# Patient Record
Sex: MALE | Race: WHITE | Employment: UNEMPLOYED | URBAN - NONMETROPOLITAN AREA
[De-identification: names, ages, dates, MRNs, and addresses within clinical notes are randomized per-mention and may not be internally consistent; named-entity substitution may affect disease eponyms.]

---

## 2020-03-05 ENCOUNTER — APPOINTMENT (OUTPATIENT)
Dept: CT IMAGING | Age: 45
DRG: 092 | End: 2020-03-05
Payer: COMMERCIAL

## 2020-03-05 ENCOUNTER — APPOINTMENT (OUTPATIENT)
Dept: MRI IMAGING | Age: 45
DRG: 092 | End: 2020-03-05
Payer: COMMERCIAL

## 2020-03-05 ENCOUNTER — HOSPITAL ENCOUNTER (INPATIENT)
Age: 45
LOS: 11 days | Discharge: NURSING, REHAB OR LONG-TERM CARE | DRG: 092 | End: 2020-03-17
Attending: FAMILY MEDICINE | Admitting: FAMILY MEDICINE
Payer: COMMERCIAL

## 2020-03-05 LAB
ANION GAP SERPL CALCULATED.3IONS-SCNC: 14 MEQ/L (ref 8–16)
BACTERIA: ABNORMAL /HPF
BASOPHILS # BLD: 0.3 %
BASOPHILS ABSOLUTE: 0 THOU/MM3 (ref 0–0.1)
BILIRUBIN URINE: NEGATIVE
BLOOD, URINE: ABNORMAL
BUN BLDV-MCNC: 13 MG/DL (ref 7–22)
C-REACTIVE PROTEIN: 0.19 MG/DL (ref 0–1)
CALCIUM IONIZED: 1.15 MMOL/L (ref 1.12–1.32)
CALCIUM SERPL-MCNC: 9.4 MG/DL (ref 8.5–10.5)
CALCIUM SERPL-MCNC: 9.5 MG/DL (ref 8.5–10.5)
CASTS 2: ABNORMAL /LPF
CASTS UA: ABNORMAL /LPF
CHARACTER, URINE: CLEAR
CHLORIDE BLD-SCNC: 102 MEQ/L (ref 98–111)
CO2: 24 MEQ/L (ref 23–33)
COLOR: YELLOW
CREAT SERPL-MCNC: 0.8 MG/DL (ref 0.4–1.2)
CRYSTALS, UA: ABNORMAL
EOSINOPHIL # BLD: 1 %
EOSINOPHILS ABSOLUTE: 0.1 THOU/MM3 (ref 0–0.4)
EPITHELIAL CELLS, UA: ABNORMAL /HPF
ERYTHROCYTE [DISTWIDTH] IN BLOOD BY AUTOMATED COUNT: 12.3 % (ref 11.5–14.5)
ERYTHROCYTE [DISTWIDTH] IN BLOOD BY AUTOMATED COUNT: 38.3 FL (ref 35–45)
GFR SERPL CREATININE-BSD FRML MDRD: > 90 ML/MIN/1.73M2
GLUCOSE BLD-MCNC: 139 MG/DL (ref 70–108)
GLUCOSE URINE: NEGATIVE MG/DL
HCT VFR BLD CALC: 43.9 % (ref 42–52)
HEMOGLOBIN: 14.4 GM/DL (ref 14–18)
IMMATURE GRANS (ABS): 0.02 THOU/MM3 (ref 0–0.07)
IMMATURE GRANULOCYTES: 0.3 %
KETONES, URINE: NEGATIVE
LEUKOCYTE ESTERASE, URINE: NEGATIVE
LYMPHOCYTES # BLD: 20.6 %
LYMPHOCYTES ABSOLUTE: 1.3 THOU/MM3 (ref 1–4.8)
MAGNESIUM: 2.2 MG/DL (ref 1.6–2.4)
MCH RBC QN AUTO: 28.1 PG (ref 26–33)
MCHC RBC AUTO-ENTMCNC: 32.8 GM/DL (ref 32.2–35.5)
MCV RBC AUTO: 85.7 FL (ref 80–94)
MISCELLANEOUS 2: ABNORMAL
MONOCYTES # BLD: 5.9 %
MONOCYTES ABSOLUTE: 0.4 THOU/MM3 (ref 0.4–1.3)
NITRITE, URINE: NEGATIVE
NUCLEATED RED BLOOD CELLS: 0 /100 WBC
OSMOLALITY CALCULATION: 281.8 MOSMOL/KG (ref 275–300)
PH UA: 7 (ref 5–9)
PLATELET # BLD: 305 THOU/MM3 (ref 130–400)
PMV BLD AUTO: 8.2 FL (ref 9.4–12.4)
POTASSIUM REFLEX MAGNESIUM: 4.1 MEQ/L (ref 3.5–5.2)
PROTEIN UA: NEGATIVE
RBC # BLD: 5.12 MILL/MM3 (ref 4.7–6.1)
RBC URINE: ABNORMAL /HPF
RENAL EPITHELIAL, UA: ABNORMAL
SEDIMENTATION RATE, ERYTHROCYTE: 17 MM/HR (ref 0–10)
SEG NEUTROPHILS: 71.9 %
SEGMENTED NEUTROPHILS ABSOLUTE COUNT: 4.5 THOU/MM3 (ref 1.8–7.7)
SODIUM BLD-SCNC: 140 MEQ/L (ref 135–145)
SPECIFIC GRAVITY, URINE: 1.01 (ref 1–1.03)
UROBILINOGEN, URINE: 1 EU/DL (ref 0–1)
WBC # BLD: 6.3 THOU/MM3 (ref 4.8–10.8)
WBC UA: ABNORMAL /HPF
YEAST: ABNORMAL

## 2020-03-05 PROCEDURE — 72158 MRI LUMBAR SPINE W/O & W/DYE: CPT

## 2020-03-05 PROCEDURE — 81001 URINALYSIS AUTO W/SCOPE: CPT

## 2020-03-05 PROCEDURE — 82330 ASSAY OF CALCIUM: CPT

## 2020-03-05 PROCEDURE — A9579 GAD-BASE MR CONTRAST NOS,1ML: HCPCS | Performed by: FAMILY MEDICINE

## 2020-03-05 PROCEDURE — 96374 THER/PROPH/DIAG INJ IV PUSH: CPT

## 2020-03-05 PROCEDURE — 70450 CT HEAD/BRAIN W/O DYE: CPT

## 2020-03-05 PROCEDURE — 72141 MRI NECK SPINE W/O DYE: CPT

## 2020-03-05 PROCEDURE — 80048 BASIC METABOLIC PNL TOTAL CA: CPT

## 2020-03-05 PROCEDURE — A9579 GAD-BASE MR CONTRAST NOS,1ML: HCPCS | Performed by: NURSE PRACTITIONER

## 2020-03-05 PROCEDURE — 85025 COMPLETE CBC W/AUTO DIFF WBC: CPT

## 2020-03-05 PROCEDURE — 86140 C-REACTIVE PROTEIN: CPT

## 2020-03-05 PROCEDURE — 70551 MRI BRAIN STEM W/O DYE: CPT

## 2020-03-05 PROCEDURE — 99285 EMERGENCY DEPT VISIT HI MDM: CPT

## 2020-03-05 PROCEDURE — 96376 TX/PRO/DX INJ SAME DRUG ADON: CPT

## 2020-03-05 PROCEDURE — 72157 MRI CHEST SPINE W/O & W/DYE: CPT

## 2020-03-05 PROCEDURE — 85651 RBC SED RATE NONAUTOMATED: CPT

## 2020-03-05 PROCEDURE — 36415 COLL VENOUS BLD VENIPUNCTURE: CPT

## 2020-03-05 PROCEDURE — 82310 ASSAY OF CALCIUM: CPT

## 2020-03-05 PROCEDURE — 83735 ASSAY OF MAGNESIUM: CPT

## 2020-03-05 PROCEDURE — 6360000004 HC RX CONTRAST MEDICATION: Performed by: NURSE PRACTITIONER

## 2020-03-05 PROCEDURE — 6360000004 HC RX CONTRAST MEDICATION: Performed by: FAMILY MEDICINE

## 2020-03-05 RX ORDER — METOCLOPRAMIDE 10 MG/1
10 TABLET ORAL 4 TIMES DAILY
Qty: 20 TABLET | Refills: 0 | Status: SHIPPED | OUTPATIENT
Start: 2020-03-05 | End: 2020-03-05

## 2020-03-05 RX ORDER — TRAMADOL HYDROCHLORIDE 50 MG/1
50 TABLET ORAL EVERY 6 HOURS PRN
Qty: 20 TABLET | Refills: 0 | Status: SHIPPED | OUTPATIENT
Start: 2020-03-05 | End: 2020-03-05

## 2020-03-05 RX ADMIN — GADOTERIDOL 15 ML: 279.3 INJECTION, SOLUTION INTRAVENOUS at 21:23

## 2020-03-05 RX ADMIN — GADOTERIDOL 15 ML: 279.3 INJECTION, SOLUTION INTRAVENOUS at 13:14

## 2020-03-05 ASSESSMENT — ENCOUNTER SYMPTOMS
CONSTIPATION: 0
BACK PAIN: 0
ABDOMINAL PAIN: 0
COUGH: 0
DIARRHEA: 0
NAUSEA: 0
SHORTNESS OF BREATH: 0
VOMITING: 0
CHEST TIGHTNESS: 0

## 2020-03-05 NOTE — ED NOTES
Ambulated patient in the hallway with contact assist to help steady and a 2 wheel walker. Gait is slow and \"jerky\". Continues to complain of numbness in legs. Omar Benítez NP in [place] to witness patient ambulating. Returned to cot and laying down. Mom at bedside. Call light in reach.       Lion Kwok RN  03/05/20 9677

## 2020-03-05 NOTE — ED PROVIDER NOTES
be filling with contrast. This could also represent subarachnoid adhesions. 3. Ventral epidural contrast is able to displace the thoracic spinal cord away from the posterior margins of the vertebral bodies. 4. There is subtle mass effect upon the dorsal aspect of the thoracic spinal cord at the T8-9 and T9 levels. **This report has been created using voice recognition software. It may contain minor errors which are inherent in voice recognition technology. **      Final report electronically signed by Dr. Francheska Coley on 3/6/2020 1:25 PM      FL LUMBAR PUNCTURE DIAG   Final Result      Fluoroscopy guided lumbar puncture for CSF collection and contrast injection for CT of the thoracic spine. Final report electronically signed by Dr. Linda Wiley on 3/6/2020 12:34 PM      FL GUIDED FOR SPINE INJECT   Final Result      Fluoroscopy guided lumbar puncture for CSF collection and contrast injection for CT of the thoracic spine. Final report electronically signed by Dr. Linda Wiley on 3/6/2020 12:34 PM      FL MYELOGRAM THORACIC VIA LUMB INJ S&I   Final Result      Fluoroscopy guided lumbar puncture for CSF collection and contrast injection for CT of the thoracic spine. Final report electronically signed by Dr. Linda Wiley on 3/6/2020 12:34 PM      XR CHEST PORTABLE   Final Result      No acute cardiopulmonary disease. **This report has been created using voice recognition software. It may contain minor errors which are inherent in voice recognition technology. **      Final report electronically signed by Dr. Miryam Trinh on 3/6/2020 3:24 AM      MRI BRAIN WO CONTRAST   Final Result    Normal MRI of the brain. **This report has been created using voice recognition software. It may contain minor errors which are inherent in voice recognition technology. **      Final report electronically signed by Dr. Francheska Coley on 3/6/2020 9:31 AM       [address] CONTRAST   Final Result          1. Moderate severity spinal canal stenosis at the C3-4, C4-5 and C5-6 levels due to posterior disc osteophyte complexes. 2. Severe bilateral foraminal stenosis at the C5-6 level. 3. Normal signal in the cervical spinal cord. **This report has been created using voice recognition software. It may contain minor errors which are inherent in voice recognition technology. **      Final report electronically signed by Dr. Francheska Coley on 3/6/2020 9:35 AM      MRI THORACIC SPINE W WO CONTRAST   Final Result       Mass effect upon the thoracic spinal cord at the T7-8 through T9 levels. The thoracic spinal cord is flattened against the ventral spinal canal at the T8-9 level. There is abnormal cord signal consistent with cord edema versus myelomalacia. Just below    this, there is a cystic abnormality within the cord. This can represent a syrinx. There is a nodule of enhancement. The differential diagnosis includes a cystic mass in the thoracic spinal cord with enhancing nodule however, this does not explain the    mass effect upon the dorsal aspect of the cord. Alternatively, this could be and arachnoid cyst dorsal to the thoracic cord compressing the cord. This could also represent subarachnoid adhesions distorting the cord. **This report has been created using voice recognition software. It may contain minor errors which are inherent in voice recognition technology. **      Final report electronically signed by Dr. Francheska Coley on 3/6/2020 9:51 AM      MRI [address]   Final Result       1. Abnormal marrow signal in the superior endplate of L5 for. This is on the right. The pattern and distribution is most consistent with an acute fracture. This is most likely secondary to underlying osteopenia process if the patient does not have a    history of trauma. Clinical correlation is recommended.    2. No evidence of significant spinal canal stenosis following components:    Glucose 174 (*)     BUN 26 (*)     All other components within normal limits   CBC - Abnormal; Notable for the following components:    WBC 12.8 (*)     MPV 8.4 (*)     All other components within normal limits   BASIC METABOLIC PANEL - Abnormal; Notable for the following components:    Glucose 149 (*)     BUN 24 (*)     All other components within normal limits   CBC - Abnormal; Notable for the following components:    WBC 14.1 (*)     MPV 8.9 (*)     All other components within normal limits   BASIC METABOLIC PANEL - Abnormal; Notable for the following components:    Glucose 155 (*)     BUN 26 (*)     All other components within normal limits   CBC - Abnormal; Notable for the following components:    WBC 17.0 (*)     Platelets 359 (*)     MPV 8.9 (*)     All other components within normal limits   VITAMIN D 25 HYDROXY - Abnormal; Notable for the following components:    Vit D, 25-Hydroxy 24 (*)     All other components within normal limits   CBC WITH AUTO DIFFERENTIAL - Abnormal; Notable for the following components:    WBC 19.1 (*)     Platelets 702 (*)     MPV 8.9 (*)     Segs Absolute 16.0 (*)     Monocytes Absolute 1.4 (*)     Immature Grans (Abs) 0.60 (*)     All other components within normal limits   BASIC METABOLIC PANEL - Abnormal; Notable for the following components:    Sodium 132 (*)     Chloride 94 (*)     Glucose 128 (*)     BUN 27 (*)     All other components within normal limits   POCT GLUCOSE - Abnormal; Notable for the following components:    POC Glucose 133 (*)     All other components within normal limits   POCT GLUCOSE - Abnormal; Notable for the following components:    POC Glucose 170 (*)     All other components within normal limits   POCT GLUCOSE - Abnormal; Notable for the following components:    POC Glucose 159 (*)     All other components within normal limits   POCT GLUCOSE - Abnormal; Notable for the following components:    POC Glucose 240 (*)     All other components within normal limits   POCT GLUCOSE - Abnormal; Notable for the following components:    POC Glucose 138 (*)     All other components within normal limits   POCT GLUCOSE - Abnormal; Notable for the following components:    POC Glucose 126 (*)     All other components within normal limits   POCT GLUCOSE - Abnormal; Notable for the following components:    POC Glucose 134 (*)     All other components within normal limits   POCT GLUCOSE - Abnormal; Notable for the following components:    POC Glucose 200 (*)     All other components within normal limits   POCT GLUCOSE - Abnormal; Notable for the following components:    POC Glucose 143 (*)     All other components within normal limits   POCT GLUCOSE - Abnormal; Notable for the following components:    POC Glucose 118 (*)     All other components within normal limits   POCT GLUCOSE - Abnormal; Notable for the following components:    POC Glucose 131 (*)     All other components within normal limits   POCT GLUCOSE - Abnormal; Notable for the following components:    POC Glucose 169 (*)     All other components within normal limits   POCT GLUCOSE - Abnormal; Notable for the following components:    POC Glucose 132 (*)     All other components within normal limits   POCT GLUCOSE - Abnormal; Notable for the following components:    POC Glucose 122 (*)     All other components within normal limits   POCT GLUCOSE - Abnormal; Notable for the following components:    POC Glucose 159 (*)     All other components within normal limits   POCT GLUCOSE - Abnormal; Notable for the following components:    POC Glucose 214 (*)     All other components within normal limits   POCT GLUCOSE - Abnormal; Notable for the following components:    POC Glucose 135 (*)     All other components within normal limits   POCT GLUCOSE - Abnormal; Notable for the following components:    POC Glucose 128 (*)     All other components within normal limits   POCT GLUCOSE - Abnormal; Notable for cerebrospinal fluid       Site:           Current Antibiotics: not stated   MENINGITIS ENCEPHALITIS PANEL CSF, MOLECULAR   C-REACTIVE PROTEIN   ANION GAP   GLOMERULAR FILTRATION RATE, ESTIMATED   OSMOLALITY   CALCIUM   CALCIUM, IONIZED   MAGNESIUM   PROTIME-INR   HEMOGLOBIN A1C   VITAMIN B12 & FOLATE   TSH WITH REFLEX   ANION GAP   GLOMERULAR FILTRATION RATE, ESTIMATED   OSMOLALITY   PROTEIN, CSF   ANION GAP   GLOMERULAR FILTRATION RATE, ESTIMATED   HEMOGLOBIN A1C   ANION GAP   GLOMERULAR FILTRATION RATE, ESTIMATED   CYTOLOGY, NON-GYN    Narrative:     [facility]      [place], [place]                  [phone]  [address] [facility], [address]                                                      PROC: 2020  [facility]                                    RECV: 2020  [address]                                    RPTD: 03/10/2020  [facility], [address]                      MRN:  [id number]    LOC: [id number]                      ACCT: [de-identified]  SEX: M                      : 1975  AGE: 53 Y                         PATHOLOGY REPORT                      ATTN: Elizabeth Lucas                      REQ: MILADYS BEST       Copies To:   Micaela Macedo DR; Elizabeth Lucas      Clinical Information:  DISEASE OF THE SPINAL CORD, UNSPECIFIED          FINAL RESULTS:     Mononuclear cells present, with poor preservation. Note: The cytology is limited by poor preservation and staining       Of the specimen. These are mononuclear cells, favored to be       Inflammatory. Please correlate with cell count differential.        SOURCE:  A) BODY FLUID, SPINAL FLUID    Source Description:                 Materials Prepared & Examined:  Volume. ........... Rocio Silk 13 ml           Monolayers. ............... Rocio Silk 1  Consistency. .. Rocio Silk Rocio Silk Rocio Silk Thin  Other. ................  3ml spinal  fluid added to fixative          Pathologist: TITO   CPT: 83141 x 1 <Sign Out Dr. Mona Coleman M.D., F.C.A.P. Accessioning and Transcription services performed at:  [facility]/ [facility]   Printed on:  3/10/2020  [place] [address], [address] Tasha Colon  Original print date: 03/10/2020  Technical service performed at [facility]-[address], [address] [facility], ESTIMATED   ANION [address] [facility], ESTIMATED   SCAN OF BLOOD SMEAR   CYTOLOGY, NON-GYN       EMERGENCY DEPARTMENT COURSE:   Vitals:    Vitals:    03/17/20 0322 03/17/20 0815 03/17/20 1200 03/17/20 1256   BP: 109/64 127/68 130/81    Pulse: 55 74 59    Resp: 16 16 16    Temp: 97.7 °F (36.5 °C) 97.9 °F (36.6 °C) 96.9 °F (36.1 °C)    TempSrc: Oral Oral Oral    SpO2: 94% 96% 96% 96%   Weight:       Height:           11:08 AM: The patient was seen and evaluated. MDM:  Patient seen and evaluated in the emergency department after being transferred to the ED from [facility] for further evaluation of worsening neurologic status. Appropriate labs were ordered, MRI lumbar spine was completed with no acute findings. After initial work-up patient was ambulated in the hallway by nursing staff and had significant difficulty and gross ataxia with ambulation. At this point I did not feel that patient was stable to discharge home, contacted hospitalist and neurology for consult and possible admission. At this time patient did not meet criteria for admission, I staffed the patient with Dr. Johan Maravilla who also saw the patient, please see his note for details. On reevaluation, patient was found to have hyperactive deep tendon reflexes in lower extremities as well as bilateral clonus.   MRI of head cervical and thoracic spine were also ordered, delayed due to patient having already been injected with contrast.  At the end of my shift, patient was pending MRI results and care was signed out to Dr. Dina Gupta. CRITICAL CARE:   None    CONSULTS:  11:35 - Contacted Garrison Garcia at [facility] to determine appointment this am with pt; reported that he wanted pt to be seen by neurology at [facility] for expressive aphasia and worsening numbness in the lower legs since seeing him in December. 14:10 - Contacted Dr Curtis Hansen (neurology) for potential consult; following case presentation Dr Curtis Hansen feel spt should be stable enough to be seen as an outpatient  14:45-consulted Minerva Duggan with hospitalist service for possible admission for further evaluation as patient is unable to ambulate  15:15-Dr. Eros Pickett attending to evaluate patient for further work-up    PROCEDURES:  None    FINAL IMPRESSION      1. Ataxia    2. Spinal cord lesion ([facility] [address]          DISPOSITION/PLAN   Admit    PATIENT REFERRED TO:  No follow-up provider specified. DISCHARGE MEDICATIONS:  Discharge Medication List as of 3/17/2020  4:22 PM          (Please note that portions of this note were completed with a voice recognition program.  Efforts were made to edit the dictations but occasionally words are mis-transcribed.)    The patient was given an opportunity to see the Emergency Attending. The patient voiced understanding that I was a Mid-LevelProvider and was in agreement with being seen independently by myself.           Foreign Blas, ISHAAN - CNP  03/25/20 9231

## 2020-03-05 NOTE — ED NOTES
States he was sent over from [address] [place] [address] for to see a neurologist and get blood work done. PT has had nerve problems with his knee for the past 6 months. Pt states he is not having any pain at the moment. Pt was noticeably unstable when getting into the bed.        Susan Christian  03/05/20 1016

## 2020-03-05 NOTE — ED NOTES
Spoke with MRI tech on phone. Patient has to wait 6 hours before can have another MRI completed due to receiving contrast.  Dr Holly Purvis updated.        Maria Teresa Srinivasan RN  03/05/20 8603

## 2020-03-05 NOTE — ED NOTES
Resting on cot at this time watching TV. Dr Holly Purvis in room to assess. Patient states his legs are \"sore\". Elevated on bed at this time. Call light in reach. Denies needs at this time.       Onur Lopez RN  03/05/20 2070

## 2020-03-05 NOTE — ED PROVIDER NOTES
when I reviewed the films. Radiology reading is pending    At this point will pass the case to  at change of shift. Dr. Ashlyn Silva is given the clinical history and he will make disposition once thoracic spine MRI results are known. 1. Ataxia          DISPOSITION/PLAN  PATIENT REFERRED TO:  No follow-up provider specified.   DISCHARGE MEDICATIONS:  Current Discharge Medication List            MD Gloria Rodriguez MD  03/05/20 3497

## 2020-03-06 ENCOUNTER — APPOINTMENT (OUTPATIENT)
Dept: GENERAL RADIOLOGY | Age: 45
DRG: 092 | End: 2020-03-06
Payer: COMMERCIAL

## 2020-03-06 ENCOUNTER — APPOINTMENT (OUTPATIENT)
Dept: CT IMAGING | Age: 45
DRG: 092 | End: 2020-03-06
Payer: COMMERCIAL

## 2020-03-06 PROBLEM — R29.898 LEG WEAKNESS, BILATERAL: Status: ACTIVE | Noted: 2020-03-06

## 2020-03-06 LAB
ANION GAP SERPL CALCULATED.3IONS-SCNC: 16 MEQ/L (ref 8–16)
AVERAGE GLUCOSE: 123 MG/DL (ref 70–126)
BUN BLDV-MCNC: 14 MG/DL (ref 7–22)
CALCIUM SERPL-MCNC: 9.8 MG/DL (ref 8.5–10.5)
CHARACTER, CSF: ABNORMAL
CHLORIDE BLD-SCNC: 103 MEQ/L (ref 98–111)
CO2: 23 MEQ/L (ref 23–33)
COLOR CSF: COLORLESS
CREAT SERPL-MCNC: 0.8 MG/DL (ref 0.4–1.2)
CRYPTOCOCCUS NEOFORMANS/GATTI CSF FILM ARR.: NOT DETECTED
CYTOMEGALOVIRUS (CMV) CSF FILM ARRAY: NOT DETECTED
EKG ATRIAL RATE: 63 BPM
EKG P AXIS: 18 DEGREES
EKG P-R INTERVAL: 112 MS
EKG Q-T INTERVAL: 392 MS
EKG QRS DURATION: 92 MS
EKG QTC CALCULATION (BAZETT): 401 MS
EKG R AXIS: 60 DEGREES
EKG T AXIS: 19 DEGREES
EKG VENTRICULAR RATE: 63 BPM
ENTEROVIRUS DETECTION PCR: NOT DETECTED
ERYTHROCYTE [DISTWIDTH] IN BLOOD BY AUTOMATED COUNT: 12.4 % (ref 11.5–14.5)
ERYTHROCYTE [DISTWIDTH] IN BLOOD BY AUTOMATED COUNT: 39.3 FL (ref 35–45)
ESCHERICHIA COLI K1 CSF FILM ARRAY: NOT DETECTED
FOLATE: 12 NG/ML (ref 4.8–24.2)
GFR SERPL CREATININE-BSD FRML MDRD: > 90 ML/MIN/1.73M2
GLUCOSE BLD-MCNC: 133 MG/DL (ref 70–108)
GLUCOSE BLD-MCNC: 138 MG/DL (ref 70–108)
GLUCOSE BLD-MCNC: 159 MG/DL (ref 70–108)
GLUCOSE BLD-MCNC: 170 MG/DL (ref 70–108)
GLUCOSE BLD-MCNC: 240 MG/DL (ref 70–108)
GLUCOSE, CSF: 82 MG/DL (ref 40–80)
HAEMOPHILUS INFLUENZA CSF FILM ARRAY: NOT DETECTED
HBA1C MFR BLD: 6.1 % (ref 4.4–6.4)
HCT VFR BLD CALC: 47.1 % (ref 42–52)
HEMOGLOBIN: 15.4 GM/DL (ref 14–18)
HHV-6 (HERPESVIRUS 6) CSF FILM ARRAY: NOT DETECTED
HSV-1 CSF FILM ARRAY: NOT DETECTED
HSV-2 CSF FILM ARRAY: NOT DETECTED
INR BLD: 1.04 (ref 0.85–1.13)
LISTERIA MONOCYTOGENES CSF FILM ARRAY: NOT DETECTED
LYMPHS CSF: 23 % (ref 0–90)
MCH RBC QN AUTO: 28.5 PG (ref 26–33)
MCHC RBC AUTO-ENTMCNC: 32.7 GM/DL (ref 32.2–35.5)
MCV RBC AUTO: 87.2 FL (ref 80–94)
MONOCYTE, CSF: 6 % (ref 0–45)
NEISSERIA MENIGITIDIS CSF FILM ARRAY: NOT DETECTED
OSMOLALITY CALCULATION: 285.8 MOSMOL/KG (ref 275–300)
PARECHOVIRUS CSF FILM ARRAY: NOT DETECTED
PATHOLOGIST REVIEW: ABNORMAL
PLATELET # BLD: 286 THOU/MM3 (ref 130–400)
PMV BLD AUTO: 8.6 FL (ref 9.4–12.4)
POTASSIUM REFLEX MAGNESIUM: 4.7 MEQ/L (ref 3.5–5.2)
PROTEIN CSF: 49 MG/DL (ref 12–60)
RBC # BLD: 5.4 MILL/MM3 (ref 4.7–6.1)
RBC CSF: 726 /CUMM
SEGS, CSF: 71 % (ref 0–6)
SODIUM BLD-SCNC: 142 MEQ/L (ref 135–145)
STREPTOCOCCUS AGALACTIAE CSF FILM ARRAY: NOT DETECTED
STREPTOCOCCUS PNEUMONIAE CSF FILM ARRAY: NOT DETECTED
TOTAL NUCLEATED CELLS CSF: 19 /CUMM (ref 0–5)
TSH SERPL DL<=0.05 MIU/L-ACNC: 1.44 UIU/ML (ref 0.4–4.2)
TUBE VOLUME RECEIVED CSF: 4 ML
VARICELLA-ZOSTER, PCR: NOT DETECTED
VITAMIN B-12: 543 PG/ML (ref 211–911)
WBC # BLD: 10.5 THOU/MM3 (ref 4.8–10.8)

## 2020-03-06 PROCEDURE — 99223 1ST HOSP IP/OBS HIGH 75: CPT | Performed by: FAMILY MEDICINE

## 2020-03-06 PROCEDURE — 6370000000 HC RX 637 (ALT 250 FOR IP): Performed by: FAMILY MEDICINE

## 2020-03-06 PROCEDURE — 6360000004 HC RX CONTRAST MEDICATION: Performed by: PSYCHIATRY & NEUROLOGY

## 2020-03-06 PROCEDURE — 71045 X-RAY EXAM CHEST 1 VIEW: CPT

## 2020-03-06 PROCEDURE — 87075 CULTR BACTERIA EXCEPT BLOOD: CPT

## 2020-03-06 PROCEDURE — 36415 COLL VENOUS BLD VENIPUNCTURE: CPT

## 2020-03-06 PROCEDURE — 87205 SMEAR GRAM STAIN: CPT

## 2020-03-06 PROCEDURE — 93010 ELECTROCARDIOGRAM REPORT: CPT | Performed by: NUCLEAR MEDICINE

## 2020-03-06 PROCEDURE — G0378 HOSPITAL OBSERVATION PER HR: HCPCS

## 2020-03-06 PROCEDURE — 89051 BODY FLUID CELL COUNT: CPT

## 2020-03-06 PROCEDURE — 62303 MYELOGRAPHY LUMBAR INJECTION: CPT

## 2020-03-06 PROCEDURE — 99223 1ST HOSP IP/OBS HIGH 75: CPT | Performed by: NEUROLOGICAL SURGERY

## 2020-03-06 PROCEDURE — 82746 ASSAY OF FOLIC ACID SERUM: CPT

## 2020-03-06 PROCEDURE — 6360000002 HC RX W HCPCS: Performed by: FAMILY MEDICINE

## 2020-03-06 PROCEDURE — 80048 BASIC METABOLIC PNL TOTAL CA: CPT

## 2020-03-06 PROCEDURE — B01B1ZZ FLUOROSCOPY OF SPINAL CORD USING LOW OSMOLAR CONTRAST: ICD-10-PCS | Performed by: RADIOLOGY

## 2020-03-06 PROCEDURE — 93005 ELECTROCARDIOGRAM TRACING: CPT | Performed by: FAMILY MEDICINE

## 2020-03-06 PROCEDURE — 82607 VITAMIN B-12: CPT

## 2020-03-06 PROCEDURE — 2060000000 HC ICU INTERMEDIATE R&B

## 2020-03-06 PROCEDURE — 84157 ASSAY OF PROTEIN OTHER: CPT

## 2020-03-06 PROCEDURE — 82948 REAGENT STRIP/BLOOD GLUCOSE: CPT

## 2020-03-06 PROCEDURE — 62328 DX LMBR SPI PNXR W/FLUOR/CT: CPT

## 2020-03-06 PROCEDURE — 2580000003 HC RX 258: Performed by: FAMILY MEDICINE

## 2020-03-06 PROCEDURE — 83036 HEMOGLOBIN GLYCOSYLATED A1C: CPT

## 2020-03-06 PROCEDURE — 77003 FLUOROGUIDE FOR SPINE INJECT: CPT

## 2020-03-06 PROCEDURE — 85027 COMPLETE CBC AUTOMATED: CPT

## 2020-03-06 PROCEDURE — 94761 N-INVAS EAR/PLS OXIMETRY MLT: CPT

## 2020-03-06 PROCEDURE — 85610 PROTHROMBIN TIME: CPT

## 2020-03-06 PROCEDURE — 72129 CT CHEST SPINE W/DYE: CPT

## 2020-03-06 PROCEDURE — 009U3ZX DRAINAGE OF SPINAL CANAL, PERCUTANEOUS APPROACH, DIAGNOSTIC: ICD-10-PCS | Performed by: RADIOLOGY

## 2020-03-06 PROCEDURE — 84443 ASSAY THYROID STIM HORMONE: CPT

## 2020-03-06 PROCEDURE — 87798 DETECT AGENT NOS DNA AMP: CPT

## 2020-03-06 PROCEDURE — 2709999900 HC NON-CHARGEABLE SUPPLY

## 2020-03-06 PROCEDURE — 99252 IP/OBS CONSLTJ NEW/EST SF 35: CPT | Performed by: PSYCHIATRY & NEUROLOGY

## 2020-03-06 PROCEDURE — 82945 GLUCOSE OTHER FLUID: CPT

## 2020-03-06 PROCEDURE — 88112 CYTOPATH CELL ENHANCE TECH: CPT

## 2020-03-06 RX ORDER — PROMETHAZINE HYDROCHLORIDE 25 MG/1
12.5 TABLET ORAL EVERY 6 HOURS PRN
Status: DISCONTINUED | OUTPATIENT
Start: 2020-03-06 | End: 2020-03-17 | Stop reason: HOSPADM

## 2020-03-06 RX ORDER — ONDANSETRON 2 MG/ML
4 INJECTION INTRAMUSCULAR; INTRAVENOUS EVERY 6 HOURS PRN
Status: DISCONTINUED | OUTPATIENT
Start: 2020-03-06 | End: 2020-03-17 | Stop reason: HOSPADM

## 2020-03-06 RX ORDER — DEXAMETHASONE SODIUM PHOSPHATE 4 MG/ML
4 INJECTION, SOLUTION INTRA-ARTICULAR; INTRALESIONAL; INTRAMUSCULAR; INTRAVENOUS; SOFT TISSUE EVERY 6 HOURS
Status: DISCONTINUED | OUTPATIENT
Start: 2020-03-06 | End: 2020-03-17 | Stop reason: HOSPADM

## 2020-03-06 RX ORDER — DEXAMETHASONE SODIUM PHOSPHATE 4 MG/ML
10 INJECTION, SOLUTION INTRA-ARTICULAR; INTRALESIONAL; INTRAMUSCULAR; INTRAVENOUS; SOFT TISSUE ONCE
Status: COMPLETED | OUTPATIENT
Start: 2020-03-06 | End: 2020-03-06

## 2020-03-06 RX ORDER — SODIUM CHLORIDE 0.9 % (FLUSH) 0.9 %
10 SYRINGE (ML) INJECTION PRN
Status: DISCONTINUED | OUTPATIENT
Start: 2020-03-06 | End: 2020-03-17 | Stop reason: HOSPADM

## 2020-03-06 RX ORDER — ALBUTEROL SULFATE 90 UG/1
2 AEROSOL, METERED RESPIRATORY (INHALATION) EVERY 6 HOURS PRN
Status: DISCONTINUED | OUTPATIENT
Start: 2020-03-06 | End: 2020-03-17 | Stop reason: HOSPADM

## 2020-03-06 RX ORDER — SODIUM CHLORIDE 0.9 % (FLUSH) 0.9 %
10 SYRINGE (ML) INJECTION EVERY 12 HOURS SCHEDULED
Status: DISCONTINUED | OUTPATIENT
Start: 2020-03-06 | End: 2020-03-17 | Stop reason: HOSPADM

## 2020-03-06 RX ORDER — ACETAMINOPHEN 650 MG/1
650 SUPPOSITORY RECTAL EVERY 6 HOURS PRN
Status: DISCONTINUED | OUTPATIENT
Start: 2020-03-06 | End: 2020-03-17 | Stop reason: HOSPADM

## 2020-03-06 RX ORDER — POLYETHYLENE GLYCOL 3350 17 G/17G
17 POWDER, FOR SOLUTION ORAL DAILY PRN
Status: DISCONTINUED | OUTPATIENT
Start: 2020-03-06 | End: 2020-03-17 | Stop reason: HOSPADM

## 2020-03-06 RX ORDER — ACETAMINOPHEN 325 MG/1
650 TABLET ORAL EVERY 6 HOURS PRN
Status: DISCONTINUED | OUTPATIENT
Start: 2020-03-06 | End: 2020-03-17 | Stop reason: HOSPADM

## 2020-03-06 RX ADMIN — IOHEXOL 10 ML: 240 INJECTION, SOLUTION INTRATHECAL; INTRAVASCULAR; INTRAVENOUS; ORAL at 12:28

## 2020-03-06 RX ADMIN — DEXAMETHASONE SODIUM PHOSPHATE 10 MG: 4 INJECTION, SOLUTION INTRA-ARTICULAR; INTRALESIONAL; INTRAMUSCULAR; INTRAVENOUS; SOFT TISSUE at 02:44

## 2020-03-06 RX ADMIN — ACETAMINOPHEN 650 MG: 325 TABLET ORAL at 06:30

## 2020-03-06 RX ADMIN — Medication 10 ML: at 21:24

## 2020-03-06 RX ADMIN — DEXAMETHASONE SODIUM PHOSPHATE 4 MG: 4 INJECTION, SOLUTION INTRAMUSCULAR; INTRAVENOUS at 21:23

## 2020-03-06 RX ADMIN — Medication 10 ML: at 15:48

## 2020-03-06 RX ADMIN — DEXAMETHASONE SODIUM PHOSPHATE 4 MG: 4 INJECTION, SOLUTION INTRAMUSCULAR; INTRAVENOUS at 15:48

## 2020-03-06 ASSESSMENT — PAIN SCALES - GENERAL
PAINLEVEL_OUTOF10: 0
PAINLEVEL_OUTOF10: 0
PAINLEVEL_OUTOF10: 10
PAINLEVEL_OUTOF10: 0

## 2020-03-06 NOTE — ED NOTES
Dr Theresia Aschoff in room assessing pt. Pt will be transferred to floor after.      Dia Quiroz RN  03/06/20 7412

## 2020-03-06 NOTE — CONSULTS
head of the bed elevated 30 degrees, comfortably without significant complaints other than lower extremity numbness. Zickel exam the patient is hyperreflexive in the lower extremities bilaterally with ankle clonus bilateral.  He demonstrates 5/5 strength for lower extremities with excellent effort some mild weakness for dorsiflexion and extensor hallux longus estimated at 4/5 with left iliopsoas also estimated at 4/5. He was intact to pinprick sensation for the right lower extremity but demonstrates noticeable decrease from the hip to the ankle with some normalizing of pinprick noted for the foot. Thoracic 9 distribution noted. Ulcerative for clonus and hyperreflexia. There is no tenderness to palpation of the cervical, thoracic or lumbar spine. An MRI of the thoracic spine imaged on 3/5/2020 reveals a cystic lesion within the spinal cord at T8-9 level with a moderate broad-based disc protrusion and syrinx extending both inferiorly and superiorly to the cystic focus. The cystic lesion measures 0.4 x 0.97 cm. ROS:    Review of Systems  Constitutional: Positive for activity change. Negative for chills and fever. Respiratory: Negative for cough, chest tightness and shortness of breath. Cardiovascular: Negative for chest pain and leg swelling. Gastrointestinal: Negative for abdominal pain, constipation, diarrhea, nausea and vomiting. Genitourinary: Positive for decreased urine volume, difficulty urinating (endorses incontinence at nighttime; \"waking up wet\"), dysuria and urgency. Negative for flank pain, frequency, hematuria, penile pain and penile swelling. Musculoskeletal: Positive for gait problem. Negative for arthralgias, back pain, joint swelling, neck pain and neck stiffness. Neurological: Positive for dizziness, weakness and numbness. Negative for light-headedness and headaches.      PROBLEM LIST:  Patient Active Problem List   Diagnosis    Leg weakness, bilateral       MEDICATIONS: Prior to Admission medications    Medication Sig Start Date End Date Taking? Authorizing Provider   Acetaminophen 325 MG CAPS Take 2 tablets by mouth   Yes Historical Provider, MD   albuterol (PROVENTIL) (2.5 MG/3ML) 0.083% nebulizer solution Take 3 mLs by nebulization every 6 hours as needed for Wheezing or Shortness of Breath. 7/22/12   Nikki Vásquez MD   albuterol (PROVENTIL HFA) 108 (90 BASE) MCG/ACT inhaler Inhale 2 puffs into the lungs every 6 hours as needed for Wheezing. 7/20/12   ISHAAN Motta CNP   potassium chloride SA (K-DUR;KLOR-CON) 10 MEQ tablet Take 1 tablet by mouth daily.  7/20/12   ISHAAN Motta CNP       Current Facility-Administered Medications   Medication Dose Route Frequency Provider Last Rate Last Dose    albuterol sulfate  (90 Base) MCG/ACT inhaler 2 puff  2 puff Inhalation Q6H PRN Lorenza Madrigal MD        sodium chloride flush 0.9 % injection 10 mL  10 mL Intravenous 2 times per day Romi Gardner MD        sodium chloride flush 0.9 % injection 10 mL  10 mL Intravenous PRN Lorenza Madrigal MD        acetaminophen (TYLENOL) tablet 650 mg  650 mg Oral Q6H PRN Lorenza Madrigal MD   650 mg at 03/06/20 0630    Or    acetaminophen (TYLENOL) suppository 650 mg  650 mg Rectal Q6H PRN Lorenza Madrigal MD        polyethylene glycol (GLYCOLAX) packet 17 g  17 g Oral Daily PRN Lorenza Madrigal MD        promethazine (PHENERGAN) tablet 12.5 mg  12.5 mg Oral Q6H PRN Lorenza Madrigal MD        Or    ondansetron (ZOFRAN) injection 4 mg  4 mg Intravenous Q6H PRN Lorenza Madrigal MD        Dexamethasone Sodium Phosphate injection 4 mg  4 mg Intravenous Q6H Lorenza Madrigal MD         Current Outpatient Medications   Medication Sig Dispense Refill    Acetaminophen 325 MG CAPS Take 2 tablets by mouth      albuterol (PROVENTIL) (2.5 MG/3ML) 0.083% nebulizer solution Take 3 mLs by nebulization every 6 hours as needed for Wheezing person, place, and time. Cranial Nerves: No facial asymmetry. Sensory: Sensory deficit present. Motor: Weakness present. No pronator drift. Gait: Gait abnormal.      Deep Tendon Reflexes: Reflexes abnormal.      Comments: Over 5 strength for lower extremities bilaterally with exception of left iliopsoas at -4/5 and some dorsiflexion weakness left greater than right at 4/5. Significant decrease for pinprick sensation for left lower extremity from the hip to the ankle with some improvement involving the left foot. Positive ankle clonus left lower extremity. Psychiatric:         Mood and Affect: Mood normal.         Behavior: Behavior normal.         Thought Content:  Thought content normal.         Judgment: Judgment normal.             Carlos Clemons PA-C  Electronically signed 3/6/2020 at 0830

## 2020-03-06 NOTE — ED NOTES
Pt VS reassessed and Respiraitons regular. Pt states that his pain is very bad and it hurts a lot.  Will continue to monitor      Scott Medrano RN  03/06/20 7033

## 2020-03-06 NOTE — ED NOTES
Pt VS reassessed and respirations regular. Dr. Ilda Reyes at bedside to talk with pt.  Will continue to monitor      Shlomo Myles RN  03/06/20 7208

## 2020-03-06 NOTE — ED NOTES
Pt resting in bed at this time. VS reassessed and respirations regular.  Will continue to monitor      Smita Rosas RN  03/06/20 023

## 2020-03-06 NOTE — H&P
Allergies:   Patient has no known allergies. Social History:   Socioeconomic History    Marital status: Single   Tobacco Use    Smoking status: Never Smoker   Substance and Sexual Activity    Alcohol use: No    Drug use: No     Family History:    Reviewed / no significant PFHx. REVIEW OF SYSTEMS:  A 14-point ROS was obtained and negative, with the exception of pertinent positives as stated in the HPI. PHYSICAL EXAM:  Vitals:    03/05/20 2243 03/05/20 2330 03/06/20 0039 03/06/20 0131   BP:  (!) 142/94 126/88 119/80   Pulse: 79 95 90 80   Resp: 16 17 18 19   Temp:       SpO2: 98% 95% 97% 95%   Weight:       Height:       RA    General appearance: Alert / well-appearing  male. Pleasant. Cooperative. NAD. HEENT: Normocephalic / atraumatic. PERRL. EOMI. Conjunctivae appear normal.  Neck: Supple. No JVD. Respiratory: Normal respiratory effort on RA. CTAB. No wheezes / rales / rhonchi. Cardiovascular: RRR. Normal S1/S2. No murmurs / rubs / gallops. Abdomen: Soft / non-tender / non-distended. BS present. Musculoskeletal: No cyanosis or LE edema.   - Back exam: No visible gross deformities or asymmetries. No midline tenderness to palpation or bony step-offs. No paraspinal tenderness to palpation. Skin: Warm / dry. No pallor or diaphoresis. Neurologic: A/O x 3. Speech normal. Answers questions appropriately. CN intact. - Bilateral upper extremities: Strength/sensation normal. 2+ reflexes. - Bilateral lower extremities: Sensation is absent to soft touch and pinprick. 4/5 strength. + Hyperreflexia. Clonus present. Wiggles toes without issue.  - Demonstrates T9 sensory deficit on exam  Psychiatric: Thought content / judgment / insight appear appropriate. Capillary refill: Brisk bilaterally. Peripheral pulses: +2 bilaterally.     Labs:   Results for orders placed or performed during the hospital encounter of 03/05/20   CBC Auto Differential   Result Value Ref Range    WBC 6.3 4.8 - 10.8 STRAW-YELL    Character, Urine CLEAR CLEAR-SL C    RBC, UA 5-10 0-2/hpf /hpf    WBC, UA NONE SEEN 0-4/hpf /hpf    Epithelial Cells, UA NONE SEEN 3-5/hpf /hpf    Bacteria, UA NONE SEEN FEW/NONE S /hpf    Casts UA NONE SEEN NONE SEEN /lpf    Crystals, UA NONE SEEN NONE SEEN    Renal Epithelial, UA NONE SEEN NONE SEEN    Yeast, UA NONE SEEN NONE SEEN    CASTS 2 NONE SEEN NONE SEEN /lpf    MISCELLANEOUS 2 NONE SEEN    Calcium   Result Value Ref Range    Calcium 9.5 8.5 - 10.5 mg/dL   Calcium, Ionized   Result Value Ref Range    Calcium, Ion 1.15 1.12 - 1.32 mmol/L   Magnesium   Result Value Ref Range    Magnesium 2.2 1.6 - 2.4 mg/dL     EKG: none performed in the ED    Radiology:    Ct Head Wo Contrast  Result Date: 3/5/2020  PROCEDURE: CT HEAD WO CONTRAST CLINICAL INFORMATION: numbness below waist, worsening expressive aphasia, Trauma. COMPARISON: No prior study. TECHNIQUE: Noncontrast 5 mm axial images were obtained through the brain. All CT scans at this facility use dose modulation, iterative reconstruction, and/or weight-based dosing when appropriate to reduce radiation dose to as low as reasonably achievable. FINDINGS: There is no hemorrhage. There are no intra-or extra-axial collections. There is no hydrocephalus, midline shift or mass effect. The gray-white matter differentiation is preserved. The paranasal sinuses and mastoid air cells are normally aerated. There is no suspicious calvarial abnormality. No evidence of an acute process. **This report has been created using voice recognition software. It may contain minor errors which are inherent in voice recognition technology. ** Final report electronically signed by Dr. Ag Noyola on 3/5/2020 12:13 PM      Mri Cervical Spine Wo Contrast  Result Date: 3/5/2020  PROCEDURE: MRI CERVICAL SPINE WO CONTRAST  TECHNIQUE:  Multiplane multiecho imaging obtained of the cervical spine without intravenous contrast.  HISTORY: ataxia; bilateral ankle clonus is electronically signed by Roseanne Wiseman MD., March 6 2020 12:10:38 AM ET =============== PRELIMINARY RESULT ADDENDUM ENDS ================== PROCEDURE: MRI THORACIC SPINE W WO CONTRAST  TECHNIQUE:    HISTORY: ataxia; bilateral ankle clonus  COMPARISONS:  FINDINGS:  There is a cystic lesion within the spinal cord at T8-T9 level results in some cord anomaly at this level and is minimally expansile. The cord approximately T8-T9 level where there is a moderate broad-based disc protrusion. There is syrinx extending both  inferiorly and superiorly to the cystic focus follows signal intensity on all pulse sequences. On postcontrast exam there is a punctate focus of enhancement in the posterior margin of the cystic lesion no additional areas of abnormal enhancement are observed  Additionally noted is broad-based posterior disc protrusion at T7-8 and minimal bulging disc T6 T7   Cystic lesion within the cord with a tiny enhancing focus present inferior to the T8-T9 level approximately 0.4 x 0.97 cm and transverse and CC dimension with syrinx extending to the upper and lower thoracic cord  Suspect neoplastic focus. Postposttraumatic or postinfectious etiologies may demonstrate a similar appearance  Small disc protrusions T7-T8 and T8-T9  This document is electronically signed by Roseanne Wiseman MD., March 5 2020 11:59:36 PM ET    Mri Lumbar Spine W Wo Contrast  Result Date: 3/5/2020  PROCEDURE: MRI LUMBAR SPINE W WO CONTRAST CLINICAL INFORMATION: bilateral leg numbness, loss of bladder control. Urinary incontinence. Uncontrollable leg spasms. COMPARISON: No prior study. TECHNIQUE: Sagittal and axial T1 and T2-weighted images were obtained to the lumbar spine. Postcontrast axial and sagittal T1-weighted images were also obtained. FINDINGS: The lumbar vertebral bodies are normally aligned. There is abnormal bone marrow signal in the superior right aspect of the L4 vertebral body. There is edema on the STIR sequence.

## 2020-03-06 NOTE — PROCEDURES
Myelogram (with Radiologist)     Technique:     The prodecure and risks were explained to the patient. Informed consent was obtained. The patient was placed in a prone position on the fluoroscopy table. A lumbar intervertebral disc space was localized under fluoroscopy. Under sterile conditions, a lumbar puncture was performed using a 20-gauge spinal needle after the administration of local anesthetic with 2% lidocaine. The subarachnoid space was accessed without difficulty and showed immediate clear cerebrospinal fluid. 13 ml of fluid was collected and sent to cytology. Approximately 10 mL of omnipaque-200 was then injected under fluoroscopy. The thecal sac was well opacified. The needle was removed and a bandage was placed over the puncture site. There were no immediate complications and blood loss was minimal. The patient was transferred to CT in satisfactory condition. Impression: Unremarkable fluid collection and contrast injection with CT scan to follow.

## 2020-03-06 NOTE — ED NOTES
MRI called for pt. MRI states that they will be down to get pt after they are finished with the pt that they have.      Lluvia Mcclure RN  03/05/20 1950

## 2020-03-06 NOTE — ED NOTES
ED to inpatient nurses report    Chief Complaint   Patient presents with    Other     sent in by doctor      Present to ED from home  LOC: alert and orientated to name, place, date  Vital signs   Vitals:    03/06/20 0236 03/06/20 0346 03/06/20 0500 03/06/20 0624   BP: 118/71 110/67 116/76 120/82   Pulse: 72 73 86 84   Resp: 16 17 16 16   Temp:       SpO2: 95% 95% 97% 96%   Weight:       Height:          Oxygen Baseline 96%    Current needs required room air  Bipap/Cpap No  LDAs:   Peripheral IV 03/06/20 Left Forearm (Active)   Site Assessment Clean;Dry; Intact 3/6/2020  6:24 AM   Line Status Normal saline locked 3/6/2020  6:24 AM   Dressing Status Clean;Dry; Intact 3/6/2020  6:24 AM     Mobility: Requires assistance * 1  Pending ED orders: none  Present condition: stable    Electronically signed by Tay Mclean RN on 3/6/2020 at 7:25 AM       Tay Mclean RN  03/06/20 0010

## 2020-03-06 NOTE — ED NOTES
ED nurse-to-nurse bedside report    Chief Complaint   Patient presents with    Other     sent in by doctor      LOC: alert and orientated to name, place, date  Vital signs   Vitals:    03/06/20 0236 03/06/20 0346 03/06/20 0500 03/06/20 0624   BP: 118/71 110/67 116/76 120/82   Pulse: 72 73 86 84   Resp: 16 17 16 16   Temp:       SpO2: 95% 95% 97% 96%   Weight:       Height:          Pain:    Pain Interventions: tylenol  Pain Goal: 3  Oxygen: No    Current needs required room air   Telemetry: No  LDAs:   Peripheral IV 03/06/20 Left Forearm (Active)   Site Assessment Clean;Dry; Intact 3/6/2020  6:24 AM   Line Status Normal saline locked 3/6/2020  6:24 AM   Dressing Status Clean;Dry; Intact 3/6/2020  6:24 AM     Continuous Infusions:   Mobility: Requires assistance * 1  Gerard Fall Risk Score: No flowsheet data found.   Fall Interventions: side rails up, bed locked and lowest position  Report given to: Toshia Rosas RN  03/06/20 [id number]

## 2020-03-06 NOTE — ED NOTES
Pt resting in bed watching tv. VS reassessed and respirations regular. Pt updated on POC.  PT verbalized understanding     Karon Mancia RN  03/06/20 [id number]

## 2020-03-06 NOTE — CONSULTS
moderate severity spinal canal stenosis. There is moderate severity left foraminal stenosis. At C4-C5, there is a posterior disc osteophyte complex. There are bilateral uncovertebral joint degenerative changes. There is moderate severity spinal canal stenosis. There is severe left and moderate severity right foraminal stenosis. At C5-C6, there is a moderate-sized posterior disc osteophyte complex. There are uncovertebral joint degenerative changes. There is moderate severity spinal canal stenosis. There is severe bilateral foraminal stenosis. At C6-C7, there are very mild facet degenerative changes. There is no spinal canal or foraminal stenosis. At C7-T1, there are no degenerative changes. There are no suspicious findings in the cervical soft tissues. Impression    1. Moderate severity spinal canal stenosis at the C3-4, C4-5 and C5-6 levels due to posterior disc osteophyte complexes. 2. Severe bilateral foraminal stenosis at the C5-6 level. 3. Normal signal in the cervical spinal cord. **This report has been created using voice recognition software. It may contain minor errors which are inherent in voice recognition technology. **    Final report electronically signed by Dr. Steven Hu on 3/6/2020 9:35 AM     No results found for this or any previous visit. No results found for this or any previous visit. No results found for this or any previous visit. Results for orders placed during the hospital encounter of 03/05/20   MRI BRAIN WO CONTRAST    Narrative PROCEDURE: MRI BRAIN WO CONTRAST    CLINICAL INFORMATION ataxia; bilateral ankle clonus. Bilateral leg numbness. Urinary incontinence. COMPARISON: Head CT 3/5/2020. TECHNIQUE: Multiplanar and multiple spin echo MRI images were obtained of the brain without contrast.    FINDINGS:        The diffusion-weighted images are normal.  The brain volume is normal. There is normal signal intensity in the brain.     There are no intra-or extra-axial collections. There is no hydrocephalus, midline shift or mass effect. There is no susceptibility artifact in the brain. The major intracranial vascular flow voids are present. The midline craniocervical junction structures are normal.  The pituitary gland and brainstem are normal.            Impression  Normal MRI of the brain. **This report has been created using voice recognition software. It may contain minor errors which are inherent in voice recognition technology. **    Final report electronically signed by Dr. Griselda Barajas on 3/6/2020 9:31 AM     No results found for this or any previous visit. No results found for this or any previous visit. Results for orders placed during the hospital encounter of 03/05/20   CT Head WO Contrast    Narrative PROCEDURE: CT HEAD WO CONTRAST    CLINICAL INFORMATION: numbness below waist, worsening expressive aphasia, Trauma. COMPARISON: No prior study. TECHNIQUE: Noncontrast 5 mm axial images were obtained through the brain. All CT scans at this facility use dose modulation, iterative reconstruction, and/or weight-based dosing when appropriate to reduce radiation dose to as low as reasonably achievable. FINDINGS:        There is no hemorrhage. There are no intra-or extra-axial collections. There is no hydrocephalus, midline shift or mass effect. The gray-white matter differentiation is preserved. The paranasal sinuses and mastoid air cells are normally aerated. There is no suspicious calvarial abnormality. Impression  No evidence of an acute process. **This report has been created using voice recognition software. It may contain minor errors which are inherent in voice recognition technology. **    Final report electronically signed by Dr. Theodore Richardson on 3/5/2020 12:13 PM         We reviewed the patient records and available information in the EHR       Impression:    Active Problems:    Leg weakness, bilateral  Resolved Problems:    * No resolved hospital problems. *      48 year old man with acute presentation of lower extremities weakness and loss of sensation. Recommendations:    - MRI thoracic showed a T8/9 lesion, cyst  -CT thoracic myelogram showed subarachnoid adhesions  - will give him decadron 4mg q6hr to help his spinal edema  - still cannot completely rule out small tumor at this time  - CSF was sent for cytology  - CSF glucose and protein are normal  - f/u cytology and infection panel.  - lovenox for DVT prophylaxis  - aggressive PT/OT                                              1. Case was discussed with primary service. 2. All questions were answered. It was my pleasure to evaluate Concetta Santiago today. Please call with questions.       Electronically signed by Nu Escobar MD on 3/6/2020 at 10:23 Amy Hardwick MD  Attending Neurologist/Neurointensivist

## 2020-03-06 NOTE — ED PROVIDER NOTES
Value Ref Range    Calcium 9.5 8.5 - 10.5 mg/dL   Calcium, Ionized   Result Value Ref Range    Calcium, Ion 1.15 1.12 - 1.32 mmol/L   Magnesium   Result Value Ref Range    Magnesium 2.2 1.6 - 2.4 mg/dL   EKG 12 Lead   Result Value Ref Range    Ventricular Rate 63 BPM    Atrial Rate 63 BPM    P-R Interval 112 ms    QRS Duration 92 ms    Q-T Interval 392 ms    QTc Calculation (Bazett) 401 ms    P Axis 18 degrees    R Axis 60 degrees    T Axis 19 degrees     IMAGING STUDIES  XR CHEST PORTABLE   Final Result      No acute cardiopulmonary disease. **This report has been created using voice recognition software. It may contain minor errors which are inherent in voice recognition technology. **      Final report electronically signed by Dr. Sedrick King on 3/6/2020 3:24 AM      MRI BRAIN WO CONTRAST         MRI CERVICAL SPINE WO CONTRAST         MRI THORACIC SPINE W WO CONTRAST         MRI LUMBAR SPINE W Collin Tejada   Final Result       1. Abnormal marrow signal in the superior endplate of L5 for. This is on the right. The pattern and distribution is most consistent with an acute fracture. This is most likely secondary to underlying osteopenia process if the patient does not have a    history of trauma. Clinical correlation is recommended. 2. No evidence of significant spinal canal stenosis at any level. 3. Variable foraminal stenosis at multiple levels as described above. **This report has been created using voice recognition software. It may contain minor errors which are inherent in voice recognition technology. **      Final report electronically signed by Dr. Shreya Pascual on 3/5/2020 1:50 PM      CT Head WO Contrast   Final Result    No evidence of an acute process. **This report has been created using voice recognition software. It may contain minor errors which are inherent in voice recognition technology. **      Final report electronically signed by Dr. Grzegorz Reeder on 3/5/2020 12:13 PM        Discussed with neurosurgery who recommends hospitalist admission. Admitted by Dr. Chino Platt. IMPRESSION  1. Ataxia    2.  Spinal cord lesion [facility])        DISPOSITION AND PLAN  Admit     Deepika Olson MD  03/06/20 3296

## 2020-03-07 LAB
ANION GAP SERPL CALCULATED.3IONS-SCNC: 14 MEQ/L (ref 8–16)
AVERAGE GLUCOSE: 120 MG/DL (ref 70–126)
BUN BLDV-MCNC: 26 MG/DL (ref 7–22)
CALCIUM SERPL-MCNC: 9.4 MG/DL (ref 8.5–10.5)
CHLORIDE BLD-SCNC: 103 MEQ/L (ref 98–111)
CO2: 23 MEQ/L (ref 23–33)
CREAT SERPL-MCNC: 0.9 MG/DL (ref 0.4–1.2)
ERYTHROCYTE [DISTWIDTH] IN BLOOD BY AUTOMATED COUNT: 12.3 % (ref 11.5–14.5)
ERYTHROCYTE [DISTWIDTH] IN BLOOD BY AUTOMATED COUNT: 38.9 FL (ref 35–45)
GFR SERPL CREATININE-BSD FRML MDRD: > 90 ML/MIN/1.73M2
GLUCOSE BLD-MCNC: 126 MG/DL (ref 70–108)
GLUCOSE BLD-MCNC: 134 MG/DL (ref 70–108)
GLUCOSE BLD-MCNC: 138 MG/DL (ref 70–108)
GLUCOSE BLD-MCNC: 174 MG/DL (ref 70–108)
GLUCOSE BLD-MCNC: 200 MG/DL (ref 70–108)
HBA1C MFR BLD: 6 % (ref 4.4–6.4)
HCT VFR BLD CALC: 46 % (ref 42–52)
HEMOGLOBIN: 14.9 GM/DL (ref 14–18)
MCH RBC QN AUTO: 28.1 PG (ref 26–33)
MCHC RBC AUTO-ENTMCNC: 32.4 GM/DL (ref 32.2–35.5)
MCV RBC AUTO: 86.8 FL (ref 80–94)
PLATELET # BLD: 370 THOU/MM3 (ref 130–400)
PMV BLD AUTO: 8.4 FL (ref 9.4–12.4)
POTASSIUM SERPL-SCNC: 4.3 MEQ/L (ref 3.5–5.2)
RBC # BLD: 5.3 MILL/MM3 (ref 4.7–6.1)
SODIUM BLD-SCNC: 140 MEQ/L (ref 135–145)
WBC # BLD: 12.8 THOU/MM3 (ref 4.8–10.8)

## 2020-03-07 PROCEDURE — 80048 BASIC METABOLIC PNL TOTAL CA: CPT

## 2020-03-07 PROCEDURE — 85027 COMPLETE CBC AUTOMATED: CPT

## 2020-03-07 PROCEDURE — 97165 OT EVAL LOW COMPLEX 30 MIN: CPT

## 2020-03-07 PROCEDURE — 94760 N-INVAS EAR/PLS OXIMETRY 1: CPT

## 2020-03-07 PROCEDURE — 36415 COLL VENOUS BLD VENIPUNCTURE: CPT

## 2020-03-07 PROCEDURE — G0378 HOSPITAL OBSERVATION PER HR: HCPCS

## 2020-03-07 PROCEDURE — 97535 SELF CARE MNGMENT TRAINING: CPT

## 2020-03-07 PROCEDURE — 99232 SBSQ HOSP IP/OBS MODERATE 35: CPT | Performed by: PSYCHIATRY & NEUROLOGY

## 2020-03-07 PROCEDURE — 99232 SBSQ HOSP IP/OBS MODERATE 35: CPT | Performed by: INTERNAL MEDICINE

## 2020-03-07 PROCEDURE — 97110 THERAPEUTIC EXERCISES: CPT

## 2020-03-07 PROCEDURE — 2580000003 HC RX 258: Performed by: FAMILY MEDICINE

## 2020-03-07 PROCEDURE — 6370000000 HC RX 637 (ALT 250 FOR IP): Performed by: PSYCHIATRY & NEUROLOGY

## 2020-03-07 PROCEDURE — 6360000002 HC RX W HCPCS: Performed by: FAMILY MEDICINE

## 2020-03-07 PROCEDURE — 82948 REAGENT STRIP/BLOOD GLUCOSE: CPT

## 2020-03-07 PROCEDURE — 2709999900 HC NON-CHARGEABLE SUPPLY

## 2020-03-07 PROCEDURE — 2060000000 HC ICU INTERMEDIATE R&B

## 2020-03-07 PROCEDURE — 6370000000 HC RX 637 (ALT 250 FOR IP): Performed by: FAMILY MEDICINE

## 2020-03-07 PROCEDURE — 83036 HEMOGLOBIN GLYCOSYLATED A1C: CPT

## 2020-03-07 PROCEDURE — 97163 PT EVAL HIGH COMPLEX 45 MIN: CPT

## 2020-03-07 PROCEDURE — 96376 TX/PRO/DX INJ SAME DRUG ADON: CPT

## 2020-03-07 RX ORDER — DEXTROSE MONOHYDRATE 25 G/50ML
12.5 INJECTION, SOLUTION INTRAVENOUS PRN
Status: DISCONTINUED | OUTPATIENT
Start: 2020-03-07 | End: 2020-03-17 | Stop reason: HOSPADM

## 2020-03-07 RX ORDER — NICOTINE POLACRILEX 4 MG
15 LOZENGE BUCCAL PRN
Status: DISCONTINUED | OUTPATIENT
Start: 2020-03-07 | End: 2020-03-17 | Stop reason: HOSPADM

## 2020-03-07 RX ORDER — PANTOPRAZOLE SODIUM 40 MG/1
40 TABLET, DELAYED RELEASE ORAL
Status: DISCONTINUED | OUTPATIENT
Start: 2020-03-07 | End: 2020-03-17 | Stop reason: HOSPADM

## 2020-03-07 RX ORDER — DEXTROSE MONOHYDRATE 50 MG/ML
100 INJECTION, SOLUTION INTRAVENOUS PRN
Status: DISCONTINUED | OUTPATIENT
Start: 2020-03-07 | End: 2020-03-17 | Stop reason: HOSPADM

## 2020-03-07 RX ADMIN — POLYETHYLENE GLYCOL 3350 17 G: 17 POWDER, FOR SOLUTION ORAL at 08:02

## 2020-03-07 RX ADMIN — Medication 10 ML: at 08:03

## 2020-03-07 RX ADMIN — PANTOPRAZOLE SODIUM 40 MG: 40 TABLET, DELAYED RELEASE ORAL at 18:29

## 2020-03-07 RX ADMIN — DEXAMETHASONE SODIUM PHOSPHATE 4 MG: 4 INJECTION, SOLUTION INTRAMUSCULAR; INTRAVENOUS at 08:03

## 2020-03-07 RX ADMIN — Medication 10 ML: at 21:09

## 2020-03-07 RX ADMIN — INSULIN LISPRO 2 UNITS: 100 INJECTION, SOLUTION INTRAVENOUS; SUBCUTANEOUS at 21:09

## 2020-03-07 RX ADMIN — DEXAMETHASONE SODIUM PHOSPHATE 4 MG: 4 INJECTION, SOLUTION INTRAMUSCULAR; INTRAVENOUS at 02:39

## 2020-03-07 RX ADMIN — DEXAMETHASONE SODIUM PHOSPHATE 4 MG: 4 INJECTION, SOLUTION INTRAMUSCULAR; INTRAVENOUS at 21:08

## 2020-03-07 RX ADMIN — DEXAMETHASONE SODIUM PHOSPHATE 4 MG: 4 INJECTION, SOLUTION INTRAMUSCULAR; INTRAVENOUS at 16:15

## 2020-03-07 ASSESSMENT — PAIN SCALES - GENERAL
PAINLEVEL_OUTOF10: 0
PAINLEVEL_OUTOF10: 0
PAINLEVEL_OUTOF10: 4
PAINLEVEL_OUTOF10: 10
PAINLEVEL_OUTOF10: 10
PAINLEVEL_OUTOF10: 0

## 2020-03-07 ASSESSMENT — PAIN DESCRIPTION - ORIENTATION
ORIENTATION: RIGHT;LEFT

## 2020-03-07 ASSESSMENT — PAIN DESCRIPTION - PAIN TYPE
TYPE: ACUTE PAIN

## 2020-03-07 ASSESSMENT — PAIN DESCRIPTION - LOCATION
LOCATION: LEG

## 2020-03-07 ASSESSMENT — PAIN - FUNCTIONAL ASSESSMENT: PAIN_FUNCTIONAL_ASSESSMENT: PREVENTS OR INTERFERES SOME ACTIVE ACTIVITIES AND ADLS

## 2020-03-07 ASSESSMENT — PAIN DESCRIPTION - ONSET: ONSET: AWAKENED FROM SLEEP

## 2020-03-07 ASSESSMENT — PAIN DESCRIPTION - PROGRESSION: CLINICAL_PROGRESSION: NOT CHANGED

## 2020-03-07 ASSESSMENT — PAIN DESCRIPTION - FREQUENCY: FREQUENCY: CONTINUOUS

## 2020-03-07 ASSESSMENT — PAIN DESCRIPTION - DESCRIPTORS: DESCRIPTORS: SHARP;HEAVINESS
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
## 2020-03-07 NOTE — PROGRESS NOTES
NEUROLOGY INPATIENT PROGRESS NOTE    Patient- Curtis Garcia    MRN -  [id number]   Acct # - [de-identified]      - 1975    53 y.o. 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 Patient is alert at this time. He feels a little stronger today    Objective:   /73   Pulse 61   Temp 97.5 °F (36.4 °C) (Oral)   Resp 15   Ht 5' 10\" (1.778 m)   Wt 195 lb (88.5 kg)   SpO2 94%   BMI 27.98 kg/m²       Intake/Output Summary (Last 24 hours) at 3/7/2020 1717  Last data filed at 3/7/2020 1456  Gross per 24 hour   Intake 1320 ml   Output 1100 ml   Net 220 ml       Physical Exam:  General:  Awake, walking on walker with PT, not  in distress. Language is intact. HEENT: pink conjunctiva, unicteric sclera, moist oral mucosa. There is no neck lymphadenopathy. Neck: There is no carotid bruits. The Neck is supple. Neuro: CN 2-12 grossly intact with no focal deficits. Power 5/5 Throughout the upper arms symmetric, right lower leg 4-/5, left leg 4/5, Reflexes are +3 symmetric. Long tracts are intact. Cerebellar exam is Intact. Sensory exam is intact to light touch. Gait is slow with walker and need help. No abnormal movement. Osteo: There is no LROM of any of the 4 extremity jointsno joint tenderness. Leg edema none  Skin: no lesions, no rash, warm and moist to touch. Abdomen is soft intact bowel sounds. ROS:    Cardiac: no chest pain. No palpitations.   Renal : no flank pain, no hematuria  Skin: no rash    Medications:     insulin lispro  0-12 Units Subcutaneous TID     insulin lispro  0-6 Units Subcutaneous Nightly    sodium chloride flush  10 mL Intravenous 2 times per day    dexamethasone  4 mg Intravenous Q6H       Data:   CBC:   Recent Labs     20  1126 20  0621 20  0400   WBC 6.3 10.5 12.8*   HGB 14.4 15.4 14.9    286 370     BMP:    Recent Labs     20  1126

## 2020-03-07 NOTE — PLAN OF CARE
Problem: Falls - Risk of:  Goal: Will remain free from falls  Description: Will remain free from falls  Outcome: Ongoing  Note: Patient remained free from falls this shift. Bed is in low position with alarm on and siderails up x2. Education given on use of call light and patient voiced understanding. Call light and beside table within reach. Arm band and falling star in place. Hourly rounds completed. Will continue to monitor. Goal: Absence of physical injury  Description: Absence of physical injury  Outcome: Ongoing     Problem: Risk for Impaired Skin Integrity  Goal: Tissue integrity - skin and mucous membranes  Description: Structural intactness and normal physiological function of skin and  mucous membranes. Outcome: Ongoing  Note: Patient able to hydrate orally and provide own oral care. Problem: Safety:  Goal: Free from accidental physical injury  Description: Free from accidental physical injury  Outcome: Ongoing  Note: Call light in reach, fall band on, bed alarm on. Non-skid socks on       Problem: Daily Care:  Goal: Daily care needs are met  Description: Daily care needs are met  Outcome: Ongoing  Note: Denies needs at this time. Problem: Discharge Planning:  Goal: Patients continuum of care needs are met  Description: Patients continuum of care needs are met  Outcome: Ongoing  Note: The current medical regimen is effective;  continue present plan and medications. Problem: Pain:  Goal: Pain level will decrease  Description: Pain level will decrease  Outcome: Ongoing  Note: Patient denied pain with each assessment this shift. Will continue to monitor and provide pain relief measures as needed. Goal: Control of acute pain  Description: Control of acute pain  Outcome: Ongoing  Goal: Control of chronic pain  Description: Control of chronic pain  Outcome: Ongoing    Care plan reviewed with patient. Patient verbalize understanding of the plan of care and contribute to goal setting.

## 2020-03-07 NOTE — PROGRESS NOTES
floor apartment   Bathroom Shower/Tub: Tub/Shower unit  Bathroom Toilet: Standard  Bathroom Accessibility: Accessible    Receives Help From: Family  ADL Assistance: Independent  Homemaking Assistance: Needs assistance  Homemaking Responsibilities: Yes  Ambulation Assistance: Independent  Transfer Assistance: Independent    Active : Yes  Occupation: Full time employment  Type of occupation:  at a [address] [employer]: Watching sports, playing games on his phone  Additional Comments: Pt had to start having help with driving secondary to weakness and numbness in his legs. He still has been independent with self care. Cognition/Orientation:  Overall Orientation Status: Within Normal Limits  Overall Cognitive Status: [facility]  Cognition Comment: Cues for problem solving new learning    ADL's:  LE Dressing: Maximum assistance(straightening his slipper socks; help also provided with starting his depends and pulling them up- pt did help some with getting them up over his hips)  Vision - Basic Assessment  Prior Vision: No visual deficits    Functional Mobility:  Bed mobility  Supine to Sit: Contact guard assistance    Functional Mobility  Functional - Mobility Device: Rolling Walker  Activity: Other(Walked 10 ft x 2 including turning around in hallway)  Assist Level: Minimal assistance  Functional Mobility Comments: Cues provided for keeping his back straight and to be as tall as possible. Pt favors his RLE.      Balance:  Balance  Sitting Balance: Stand by assistance  Standing Balance: Contact guard assistance(preparing to walk and finishing pulling up his Depends)  Standing Balance  Time: 30 seconds and 40 seconds  Activity: preparing to walk and pulling up the Depends    Transfers:  Sit to stand: Minimal assistance(Cues to push off of the bed using his arms)  Stand to sit: Minimal assistance(cues to reach back for the armrests of the chair and sit slowly)       Upper Extremity Assessment:   MARLEN PROM: WNL  LUE AROM : WNL  Left Hand PROM: WNL  Left Hand AROM: WNL  RUE PROM: WNL  RUE AROM : WNL  Right Hand PROM: WNL  Right Hand AROM: WNL    LUE Strength  L Hand General: 4+/5  LUE Strength Comment: 4-/5 at shoulder muscles and 4+/5 at biceps/triceps  RUE Strength  R Hand General: 4+/5  RUE Strength Comment: 4-/5 at shoulder muscles and 4+/5 at biceps/triceps    Sensation  Overall Sensation Status: Impaired(Little or no sensation in his LLE; intact to tactile stim along RLE)       Activity Tolerance: Patient limited by pain  Pt was sitting in the chair and had his legs elevated. He reports that he feels  better just from getting up out of bed. Assessment:  Assessment: Patient would benefit from continued skilled OT services to address above deficits secondary to admission with BLE weakness. He was having increased difficulty with walking. He had fallen prior to admission. He had recent injection in his L knee for OA pain. Pt demonstrated LE dressing with MAX A and walked out to hallway and back with a rolling walker and MIN A. Pt was independent and did not use any AD prior to admission. Performance deficits / Impairments: Decreased functional mobility , Decreased ADL status, Decreased endurance, Decreased high-level IADLs, Decreased sensation  Prognosis: Good  REQUIRES OT FOLLOW UP: Yes  Decision Making: Low Complexity    Treatment Initiated: Treatment and education initiated within context of evaluation. Evaluation time included review of current medical information, gathering information related to past medical, social and functional history, completion of standardized testing, formal and informal observation of tasks, assessment of data and development of plan of care and goals. Treatment time included skilled education and facilitation of tasks to increase safety and independence with ADL's for improved functional independence and quality of life.    Pt demonstrated stretching exercises while using a gait belt: pt was able to do calf stretches and ankle plantar flexion and dorsiflexion x 8 reps. Tolerated well    Exercises completed to increase pt's strength or decrease muscle tightness for ease of doing self care and functional mobility. Encouraged pt to be up in the chair for meals. Pt agreed to not stay in the bed for long periods of time. Discharge Recommendations:  Continue to assess pending progress, Patient would benefit from continued therapy after discharge    Patient Education:  OT Education: OT Role, Plan of Care, Transfer Training  Patient Education: Stretching exercises while using his gait belt as a strap    Equipment Recommendations:  Equipment Needed: Yes  Other: Shower chair; grabbars in shower; LHAE    Plan:  Times per week: 6x  Current Treatment Recommendations: Functional Mobility Training, Endurance Training, Balance Training, Safety Education & Training, Equipment Evaluation, Education, & procurement, Self-Care / ADL  Plan Comment: Pt would benefit from continued skilled OT services when medically stable and discharged from Acute. Moraima Ballard would benefit. Specific instructions for Next Treatment: Functional mobility; ADLs and adaptations; standing safety    Goals:  Patient goals : \"Get around and be able to keep doing things independently. \" pt states. Short term goals  Time Frame for Short term goals: By discharge  Short term goal 1: Pt will demonstrate functional mobility walking to/from the chair or the bathroom with a rolling walker and SBA to prepare for doing self care at the sink. Short term goal 2: Pt will complete simple ADLs while standing and using 1 hand release with SBA to increase his safety with dressing, bathing. Short term goal 3: Pt will complete lower body ADLs while using any LHAE needed with SBA to increase his independence with self care.    Short term goal 4: Pt will complete transfers to/from various sufaces with SBA with cues for hand placement to
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
## 2020-03-08 LAB
GLUCOSE BLD-MCNC: 118 MG/DL (ref 70–108)
GLUCOSE BLD-MCNC: 131 MG/DL (ref 70–108)
GLUCOSE BLD-MCNC: 143 MG/DL (ref 70–108)
GLUCOSE BLD-MCNC: 169 MG/DL (ref 70–108)

## 2020-03-08 PROCEDURE — 82948 REAGENT STRIP/BLOOD GLUCOSE: CPT

## 2020-03-08 PROCEDURE — 6360000002 HC RX W HCPCS: Performed by: FAMILY MEDICINE

## 2020-03-08 PROCEDURE — 6370000000 HC RX 637 (ALT 250 FOR IP): Performed by: INTERNAL MEDICINE

## 2020-03-08 PROCEDURE — G0378 HOSPITAL OBSERVATION PER HR: HCPCS

## 2020-03-08 PROCEDURE — 6370000000 HC RX 637 (ALT 250 FOR IP): Performed by: PSYCHIATRY & NEUROLOGY

## 2020-03-08 PROCEDURE — 99232 SBSQ HOSP IP/OBS MODERATE 35: CPT | Performed by: NEUROLOGICAL SURGERY

## 2020-03-08 PROCEDURE — 2580000003 HC RX 258: Performed by: FAMILY MEDICINE

## 2020-03-08 PROCEDURE — 99232 SBSQ HOSP IP/OBS MODERATE 35: CPT | Performed by: INTERNAL MEDICINE

## 2020-03-08 PROCEDURE — 2060000000 HC ICU INTERMEDIATE R&B

## 2020-03-08 PROCEDURE — 99232 SBSQ HOSP IP/OBS MODERATE 35: CPT | Performed by: PSYCHIATRY & NEUROLOGY

## 2020-03-08 PROCEDURE — 96376 TX/PRO/DX INJ SAME DRUG ADON: CPT

## 2020-03-08 RX ADMIN — DEXAMETHASONE SODIUM PHOSPHATE 4 MG: 4 INJECTION, SOLUTION INTRAMUSCULAR; INTRAVENOUS at 16:00

## 2020-03-08 RX ADMIN — PANTOPRAZOLE SODIUM 40 MG: 40 TABLET, DELAYED RELEASE ORAL at 06:36

## 2020-03-08 RX ADMIN — INSULIN LISPRO 1 UNITS: 100 INJECTION, SOLUTION INTRAVENOUS; SUBCUTANEOUS at 20:59

## 2020-03-08 RX ADMIN — DEXAMETHASONE SODIUM PHOSPHATE 4 MG: 4 INJECTION, SOLUTION INTRAMUSCULAR; INTRAVENOUS at 03:46

## 2020-03-08 RX ADMIN — DEXAMETHASONE SODIUM PHOSPHATE 4 MG: 4 INJECTION, SOLUTION INTRAMUSCULAR; INTRAVENOUS at 20:59

## 2020-03-08 RX ADMIN — INSULIN LISPRO 2 UNITS: 100 INJECTION, SOLUTION INTRAVENOUS; SUBCUTANEOUS at 08:33

## 2020-03-08 RX ADMIN — Medication 10 ML: at 08:33

## 2020-03-08 RX ADMIN — PANTOPRAZOLE SODIUM 40 MG: 40 TABLET, DELAYED RELEASE ORAL at 18:00

## 2020-03-08 RX ADMIN — Medication 10 ML: at 20:59

## 2020-03-08 RX ADMIN — DEXAMETHASONE SODIUM PHOSPHATE 4 MG: 4 INJECTION, SOLUTION INTRAMUSCULAR; INTRAVENOUS at 08:32

## 2020-03-08 ASSESSMENT — PAIN SCALES - GENERAL
PAINLEVEL_OUTOF10: 0
PAINLEVEL_OUTOF10: 0

## 2020-03-08 NOTE — PROGRESS NOTES
Progress note      Patient: Courtney Stewart  YOB: 1975    MRN: [id number]     Acct: [de-identified]    PCP: No primary care provider on file. 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 Lesion with tiny enhancing focus present inferior to the T8-T9 level; approximately 0.4 x 0.97 cm transverse and CC dimension with syrinx extending to the upper and lower thoracic cord. Neoplastic focus suspected / post-traumatic or postinfectious etiologies not excluded. Patient endorses progressive symptoms + intermittent bladder and bowel incontinence. Denies recent trauma. Patient is stable without significant change is weakness, per patient. Appears to be consistent with cyst based on myelogram, Trialing steroids. If no improvement NS to consider surgical approach. 3/8/20: sensation improving. Continue conservative approach. Abnormal marrow signal at L5  On MRI. Per read, most consistent with an acute fracture (?secondary to underlying osteopenia). Vitamin D level pending. Neurosurgery on consult. Multi-level DDD (C/T/L spine)  Neurosurgery on consult. Symptom control as needed. PT/OT eval prior to discharge. Hyperglycemia  Mild. No known history of DM. May represent a stress-response. Patient will be receiving IV steroids. A1C 6.1. OK to monitor BG with daily BMP for now. SSI, CC diet    Microscopic hematuria  Noted on UA. Patient is a never-smoker. Recommend additional outpatient follow up as indicated. Asthma  Without exacerbation. Albuterol prn. Chief Complaint: leg weakness/numbness    History of Present Illness:  52 y/o M PMHx asthma -- presents to [address] [place] [address] with a chief complaint of bilateral leg weakness and numbness. History provided by the patient.     Patient endorses several-week history of progressive bilateral leg weakness with Substance and Sexual Activity    Alcohol use: No    Drug use: No     Family History:    Reviewed / no significant PFHx. REVIEW OF SYSTEMS:  A 14-point ROS was obtained and negative, with the exception of pertinent positives as stated in the HPI. PHYSICAL EXAM:  Vitals:    03/08/20 0344 03/08/20 0819 03/08/20 1150 03/08/20 1535   BP: 126/81 116/78 132/80 119/72   Pulse: 61 (!) 6 70 65   Resp: 14 17 18 16   Temp: 97.8 °F (36.6 °C) 97.9 °F (36.6 °C) 97.5 °F (36.4 °C) 97.5 °F (36.4 °C)   TempSrc: Oral Oral Oral Oral   SpO2: 96% 97% 95% 95%   Weight: 203 lb 14.4 oz (92.5 kg)      Height:       RA    General appearance: Alert / well-appearing  male. Pleasant. Cooperative. NAD. HEENT: Normocephalic / atraumatic. PERRL. EOMI. Conjunctivae appear normal.  Neck: Supple. No JVD. Respiratory: Normal respiratory effort on RA. CTAB. No wheezes / rales / rhonchi. Cardiovascular: RRR. Normal S1/S2. No murmurs / rubs / gallops. Abdomen: Soft / non-tender / non-distended. BS present. Musculoskeletal: No cyanosis or LE edema.   - Back exam: No visible gross deformities or asymmetries. No midline tenderness to palpation or bony step-offs. No paraspinal tenderness to palpation. Skin: Warm / dry. No pallor or diaphoresis. Neurologic: A/O x 3. Speech normal. Answers questions appropriately. CN intact. - Bilateral upper extremities: Strength/sensation normal. 2+ reflexes. - Bilateral lower extremities: improved sensation in LLE, present in right. BLE weakness, worse on left. Psychiatric: Thought content / judgment / insight appear appropriate. Capillary refill: Brisk bilaterally. Peripheral pulses: +2 bilaterally.     Labs:   Results for orders placed or performed during the hospital encounter of 03/05/20   Culture, CSF   Result Value Ref Range    CSF Culture No growth-preliminary No growth      Anaerobic Culture No growth-preliminary       Gram Stain Result       performed on cytospun specimen Rare segmented neutrophils observed. No bacteria seen. Meningitis Encephalitis Panel CSF, Molecular   Result Value Ref Range    ESCHERICHIA COLI K1 CSF FILM ARRAY Not Detected     HAEMOPHILUS INFLUENZA CSF FILM ARRAY Not Detected     LISTERIA MONOCYTOGENES CSF FILM ARRAY Not Detected     NEISSERIA MENIGITIDIS CSF FILM ARRAY Not Detected     STREPTOCOCCUS AGALACTIAE CSF FILM ARRAY Not Detected     STREPTOCOCCUS PNEUMONIAE CSF FILM ARRAY Not Detected     CYTOMEGALOVIRUS (CMV) CSF FILM ARRAY Not Detected     Enterovirus Detect PCR Not Detected     HSV-1 CSF FILM ARRAY Not Detected     HSV-2 CSF FILM ARRAY Not Detected     HHV-6 (HERPESVIRUS 6) CSF FILM ARRAY Not Detected     PARECHOVIRUS CSF FILM ARRAY Not Detected     Varicella-Zoster, PCR Not Detected     CRYPTOCOCCUS NEOFORMANS/MARILU CSF FILM ARR.  Not Detected    CBC Auto Differential   Result Value Ref Range    WBC 6.3 4.8 - 10.8 thou/mm3    RBC 5.12 4.70 - 6.10 mill/mm3    Hemoglobin 14.4 14.0 - 18.0 gm/dl    Hematocrit 43.9 42.0 - 52.0 %    MCV 85.7 80.0 - 94.0 fL    MCH 28.1 26.0 - 33.0 pg    MCHC 32.8 32.2 - 35.5 gm/dl    RDW-CV 12.3 11.5 - 14.5 %    RDW-SD 38.3 35.0 - 45.0 fL    Platelets 587 463 - 716 thou/mm3    MPV 8.2 (L) 9.4 - 12.4 fL    Seg Neutrophils 71.9 %    Lymphocytes 20.6 %    Monocytes 5.9 %    Eosinophils 1.0 %    Basophils 0.3 %    Immature Granulocytes 0.3 %    Segs Absolute 4.5 1.8 - 7.7 thou/mm3    Lymphocytes Absolute 1.3 1.0 - 4.8 thou/mm3    Monocytes Absolute 0.4 0.4 - 1.3 thou/mm3    Eosinophils Absolute 0.1 0.0 - 0.4 thou/mm3    Basophils Absolute 0.0 0.0 - 0.1 thou/mm3    Immature Grans (Abs) 0.02 0.00 - 0.07 thou/mm3    nRBC 0 /100 wbc   Basic Metabolic Panel w/ Reflex to MG   Result Value Ref Range    Sodium 140 135 - 145 meq/L    Potassium reflex Magnesium 4.1 3.5 - 5.2 meq/L    Chloride 102 98 - 111 meq/L    CO2 24 23 - 33 meq/L    Glucose 139 (H) 70 - 108 mg/dL    BUN 13 7 - 22 mg/dL    CREATININE 0.8 0.4 - 1.2 mg/dL    Calcium 9.4 8.5 - 10.5 mg/dL   Sedimentation Rate   Result Value Ref Range    Sed Rate 17 (H) 0 - 10 mm/hr   C-Reactive Protein   Result Value Ref Range    CRP 0.19 0.00 - 1.00 mg/dl   Anion Gap   Result Value Ref Range    Anion Gap 14.0 8.0 - 16.0 meq/L   Glomerular Filtration Rate, Estimated   Result Value Ref Range    Est, Glom Filt Rate >90 ml/min/1.73m2   Osmolality   Result Value Ref Range    Osmolality Calc 281.8 275.0 - 300 mOsmol/kg   Urine with Reflexed Micro   Result Value Ref Range    Glucose, Ur NEGATIVE NEGATIVE mg/dl    Bilirubin Urine NEGATIVE NEGATIVE    Ketones, Urine NEGATIVE NEGATIVE    Specific Gravity, Urine 1.015 1.002 - 1.03    Blood, Urine TRACE (A) NEGATIVE    pH, UA 7.0 5.0 - 9.0    Protein, UA NEGATIVE NEGATIVE    Urobilinogen, Urine 1.0 0.0 - 1.0 eu/dl    Nitrite, Urine NEGATIVE NEGATIVE    Leukocyte Esterase, Urine NEGATIVE NEGATIVE    Color, UA YELLOW STRAW-YELL    Character, Urine CLEAR CLEAR-SL C    RBC, UA 5-10 0-2/hpf /hpf    WBC, UA NONE SEEN 0-4/hpf /hpf    Epithelial Cells, UA NONE SEEN 3-5/hpf /hpf    Bacteria, UA NONE SEEN FEW/NONE S /hpf    Casts UA NONE SEEN NONE SEEN /lpf    Crystals, UA NONE SEEN NONE SEEN    Renal Epithelial, UA NONE SEEN NONE SEEN    Yeast, UA NONE SEEN NONE SEEN    CASTS 2 NONE SEEN NONE SEEN /lpf    MISCELLANEOUS 2 NONE SEEN    Calcium   Result Value Ref Range    Calcium 9.5 8.5 - 10.5 mg/dL   Calcium, Ionized   Result Value Ref Range    Calcium, Ion 1.15 1.12 - 1.32 mmol/L   Magnesium   Result Value Ref Range    Magnesium 2.2 1.6 - 2.4 mg/dL   CBC   Result Value Ref Range    WBC 10.5 4.8 - 10.8 thou/mm3    RBC 5.40 4.70 - 6.10 mill/mm3    Hemoglobin 15.4 14.0 - 18.0 gm/dl    Hematocrit 47.1 42.0 - 52.0 %    MCV 87.2 80.0 - 94.0 fL    MCH 28.5 26.0 - 33.0 pg    MCHC 32.7 32.2 - 35.5 gm/dl    RDW-CV 12.4 11.5 - 14.5 %    RDW-SD 39.3 35.0 - 45.0 fL    Platelets 215 722 - 289 thou/mm3    MPV 8.6 (L) 9.4 - 12.4 fL   Protime-INR   Result Value Ref Range    INR 1.04 upper and lower thoracic cord  Suspect neoplastic focus. Postposttraumatic or postinfectious etiologies may demonstrate a similar appearance  Small disc protrusions T7-T8 and T8-T9  This document is electronically signed by Selene Qiu MD., March 5 2020 11:59:36 PM ET    Mri Lumbar Spine W Wo Contrast  Result Date: 3/5/2020  PROCEDURE: MRI LUMBAR SPINE W WO CONTRAST CLINICAL INFORMATION: bilateral leg numbness, loss of bladder control. Urinary incontinence. Uncontrollable leg spasms. COMPARISON: No prior study. TECHNIQUE: Sagittal and axial T1 and T2-weighted images were obtained to the lumbar spine. Postcontrast axial and sagittal T1-weighted images were also obtained. FINDINGS: The lumbar vertebral bodies are normally aligned. There is abnormal bone marrow signal in the superior right aspect of the L4 vertebral body. There is edema on the STIR sequence. Subtle fracture lines are seen. This appears to be an acute compression fracture of the right lateral aspect of the superior endplate. There is no associated soft tissue mass. There is early height loss. The height loss is only 10%. There is some degenerative marrow edema in the endplates at the N5-Y3 level. This is on the right. No suspicious osseous lesions are present. There are no focal bone lesions. No pars defects are noted. There is disc desiccation throughout. The visualized aspects of the distal spinal cord are normal. The nerve roots of the cauda equina and the tip of the conus are normal. There are no gross abnormalities in the distal thoracic spine. On the axial images, at T12-L1, there are no degenerative changes. There is no spinal canal or foraminal stenosis. At L1-L2, there are minimal facet degenerative changes. There is no focal disc abnormality. There is no spinal canal or foraminal stenosis. At L2-3, there is a very mild diffuse disc bulge. There are minimal facet degenerative changes. There is no spinal canal stenosis.  There is

## 2020-03-08 NOTE — PROGRESS NOTES
Patient was seen and examined in the floor. There is no acute event over the night. Patient fells that his legs is stronger today. Also, he feels that his gait is better. T-spine CT myelogram showed:    1. No evidence of a myelographic block. Intrathecal contrast fills the thecal sac. 2. The possible intrathecal abnormality seen at the T8 and T9 levels displacing the thoracic spinal cord on MRI is not seen on CT myelogram. This abnormality could be filling with contrast. This could also represent subarachnoid adhesions. 3. Ventral epidural contrast is able to displace the thoracic spinal cord away from the posterior margins of the vertebral bodies. 4. There is subtle mass effect upon the dorsal aspect of the thoracic spinal cord at the T8-9 and T9 levels.           A/P:    -This is 53year-old male who will develop a clinical picture consistent with a progressive thoracic spinal cord myelopathy.  -  Patients T-spine MRI  and CT that showed findings so far suggestive of arachnoid cyst/ band at the levels of T7-T9 levels that displaces his spine cord at that level. Patient's showed some improvement with the steroids.  - At this time we will continue monitoring patient respond to steroids and physical therapy. If there is no significant improvement, I may consider him for a surgical intervention in the form of thoracic laminectomy and surgical resection of his arachnoid cyst/ band at the level of T7-T9.   -This was discussed with patient, family, patient primary and with Dr. Marine Sanchez (neurology team). *Time spent with the patient was 25 minutes. More than 50% was spent counseling/coordinating the patient's care.

## 2020-03-09 LAB
ANION GAP SERPL CALCULATED.3IONS-SCNC: 15 MEQ/L (ref 8–16)
BUN BLDV-MCNC: 24 MG/DL (ref 7–22)
CALCIUM SERPL-MCNC: 9.5 MG/DL (ref 8.5–10.5)
CHLORIDE BLD-SCNC: 101 MEQ/L (ref 98–111)
CO2: 24 MEQ/L (ref 23–33)
CREAT SERPL-MCNC: 0.8 MG/DL (ref 0.4–1.2)
ERYTHROCYTE [DISTWIDTH] IN BLOOD BY AUTOMATED COUNT: 12.2 % (ref 11.5–14.5)
ERYTHROCYTE [DISTWIDTH] IN BLOOD BY AUTOMATED COUNT: 38.2 FL (ref 35–45)
GFR SERPL CREATININE-BSD FRML MDRD: > 90 ML/MIN/1.73M2
GLUCOSE BLD-MCNC: 122 MG/DL (ref 70–108)
GLUCOSE BLD-MCNC: 132 MG/DL (ref 70–108)
GLUCOSE BLD-MCNC: 149 MG/DL (ref 70–108)
GLUCOSE BLD-MCNC: 159 MG/DL (ref 70–108)
GLUCOSE BLD-MCNC: 214 MG/DL (ref 70–108)
HCT VFR BLD CALC: 44.2 % (ref 42–52)
HEMOGLOBIN: 14.5 GM/DL (ref 14–18)
MCH RBC QN AUTO: 28.3 PG (ref 26–33)
MCHC RBC AUTO-ENTMCNC: 32.8 GM/DL (ref 32.2–35.5)
MCV RBC AUTO: 86.3 FL (ref 80–94)
PLATELET # BLD: 397 THOU/MM3 (ref 130–400)
PMV BLD AUTO: 8.9 FL (ref 9.4–12.4)
POTASSIUM SERPL-SCNC: 4.2 MEQ/L (ref 3.5–5.2)
RBC # BLD: 5.12 MILL/MM3 (ref 4.7–6.1)
SODIUM BLD-SCNC: 140 MEQ/L (ref 135–145)
WBC # BLD: 14.1 THOU/MM3 (ref 4.8–10.8)

## 2020-03-09 PROCEDURE — 97535 SELF CARE MNGMENT TRAINING: CPT

## 2020-03-09 PROCEDURE — 97110 THERAPEUTIC EXERCISES: CPT

## 2020-03-09 PROCEDURE — 2580000003 HC RX 258: Performed by: FAMILY MEDICINE

## 2020-03-09 PROCEDURE — 97530 THERAPEUTIC ACTIVITIES: CPT

## 2020-03-09 PROCEDURE — 85027 COMPLETE CBC AUTOMATED: CPT

## 2020-03-09 PROCEDURE — 97116 GAIT TRAINING THERAPY: CPT

## 2020-03-09 PROCEDURE — 96376 TX/PRO/DX INJ SAME DRUG ADON: CPT

## 2020-03-09 PROCEDURE — 80048 BASIC METABOLIC PNL TOTAL CA: CPT

## 2020-03-09 PROCEDURE — 99232 SBSQ HOSP IP/OBS MODERATE 35: CPT | Performed by: INTERNAL MEDICINE

## 2020-03-09 PROCEDURE — 94760 N-INVAS EAR/PLS OXIMETRY 1: CPT

## 2020-03-09 PROCEDURE — 6360000002 HC RX W HCPCS: Performed by: FAMILY MEDICINE

## 2020-03-09 PROCEDURE — 99231 SBSQ HOSP IP/OBS SF/LOW 25: CPT | Performed by: PHYSICIAN ASSISTANT

## 2020-03-09 PROCEDURE — 36415 COLL VENOUS BLD VENIPUNCTURE: CPT

## 2020-03-09 PROCEDURE — G0378 HOSPITAL OBSERVATION PER HR: HCPCS

## 2020-03-09 PROCEDURE — 82948 REAGENT STRIP/BLOOD GLUCOSE: CPT

## 2020-03-09 PROCEDURE — 6370000000 HC RX 637 (ALT 250 FOR IP): Performed by: PSYCHIATRY & NEUROLOGY

## 2020-03-09 PROCEDURE — 2060000000 HC ICU INTERMEDIATE R&B

## 2020-03-09 RX ADMIN — PANTOPRAZOLE SODIUM 40 MG: 40 TABLET, DELAYED RELEASE ORAL at 17:10

## 2020-03-09 RX ADMIN — INSULIN LISPRO 2 UNITS: 100 INJECTION, SOLUTION INTRAVENOUS; SUBCUTANEOUS at 17:11

## 2020-03-09 RX ADMIN — Medication 10 ML: at 08:44

## 2020-03-09 RX ADMIN — INSULIN LISPRO 2 UNITS: 100 INJECTION, SOLUTION INTRAVENOUS; SUBCUTANEOUS at 22:12

## 2020-03-09 RX ADMIN — Medication 10 ML: at 22:12

## 2020-03-09 RX ADMIN — DEXAMETHASONE SODIUM PHOSPHATE 4 MG: 4 INJECTION, SOLUTION INTRAMUSCULAR; INTRAVENOUS at 08:44

## 2020-03-09 RX ADMIN — DEXAMETHASONE SODIUM PHOSPHATE 4 MG: 4 INJECTION, SOLUTION INTRAMUSCULAR; INTRAVENOUS at 03:40

## 2020-03-09 RX ADMIN — DEXAMETHASONE SODIUM PHOSPHATE 4 MG: 4 INJECTION, SOLUTION INTRAMUSCULAR; INTRAVENOUS at 15:12

## 2020-03-09 RX ADMIN — PANTOPRAZOLE SODIUM 40 MG: 40 TABLET, DELAYED RELEASE ORAL at 08:44

## 2020-03-09 RX ADMIN — DEXAMETHASONE SODIUM PHOSPHATE 4 MG: 4 INJECTION, SOLUTION INTRAMUSCULAR; INTRAVENOUS at 22:12

## 2020-03-09 ASSESSMENT — ENCOUNTER SYMPTOMS
BACK PAIN: 0
ABDOMINAL PAIN: 0
ABDOMINAL DISTENTION: 0
SHORTNESS OF BREATH: 0
CHEST TIGHTNESS: 0
APNEA: 0

## 2020-03-09 ASSESSMENT — PAIN SCALES - GENERAL
PAINLEVEL_OUTOF10: 0

## 2020-03-09 NOTE — PLAN OF CARE
Patients continuum of care needs are met  3/9/2020 1026 by Virginia Mae, MANOLO  Outcome: Ongoing  Note: No orders for d/c at this time. Care plan reviewed with patient. Patient verbalizes understanding of the plan of care and contributed to goal setting.
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
## 2020-03-09 NOTE — CARE COORDINATION
3/9/20, 10:48 AM EDT  DISCHARGE PLANNING EVALUATION:    Cheyenne Hernandez       Admitted from: ED 3/5/2020/ [address] day: 3   Location: -17/[facility] Reason for admit: Leg weakness, bilateral [R29.898] Status: IP  Admit order signed?: yes  PMH:  has a past medical history of Asthma. Procedure: none  Pertinent abnormal Imaging:CT Thoracic Spine W Contrast    Impression:          1. No evidence of a myelographic block. Intrathecal contrast fills the thecal sac. 2. The possible intrathecal abnormality seen at the T8 and T9 levels displacing the thoracic spinal cord on MRI is not seen on CT myelogram. This abnormality could be filling with contrast. This could also represent subarachnoid adhesions. 3. Ventral epidural contrast is able to displace the thoracic spinal cord away from the posterior margins of the vertebral bodies. 4. There is subtle mass effect upon the dorsal aspect of the thoracic spinal cord at the T8-9 and T9 levels. Mri Thoracic Spine W WO Contrast    Impression:          Mass effect upon the thoracic spinal cord at the T7-8 through T9 levels. The thoracic spinal cord is flattened against the ventral spinal canal at the T8-9 level. There is abnormal cord signal consistent with cord edema versus myelomalacia. Just below   this, there is a cystic abnormality within the cord. This can represent a syrinx. There is a nodule of enhancement. The differential diagnosis includes a cystic mass in the thoracic spinal cord with enhancing nodule however, this does not explain the   mass effect upon the dorsal aspect of the cord. Alternatively, this could be and arachnoid cyst dorsal to the thoracic cord compressing the cord. This could also represent subarachnoid adhesions distorting the cord.              Medications:  Scheduled Meds:   insulin lispro  0-12 Units Subcutaneous TID WC    insulin lispro  0-6 Units Subcutaneous Nightly    pantoprazole  40 mg Oral BID AC    sodium chloride flush  10 mL Intravenous 2 times per day    dexamethasone  4 mg Intravenous Q6H     Continuous Infusions:   dextrose        Pertinent Info/Orders/Treatment Plan:  WBC 14.1, IV Decadron, diabetes management, telemetry, neuro checks, Neurology consult, Neurosurgery consult, PT/OT. Diet: DIET GENERAL; Carb Control: 3 carb choices (45 gms)/meal   Smoking status:  reports that he has never smoked. He does not have any smokeless tobacco history on file. PCP: No primary care provider on file. Readmission 30 days or less: no  Readmission Risk Score: 10%    Discharge Planning Evaluation  Current Residence:  Private Residence  Living Arrangements:  Family Members   Support Systems:  None  Current Services PTA:     Potential Assistance Needed:  N/A  Potential Assistance Purchasing Medications:  No  Does patient want to participate in local refill/ meds to beds program?  No  Type of Home Care Services:  None  Patient expects to be discharged to:  home with mom   Expected Discharge date: Follow Up Appointment: Best Day/ Time: Tuesday AM    Patient Goals/Plan/Treatment Preferences: Met with Brigido Avila. He currently lives at home with his mother. Plan is to return home at discharge. He will most likely need a front wheeled walker. He declines HH. Will follow. Transportation/Food Security/Housekeeping Addressed:  No issues identified.     Evaluation: no

## 2020-03-09 NOTE — PROGRESS NOTES
Neurosurgery Progress Note    Patient:  Julio Cesar Bolaños      Unit/Bed:4A-17/017-A    YOB: 1975    MRN: [id number]     Acct: [de-identified]     Admit date: 3/5/2020    Chief Complaint   Patient presents with    Other     sent in by doctor       Patient Seen, Chart, Physician notes, Labs, Radiology studies reviewed. Subjective: Patient is status post admission with lower extremity weakness and numbness secondary to intrathecal abnormalities seen at T8-T9 levels. He is comfortable this morning without significant complaints relating improvement in sensation for the left lower extremity along with improvement in strength left greater than right lower extremities. Past, Family, Social History unchanged from admission. Diet:  DIET GENERAL; Carb Control: 3 carb choices (45 gms)/meal    Medications:  Scheduled Meds:   insulin lispro  0-12 Units Subcutaneous TID WC    insulin lispro  0-6 Units Subcutaneous Nightly    pantoprazole  40 mg Oral BID AC    sodium chloride flush  10 mL Intravenous 2 times per day    dexamethasone  4 mg Intravenous Q6H     Continuous Infusions:   dextrose       PRN Meds:glucose, dextrose, glucagon (rDNA), dextrose, albuterol sulfate HFA, sodium chloride flush, acetaminophen **OR** acetaminophen, polyethylene glycol, promethazine **OR** ondansetron    Objective: He is sitting up in a chair comfortably today having ambulated with assistance utilizing a rolling four-point walker. He demonstrates purposeful movement in all 4 extremities with improved strength for left lower extremity and improved sensation for left lower extremity. Vitals: /83   Pulse 67   Temp 97.9 °F (36.6 °C) (Oral)   Resp 16   Ht 5' 10\" (1.778 m)   Wt 204 lb 12.8 oz (92.9 kg)   SpO2 96%   BMI 29.39 kg/m²   Physical Exam:  Alert and attentive. Language appropriate, with no aphasia. Pupils equal.  Facial strength symmetric.     Physical Exam   Acute changes recorded to the patient's chart overnight. Patient does relate improvement in strength and sensation for lower extremity groups and is ambulating with assistance. ROS:  Review of Systems   Constitutional: Positive for activity change. Respiratory: Negative for apnea, chest tightness and shortness of breath. Cardiovascular: Negative for chest pain and leg swelling. Gastrointestinal: Negative for abdominal distention and abdominal pain. Musculoskeletal: Negative for back pain, neck pain and neck stiffness. Neurological: Positive for weakness and numbness. Negative for dizziness and headaches. Psychiatric/Behavioral: Negative for agitation, behavioral problems, confusion and decreased concentration. 24 hour intake/output:    Intake/Output Summary (Last 24 hours) at 3/9/2020 0921  Last data filed at 3/9/2020 0338  Gross per 24 hour   Intake 770 ml   Output 2050 ml   Net -1280 ml     Last 3 weights: Wt Readings from Last 3 Encounters:   03/09/20 204 lb 12.8 oz (92.9 kg)         CBC:   Recent Labs     03/07/20  0400 03/09/20  0753   WBC 12.8* 14.1*   HGB 14.9 14.5    397     BMP:    Recent Labs     03/07/20  0400 03/09/20  0753    140   K 4.3 4.2    101   CO2 23 24   BUN 26* 24*   CREATININE 0.9 0.8   GLUCOSE 174* 149*     Calcium:  Recent Labs     03/09/20  0753   CALCIUM 9.5     Magnesium:No results for input(s): MG in the last 72 hours. Glucose:  Recent Labs     03/08/20  1731 03/08/20  1954 03/09/20  0900   POCGLU 131* 169* 132*     HgbA1C:   Recent Labs     03/07/20  0400   LABA1C 6.0     Lipids: No results for input(s): CHOL, TRIG, HDL, LDLCALC in the last 72 hours. Invalid input(s): LDL    Radiology reports as per the Radiologist  Radiology: Ct Head Wo Contrast    Result Date: 3/5/2020  PROCEDURE: CT HEAD WO CONTRAST CLINICAL INFORMATION: numbness below waist, worsening expressive aphasia, Trauma. COMPARISON: No prior study.  TECHNIQUE: Noncontrast 5 mm axial images were obtained through the obtained through the discs. Postcontrast axial and sagittal T1-weighted images were obtained. FINDINGS:  At the T9 level, the thoracic spinal cord is abnormal. The central canal is dilated. Just above this, the thoracic spinal cord is flattened ventrally in the thecal sac. There does appear to be abnormal cord signal. This is at the T8-9 disc level. There is mass effect upon the posterior aspect of the cord from an abnormality within the spinal canal. The thoracic spinal cord is flattened ventrally at this level. There is compression of the thoracic spinal cord ventrally. Just above this, there appears to be an intrathecal but extramedullary abnormality. On the axial images, this measures 16 x 9 cm. This is difficult to assess on the other sequences. This is isointense to CSF on all sequences and does not enhance. This appears to extend over a length of 3.2 cm. This may represent an arachnoid cyst within the thecal sac. This could also represent subarachnoid adhesions. On the postcontrast images, there is a punctate focus of enhancement inferiorly at the level of the dilated central canal and abnormal thoracic cord. This is at the T9 level. This can represent a small nodule associated with the tumor versus some vascular enhancement. This is seen on series 10 image 9, the sagittal postcontrast T1 weighted images. This is not well demonstrated on the axial postcontrast images. On the axial postcontrast images, there is no abnormal enhancement. The thoracic vertebral bodies are normally aligned. There are no acute compression fractures. There is an old mild chronic appearing deformity in the superior endplate of T6. There is no suspicious marrow signal abnormality. There is no bone marrow edema. On the axial images, there is some minimal prominence of the central canal in the upper thoracic cord.  Beginning at the T7-8 level, the thoracic spinal cord is displaced ventrally in the spinal canal. There is mass effect upon the posterior aspect of the thoracic spinal cord. This is at the disc level. Just below the disc level there is prominence of central canal. Then, at the disc level at the T8-9 level, the thoracic spinal cord is flattened. There is abnormal cord signal. Below the T9 level, the thoracic spinal cord is of normal caliber. Again there is minimal scattered prominence of the central canal. There are no gross abnormalities on the localizer images. Mass effect upon the thoracic spinal cord at the T7-8 through T9 levels. The thoracic spinal cord is flattened against the ventral spinal canal at the T8-9 level. There is abnormal cord signal consistent with cord edema versus myelomalacia. Just below this, there is a cystic abnormality within the cord. This can represent a syrinx. There is a nodule of enhancement. The differential diagnosis includes a cystic mass in the thoracic spinal cord with enhancing nodule however, this does not explain the mass effect upon the dorsal aspect of the cord. Alternatively, this could be and arachnoid cyst dorsal to the thoracic cord compressing the cord. This could also represent subarachnoid adhesions distorting the cord. **This report has been created using voice recognition software. It may contain minor errors which are inherent in voice recognition technology. ** Final report electronically signed by Dr. Keri Abdullahi on 3/6/2020 9:51 AM    Mri Lumbar Spine W Wo Contrast    Result Date: 3/5/2020  PROCEDURE: MRI LUMBAR SPINE W WO CONTRAST CLINICAL INFORMATION: bilateral leg numbness, loss of bladder control. Urinary incontinence. Uncontrollable leg spasms. COMPARISON: No prior study. TECHNIQUE: Sagittal and axial T1 and T2-weighted images were obtained to the lumbar spine. Postcontrast axial and sagittal T1-weighted images were also obtained. FINDINGS: The lumbar vertebral bodies are normally aligned.   There is abnormal bone marrow signal in the superior right aspect of the L4 vertebral body. There is edema on the STIR sequence. Subtle fracture lines are seen. This appears to be an acute compression fracture of the right lateral aspect of the superior endplate. There is no associated soft tissue mass. There is early height loss. The height loss is only 10%. There is some degenerative marrow edema in the endplates at the B0-A0 level. This is on the right. No suspicious osseous lesions are present. There are no focal bone lesions. No pars defects are noted. There is disc desiccation throughout. The visualized aspects of the distal spinal cord are normal. The nerve roots of the cauda equina and the tip of the conus are normal. There are no gross abnormalities in the distal thoracic spine. On the axial images, at T12-L1, there are no degenerative changes. There is no spinal canal or foraminal stenosis. At L1-L2, there are minimal facet degenerative changes. There is no focal disc abnormality. There is no spinal canal or foraminal stenosis. At L2-3, there is a very mild diffuse disc bulge. There are minimal facet degenerative changes. There is no spinal canal stenosis. There is mild left foraminal stenosis. At L3-4, there are mild facet degenerative changes. There is no focal disc abnormality. There is no spinal canal stenosis. There is mild left foraminal stenosis. At L4-5, there are facet degenerative changes. There is a mild diffuse disc bulge. There is mild spinal canal stenosis. There is mild left foraminal stenosis. At L5-S1, there are mild facet degenerative changes. There is no spinal canal stenosis. There is moderate severity right foraminal stenosis. There is no abnormal enhancement. There are no suspicious findings in the visualized aspects of the retroperitoneum and paraspinal soft tissues. 1. Abnormal marrow signal in the superior endplate of L5 for. This is on the right. The pattern and distribution is most consistent with an acute fracture.  This is most likely sterile manner. Following administration of 2% lidocaine, a lumbar puncture was performed using a 20-gauge spinal needle. The subarachnoid space was accessed without difficulty and showed immediate clear cerebrospinal fluid. 13 mL of fluid was collected and sent to cytology. Approximately 10 mL Omnipaque 200 was injected under fluoroscopy. The needle was withdrawn and a bandage was placed over the puncture site. The patient tolerated the procedure well without immediate complications. 2 spot images were obtained. Total fluoroscopy time 2.4 minutes. COMPARISON: None FINDINGS: There is adequate opacification of the lumbar thecal sac with subsequent migration of contrast into the thoracic spine. Fluoroscopy guided lumbar puncture for CSF collection and contrast injection for CT of the thoracic spine. Final report electronically signed by Dr. Lita Hodges on 3/6/2020 12:34 PM    A/P: S/P remains status post admission with complaints of lower extremity weakness and numbness secondary to Lasix lesion evidenced on imaging. He is comfortable today and without specific complaints relating improvement in lower extremity strength and sensation following steroid therapy. Neurosurgery recommends continuation of the steroids along with PT and OT as tolerated.   Neurosurgery to follow    Electronically signed by Donal Rinne, PA-C on 3/9/2020 at 9:21 AM

## 2020-03-09 NOTE — FLOWSHEET NOTE
Pt is a 44y. o. male sitting up in bed about to eat dinner, at 4A17. Mentioned he's thankful the Katja Conner pointed him in the direction of the hospital following a medical procedure and flare-up of current issue. 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 He enthusiastically shared that his feeling in lower extremities is improving and is actually walking three times a day. Pt and  discussed 's journey to our visit, professionally. Pt expressed desire to continue conversation with me, in particular when we have more time so he could eat his dinner. Goal: Being able to walk more distance for longer periods and be pain-free    Plan: Continue following treatment plan as described/prescribed and work hard at getting around     03/09/20 [address] [facility] [facility] provided to: Patient   Referral/Consult From: [facility] Unknown   Continue Visiting Yes  (3/9)   Complexity of Encounter Low   Length of Encounter 15 minutes   Spiritual/Orthodox   Type Spiritual support   Assessment Calm; Hopeful;Peaceful   Intervention Prayer; Active listening;Discussed illness/injury and it's impact; Discussed belief system/Adventism practices/ronit   Outcome Comfort;Expressed gratitude;Receptive;Engaged in conversation
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
## 2020-03-09 NOTE — PROGRESS NOTES
10.5 12.8*   HGB 15.4 14.9    370     BMP:    Recent Labs     03/06/20  0621 03/07/20  0400    140   K 4.7 4.3    103   CO2 23 23   BUN 14 26*   CREATININE 0.8 0.9   GLUCOSE 138* 174*         Results for orders placed during the hospital encounter of 03/05/20   MRI CERVICAL SPINE WO CONTRAST    Narrative PROCEDURE: MRI CERVICAL SPINE WO CONTRAST    CLINICAL INFORMATION: ataxia; bilateral ankle clonus . Bilateral leg numbness. Urinary incontinence. COMPARISON: No prior study. TECHNIQUE: Sagittal T1, T2 and STIR sequences were obtained through the cervical spine. Axial fast and echo and gradient echo T2-weighted images were obtained. FINDINGS:        There is a slight retrolisthesis of C4 on C5 and C5 on C6. There is normal marrow signal throughout. No suspicious osseous lesions are present. There is no bone marrow edema. The cervical spinal cord is of normal caliber and signal intensity. There is normal signal in the dorsal columns. The visualized aspects of the posterior fossa are normal.    On the axial images, at C2-C3, there are very mild facet degenerative changes. There is no spinal canal or foraminal stenosis. At C3-C4, there is a posterior discussed effect complex. There are uncovertebral joint degenerative changes. There is moderate severity spinal canal stenosis. There is moderate severity left foraminal stenosis. At C4-C5, there is a posterior disc osteophyte complex. There are bilateral uncovertebral joint degenerative changes. There is moderate severity spinal canal stenosis. There is severe left and moderate severity right foraminal stenosis. At C5-C6, there is a moderate-sized posterior disc osteophyte complex. There are uncovertebral joint degenerative changes. There is moderate severity spinal canal stenosis. There is severe bilateral foraminal stenosis. At C6-C7, there are very mild facet degenerative changes.  There is no spinal canal or foraminal results found for this or any previous visit. Results for orders placed during the hospital encounter of 03/05/20   CT Head WO Contrast    Narrative PROCEDURE: CT HEAD WO CONTRAST    CLINICAL INFORMATION: numbness below waist, worsening expressive aphasia, Trauma. COMPARISON: No prior study. TECHNIQUE: Noncontrast 5 mm axial images were obtained through the brain. All CT scans at this facility use dose modulation, iterative reconstruction, and/or weight-based dosing when appropriate to reduce radiation dose to as low as reasonably achievable. FINDINGS:        There is no hemorrhage. There are no intra-or extra-axial collections. There is no hydrocephalus, midline shift or mass effect. The gray-white matter differentiation is preserved. The paranasal sinuses and mastoid air cells are normally aerated. There is no suspicious calvarial abnormality. Impression  No evidence of an acute process. **This report has been created using voice recognition software. It may contain minor errors which are inherent in voice recognition technology. **    Final report electronically signed by Dr. Mary Mckinney on 3/5/2020 12:13 PM         Assessment:  Active Problems:    Leg weakness, bilateral  Resolved Problems:    * No resolved hospital problems. *    51 year old man with acute lower legs weakeness and loss of sensation, now with T8/9 subarachnoid adhesion, improved after decadron    Plan:    1. con't decadron 4mg q6hr for now, may continue it for another 10 days and wean it off, discussed with Dr. Shayy Morataya (neurosurgeon) and favor this non-invasive plan, if patient get worse, surgery will be considered. Patient agrees with this plan as well. 2. con't PT/OT  3. lovenox for DVT prophylaxis  4.  Pantoprazole 40mg BID due to decadron use for GI prophylaxis    Mercie Kehr, MD, 3/8/2020 8:19 PM     Farideh Waldrop MD  Attending Neurologist/Neurointensivist

## 2020-03-10 LAB
GLUCOSE BLD-MCNC: 128 MG/DL (ref 70–108)
GLUCOSE BLD-MCNC: 135 MG/DL (ref 70–108)
GLUCOSE BLD-MCNC: 139 MG/DL (ref 70–108)
GLUCOSE BLD-MCNC: 141 MG/DL (ref 70–108)

## 2020-03-10 PROCEDURE — 96376 TX/PRO/DX INJ SAME DRUG ADON: CPT

## 2020-03-10 PROCEDURE — 6360000002 HC RX W HCPCS: Performed by: FAMILY MEDICINE

## 2020-03-10 PROCEDURE — 2580000003 HC RX 258: Performed by: FAMILY MEDICINE

## 2020-03-10 PROCEDURE — 6370000000 HC RX 637 (ALT 250 FOR IP): Performed by: PSYCHIATRY & NEUROLOGY

## 2020-03-10 PROCEDURE — 97535 SELF CARE MNGMENT TRAINING: CPT

## 2020-03-10 PROCEDURE — 97116 GAIT TRAINING THERAPY: CPT

## 2020-03-10 PROCEDURE — 82948 REAGENT STRIP/BLOOD GLUCOSE: CPT

## 2020-03-10 PROCEDURE — 97110 THERAPEUTIC EXERCISES: CPT

## 2020-03-10 PROCEDURE — 99232 SBSQ HOSP IP/OBS MODERATE 35: CPT | Performed by: NEUROLOGICAL SURGERY

## 2020-03-10 PROCEDURE — 2060000000 HC ICU INTERMEDIATE R&B

## 2020-03-10 PROCEDURE — 99232 SBSQ HOSP IP/OBS MODERATE 35: CPT | Performed by: INTERNAL MEDICINE

## 2020-03-10 PROCEDURE — G0378 HOSPITAL OBSERVATION PER HR: HCPCS

## 2020-03-10 PROCEDURE — 97530 THERAPEUTIC ACTIVITIES: CPT

## 2020-03-10 RX ADMIN — INSULIN LISPRO 1 UNITS: 100 INJECTION, SOLUTION INTRAVENOUS; SUBCUTANEOUS at 21:26

## 2020-03-10 RX ADMIN — DEXAMETHASONE SODIUM PHOSPHATE 4 MG: 4 INJECTION, SOLUTION INTRAMUSCULAR; INTRAVENOUS at 14:40

## 2020-03-10 RX ADMIN — Medication 10 ML: at 21:26

## 2020-03-10 RX ADMIN — Medication 10 ML: at 08:47

## 2020-03-10 RX ADMIN — DEXAMETHASONE SODIUM PHOSPHATE 4 MG: 4 INJECTION, SOLUTION INTRAMUSCULAR; INTRAVENOUS at 08:47

## 2020-03-10 RX ADMIN — PANTOPRAZOLE SODIUM 40 MG: 40 TABLET, DELAYED RELEASE ORAL at 08:47

## 2020-03-10 RX ADMIN — DEXAMETHASONE SODIUM PHOSPHATE 4 MG: 4 INJECTION, SOLUTION INTRAMUSCULAR; INTRAVENOUS at 04:00

## 2020-03-10 RX ADMIN — DEXAMETHASONE SODIUM PHOSPHATE 4 MG: 4 INJECTION, SOLUTION INTRAMUSCULAR; INTRAVENOUS at 21:26

## 2020-03-10 RX ADMIN — PANTOPRAZOLE SODIUM 40 MG: 40 TABLET, DELAYED RELEASE ORAL at 17:04

## 2020-03-10 RX ADMIN — INSULIN LISPRO 2 UNITS: 100 INJECTION, SOLUTION INTRAVENOUS; SUBCUTANEOUS at 18:07

## 2020-03-10 ASSESSMENT — PAIN SCALES - GENERAL
PAINLEVEL_OUTOF10: 0

## 2020-03-10 NOTE — PROGRESS NOTES
Pharmacy Medication History Note      List of current medications patient is taking is complete. Source of information: Patient and family    Changes made to medication list:  Medications removed (include reason, ex. therapy complete or physician discontinued): Albuterol inhaler (List cleanup. Patient states to have not used inhaler in about six years and does not have one at home.)    Medications added/doses adjusted:  None    Other notes (ex. Recent course of antibiotics, Coumadin dosing):  Denies use of other OTC or herbal medications.       Allergies reviewed      Electronically signed by Cristiane Dia on 3/10/2020 at 11:43 AM
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
## 2020-03-10 NOTE — PROGRESS NOTES
Systems  Constitutional: Positive for activity change. Respiratory: Negative for apnea, chest tightness and shortness of breath. Cardiovascular: Negative for chest pain and leg swelling. Gastrointestinal: Negative for abdominal distention and abdominal pain. Musculoskeletal: Negative for back pain, neck pain and neck stiffness. Neurological: Positive for weakness and numbness. Negative for dizziness and headaches. Psychiatric/Behavioral: Negative for agitation, behavioral problems, confusion and decreased concentration. 24 hour intake/output:    Intake/Output Summary (Last 24 hours) at 3/10/2020 1644  Last data filed at 3/10/2020 0345  Gross per 24 hour   Intake 910 ml   Output 1300 ml   Net -390 ml     Last 3 weights: Wt Readings from Last 3 Encounters:   03/10/20 205 lb (93 kg)         CBC:   Recent Labs     03/09/20  0753   WBC 14.1*   HGB 14.5        BMP:    Recent Labs     03/09/20  0753      K 4.2      CO2 24   BUN 24*   CREATININE 0.8   GLUCOSE 149*     Calcium:  Recent Labs     03/09/20  0753   CALCIUM 9.5     Magnesium:No results for input(s): MG in the last 72 hours. Glucose:  Recent Labs     03/09/20  2041 03/10/20  0833 03/10/20  1222   POCGLU 214* 135* 128*     HgbA1C: No results for input(s): LABA1C in the last 72 hours. Lipids: No results for input(s): CHOL, TRIG, HDL, LDLCALC in the last 72 hours. Invalid input(s): LDL    Radiology reports as per the Radiologist  Radiology: Ct Head Wo Contrast    Result Date: 3/5/2020  PROCEDURE: CT HEAD WO CONTRAST CLINICAL INFORMATION: numbness below waist, worsening expressive aphasia, Trauma. COMPARISON: No prior study. TECHNIQUE: Noncontrast 5 mm axial images were obtained through the brain. All CT scans at this facility use dose modulation, iterative reconstruction, and/or weight-based dosing when appropriate to reduce radiation dose to as low as reasonably achievable. FINDINGS: There is no hemorrhage.  There are no abnormal cord signal. Below the T9 level, the thoracic spinal cord is of normal caliber. Again there is minimal scattered prominence of the central canal. There are no gross abnormalities on the localizer images. Mass effect upon the thoracic spinal cord at the T7-8 through T9 levels. The thoracic spinal cord is flattened against the ventral spinal canal at the T8-9 level. There is abnormal cord signal consistent with cord edema versus myelomalacia. Just below this, there is a cystic abnormality within the cord. This can represent a syrinx. There is a nodule of enhancement. The differential diagnosis includes a cystic mass in the thoracic spinal cord with enhancing nodule however, this does not explain the mass effect upon the dorsal aspect of the cord. Alternatively, this could be and arachnoid cyst dorsal to the thoracic cord compressing the cord. This could also represent subarachnoid adhesions distorting the cord. **This report has been created using voice recognition software. It may contain minor errors which are inherent in voice recognition technology. ** Final report electronically signed by Dr. Idania Ferreira on 3/6/2020 9:51 AM    Mri Lumbar Spine W Wo Contrast    Result Date: 3/5/2020  PROCEDURE: MRI LUMBAR SPINE W WO CONTRAST CLINICAL INFORMATION: bilateral leg numbness, loss of bladder control. Urinary incontinence. Uncontrollable leg spasms. COMPARISON: No prior study. TECHNIQUE: Sagittal and axial T1 and T2-weighted images were obtained to the lumbar spine. Postcontrast axial and sagittal T1-weighted images were also obtained. FINDINGS: The lumbar vertebral bodies are normally aligned. There is abnormal bone marrow signal in the superior right aspect of the L4 vertebral body. There is edema on the STIR sequence. Subtle fracture lines are seen. This appears to be an acute compression fracture of the right lateral aspect of the superior endplate. There is no associated soft tissue mass.  There is as described above. **This report has been created using voice recognition software. It may contain minor errors which are inherent in voice recognition technology. ** Final report electronically signed by Dr. Humphrey Colon on 3/5/2020 1:50 PM    Xr Chest Portable    Result Date: 3/6/2020  PROCEDURE: XR CHEST PORTABLE CLINICAL INFORMATION: pre-op. COMPARISON: Chest x-ray dated 7/28/2012 TECHNIQUE: AP Portable chest xray FINDINGS: Lines/tubes/devices: none Lungs/pleura:  No pneumonia, pulmonary edema, or obvious mass. No pleural effusion. No pneumothorax. Heart: Heart size is normal. Mediastinum/tiago: No obvious mass or adenopathy. Skeleton: There is multilevel vertebral endplate spondylosis. No acute cardiopulmonary disease. **This report has been created using voice recognition software. It may contain minor errors which are inherent in voice recognition technology. ** Final report electronically signed by Dr. Stephan Leahy on 3/6/2020 3:24 AM    Mri Brain Wo Contrast    Result Date: 3/6/2020  PROCEDURE: MRI BRAIN WO CONTRAST CLINICAL INFORMATION ataxia; bilateral ankle clonus. Bilateral leg numbness. Urinary incontinence. COMPARISON: Head CT 3/5/2020. TECHNIQUE: Multiplanar and multiple spin echo MRI images were obtained of the brain without contrast. FINDINGS: The diffusion-weighted images are normal.  The brain volume is normal. There is normal signal intensity in the brain. There are no intra-or extra-axial collections. There is no hydrocephalus, midline shift or mass effect. There is no susceptibility artifact in the brain. The major intracranial vascular flow voids are present. The midline craniocervical junction structures are normal.  The pituitary gland and brainstem are normal.      Normal MRI of the brain. **This report has been created using voice recognition software. It may contain minor errors which are inherent in voice recognition technology. ** Final report electronically signed by Dr. Odalys Burrell Olimpia Taylor on 3/6/2020 9:31 AM    Fl Guided For Spine Inject    Result Date: 3/6/2020  PROCEDURE: FL LUMBAR PUNCTURE DIAG, FL GUIDED FOR SPINE INJECT, FL MYELOGRAM THORACIC VIA LUMB INJ S&I CLINICAL INFORMATION: Abnormal MRI examination; fluoroscopy guided lumbar puncture requested for CSF collection and contrast injection for CT of the thoracic spine TECHNIQUE: The procedure, risks and potential complications were explained to the patient. Informed consent was obtained. The patient was placed in a prone position on the fluoroscopy table and a lumbar interspace was localized. The overlying skin was prepped and draped in the usual sterile manner. Following administration of 2% lidocaine, a lumbar puncture was performed using a 20-gauge spinal needle. The subarachnoid space was accessed without difficulty and showed immediate clear cerebrospinal fluid. 13 mL of fluid was collected and sent to cytology. Approximately 10 mL Omnipaque 200 was injected under fluoroscopy. The needle was withdrawn and a bandage was placed over the puncture site. The patient tolerated the procedure well without immediate complications. 2 spot images were obtained. Total fluoroscopy time 2.4 minutes. COMPARISON: None FINDINGS: There is adequate opacification of the lumbar thecal sac with subsequent migration of contrast into the thoracic spine. Fluoroscopy guided lumbar puncture for CSF collection and contrast injection for CT of the thoracic spine. Final report electronically signed by Dr. Stefanie Coe on 3/6/2020 12:34 PM    Fl Lumbar Puncture Diag    Result Date: 3/6/2020  PROCEDURE: FL LUMBAR PUNCTURE DIAG, FL GUIDED FOR SPINE INJECT, FL MYELOGRAM THORACIC VIA LUMB INJ S&I CLINICAL INFORMATION: Abnormal MRI examination; fluoroscopy guided lumbar puncture requested for CSF collection and contrast injection for CT of the thoracic spine TECHNIQUE: The procedure, risks and potential complications were explained to the patient.  Informed

## 2020-03-10 NOTE — PLAN OF CARE
Problem: Falls - Risk of:  Goal: Will remain free from falls  Description: Will remain free from falls  3/10/2020 0951 by Noe Corbin RN  Outcome: Met This Shift  Note: Free from falls this shift, at this time. Call light and bedside table within reach. Bed alarm on. Bed wheels locked and bed in lowest position. Pt oriented to own abilities and is encouraged to use call light for needs. Up X 1 assist and walker. Problem: Falls - Risk of:  Goal: Absence of physical injury  Description: Absence of physical injury  Outcome: Met This Shift  Note: Free from physical injury this shift, at this time. Problem: Risk for Impaired Skin Integrity  Goal: Tissue integrity - skin and mucous membranes  Description: Structural intactness and normal physiological function of skin and  mucous membranes. 3/10/2020 0951 by Noe Corbin RN  Outcome: Met This Shift  Note: No skin issues noted with each assessment. Patient able to turn and reposition self. Problem: Safety:  Goal: Free from accidental physical injury  Description: Free from accidental physical injury  Outcome: Met This Shift  Note: Free from accidental physical injury this shift, at this time. Problem: Pain:  Goal: Pain level will decrease  Description: Pain level will decrease  3/10/2020 0951 by Noe Corbin RN  Outcome: Met This Shift  Note: Denies pain this shift, at this time. Pain goal is 3. Problem: Pain:  Goal: Control of acute pain  Description: Control of acute pain  Outcome: Met This Shift  Note: Denies pain. Problem: Pain:  Goal: Control of chronic pain  Description: Control of chronic pain  Outcome: Met This Shift  Note: Denies pain. Problem: Daily Care:  Goal: Daily care needs are met  Description: Daily care needs are met  3/10/2020 0951 by Noe Corbin RN  Outcome: Ongoing  Note: Daily care needs met with assistance of staff.       Problem: Discharge Planning:  Goal: Patients continuum of care needs are met  Description: Patients continuum of care needs are met  3/10/2020 0951 by Low Pascual RN  Outcome: Ongoing  Note: Consult made to rehab this shift. No orders for d/c at this time. Care plan reviewed with patient. Patient verbalizes understanding of the plan of care and contributed to goal setting.

## 2020-03-10 NOTE — CARE COORDINATION
3/10/20, 2:53 PM EDT    DISCHARGE ONGOING EVALUATION:     Marcelodao Kasey day: 4  Location: -17/017-A Reason for admit: Leg weakness, bilateral [R29.898]   Treatment Plan of Care: PT/OT, Physiatry consult, IV Decadron, diabetes management. Barriers to Discharge: medical stability. PCP: No primary care provider on file. Readmission Risk Score: 10%  Patient Goals/Plan/Treatment Preferences: From home with family. Await Physiatry recommendations for possible IP Rehab.

## 2020-03-11 LAB
ANAEROBIC CULTURE: NORMAL
ANION GAP SERPL CALCULATED.3IONS-SCNC: 13 MEQ/L (ref 8–16)
BUN BLDV-MCNC: 26 MG/DL (ref 7–22)
CALCIUM SERPL-MCNC: 9.2 MG/DL (ref 8.5–10.5)
CHLORIDE BLD-SCNC: 98 MEQ/L (ref 98–111)
CO2: 25 MEQ/L (ref 23–33)
CREAT SERPL-MCNC: 0.7 MG/DL (ref 0.4–1.2)
CSF CULTURE: NORMAL
ERYTHROCYTE [DISTWIDTH] IN BLOOD BY AUTOMATED COUNT: 12.3 % (ref 11.5–14.5)
ERYTHROCYTE [DISTWIDTH] IN BLOOD BY AUTOMATED COUNT: 38 FL (ref 35–45)
GFR SERPL CREATININE-BSD FRML MDRD: > 90 ML/MIN/1.73M2
GLUCOSE BLD-MCNC: 129 MG/DL (ref 70–108)
GLUCOSE BLD-MCNC: 137 MG/DL (ref 70–108)
GLUCOSE BLD-MCNC: 145 MG/DL (ref 70–108)
GLUCOSE BLD-MCNC: 155 MG/DL (ref 70–108)
GLUCOSE BLD-MCNC: 201 MG/DL (ref 70–108)
GRAM STAIN RESULT: NORMAL
HCT VFR BLD CALC: 44.7 % (ref 42–52)
HEMOGLOBIN: 14.9 GM/DL (ref 14–18)
MCH RBC QN AUTO: 28.7 PG (ref 26–33)
MCHC RBC AUTO-ENTMCNC: 33.3 GM/DL (ref 32.2–35.5)
MCV RBC AUTO: 86 FL (ref 80–94)
PLATELET # BLD: 401 THOU/MM3 (ref 130–400)
PMV BLD AUTO: 8.9 FL (ref 9.4–12.4)
POTASSIUM SERPL-SCNC: 4.3 MEQ/L (ref 3.5–5.2)
RBC # BLD: 5.2 MILL/MM3 (ref 4.7–6.1)
SODIUM BLD-SCNC: 136 MEQ/L (ref 135–145)
WBC # BLD: 17 THOU/MM3 (ref 4.8–10.8)

## 2020-03-11 PROCEDURE — 96376 TX/PRO/DX INJ SAME DRUG ADON: CPT

## 2020-03-11 PROCEDURE — 2060000000 HC ICU INTERMEDIATE R&B

## 2020-03-11 PROCEDURE — 2580000003 HC RX 258: Performed by: FAMILY MEDICINE

## 2020-03-11 PROCEDURE — G0378 HOSPITAL OBSERVATION PER HR: HCPCS

## 2020-03-11 PROCEDURE — 80048 BASIC METABOLIC PNL TOTAL CA: CPT

## 2020-03-11 PROCEDURE — 97530 THERAPEUTIC ACTIVITIES: CPT

## 2020-03-11 PROCEDURE — 97110 THERAPEUTIC EXERCISES: CPT

## 2020-03-11 PROCEDURE — 85027 COMPLETE CBC AUTOMATED: CPT

## 2020-03-11 PROCEDURE — 36415 COLL VENOUS BLD VENIPUNCTURE: CPT

## 2020-03-11 PROCEDURE — 97116 GAIT TRAINING THERAPY: CPT

## 2020-03-11 PROCEDURE — 6360000002 HC RX W HCPCS: Performed by: FAMILY MEDICINE

## 2020-03-11 PROCEDURE — 99231 SBSQ HOSP IP/OBS SF/LOW 25: CPT | Performed by: PHYSICIAN ASSISTANT

## 2020-03-11 PROCEDURE — 82948 REAGENT STRIP/BLOOD GLUCOSE: CPT

## 2020-03-11 PROCEDURE — 6370000000 HC RX 637 (ALT 250 FOR IP): Performed by: PSYCHIATRY & NEUROLOGY

## 2020-03-11 PROCEDURE — 97535 SELF CARE MNGMENT TRAINING: CPT

## 2020-03-11 PROCEDURE — 99232 SBSQ HOSP IP/OBS MODERATE 35: CPT | Performed by: INTERNAL MEDICINE

## 2020-03-11 RX ADMIN — DEXAMETHASONE SODIUM PHOSPHATE 4 MG: 4 INJECTION, SOLUTION INTRAMUSCULAR; INTRAVENOUS at 08:41

## 2020-03-11 RX ADMIN — PANTOPRAZOLE SODIUM 40 MG: 40 TABLET, DELAYED RELEASE ORAL at 18:47

## 2020-03-11 RX ADMIN — DEXAMETHASONE SODIUM PHOSPHATE 4 MG: 4 INJECTION, SOLUTION INTRAMUSCULAR; INTRAVENOUS at 15:25

## 2020-03-11 RX ADMIN — INSULIN LISPRO 2 UNITS: 100 INJECTION, SOLUTION INTRAVENOUS; SUBCUTANEOUS at 18:44

## 2020-03-11 RX ADMIN — PANTOPRAZOLE SODIUM 40 MG: 40 TABLET, DELAYED RELEASE ORAL at 08:41

## 2020-03-11 RX ADMIN — Medication 10 ML: at 08:41

## 2020-03-11 RX ADMIN — INSULIN LISPRO 2 UNITS: 100 INJECTION, SOLUTION INTRAVENOUS; SUBCUTANEOUS at 20:35

## 2020-03-11 RX ADMIN — DEXAMETHASONE SODIUM PHOSPHATE 4 MG: 4 INJECTION, SOLUTION INTRAMUSCULAR; INTRAVENOUS at 20:34

## 2020-03-11 RX ADMIN — DEXAMETHASONE SODIUM PHOSPHATE 4 MG: 4 INJECTION, SOLUTION INTRAMUSCULAR; INTRAVENOUS at 04:14

## 2020-03-11 RX ADMIN — Medication 10 ML: at 20:37

## 2020-03-11 ASSESSMENT — PAIN SCALES - GENERAL
PAINLEVEL_OUTOF10: 0
PAINLEVEL_OUTOF10: 0

## 2020-03-11 NOTE — PROGRESS NOTES
lenghty seated rest break after trial of mobility, mod fatigue noted. ADDITIONAL ACTIVITIES:  Reiterated education on benefits of LHAE to increase independence with LB care. Therapist provided set-up for reacher and sock aid- min cues for problem solving. Pt expresses interest on demos good understanding for proper use- educated on various resources for purchase after discharge as he states he is concerned with financial aspect to purchase full hip-kit. ASSESSMENT:     Activity Tolerance:  Patient tolerance of  treatment: fair. Pt limited by fatigue. Discharge Recommendations: Continue to assess pending progress, Patient would benefit from continued therapy after discharge  Equipment Recommendations: Equipment Needed: Yes  Other: Shower chair; grabbars in shower; LHAE  Plan: Times per week: 6x  Specific instructions for Next Treatment: Functional mobility; ADLs and adaptations; standing safety  Current Treatment Recommendations: Functional Mobility Training, Endurance Training, Balance Training, Safety Education & Training, Equipment Evaluation, Education, & procurement, Self-Care / ADL  Plan Comment: Pt would benefit from continued skilled OT services when medically stable and discharged from Acute. Nickie Overton would benefit. Patient Education  Patient Education: ADL's, Equipment Education, Assistive Device Safety and Safety with transfers and mobility. Goals  Short term goals  Time Frame for Short term goals: By discharge  Short term goal 1: Pt will demonstrate functional mobility walking to/from the chair or the bathroom with a rolling walker and SBA to prepare for doing self care at the sink. Short term goal 2: Pt will complete simple ADLs while standing and using 1 hand release with SBA to increase his safety with dressing, bathing. Short term goal 3: Pt will complete lower body ADLs while using any LHAE needed with SBA to increase his independence with self care.    Short term goal 4: Pt will

## 2020-03-11 NOTE — PLAN OF CARE
Problem: Discharge Planning:  Goal: Patients continuum of care needs are met  Description: Patients continuum of care needs are met  Outcome: Ongoing  Note: Discharge planning in process and discussed with patient/family. Care manager aware of discharge needs. Patient is from home. Plan is to be discharged to inpatient rehab. Patient discharge planning remains in progress. Problem: Pain:  Goal: Pain level will decrease  Description: Pain level will decrease  Outcome: Ongoing  Note: Patient able to use 0-10 pain scale. Denies pain at this time. Agreeable to take PRN pain medications. Patient has not complained of any pain this shift. Patient stated goal is \"0/10\". Problem: Pain:  Goal: Control of acute pain  Description: Control of acute pain  Outcome: Ongoing  Note: Patient able to use 0-10 pain scale. Denies pain at this time. Agreeable to take PRN pain medications. Patient has not complained of any pain this shift. Patient stated goal is \"0/10\". Problem: Pain:  Goal: Control of chronic pain  Description: Control of chronic pain  Outcome: Ongoing  Note: Patient able to use 0-10 pain scale. Denies pain at this time. Agreeable to take PRN pain medications. Patient has not complained of any pain this shift. Patient stated goal is \"0/10\". Problem: Musculor/Skeletal Functional Status  Goal: Able to perform physical activity  Outcome: Ongoing  Note: Patient is working with PT/OT this shift. Patient ambulating with one assist and a walker. Patient to be discharged to inpatient rehab. Care plan reviewed with patient and RN. Patient and RN verbalize understanding of the plan of care and contribute to goal setting.

## 2020-03-11 NOTE — PROGRESS NOTES
Hospitalist Progress Note    Patient:  Amari Li      Unit/Bed:4A-17/017-A    YOB: 1975    MRN: [id number]       Acct: [de-identified]     PCP: No primary care provider on file. Date of Admission: 3/5/2020      Assessment/Plan:  Active Hospital Problems    Diagnosis Date Noted    Leg weakness, bilateral [R29.898] 03/06/2020       Progressive bilateral LE weakness, numbness: cystic spinal lesion T8-9 as noted on MRI. Approximately 0.4 x 0.97 cm transverse and CC dimension with syrinx extending to the upper and lower thoracic cord. -Patient is stable without significant change is weakness, per patient.   -Trial of steroids x10 days then wean. 3/8/20: sensation improving. Continue conservative approach. 3/9/20: Continuous improvement, consider IPR.   3/10/20: continues to improve. Pursuing IPR. Per NS, plan MRI t spine w/wo contrast with ciss/vista sequence. 3/11: still pending full IPR eval.  Stable today, no worsening symptoms.      Abnormal marrow signal at L5 On MRI. Per read, most consistent with an acute fracture (?secondary to underlying osteopenia). -Vitamin D level ordered for AM.   -Neurosurgery on consult. Conservative management.      Multi-level DDD (C/T/L spine)  Neurosurgery on consult. Symptom control as needed. PT/OT eval prior to discharge.     Leukocytosis: reactive 2/2 steroids. Hyperglycemia: A1C 6.1. OK to monitor BG with accuchecks. SSI, CC diet - suspect 2/2 steroids.      Microscopic hematuria  Noted on UA. Patient is a never-smoker. Recommend additional outpatient follow up by PCP with repeat UA.      Asthma Without exacerbation. Albuterol prn.       Expected discharge date:  1-2 days     Disposition: pending IPR recommendations         [] Home       [] TCU       [] Rehab       [] Psych       [] SNF       [] [place]       [] Other-    --------------------------------------------    Chief Complaint: Leg weakness    Hospital Course: Patient admitted for leg weakness, found to have spinal cord cystic lesion. Is on trial of steroids and Neurosurgery following with conservative approach. Pending disposition, eval from Physiatry. Subjective (past 24 hours): Patient seen at bedside. Feeling like his strength is getting slightly better. Still with overall decreased sensation but intact to pressure. Working with therapy. Denies any bowel or bladder incontinence or retention. Otherwise doing well. Medications:  Reviewed    Infusion Medications    dextrose       Scheduled Medications    insulin lispro  0-12 Units Subcutaneous TID WC    insulin lispro  0-6 Units Subcutaneous Nightly    pantoprazole  40 mg Oral BID AC    sodium chloride flush  10 mL Intravenous 2 times per day    dexamethasone  4 mg Intravenous Q6H     PRN Meds: glucose, dextrose, glucagon (rDNA), dextrose, albuterol sulfate HFA, sodium chloride flush, acetaminophen **OR** acetaminophen, polyethylene glycol, promethazine **OR** ondansetron      Intake/Output Summary (Last 24 hours) at 3/11/2020 0799  Last data filed at 3/11/2020 0427  Gross per 24 hour   Intake --   Output 400 ml   Net -400 ml     Weight change:       Exam:  /79   Pulse 54   Temp 98.1 °F (36.7 °C) (Oral)   Resp 16   Ht 5' 10\" (1.778 m)   Wt 205 lb (93 kg)   SpO2 96%   BMI 29.41 kg/m²     General appearance: No apparent distress, well developed, appears stated age. Eyes:  Pupils equal, round, and reactive to light. Conjunctivae/corneas clear. HENT: Head normal in appearance. External nares normal.  Oral mucosa moist without lesions. Hearing grossly intact. Poor dentition. Neck: Supple, with full range of motion. No jugular venous distention. Trachea midline. Respiratory:  Normal respiratory effort. Clear to auscultation, bilaterally without rales or wheezes or Rhonchi. Cardiovascular: Normal rate, regular rhythm with normal S1/S2 without murmurs. No lower extremity edema. created using voice recognition software. It may contain minor errors which are inherent in voice recognition technology. **      Final report electronically signed by Dr. Bill Isaac on 3/6/2020 9:35 AM      MRI THORACIC SPINE W WO CONTRAST   Final Result       Mass effect upon the thoracic spinal cord at the T7-8 through T9 levels. The thoracic spinal cord is flattened against the ventral spinal canal at the T8-9 level. There is abnormal cord signal consistent with cord edema versus myelomalacia. Just below    this, there is a cystic abnormality within the cord. This can represent a syrinx. There is a nodule of enhancement. The differential diagnosis includes a cystic mass in the thoracic spinal cord with enhancing nodule however, this does not explain the    mass effect upon the dorsal aspect of the cord. Alternatively, this could be and arachnoid cyst dorsal to the thoracic cord compressing the cord. This could also represent subarachnoid adhesions distorting the cord. **This report has been created using voice recognition software. It may contain minor errors which are inherent in voice recognition technology. **      Final report electronically signed by Dr. Bill Isaac on 3/6/2020 9:51 AM      MRI [address]   Final Result       1. Abnormal marrow signal in the superior endplate of L5 for. This is on the right. The pattern and distribution is most consistent with an acute fracture. This is most likely secondary to underlying osteopenia process if the patient does not have a    history of trauma. Clinical correlation is recommended. 2. No evidence of significant spinal canal stenosis at any level. 3. Variable foraminal stenosis at multiple levels as described above. **This report has been created using voice recognition software. It may contain minor errors which are inherent in voice recognition technology. **      Final report electronically signed by
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
## 2020-03-11 NOTE — CARE COORDINATION
3/11/20, 2:44 PM EDT    DISCHARGE ONGOING EVALUATION:     Marvel Marshall day: 5  Location: [facility]17/017-A Reason for admit: Leg weakness, bilateral [R29.898]   Treatment Plan of Care: PT/OT, IV Decadron, diabetes management, Physiatry consult. Barriers to Discharge: Physiatry consult. Awaiting Physiatry recommendations for home vs IP Rehab. PCP: No primary care provider on file. Readmission Risk Score: 10%  Patient Goals/Plan/Treatment Preferences: from home. Await Physiatry recommendations for Home vs IPR.

## 2020-03-11 NOTE — PROGRESS NOTES
Neurosurgery Progress Note    Patient:  Susan Boyce      Unit/Bed:4A-17/017-A    YOB: 1975    MRN: [id number]     Acct: [de-identified]     Admit date: 3/5/2020    Chief Complaint   Patient presents with    Other     sent in by doctor       Patient Seen, Chart, Physician notes, Labs, Radiology studies reviewed. Subjective: Mr. Florentin Miramontes remains status post admission with lower extremity weakness and numbness secondary to intrathecal abnormalities seen at T8-T9 levels.  He remains comfortable this morning without significant complaints relating some continuing improvement in sensation for the left lower extremity along with improvement in strength left greater than right lower extremities. Past, Family, Social History unchanged from admission. Diet:  DIET GENERAL; Carb Control: 3 carb choices (45 gms)/meal    Medications:  Scheduled Meds:   insulin lispro  0-12 Units Subcutaneous TID WC    insulin lispro  0-6 Units Subcutaneous Nightly    pantoprazole  40 mg Oral BID AC    sodium chloride flush  10 mL Intravenous 2 times per day    dexamethasone  4 mg Intravenous Q6H     Continuous Infusions:   dextrose       PRN Meds:glucose, dextrose, glucagon (rDNA), dextrose, albuterol sulfate HFA, sodium chloride flush, acetaminophen **OR** acetaminophen, polyethylene glycol, promethazine **OR** ondansetron    Objective: He is sitting up in bed comfortably on exam this afternoon. There are no acute changes to the patient's chart overnight. He is comfortable awaiting evaluation for possible placement for inpatient rehabilitation. Vitals: /79   Pulse 55   Temp 97.5 °F (36.4 °C) (Oral)   Resp 18   Ht 5' 10\" (1.778 m)   Wt 205 lb (93 kg)   SpO2 93%   BMI 29.41 kg/m²   Physical Exam:  Alert and attentive. Language appropriate, with no aphasia. Pupils equal.  Facial strength symmetric. Physical Exam   No acute changes recorded to the patient chart overnight.     ROS:  Review of Systems  Constitutional: Positive for activity change. Respiratory: Negative for apnea, chest tightness and shortness of breath.    Cardiovascular: Negative for chest pain and leg swelling. Gastrointestinal: Negative for abdominal distention and abdominal pain. Musculoskeletal: Negative for back pain, neck pain and neck stiffness. Neurological: Positive for weakness and numbness. Negative for dizziness and headaches. Psychiatric/Behavioral: Negative for agitation, behavioral problems, confusion and decreased concentration.     24 hour intake/output:    Intake/Output Summary (Last 24 hours) at 3/11/2020 1003  Last data filed at 3/11/2020 0425  Gross per 24 hour   Intake --   Output 400 ml   Net -400 ml     Last 3 weights: Wt Readings from Last 3 Encounters:   03/10/20 205 lb (93 kg)         CBC:   Recent Labs     03/09/20  0753 03/11/20  0722   WBC 14.1* 17.0*   HGB 14.5 14.9    401*     BMP:    Recent Labs     03/09/20  0753 03/11/20  0722    136   K 4.2 4.3    98   CO2 24 25   BUN 24* 26*   CREATININE 0.8 0.7   GLUCOSE 149* 155*     Calcium:  Recent Labs     03/11/20  0722   CALCIUM 9.2     Magnesium:No results for input(s): MG in the last 72 hours. Glucose:  Recent Labs     03/10/20  1755 03/10/20  2020 03/11/20  0826   POCGLU 141* 139* 129*     HgbA1C: No results for input(s): LABA1C in the last 72 hours. Lipids: No results for input(s): CHOL, TRIG, HDL, LDLCALC in the last 72 hours. Invalid input(s): LDL    Radiology reports as per the Radiologist  Radiology: Ct Head Wo Contrast    Result Date: 3/5/2020  PROCEDURE: CT HEAD WO CONTRAST CLINICAL INFORMATION: numbness below waist, worsening expressive aphasia, Trauma. COMPARISON: No prior study. TECHNIQUE: Noncontrast 5 mm axial images were obtained through the brain.  All CT scans at this facility use dose modulation, iterative reconstruction, and/or weight-based dosing when appropriate to reduce radiation dose to as low as the thoracic spinal cord does not fill with contrast. The thoracic vertebral bodies are normally aligned. There is normal mineralization. No suspicious osseous lesions are present. There is an old compression deformity of the superior endplate of T7. There is excellent opacification of the thecal sac intrathecal contrast. The thoracic spinal cord is of normal caliber. No mass lesions are noted in the thecal sac. 1. No evidence of a myelographic block. Intrathecal contrast fills the thecal sac. 2. The possible intrathecal abnormality seen at the T8 and T9 levels displacing the thoracic spinal cord on MRI is not seen on CT myelogram. This abnormality could be filling with contrast. This could also represent subarachnoid adhesions. 3. Ventral epidural contrast is able to displace the thoracic spinal cord away from the posterior margins of the vertebral bodies. 4. There is subtle mass effect upon the dorsal aspect of the thoracic spinal cord at the T8-9 and T9 levels. **This report has been created using voice recognition software. It may contain minor errors which are inherent in voice recognition technology. ** Final report electronically signed by Dr. Felipe Moreau on 3/6/2020 1:25 PM    Mri Cervical Spine Wo Contrast    Result Date: 3/6/2020  PROCEDURE: MRI CERVICAL SPINE WO CONTRAST CLINICAL INFORMATION: ataxia; bilateral ankle clonus . Bilateral leg numbness. Urinary incontinence. COMPARISON: No prior study. TECHNIQUE: Sagittal T1, T2 and STIR sequences were obtained through the cervical spine. Axial fast and echo and gradient echo T2-weighted images were obtained. FINDINGS: There is a slight retrolisthesis of C4 on C5 and C5 on C6. There is normal marrow signal throughout. No suspicious osseous lesions are present. There is no bone marrow edema. The cervical spinal cord is of normal caliber and signal intensity. There is normal signal in the dorsal columns.   The visualized aspects of the posterior fossa are normal. On the axial images, at C2-C3, there are very mild facet degenerative changes. There is no spinal canal or foraminal stenosis. At C3-C4, there is a posterior discussed effect complex. There are uncovertebral joint degenerative changes. There is moderate severity spinal canal stenosis. There is moderate severity left foraminal stenosis. At C4-C5, there is a posterior disc osteophyte complex. There are bilateral uncovertebral joint degenerative changes. There is moderate severity spinal canal stenosis. There is severe left and moderate severity right foraminal stenosis. At C5-C6, there is a moderate-sized posterior disc osteophyte complex. There are uncovertebral joint degenerative changes. There is moderate severity spinal canal stenosis. There is severe bilateral foraminal stenosis. At C6-C7, there are very mild facet degenerative changes. There is no spinal canal or foraminal stenosis. At C7-T1, there are no degenerative changes. There are no suspicious findings in the cervical soft tissues. 1. Moderate severity spinal canal stenosis at the C3-4, C4-5 and C5-6 levels due to posterior disc osteophyte complexes. 2. Severe bilateral foraminal stenosis at the C5-6 level. 3. Normal signal in the cervical spinal cord. **This report has been created using voice recognition software. It may contain minor errors which are inherent in voice recognition technology. ** Final report electronically signed by Dr. Rula Kelsey on 3/6/2020 9:35 AM    Mri Thoracic Spine W Wo Contrast    Result Date: 3/6/2020  PROCEDURE: MRI THORACIC SPINE W WO CONTRAST CLINICAL INFORMATION: ataxia; bilateral ankle clonus. COMPARISON: No prior study. TECHNIQUE: Sagittal T1, T2 and STIR sequences were obtained through the thoracic spine. Axial T2-weighted images were obtained through the discs. Postcontrast axial and sagittal T1-weighted images were obtained.  FINDINGS:  At the T9 level, the thoracic spinal cord is abnormal. The central canal is dilated. Just above this, the thoracic spinal cord is flattened ventrally in the thecal sac. There does appear to be abnormal cord signal. This is at the T8-9 disc level. There is mass effect upon the posterior aspect of the cord from an abnormality within the spinal canal. The thoracic spinal cord is flattened ventrally at this level. There is compression of the thoracic spinal cord ventrally. Just above this, there appears to be an intrathecal but extramedullary abnormality. On the axial images, this measures 16 x 9 cm. This is difficult to assess on the other sequences. This is isointense to CSF on all sequences and does not enhance. This appears to extend over a length of 3.2 cm. This may represent an arachnoid cyst within the thecal sac. This could also represent subarachnoid adhesions. On the postcontrast images, there is a punctate focus of enhancement inferiorly at the level of the dilated central canal and abnormal thoracic cord. This is at the T9 level. This can represent a small nodule associated with the tumor versus some vascular enhancement. This is seen on series 10 image 9, the sagittal postcontrast T1 weighted images. This is not well demonstrated on the axial postcontrast images. On the axial postcontrast images, there is no abnormal enhancement. The thoracic vertebral bodies are normally aligned. There are no acute compression fractures. There is an old mild chronic appearing deformity in the superior endplate of T6. There is no suspicious marrow signal abnormality. There is no bone marrow edema. On the axial images, there is some minimal prominence of the central canal in the upper thoracic cord. Beginning at the T7-8 level, the thoracic spinal cord is displaced ventrally in the spinal canal. There is mass effect upon the posterior aspect of the thoracic spinal cord. This is at the disc level.  Just below the disc level there is prominence of central canal. Then, at the disc level at the T8-9 level, the thoracic spinal cord is flattened. There is abnormal cord signal. Below the T9 level, the thoracic spinal cord is of normal caliber. Again there is minimal scattered prominence of the central canal. There are no gross abnormalities on the localizer images. Mass effect upon the thoracic spinal cord at the T7-8 through T9 levels. The thoracic spinal cord is flattened against the ventral spinal canal at the T8-9 level. There is abnormal cord signal consistent with cord edema versus myelomalacia. Just below this, there is a cystic abnormality within the cord. This can represent a syrinx. There is a nodule of enhancement. The differential diagnosis includes a cystic mass in the thoracic spinal cord with enhancing nodule however, this does not explain the mass effect upon the dorsal aspect of the cord. Alternatively, this could be and arachnoid cyst dorsal to the thoracic cord compressing the cord. This could also represent subarachnoid adhesions distorting the cord. **This report has been created using voice recognition software. It may contain minor errors which are inherent in voice recognition technology. ** Final report electronically signed by Dr. Derrick Jamil on 3/6/2020 9:51 AM    Mri Lumbar Spine W Wo Contrast    Result Date: 3/5/2020  PROCEDURE: MRI LUMBAR SPINE W WO CONTRAST CLINICAL INFORMATION: bilateral leg numbness, loss of bladder control. Urinary incontinence. Uncontrollable leg spasms. COMPARISON: No prior study. TECHNIQUE: Sagittal and axial T1 and T2-weighted images were obtained to the lumbar spine. Postcontrast axial and sagittal T1-weighted images were also obtained. FINDINGS: The lumbar vertebral bodies are normally aligned. There is abnormal bone marrow signal in the superior right aspect of the L4 vertebral body. There is edema on the STIR sequence. Subtle fracture lines are seen.  This appears to be an acute compression fracture of the right lateral aspect of the superior endplate. There is no associated soft tissue mass. There is early height loss. The height loss is only 10%. There is some degenerative marrow edema in the endplates at the R8-D3 level. This is on the right. No suspicious osseous lesions are present. There are no focal bone lesions. No pars defects are noted. There is disc desiccation throughout. The visualized aspects of the distal spinal cord are normal. The nerve roots of the cauda equina and the tip of the conus are normal. There are no gross abnormalities in the distal thoracic spine. On the axial images, at T12-L1, there are no degenerative changes. There is no spinal canal or foraminal stenosis. At L1-L2, there are minimal facet degenerative changes. There is no focal disc abnormality. There is no spinal canal or foraminal stenosis. At L2-3, there is a very mild diffuse disc bulge. There are minimal facet degenerative changes. There is no spinal canal stenosis. There is mild left foraminal stenosis. At L3-4, there are mild facet degenerative changes. There is no focal disc abnormality. There is no spinal canal stenosis. There is mild left foraminal stenosis. At L4-5, there are facet degenerative changes. There is a mild diffuse disc bulge. There is mild spinal canal stenosis. There is mild left foraminal stenosis. At L5-S1, there are mild facet degenerative changes. There is no spinal canal stenosis. There is moderate severity right foraminal stenosis. There is no abnormal enhancement. There are no suspicious findings in the visualized aspects of the retroperitoneum and paraspinal soft tissues. 1. Abnormal marrow signal in the superior endplate of L5 for. This is on the right. The pattern and distribution is most consistent with an acute fracture. This is most likely secondary to underlying osteopenia process if the patient does not have a history of trauma. Clinical correlation is recommended.  2. No evidence of significant spinal canal stenosis at any level. 3. Variable foraminal stenosis at multiple levels as described above. **This report has been created using voice recognition software. It may contain minor errors which are inherent in voice recognition technology. ** Final report electronically signed by Dr. Francheska Coley on 3/5/2020 1:50 PM    Xr Chest Portable    Result Date: 3/6/2020  PROCEDURE: XR CHEST PORTABLE CLINICAL INFORMATION: pre-op. COMPARISON: Chest x-ray dated 7/28/2012 TECHNIQUE: AP Portable chest xray FINDINGS: Lines/tubes/devices: none Lungs/pleura:  No pneumonia, pulmonary edema, or obvious mass. No pleural effusion. No pneumothorax. Heart: Heart size is normal. Mediastinum/tiago: No obvious mass or adenopathy. Skeleton: There is multilevel vertebral endplate spondylosis. No acute cardiopulmonary disease. **This report has been created using voice recognition software. It may contain minor errors which are inherent in voice recognition technology. ** Final report electronically signed by Dr. Miryam Trinh on 3/6/2020 3:24 AM    Mri Brain Wo Contrast    Result Date: 3/6/2020  PROCEDURE: MRI BRAIN WO CONTRAST CLINICAL INFORMATION ataxia; bilateral ankle clonus. Bilateral leg numbness. Urinary incontinence. COMPARISON: Head CT 3/5/2020. TECHNIQUE: Multiplanar and multiple spin echo MRI images were obtained of the brain without contrast. FINDINGS: The diffusion-weighted images are normal.  The brain volume is normal. There is normal signal intensity in the brain. There are no intra-or extra-axial collections. There is no hydrocephalus, midline shift or mass effect. There is no susceptibility artifact in the brain. The major intracranial vascular flow voids are present. The midline craniocervical junction structures are normal.  The pituitary gland and brainstem are normal.      Normal MRI of the brain. **This report has been created using voice recognition software.  It may contain minor errors which are inherent in voice recognition technology. ** Final report electronically signed by Dr. Shreya Pascual on 3/6/2020 9:31 AM    Fl Guided For Spine Inject    Result Date: 3/6/2020  PROCEDURE: FL LUMBAR PUNCTURE DIAG, FL GUIDED FOR SPINE INJECT, FL MYELOGRAM THORACIC VIA LUMB INJ S&I CLINICAL INFORMATION: Abnormal MRI examination; fluoroscopy guided lumbar puncture requested for CSF collection and contrast injection for CT of the thoracic spine TECHNIQUE: The procedure, risks and potential complications were explained to the patient. Informed consent was obtained. The patient was placed in a prone position on the fluoroscopy table and a lumbar interspace was localized. The overlying skin was prepped and draped in the usual sterile manner. Following administration of 2% lidocaine, a lumbar puncture was performed using a 20-gauge spinal needle. The subarachnoid space was accessed without difficulty and showed immediate clear cerebrospinal fluid. 13 mL of fluid was collected and sent to cytology. Approximately 10 mL Omnipaque 200 was injected under fluoroscopy. The needle was withdrawn and a bandage was placed over the puncture site. The patient tolerated the procedure well without immediate complications. 2 spot images were obtained. Total fluoroscopy time 2.4 minutes. COMPARISON: None FINDINGS: There is adequate opacification of the lumbar thecal sac with subsequent migration of contrast into the thoracic spine. Fluoroscopy guided lumbar puncture for CSF collection and contrast injection for CT of the thoracic spine.  Final report electronically signed by Dr. Melo Beach on 3/6/2020 12:34 PM    Fl Lumbar Puncture Diag    Result Date: 3/6/2020  PROCEDURE: FL LUMBAR PUNCTURE DIAG, FL GUIDED FOR SPINE INJECT, FL MYELOGRAM THORACIC VIA LUMB INJ S&I CLINICAL INFORMATION: Abnormal MRI examination; fluoroscopy guided lumbar puncture requested for CSF collection and contrast injection for CT of the thoracic spine TECHNIQUE: The procedure, risks and potential complications were explained to the patient. Informed consent was obtained. The patient was placed in a prone position on the fluoroscopy table and a lumbar interspace was localized. The overlying skin was prepped and draped in the usual sterile manner. Following administration of 2% lidocaine, a lumbar puncture was performed using a 20-gauge spinal needle. The subarachnoid space was accessed without difficulty and showed immediate clear cerebrospinal fluid. 13 mL of fluid was collected and sent to cytology. Approximately 10 mL Omnipaque 200 was injected under fluoroscopy. The needle was withdrawn and a bandage was placed over the puncture site. The patient tolerated the procedure well without immediate complications. 2 spot images were obtained. Total fluoroscopy time 2.4 minutes. COMPARISON: None FINDINGS: There is adequate opacification of the lumbar thecal sac with subsequent migration of contrast into the thoracic spine. Fluoroscopy guided lumbar puncture for CSF collection and contrast injection for CT of the thoracic spine. Final report electronically signed by Dr. Rosita Madrigal on 3/6/2020 12:34 PM    Fl Myelogram Thoracic Via Arletta Galea Inj S&i    Result Date: 3/6/2020  PROCEDURE: FL LUMBAR PUNCTURE DIAG, FL GUIDED FOR SPINE INJECT, FL MYELOGRAM THORACIC VIA LUMB INJ S&I CLINICAL INFORMATION: Abnormal MRI examination; fluoroscopy guided lumbar puncture requested for CSF collection and contrast injection for CT of the thoracic spine TECHNIQUE: The procedure, risks and potential complications were explained to the patient. Informed consent was obtained. The patient was placed in a prone position on the fluoroscopy table and a lumbar interspace was localized. The overlying skin was prepped and draped in the usual sterile manner. Following administration of 2% lidocaine, a lumbar puncture was performed using a 20-gauge spinal needle.  The subarachnoid space was accessed without difficulty and showed immediate clear cerebrospinal fluid. 13 mL of fluid was collected and sent to cytology. Approximately 10 mL Omnipaque 200 was injected under fluoroscopy. The needle was withdrawn and a bandage was placed over the puncture site. The patient tolerated the procedure well without immediate complications. 2 spot images were obtained. Total fluoroscopy time 2.4 minutes. COMPARISON: None FINDINGS: There is adequate opacification of the lumbar thecal sac with subsequent migration of contrast into the thoracic spine. Fluoroscopy guided lumbar puncture for CSF collection and contrast injection for CT of the thoracic spine. Final report electronically signed by Dr. Jamal Hernandez on 3/6/2020 12:34 PM    A/P: S/P He remains status post admission with complaints of lower extremity weakness and numbness secondary to Lasix lesion evidenced on imaging.  He is comfortable today and without specific complaints relating some continuing improvement in lower extremity strength and sensation following steroid therapy.  Neurosurgery recommends continuation of the steroids along with PT and OT as tolerated.    Patient care and planning was discussed by Dr. Dannielle Lyles with [facility] yesterday and we agree with evaluation for placement in inpatient rehabilitation. Neurosurgery to follow    Electronically signed by Arnulfo Arce PA-C on 3/11/2020 at 10:03 AM       Attending Note:     Patient seen and examined in floor in conjunction with neurosurgery PA Arnulfo Arce PA-C). Discussed with patient, his family, his  Nurse and patient primary  as well. All data and imaging reviewed by myself. I agree with examination assessment and plan as documented above.    - There is no acute event over the night.  -Patient feels stronger in his legs in general but he still is having a balance and gait issues.  -Patient is supposed to be evaluated by our inpatient rehab today.  -Today I reviewed the case again with patient and his primary(hospitalist service). My recommendation is follow, at this time we will continue the conservative approach at this time (steriodsand physical therapy. We will evaluate the patient again in 1 to 2 months (as long as there is no deterioration in patient neurological status) clinically and with you T-spine MRI(I prefer to be performed with CISS or Vista sequence). If patient symptoms of thoracic spine myelopathy persist, I would recommend for him a surgical intervention in the form of thoracic laminectomy and surgical resection of his arachnoid cyst/ band at the level of T7-T9.   -All questions and concerns were addressed and answered. All are in agreement with above recommendation treatment plan.       Maritza Christian MD

## 2020-03-12 LAB
GLUCOSE BLD-MCNC: 134 MG/DL (ref 70–108)
GLUCOSE BLD-MCNC: 138 MG/DL (ref 70–108)
GLUCOSE BLD-MCNC: 148 MG/DL (ref 70–108)
GLUCOSE BLD-MCNC: 167 MG/DL (ref 70–108)
VITAMIN D 25-HYDROXY: 24 NG/ML (ref 30–100)

## 2020-03-12 PROCEDURE — 6370000000 HC RX 637 (ALT 250 FOR IP): Performed by: PSYCHIATRY & NEUROLOGY

## 2020-03-12 PROCEDURE — 97110 THERAPEUTIC EXERCISES: CPT

## 2020-03-12 PROCEDURE — 99232 SBSQ HOSP IP/OBS MODERATE 35: CPT | Performed by: INTERNAL MEDICINE

## 2020-03-12 PROCEDURE — 99223 1ST HOSP IP/OBS HIGH 75: CPT | Performed by: PHYSICAL MEDICINE & REHABILITATION

## 2020-03-12 PROCEDURE — 2580000003 HC RX 258: Performed by: FAMILY MEDICINE

## 2020-03-12 PROCEDURE — G0378 HOSPITAL OBSERVATION PER HR: HCPCS

## 2020-03-12 PROCEDURE — 6370000000 HC RX 637 (ALT 250 FOR IP): Performed by: INTERNAL MEDICINE

## 2020-03-12 PROCEDURE — 97535 SELF CARE MNGMENT TRAINING: CPT

## 2020-03-12 PROCEDURE — 96376 TX/PRO/DX INJ SAME DRUG ADON: CPT

## 2020-03-12 PROCEDURE — 6360000002 HC RX W HCPCS: Performed by: FAMILY MEDICINE

## 2020-03-12 PROCEDURE — 36415 COLL VENOUS BLD VENIPUNCTURE: CPT

## 2020-03-12 PROCEDURE — 97530 THERAPEUTIC ACTIVITIES: CPT

## 2020-03-12 PROCEDURE — 2060000000 HC ICU INTERMEDIATE R&B

## 2020-03-12 PROCEDURE — 97116 GAIT TRAINING THERAPY: CPT

## 2020-03-12 PROCEDURE — 82306 VITAMIN D 25 HYDROXY: CPT

## 2020-03-12 PROCEDURE — 82948 REAGENT STRIP/BLOOD GLUCOSE: CPT

## 2020-03-12 RX ORDER — CALCIUM CARBONATE/VITAMIN D3 250-3.125
1 TABLET ORAL DAILY
Status: DISCONTINUED | OUTPATIENT
Start: 2020-03-12 | End: 2020-03-17 | Stop reason: HOSPADM

## 2020-03-12 RX ADMIN — INSULIN LISPRO 1 UNITS: 100 INJECTION, SOLUTION INTRAVENOUS; SUBCUTANEOUS at 22:07

## 2020-03-12 RX ADMIN — Medication 10 ML: at 09:10

## 2020-03-12 RX ADMIN — PANTOPRAZOLE SODIUM 40 MG: 40 TABLET, DELAYED RELEASE ORAL at 18:24

## 2020-03-12 RX ADMIN — INSULIN LISPRO 2 UNITS: 100 INJECTION, SOLUTION INTRAVENOUS; SUBCUTANEOUS at 18:21

## 2020-03-12 RX ADMIN — DEXAMETHASONE SODIUM PHOSPHATE 4 MG: 4 INJECTION, SOLUTION INTRAMUSCULAR; INTRAVENOUS at 03:37

## 2020-03-12 RX ADMIN — PANTOPRAZOLE SODIUM 40 MG: 40 TABLET, DELAYED RELEASE ORAL at 09:09

## 2020-03-12 RX ADMIN — Medication 250 MG: at 13:19

## 2020-03-12 RX ADMIN — DEXAMETHASONE SODIUM PHOSPHATE 4 MG: 4 INJECTION, SOLUTION INTRAMUSCULAR; INTRAVENOUS at 09:09

## 2020-03-12 RX ADMIN — DEXAMETHASONE SODIUM PHOSPHATE 4 MG: 4 INJECTION, SOLUTION INTRAMUSCULAR; INTRAVENOUS at 15:51

## 2020-03-12 RX ADMIN — Medication 10 ML: at 22:08

## 2020-03-12 RX ADMIN — DEXAMETHASONE SODIUM PHOSPHATE 4 MG: 4 INJECTION, SOLUTION INTRAMUSCULAR; INTRAVENOUS at 22:08

## 2020-03-12 ASSESSMENT — PAIN SCALES - GENERAL
PAINLEVEL_OUTOF10: 0
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
## 2020-03-12 NOTE — CONSULTS
guarded position wide base of support    Stairs:  started at [facility] however as he fatigued needed min assist   Number of Steps: 9  Height: 6\" step with pt using UEs heavily on the step he struggled w/ controling foot placement      Balance:  dynamic standing balance no UE at support with min to mod trunk sway and min to CGA for balance pt reaching mostly in base of support he was able to stand 2 trials for ~ 3 min at a time   Standing w/ trevor UEs at support tapping feet to target with CGA and with slight decreased accuracy. O.T:  ADL:   Lower Extremity Dressing: Stand By Assistance with v/c. To doff/don socks from B feet- pt initially attempted without LHAE, however, demos increased difficulty at RLE crossing legs to don sock- therapist initiated education for [facility] (See Additional Activity).     BALANCE:  Sitting Balance:  Stand By Assistance. Standing Balance: Contact Guard Assistance. x1 minute in prep for mobility.     BED MOBILITY:  Supine to Sit: Stand By Assistance. Scooting: Stand By Assistance EOB.     TRANSFERS:  Sit to Stand:  Minimal Assistance. Stand to Sit: Contact Guard Assistance.      FUNCTIONAL MOBILITY:  Assistive Device: Rolling Walker  Assist Level:  Minimal Assistance. Distance: Completed functional mobility to/from 4A therapy gym at slow pace, no LOB noted. Pt requires mod cues for walker safety to keep close to JENNA with therapist providing Risa Flor at times for positioning- lenghty seated rest break after trial of mobility, mod fatigue noted.      ADDITIONAL ACTIVITIES:  Reiterated education on benefits of LHAE to increase independence with LB care. Therapist provided set-up for reacher and sock aid- min cues for problem solving. Pt expresses interest on demos good understanding for proper use- educated on various resources for purchase after discharge as he states he is concerned with financial aspect to purchase full hip-kit.     Past Medical History:        Diagnosis Date    Asthma Substance and Sexual Activity    Alcohol use: No    Drug use: No    Sexual activity: Not on file   Lifestyle    Physical activity     Days per week: Not on file     Minutes per session: Not on file    Stress: Not on file   Relationships    Social connections     Talks on phone: Not on file     Gets together: Not on file     Attends Synagogue service: Not on file     Active member of club or organization: Not on file     Attends meetings of clubs or organizations: Not on file     Relationship status: Not on file    Intimate partner violence     Fear of current or ex partner: Not on file     Emotionally abused: Not on file     Physically abused: Not on file     Forced sexual activity: Not on file   Other Topics Concern    Not on file   Social History Narrative    Not on file     Occupation: He works as a  at the [employer] in [employer]. Lives with: his mom and sister. Home setup: 1 level home with 1 flight of approx. 12 stairs to enter. Previous level of independence: He had been independent. Family History:   His mother is alive and healthy. She does have bilateral knee osteoarthritis. His father passed away at the age of 48 due to cardiac complications. Review of Systems:  CONSTITUTIONAL:  negative  EYES:  negative  HEENT:  negative  RESPIRATORY:  negative  CARDIOVASCULAR:  negative  GASTROINTESTINAL:  positive for a recent history of incontinence.   GENITOURINARY:  positive for urinary incontinence  SKIN:  negative  HEMATOLOGIC/LYMPHATIC:  negative  MUSCULOSKELETAL:  positive for  myalgias, arthralgias, pain, muscle weakness and bone pain  NEUROLOGICAL:  positive for speech problems, coordination problems, gait problems, weakness, numbness and pain  BEHAVIOR/PSYCH:  negative  10 point system review otherwise negative    Physical Exam:  /80   Pulse 67   Temp 97.2 °F (36.2 °C) (Oral)   Resp 16   Ht 5' 10\" (1.778 m)   Wt 198 lb 6.4 oz (90 kg)   SpO2 93% 70 - 108 mg/dl       Impression:  Gait dysfunction. Deficits with his A.D.L.'s.  History of adult-onset diabetes mellitus. History of asthma. History of expressive aphasia and dysarthria. Patient Active Problem List   Diagnosis    Leg weakness, bilateral      Recommendations:  · Patient is a good candidate for inpatient rehab. A pre-cert has been initiated. · He is to continue to work with his current inpatient rehab. therapies. · Will continue to monitor his medical and functional recovery. Greater than 50% of the 70 minutes was spent with the patient on counseling and with respect to coordinating his future inpatient rehab care. All of his questions were answered. It was my pleasure to evaluate Natalia Folks today. Please call with questions.     Nadege Bell MD

## 2020-03-12 NOTE — PROGRESS NOTES
Hospitalist Progress Note    Patient:  Cheyenne Hernandez      Unit/Bed:4A-17/017-A    YOB: 1975    MRN: [id number]       Acct: [de-identified]     PCP: No primary care provider on file. Date of Admission: 3/5/2020      Assessment/Plan:  Active Hospital Problems    Diagnosis Date Noted    Ataxia [R27.0]     Leg weakness, bilateral [R29.898] 03/06/2020       Progressive bilateral LE weakness, numbness: cystic spinal lesion T8-9 as noted on MRI. Approximately 0.4 x 0.97 cm transverse and CC dimension with syrinx extending to the upper and lower thoracic cord. .  ? Arachnoid cyst.   -Patient is stable without significant change is weakness, per patient.   -Trial of steroids x10 days then wean. 3/8/20: sensation improving. Continue conservative approach. 3/9/20: Continuous improvement, consider IPR.   3/10/20: continues to improve. Pursuing IPR. Per NS, plan MRI t spine w/wo contrast with ciss/vista sequence. 3/11: still pending full IPR eval.  Stable today, no worsening symptoms. 3/12: PMR consult completed, awaiting precert now.      Abnormal marrow signal at L5 On MRI. Per read, most consistent with an acute fracture (?secondary to underlying osteopenia). -Vitamin D level ordered for AM. - slightly low start on [place].   -Neurosurgery on consult. Conservative management.      Multi-level DDD (C/T/L spine)  Neurosurgery on consult. Symptom control as needed. PT/OT eval prior to discharge.     Leukocytosis: reactive 2/2 steroids. Hyperglycemia: A1C 6.1. OK to monitor BG with accuchecks. SSI, CC diet - suspect 2/2 steroids. Well controlled.       Microscopic hematuria  Noted on UA. Recommend additional outpatient follow up by PCP with repeat UA.      Poor dentition: recommend Dental evaluation as OP. Asthma Without exacerbation. Albuterol prn.       Expected discharge date:  1-2 days     Disposition: pending IPR recommendations         [] Home       [] TCU       [] Rehab       [] Psych       [] SNF       [] [facility]       [] Other-    --------------------------------------------    Chief Complaint: Leg weakness    Hospital Course: Patient admitted for leg weakness, found to have spinal cord cystic lesion. Is on trial of steroids and Neurosurgery following with conservative approach. Pending disposition, eval from Physiatry. Subjective (past 24 hours): Patient seen at bedside. Feeling like his strength is getting slightly better yet today. Awaiting precert, was evaluated by PMR today. No complaints. Medications:  Reviewed    Infusion Medications    dextrose       Scheduled Medications    oyster shell calcium/vitamin D  1 tablet Oral Daily    insulin lispro  0-12 Units Subcutaneous TID WC    insulin lispro  0-6 Units Subcutaneous Nightly    pantoprazole  40 mg Oral BID AC    sodium chloride flush  10 mL Intravenous 2 times per day    dexamethasone  4 mg Intravenous Q6H     PRN Meds: glucose, dextrose, glucagon (rDNA), dextrose, albuterol sulfate HFA, sodium chloride flush, acetaminophen **OR** acetaminophen, polyethylene glycol, promethazine **OR** ondansetron      Intake/Output Summary (Last 24 hours) at 3/12/2020 1743  Last data filed at 3/12/2020 1317  Gross per 24 hour   Intake 1050 ml   Output 2075 ml   Net -1025 ml     Weight change:       Exam:  /68   Pulse 63   Temp 98 °F (36.7 °C) (Oral)   Resp 14   Ht 5' 10\" (1.778 m)   Wt 198 lb 6.4 oz (90 kg)   SpO2 94%   BMI 28.47 kg/m²     General appearance: No apparent distress, well developed, appears stated age. Eyes:  Pupils equal, round, and reactive to light. Conjunctivae/corneas clear. HENT: Head normal in appearance. External nares normal.  Oral mucosa moist without lesions. Hearing grossly intact. Poor dentition. Neck: Supple, with full range of motion. No jugular venous distention. Trachea midline. Respiratory:  Normal respiratory effort.  Clear to auscultation, bilaterally without spinal cord away from the posterior margins of the vertebral bodies. 4. There is subtle mass effect upon the dorsal aspect of the thoracic spinal cord at the T8-9 and T9 levels. **This report has been created using voice recognition software. It may contain minor errors which are inherent in voice recognition technology. **      Final report electronically signed by Dr. Lavell Calle on 3/6/2020 1:25 PM      FL LUMBAR PUNCTURE DIAG   Final Result      Fluoroscopy guided lumbar puncture for CSF collection and contrast injection for CT of the thoracic spine. Final report electronically signed by Dr. Patricio Michele on 3/6/2020 12:34 PM      FL GUIDED FOR SPINE INJECT   Final Result      Fluoroscopy guided lumbar puncture for CSF collection and contrast injection for CT of the thoracic spine. Final report electronically signed by Dr. Patricio Michele on 3/6/2020 12:34 PM      FL MYELOGRAM THORACIC VIA LUMB INJ S&I   Final Result      Fluoroscopy guided lumbar puncture for CSF collection and contrast injection for CT of the thoracic spine. Final report electronically signed by Dr. Patricio Michele on 3/6/2020 12:34 PM      XR CHEST PORTABLE   Final Result      No acute cardiopulmonary disease. **This report has been created using voice recognition software. It may contain minor errors which are inherent in voice recognition technology. **      Final report electronically signed by Dr. Noemi Richards on 3/6/2020 3:24 AM      MRI BRAIN WO CONTRAST   Final Result    Normal MRI of the brain. **This report has been created using voice recognition software. It may contain minor errors which are inherent in voice recognition technology. **      Final report electronically signed by Dr. Lavell Calle on 3/6/2020 9:31 AM      MRI CERVICAL SPINE WO CONTRAST   Final Result          1.  Moderate severity spinal canal stenosis at the C3-4, C4-5 and C5-6 levels due to posterior disc osteophyte complexes. 2. Severe bilateral foraminal stenosis at the C5-6 level. 3. Normal signal in the cervical spinal cord. **This report has been created using voice recognition software. It may contain minor errors which are inherent in voice recognition technology. **      Final report electronically signed by Dr. Bella Vizcarra on 3/6/2020 9:35 AM      MRI THORACIC SPINE W WO CONTRAST   Final Result       Mass effect upon the thoracic spinal cord at the T7-8 through T9 levels. The thoracic spinal cord is flattened against the ventral spinal canal at the T8-9 level. There is abnormal cord signal consistent with cord edema versus myelomalacia. Just below    this, there is a cystic abnormality within the cord. This can represent a syrinx. There is a nodule of enhancement. The differential diagnosis includes a cystic mass in the thoracic spinal cord with enhancing nodule however, this does not explain the    mass effect upon the dorsal aspect of the cord. Alternatively, this could be and arachnoid cyst dorsal to the thoracic cord compressing the cord. This could also represent subarachnoid adhesions distorting the cord. **This report has been created using voice recognition software. It may contain minor errors which are inherent in voice recognition technology. **      Final report electronically signed by Dr. Bella Vizcarra on 3/6/2020 9:51 AM      MRI [address]   Final Result       1. Abnormal marrow signal in the superior endplate of L5 for. This is on the right. The pattern and distribution is most consistent with an acute fracture. This is most likely secondary to underlying osteopenia process if the patient does not have a    history of trauma. Clinical correlation is recommended. 2. No evidence of significant spinal canal stenosis at any level. 3. Variable foraminal stenosis at multiple levels as described above.                **This report has been created

## 2020-03-13 LAB
GLUCOSE BLD-MCNC: 127 MG/DL (ref 70–108)
GLUCOSE BLD-MCNC: 149 MG/DL (ref 70–108)
GLUCOSE BLD-MCNC: 151 MG/DL (ref 70–108)
GLUCOSE BLD-MCNC: 180 MG/DL (ref 70–108)

## 2020-03-13 PROCEDURE — 97110 THERAPEUTIC EXERCISES: CPT

## 2020-03-13 PROCEDURE — 2580000003 HC RX 258: Performed by: FAMILY MEDICINE

## 2020-03-13 PROCEDURE — 6370000000 HC RX 637 (ALT 250 FOR IP): Performed by: FAMILY MEDICINE

## 2020-03-13 PROCEDURE — 6360000002 HC RX W HCPCS: Performed by: FAMILY MEDICINE

## 2020-03-13 PROCEDURE — 99232 SBSQ HOSP IP/OBS MODERATE 35: CPT | Performed by: INTERNAL MEDICINE

## 2020-03-13 PROCEDURE — 96376 TX/PRO/DX INJ SAME DRUG ADON: CPT

## 2020-03-13 PROCEDURE — 82948 REAGENT STRIP/BLOOD GLUCOSE: CPT

## 2020-03-13 PROCEDURE — 6370000000 HC RX 637 (ALT 250 FOR IP): Performed by: INTERNAL MEDICINE

## 2020-03-13 PROCEDURE — 97116 GAIT TRAINING THERAPY: CPT

## 2020-03-13 PROCEDURE — 1200000000 HC SEMI PRIVATE

## 2020-03-13 PROCEDURE — G0378 HOSPITAL OBSERVATION PER HR: HCPCS

## 2020-03-13 PROCEDURE — 97530 THERAPEUTIC ACTIVITIES: CPT

## 2020-03-13 PROCEDURE — 6370000000 HC RX 637 (ALT 250 FOR IP): Performed by: PSYCHIATRY & NEUROLOGY

## 2020-03-13 RX ADMIN — Medication 10 ML: at 08:59

## 2020-03-13 RX ADMIN — DEXAMETHASONE SODIUM PHOSPHATE 4 MG: 4 INJECTION, SOLUTION INTRAMUSCULAR; INTRAVENOUS at 15:07

## 2020-03-13 RX ADMIN — INSULIN LISPRO 2 UNITS: 100 INJECTION, SOLUTION INTRAVENOUS; SUBCUTANEOUS at 17:55

## 2020-03-13 RX ADMIN — Medication 250 MG: at 08:58

## 2020-03-13 RX ADMIN — PANTOPRAZOLE SODIUM 40 MG: 40 TABLET, DELAYED RELEASE ORAL at 08:59

## 2020-03-13 RX ADMIN — DEXAMETHASONE SODIUM PHOSPHATE 4 MG: 4 INJECTION, SOLUTION INTRAMUSCULAR; INTRAVENOUS at 09:00

## 2020-03-13 RX ADMIN — ACETAMINOPHEN 650 MG: 325 TABLET ORAL at 08:59

## 2020-03-13 RX ADMIN — DEXAMETHASONE SODIUM PHOSPHATE 4 MG: 4 INJECTION, SOLUTION INTRAMUSCULAR; INTRAVENOUS at 04:28

## 2020-03-13 RX ADMIN — Medication 10 ML: at 20:27

## 2020-03-13 RX ADMIN — PANTOPRAZOLE SODIUM 40 MG: 40 TABLET, DELAYED RELEASE ORAL at 17:55

## 2020-03-13 RX ADMIN — DEXAMETHASONE SODIUM PHOSPHATE 4 MG: 4 INJECTION, SOLUTION INTRAMUSCULAR; INTRAVENOUS at 20:27

## 2020-03-13 RX ADMIN — INSULIN LISPRO 1 UNITS: 100 INJECTION, SOLUTION INTRAVENOUS; SUBCUTANEOUS at 20:26

## 2020-03-13 RX ADMIN — INSULIN LISPRO 2 UNITS: 100 INJECTION, SOLUTION INTRAVENOUS; SUBCUTANEOUS at 09:01

## 2020-03-13 RX ADMIN — ACETAMINOPHEN 650 MG: 325 TABLET ORAL at 20:27

## 2020-03-13 ASSESSMENT — PAIN DESCRIPTION - DESCRIPTORS: DESCRIPTORS: PRESSURE

## 2020-03-13 ASSESSMENT — PAIN SCALES - GENERAL
PAINLEVEL_OUTOF10: 9
PAINLEVEL_OUTOF10: 8
PAINLEVEL_OUTOF10: 0
PAINLEVEL_OUTOF10: 8

## 2020-03-13 ASSESSMENT — PAIN DESCRIPTION - ORIENTATION: ORIENTATION: MID

## 2020-03-13 ASSESSMENT — PAIN DESCRIPTION - PAIN TYPE: TYPE: ACUTE PAIN

## 2020-03-13 ASSESSMENT — PAIN DESCRIPTION - PROGRESSION
CLINICAL_PROGRESSION: NOT CHANGED

## 2020-03-13 ASSESSMENT — PAIN - FUNCTIONAL ASSESSMENT: PAIN_FUNCTIONAL_ASSESSMENT: PREVENTS OR INTERFERES SOME ACTIVE ACTIVITIES AND ADLS

## 2020-03-13 ASSESSMENT — PAIN DESCRIPTION - ONSET: ONSET: ON-GOING

## 2020-03-13 ASSESSMENT — PAIN DESCRIPTION - FREQUENCY: FREQUENCY: CONTINUOUS

## 2020-03-13 ASSESSMENT — PAIN DESCRIPTION - LOCATION: LOCATION: BACK

## 2020-03-13 NOTE — PROGRESS NOTES
This RN called Cody Bowers RN Rehab coordinator and asked if the patient's precert has been approved. Whitney states she has not received any information.

## 2020-03-13 NOTE — PLAN OF CARE
Discharge Planning:  Goal: Patients continuum of care needs are met  Description: Patients continuum of care needs are met  Outcome: Ongoing  Note: Discharge planning in process and discussed with patient/family. Care manager aware of discharge needs. Patient is from home. Plan is to be discharged to inpatient rehab. Patient discharge planning remains in progress. Discharge home vs TCU     Problem: Pain:  Goal: Pain level will decrease  Description: Pain level will decrease  Outcome: Ongoing  Note: Patient able to use 0-10 pain scale. Denies pain at this time. Agreeable to take PRN pain medications. Patient has not complained of any pain this shift. Patient stated goal is \"0/10\". Problem: Pain:  Goal: Control of acute pain  Description: Control of acute pain  Outcome: Ongoing  Note: Patient able to use 0-10 pain scale. Denies pain at this time. Agreeable to take PRN pain medications. Patient has not complained of any pain this shift. Patient stated goal is \"0/10\". Problem: Pain:  Goal: Control of chronic pain  Description: Control of chronic pain  Outcome: Ongoing  Note: Patient able to use 0-10 pain scale. Denies pain at this time. Agreeable to take PRN pain medications. Patient has not complained of any pain this shift. Patient stated goal is \"0/10\". Problem: Musculor/Skeletal Functional Status  Goal: Able to perform physical activity  Outcome: Ongoing  Note: Patient is working with PT/OT this shift. Patient ambulating with one assist and a walker. Patient to be discharged to inpatient rehab. Care plan reviewed with patient and RN. Patient and RN verbalize understanding of the plan of care and contribute to goal setting.

## 2020-03-13 NOTE — CARE COORDINATION
3/13/20, 10:29 AM EDT        Planning IP rehab later today or once precert approved. As of 4338 pm no precert back. Patient goals/plan/ treatment preferences discussed by  and . Patient goals/plan/ treatment preferences reviewed with patient/ family. Patient/ family verbalize understanding of discharge plan and are in agreement with goal/plan/treatment preferences. Understanding was demonstrated using the teach back method. AVS provided by RN at time of discharge, which includes all necessary medical information pertaining to the patients current course of illness, treatment, post-discharge goals of care, and treatment preferences.
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
## 2020-03-13 NOTE — PROGRESS NOTES
with blood sugar control due to steriod use for cystic spinal lesion, thought to be causing bilateral leg weakness. Planning inpt rehab. Chemstick 149, on humalog coverage, dexamethasone. Last BM 3/9/20  · Wound Type: None  · Current Nutrition Therapies:  · Oral Diet Orders: Carb Control 3 Carbs/Meal   · Oral Diet intake: %  · Anthropometric Measures:  · Ht: 5' 10\" (177.8 cm)   · Current Body Wt: 196 lb 1.6 oz (89 kg)(3/13/20 no edema)  · Admission Body Wt: 203 lb 14.4 oz (92.5 kg)(3/8/20 no edema)  · Usual Body Wt: (~ 195# per pt. No previous EMR weights.)  · % Weight Change:  ,  2.5% loss from 3/8/20-3/13/20 if bedscales accurate  · Ideal Body Wt: 166 lb (75.3 kg),   · BMI Classification: BMI 25.0 - 29.9 Overweight    Nutrition Interventions:   Modify current diet(Increased to 4 carb per meal to assist with preventing further weight loss)  Continued Inpatient Monitoring, Education Initiated, Coordination of Care    Nutrition Evaluation:   · Evaluation: Goals set   · Goals: Patient will not have any further significant weight loss during LOS.     · Monitoring: Meal Intake, Weight, Pertinent Labs      Electronically signed by Serge Tejada RD, LD on 3/13/20 at 10:15 AM EDT    Contact Number: [phone]

## 2020-03-13 NOTE — PROGRESS NOTES
without lesions. Hearing grossly intact. Poor dentition. Neck: Supple, with full range of motion. No jugular venous distention. Trachea midline. Respiratory:  Normal respiratory effort. Clear to auscultation, bilaterally without rales or wheezes or Rhonchi. Cardiovascular: Normal rate, regular rhythm with normal S1/S2 without murmurs. No lower extremity edema. Abdomen: Soft, non-tender, non-distended with normal bowel sounds. Musculoskeletal: Normal range of motion in extremities. There is no joint swelling or tenderness. Skin: Skin color, texture, turgor normal.  No rashes or lesions. Neurologic:  Hyperreflexia noted in bilateral patellar tendons. Weakness noted which is mostly in left leg flexion/extension of knee and flexion of hip (stable). Subjective decreased sensation on left leg all over. CN 2-12 grossly intact. Psychiatric: Alert and oriented, thought content appropriate, normal insight. Capillary Refill: Brisk,< 3 seconds. Peripheral Pulses: +2 palpable, equal bilaterally. Labs:   Recent Labs     03/11/20  0722   WBC 17.0*   HGB 14.9   HCT 44.7   *     Recent Labs     03/11/20  0722      K 4.3   CL 98   CO2 25   BUN 26*   CREATININE 0.7   CALCIUM 9.2     No results for input(s): AST, ALT, BILIDIR, BILITOT, ALKPHOS in the last 72 hours. No results for input(s): INR in the last 72 hours. No results for input(s): Levonia Citrin in the last 72 hours. Microbiology:      Urinalysis:      Lab Results   Component Value Date    NITRU NEGATIVE 03/05/2020    WBCUA NONE SEEN 03/05/2020    BACTERIA NONE SEEN 03/05/2020    RBCUA 5-10 03/05/2020    BLOODU TRACE 03/05/2020    GLUCOSEU NEGATIVE 03/05/2020       Radiology:  RADIOLOGY REPORT   Final Result      CT THORACIC SPINE W CONTRAST   Final Result       1. No evidence of a myelographic block. Intrathecal contrast fills the thecal sac.    2. The possible intrathecal abnormality seen at the T8 and T9 levels displacing the Clinical correlation is recommended. 2. No evidence of significant spinal canal stenosis at any level. 3. Variable foraminal stenosis at multiple levels as described above. **This report has been created using voice recognition software. It may contain minor errors which are inherent in voice recognition technology. **      Final report electronically signed by Dr. Francheska Coley on 3/5/2020 1:50 PM      CT Head WO Contrast   Final Result    No evidence of an acute process. **This report has been created using voice recognition software. It may contain minor errors which are inherent in voice recognition technology. **      Final report electronically signed by Dr. Maxi Landon on 3/5/2020 12:13 PM          DVT prophylaxis: [] Lovenox                                  [x] SCDs                                 [] SQ Heparin                                 [] Encourage ambulation           [] Already on Anticoagulation     Code Status: Full Code    PT/OT Eval Status: yes    Diet:   DIET GENERAL; Carb Control: 4 carb choices (60 gms)/meal    Fluids: na    Tele:   [] yes             [x] no      Electronically signed by Charlotte Urbina DO on 3/13/2020 at 4:48 PM

## 2020-03-13 NOTE — PROGRESS NOTES
Planning inpatient rehab today if patient is medically cleared and precert is approved.  Patient will go to 1X[facility]

## 2020-03-14 LAB
GLUCOSE BLD-MCNC: 135 MG/DL (ref 70–108)
GLUCOSE BLD-MCNC: 147 MG/DL (ref 70–108)
GLUCOSE BLD-MCNC: 147 MG/DL (ref 70–108)
GLUCOSE BLD-MCNC: 163 MG/DL (ref 70–108)
GLUCOSE BLD-MCNC: 164 MG/DL (ref 70–108)

## 2020-03-14 PROCEDURE — 2580000003 HC RX 258: Performed by: FAMILY MEDICINE

## 2020-03-14 PROCEDURE — 96376 TX/PRO/DX INJ SAME DRUG ADON: CPT

## 2020-03-14 PROCEDURE — 99232 SBSQ HOSP IP/OBS MODERATE 35: CPT | Performed by: INTERNAL MEDICINE

## 2020-03-14 PROCEDURE — G0378 HOSPITAL OBSERVATION PER HR: HCPCS

## 2020-03-14 PROCEDURE — 6370000000 HC RX 637 (ALT 250 FOR IP): Performed by: INTERNAL MEDICINE

## 2020-03-14 PROCEDURE — 6360000002 HC RX W HCPCS: Performed by: FAMILY MEDICINE

## 2020-03-14 PROCEDURE — 1200000000 HC SEMI PRIVATE

## 2020-03-14 PROCEDURE — 82948 REAGENT STRIP/BLOOD GLUCOSE: CPT

## 2020-03-14 PROCEDURE — 6370000000 HC RX 637 (ALT 250 FOR IP): Performed by: PSYCHIATRY & NEUROLOGY

## 2020-03-14 PROCEDURE — 6370000000 HC RX 637 (ALT 250 FOR IP): Performed by: FAMILY MEDICINE

## 2020-03-14 PROCEDURE — 94760 N-INVAS EAR/PLS OXIMETRY 1: CPT

## 2020-03-14 RX ADMIN — ACETAMINOPHEN 650 MG: 325 TABLET ORAL at 03:40

## 2020-03-14 RX ADMIN — PANTOPRAZOLE SODIUM 40 MG: 40 TABLET, DELAYED RELEASE ORAL at 08:51

## 2020-03-14 RX ADMIN — INSULIN LISPRO 2 UNITS: 100 INJECTION, SOLUTION INTRAVENOUS; SUBCUTANEOUS at 12:11

## 2020-03-14 RX ADMIN — DEXAMETHASONE SODIUM PHOSPHATE 4 MG: 4 INJECTION, SOLUTION INTRAMUSCULAR; INTRAVENOUS at 22:08

## 2020-03-14 RX ADMIN — Medication 250 MG: at 12:11

## 2020-03-14 RX ADMIN — DEXAMETHASONE SODIUM PHOSPHATE 4 MG: 4 INJECTION, SOLUTION INTRAMUSCULAR; INTRAVENOUS at 03:40

## 2020-03-14 RX ADMIN — Medication 10 ML: at 22:09

## 2020-03-14 RX ADMIN — DEXAMETHASONE SODIUM PHOSPHATE 4 MG: 4 INJECTION, SOLUTION INTRAMUSCULAR; INTRAVENOUS at 12:10

## 2020-03-14 RX ADMIN — INSULIN LISPRO 1 UNITS: 100 INJECTION, SOLUTION INTRAVENOUS; SUBCUTANEOUS at 22:08

## 2020-03-14 ASSESSMENT — PAIN DESCRIPTION - PROGRESSION
CLINICAL_PROGRESSION: NOT CHANGED
CLINICAL_PROGRESSION: GRADUALLY WORSENING
CLINICAL_PROGRESSION: NOT CHANGED

## 2020-03-14 ASSESSMENT — PAIN DESCRIPTION - ORIENTATION: ORIENTATION: LOWER

## 2020-03-14 ASSESSMENT — PAIN DESCRIPTION - ONSET: ONSET: ON-GOING

## 2020-03-14 ASSESSMENT — PAIN SCALES - GENERAL
PAINLEVEL_OUTOF10: 0
PAINLEVEL_OUTOF10: 8
PAINLEVEL_OUTOF10: 8
PAINLEVEL_OUTOF10: 0
PAINLEVEL_OUTOF10: 2

## 2020-03-14 ASSESSMENT — PAIN DESCRIPTION - LOCATION: LOCATION: BACK

## 2020-03-14 ASSESSMENT — PAIN DESCRIPTION - PAIN TYPE: TYPE: ACUTE PAIN

## 2020-03-14 ASSESSMENT — PAIN DESCRIPTION - DESCRIPTORS: DESCRIPTORS: ACHING

## 2020-03-14 ASSESSMENT — PAIN DESCRIPTION - FREQUENCY: FREQUENCY: CONTINUOUS

## 2020-03-14 ASSESSMENT — PAIN - FUNCTIONAL ASSESSMENT: PAIN_FUNCTIONAL_ASSESSMENT: PREVENTS OR INTERFERES SOME ACTIVE ACTIVITIES AND ADLS

## 2020-03-14 NOTE — PLAN OF CARE
Problem: Falls - Risk of:  Goal: Will remain free from falls  Description: Will remain free from falls  3/14/2020 1126 by Naseem Horne RN  Outcome: Met This Shift  Note: No falls noted this shift. Patient ambulates with x1 staff assistance with moderate difficulty and slightly stilted gait. Bed kept in low position. Safe environment maintained. Bedside table & call light in reach. Uses call light appropriately when needing assistance. 3/14/2020 0101 by Terri Guerrero RN  Outcome: Ongoing  Note: No falls noted this shift. Patient ambulates with x1 staff assistance with unsteady gait. Bed kept in low position. Safe environment maintained. Bedside table & call light in reach. Uses call light appropriately when needing assistance. Problem: Risk for Impaired Skin Integrity  Goal: Tissue integrity - skin and mucous membranes  Description: Structural intactness and normal physiological function of skin and  mucous membranes. 3/14/2020 1126 by Naseem Horne RN  Outcome: Ongoing  Note: Mucous membranes pink and moist.   No new skin issues noted. Turning q2h to prevent any new skin issues      Problem: Discharge Planning:  Goal: Patients continuum of care needs are met  Description: Patients continuum of care needs are met  3/14/2020 1126 by Naseem Horne RN  Outcome: Ongoing  Note: Planning inpatient rehab at discharge    Goal: Daily care needs are met  Description: Daily care needs are met  3/14/2020 1126 by Naseem Horne RN  Outcome: Met This Shift  Note: Able to participate in adls with minimal or standby assist from pt.     Tolerates activity fairly well

## 2020-03-14 NOTE — PROGRESS NOTES
Hospitalist Progress Note    Patient:  Kesha West      Unit/Bed:7K-20/020-A    YOB: 1975    MRN: [id number]       Acct: [de-identified]     PCP: No primary care provider on file. Date of Admission: 3/5/2020    Assessment/Plan:    Progressive bilateral LE weakness, numbness: cystic spinal lesion T8-9 as noted on MRI. Approximately 0.4 x 0.97 cm transverse and CC dimension with syrinx extending to the upper and lower thoracic cord. .  ? Arachnoid cyst.   -stable without significant change in weakness, per patient.   -Trial of steroids x10 days then wean. 3/8/20: sensation improving. Continue conservative approach. 3/9/20: Continuous improvement, consider IPR.   3/10/20: continues to improve. Pursuing IPR.   3/11: still pending full IPR eval.  Stable today, no worsening symptoms. 3/12: PMR consult completed, awaiting precert now. 9/56: Precert pending. Stable. 3/14: As above  Per NS, plan MRI t spine w/wo contrast with ciss/vista sequence. F/up 1-2 months  Neurosurgery to determine steroid taper duration  Tylenol prn, PPI while on steroids. Continue PT. OT     Abnormal marrow signal at L5 On MRI. Per read, most consistent with an acute fracture (?secondary to underlying osteopenia). -Vitamin D level ordered for AM. - slightly low start on [place].   -Neurosurgery on consult. Conservative management.      Multi-level DDD (C/T/L spine)  Neurosurgery on consult. Symptom control as needed. PT/OT eval.      Leukocytosis: reactive 2/2 steroids.      Hyperglycemia: A1C 6.1. OK to monitor BG with accuchecks. SSI, CC diet - suspect 2/2 steroids. Well controlled.       Microscopic hematuria  Noted on UA. Recommend additional outpatient follow up by PCP with repeat UA.      Poor dentition: recommend Dental evaluation as OP.      Asthma Without exacerbation. Albuterol prn.         Expected discharge date:  3/16    Disposition:    [] Home       [] TCU       [x] Rehab       [] Psych       [] SNF sounds. Musculoskeletal: passive and active ROM x 4 extremities. Skin: Skin color, texture, turgor normal.    Neurologic:  LLE weakness compared to right 4-/5, subjective decreased sensation over all of LLE except for spared location corresponding to L4-5 dermatome. Cranial nerves: II-XII intact, grossly non-focal.  Psychiatric: Alert and oriented, thought content appropriate  Capillary Refill: Brisk,< 3 seconds   Peripheral Pulses: +2 palpable, equal bilaterally       Labs:   No results for input(s): WBC, HGB, HCT, PLT in the last 72 hours. No results for input(s): NA, K, CL, CO2, BUN, CREATININE, CALCIUM, PHOS in the last 72 hours. Invalid input(s): MAGNES  No results for input(s): AST, ALT, BILIDIR, BILITOT, ALKPHOS in the last 72 hours. No results for input(s): INR in the last 72 hours. No results for input(s): Don Rower in the last 72 hours. No results for input(s): PROCAL in the last 72 hours. Microbiology:      Urinalysis:      Lab Results   Component Value Date    NITRU NEGATIVE 03/05/2020    WBCUA NONE SEEN 03/05/2020    BACTERIA NONE SEEN 03/05/2020    RBCUA 5-10 03/05/2020    BLOODU TRACE 03/05/2020    GLUCOSEU NEGATIVE 03/05/2020       Radiology:  RADIOLOGY REPORT   Final Result      CT THORACIC SPINE W CONTRAST   Final Result       1. No evidence of a myelographic block. Intrathecal contrast fills the thecal sac. 2. The possible intrathecal abnormality seen at the T8 and T9 levels displacing the thoracic spinal cord on MRI is not seen on CT myelogram. This abnormality could be filling with contrast. This could also represent subarachnoid adhesions. 3. Ventral epidural contrast is able to displace the thoracic spinal cord away from the posterior margins of the vertebral bodies. 4. There is subtle mass effect upon the dorsal aspect of the thoracic spinal cord at the T8-9 and T9 levels. **This report has been created using voice recognition software.  It may contain minor errors which are inherent in voice recognition technology. **      Final report electronically signed by Dr. Shreya Pascual on 3/6/2020 1:25 PM      FL LUMBAR PUNCTURE DIAG   Final Result      Fluoroscopy guided lumbar puncture for CSF collection and contrast injection for CT of the thoracic spine. Final report electronically signed by Dr. Melo Beach on 3/6/2020 12:34 PM      FL GUIDED FOR SPINE INJECT   Final Result      Fluoroscopy guided lumbar puncture for CSF collection and contrast injection for CT of the thoracic spine. Final report electronically signed by Dr. Melo Beach on 3/6/2020 12:34 PM      FL MYELOGRAM THORACIC VIA LUMB INJ S&I   Final Result      Fluoroscopy guided lumbar puncture for CSF collection and contrast injection for CT of the thoracic spine. Final report electronically signed by Dr. Melo Beach on 3/6/2020 12:34 PM      XR CHEST PORTABLE   Final Result      No acute cardiopulmonary disease. **This report has been created using voice recognition software. It may contain minor errors which are inherent in voice recognition technology. **      Final report electronically signed by Dr. Sedrick King on 3/6/2020 3:24 AM      MRI BRAIN WO CONTRAST   Final Result    Normal MRI of the brain. **This report has been created using voice recognition software. It may contain minor errors which are inherent in voice recognition technology. **      Final report electronically signed by Dr. Shreya Pascual on 3/6/2020 9:31 AM      MRI CERVICAL SPINE WO CONTRAST   Final Result          1. Moderate severity spinal canal stenosis at the C3-4, C4-5 and C5-6 levels due to posterior disc osteophyte complexes. 2. Severe bilateral foraminal stenosis at the C5-6 level. 3. Normal signal in the cervical spinal cord. **This report has been created using voice recognition software.  It may contain minor errors which are inherent in voice recognition **This report has been created using voice recognition software. It may contain minor errors which are inherent in voice recognition technology. **      Final report electronically signed by Dr. Theodore Richardson on 3/5/2020 12:13 PM          DVT prophylaxis: [] Lovenox                                 [x] SCDs                                 [] SQ Heparin                                 [] Encourage ambulation           [] Already on Anticoagulation     Code Status: Full Code    Tele:   [] yes             [x] no    Active Hospital Problems    Diagnosis Date Noted    Ataxia [R27.0]     Leg weakness, bilateral [R29.898] 03/06/2020       Electronically signed by Estefani Diana DO on 3/14/2020 at 5:10 PM

## 2020-03-14 NOTE — PLAN OF CARE
care needs are met  Description: Patients continuum of care needs are met  3/14/2020 0101 by Bubba Brooks RN  Outcome: Ongoing  Note: Pt plans inpatient rehab at discharge. Care manager and social working helping with discharge needs. Problem: Pain:  Goal: Pain level will decrease  Description: Pain level will decrease  3/14/2020 0101 by Bubba Brooks RN  Outcome: Ongoing  Note: Pt reports pain at 7 on scale. Pt states oral medication helping to achieve pain goal of a 0 on scale. Problem: Pain:  Goal: Control of acute pain  Description: Control of acute pain  Outcome: Ongoing  Note: Pt reports pain at 7 on scale. Pt states oral medication helping to achieve pain goal of a 0 on scale. Problem: Pain:  Goal: Control of chronic pain  Description: Control of chronic pain  Outcome: Ongoing  Note: Pt reports pain at 7 on scale. Pt states oral medication helping to achieve pain goal of a 0 on scale. Problem: Musculor/Skeletal Functional Status  Goal: Able to perform physical activity  3/14/2020 0101 by Bubba Brooks RN  Outcome: Ongoing  Note: Pt is up with x1 staff assistance with walker and gait belt. Pt tolerates well, but with an unsteady gait. Will continue to monitor. Problem: Nutrition  Goal: Optimal nutrition therapy  Outcome: Ongoing  Note: Pt is a general diet and tolerating well. Care plan reviewed with patient. Patient verbalize understanding of the plan of care and contribute to goal setting.

## 2020-03-15 LAB
GLUCOSE BLD-MCNC: 115 MG/DL (ref 70–108)
GLUCOSE BLD-MCNC: 139 MG/DL (ref 70–108)
GLUCOSE BLD-MCNC: 140 MG/DL (ref 70–108)
GLUCOSE BLD-MCNC: 155 MG/DL (ref 70–108)

## 2020-03-15 PROCEDURE — 6370000000 HC RX 637 (ALT 250 FOR IP): Performed by: PSYCHIATRY & NEUROLOGY

## 2020-03-15 PROCEDURE — 97530 THERAPEUTIC ACTIVITIES: CPT

## 2020-03-15 PROCEDURE — 96376 TX/PRO/DX INJ SAME DRUG ADON: CPT

## 2020-03-15 PROCEDURE — 99231 SBSQ HOSP IP/OBS SF/LOW 25: CPT | Performed by: INTERNAL MEDICINE

## 2020-03-15 PROCEDURE — 97110 THERAPEUTIC EXERCISES: CPT

## 2020-03-15 PROCEDURE — 1200000000 HC SEMI PRIVATE

## 2020-03-15 PROCEDURE — 2580000003 HC RX 258: Performed by: FAMILY MEDICINE

## 2020-03-15 PROCEDURE — 6370000000 HC RX 637 (ALT 250 FOR IP): Performed by: INTERNAL MEDICINE

## 2020-03-15 PROCEDURE — 6360000002 HC RX W HCPCS: Performed by: FAMILY MEDICINE

## 2020-03-15 PROCEDURE — 94760 N-INVAS EAR/PLS OXIMETRY 1: CPT

## 2020-03-15 PROCEDURE — 82948 REAGENT STRIP/BLOOD GLUCOSE: CPT

## 2020-03-15 PROCEDURE — G0378 HOSPITAL OBSERVATION PER HR: HCPCS

## 2020-03-15 RX ADMIN — Medication 10 ML: at 08:59

## 2020-03-15 RX ADMIN — DEXAMETHASONE SODIUM PHOSPHATE 4 MG: 4 INJECTION, SOLUTION INTRAMUSCULAR; INTRAVENOUS at 03:05

## 2020-03-15 RX ADMIN — PANTOPRAZOLE SODIUM 40 MG: 40 TABLET, DELAYED RELEASE ORAL at 17:17

## 2020-03-15 RX ADMIN — PANTOPRAZOLE SODIUM 40 MG: 40 TABLET, DELAYED RELEASE ORAL at 08:59

## 2020-03-15 RX ADMIN — INSULIN LISPRO 1 UNITS: 100 INJECTION, SOLUTION INTRAVENOUS; SUBCUTANEOUS at 21:14

## 2020-03-15 RX ADMIN — Medication 10 ML: at 20:56

## 2020-03-15 RX ADMIN — DEXAMETHASONE SODIUM PHOSPHATE 4 MG: 4 INJECTION, SOLUTION INTRAMUSCULAR; INTRAVENOUS at 17:17

## 2020-03-15 RX ADMIN — Medication 250 MG: at 08:59

## 2020-03-15 RX ADMIN — DEXAMETHASONE SODIUM PHOSPHATE 4 MG: 4 INJECTION, SOLUTION INTRAMUSCULAR; INTRAVENOUS at 08:59

## 2020-03-15 RX ADMIN — DEXAMETHASONE SODIUM PHOSPHATE 4 MG: 4 INJECTION, SOLUTION INTRAMUSCULAR; INTRAVENOUS at 20:56

## 2020-03-15 ASSESSMENT — PAIN SCALES - GENERAL
PAINLEVEL_OUTOF10: 0

## 2020-03-15 ASSESSMENT — PAIN DESCRIPTION - PROGRESSION
CLINICAL_PROGRESSION: NOT CHANGED

## 2020-03-15 NOTE — PROGRESS NOTES
minor errors which are inherent in voice recognition technology. **      Final report electronically signed by Dr. Johnathon Contreras on 3/6/2020 1:25 PM      FL LUMBAR PUNCTURE DIAG   Final Result      Fluoroscopy guided lumbar puncture for CSF collection and contrast injection for CT of the thoracic spine. Final report electronically signed by Dr. Parth Ramirez on 3/6/2020 12:34 PM      FL GUIDED FOR SPINE INJECT   Final Result      Fluoroscopy guided lumbar puncture for CSF collection and contrast injection for CT of the thoracic spine. Final report electronically signed by Dr. Parth Ramirez on 3/6/2020 12:34 PM      FL MYELOGRAM THORACIC VIA LUMB INJ S&I   Final Result      Fluoroscopy guided lumbar puncture for CSF collection and contrast injection for CT of the thoracic spine. Final report electronically signed by Dr. Parth Ramirez on 3/6/2020 12:34 PM      XR CHEST PORTABLE   Final Result      No acute cardiopulmonary disease. **This report has been created using voice recognition software. It may contain minor errors which are inherent in voice recognition technology. **      Final report electronically signed by Dr. Maxwell Jimenez on 3/6/2020 3:24 AM      MRI BRAIN WO CONTRAST   Final Result    Normal MRI of the brain. **This report has been created using voice recognition software. It may contain minor errors which are inherent in voice recognition technology. **      Final report electronically signed by Dr. Johnathon Contreras on 3/6/2020 9:31 AM      MRI CERVICAL SPINE WO CONTRAST   Final Result          1. Moderate severity spinal canal stenosis at the C3-4, C4-5 and C5-6 levels due to posterior disc osteophyte complexes. 2. Severe bilateral foraminal stenosis at the C5-6 level. 3. Normal signal in the cervical spinal cord. **This report has been created using voice recognition software.  It may contain minor errors which are inherent in voice recognition technology. **      Final report electronically signed by Dr. Felipe Moreau on 3/6/2020 9:35 AM      MRI THORACIC SPINE W WO CONTRAST   Final Result       Mass effect upon the thoracic spinal cord at the T7-8 through T9 levels. The thoracic spinal cord is flattened against the ventral spinal canal at the T8-9 level. There is abnormal cord signal consistent with cord edema versus myelomalacia. Just below    this, there is a cystic abnormality within the cord. This can represent a syrinx. There is a nodule of enhancement. The differential diagnosis includes a cystic mass in the thoracic spinal cord with enhancing nodule however, this does not explain the    mass effect upon the dorsal aspect of the cord. Alternatively, this could be and arachnoid cyst dorsal to the thoracic cord compressing the cord. This could also represent subarachnoid adhesions distorting the cord. **This report has been created using voice recognition software. It may contain minor errors which are inherent in voice recognition technology. **      Final report electronically signed by Dr. Felipe Moreau on 3/6/2020 9:51 AM      MRI [address]   Final Result       1. Abnormal marrow signal in the superior endplate of L5 for. This is on the right. The pattern and distribution is most consistent with an acute fracture. This is most likely secondary to underlying osteopenia process if the patient does not have a    history of trauma. Clinical correlation is recommended. 2. No evidence of significant spinal canal stenosis at any level. 3. Variable foraminal stenosis at multiple levels as described above. **This report has been created using voice recognition software. It may contain minor errors which are inherent in voice recognition technology. **      Final report electronically signed by Dr. Felipe Moreau on 3/5/2020 1:50 PM      CT Head WO Contrast   Final Result    No evidence of an acute process. **This report has been created using voice recognition software. It may contain minor errors which are inherent in voice recognition technology. **      Final report electronically signed by Dr. Emanuel Álvarez on 3/5/2020 12:13 PM          DVT prophylaxis: [] Lovenox                                 [x] SCDs                                 [] SQ Heparin                                 [] Encourage ambulation           [] Already on Anticoagulation     Code Status: Full Code    Tele:   [] yes             [x] no    Active Hospital Problems    Diagnosis Date Noted    Ataxia [R27.0]     Leg weakness, bilateral [R29.898] 03/06/2020       Electronically signed by Bello Velásquez DO on 3/15/2020 at 3:32 PM

## 2020-03-15 NOTE — PLAN OF CARE
Problem: Falls - Risk of:  Goal: Will remain free from falls  Description: Will remain free from falls  3/15/2020 1133 by Vilma Sen RN  Outcome: Ongoing  Note: Patient is a fall risk, working with PT/OT, ambulates with gait belt, walker, nonskid socks and assistance of staff. Problem: Falls - Risk of:  Goal: Absence of physical injury  Description: Absence of physical injury  3/15/2020 1133 by Vilma Sen RN  Outcome: Ongoing     Problem: Risk for Impaired Skin Integrity  Goal: Tissue integrity - skin and mucous membranes  Description: Structural intactness and normal physiological function of skin and  mucous membranes. 3/15/2020 1133 by Vilma Sen RN  Outcome: Ongoing  Note: Continent of bladder and bowel, shifts weight often in bed. Problem: Safety:  Goal: Free from accidental physical injury  Description: Free from accidental physical injury  3/15/2020 1133 by Vilma Sen RN  Outcome: Ongoing  Note: Patient is a fall risk, working with PT/OT, ambulates with gait belt, walker, nonskid socks and assistance of staff. Problem: Daily Care:  Goal: Daily care needs are met  Description: Daily care needs are met  3/15/2020 1133 by Vilma Sen RN  Outcome: Ongoing  Note: Patient able to wash himself with minimal assistance. Problem: Discharge Planning:  Goal: Patients continuum of care needs are met  Description: Patients continuum of care needs are met  3/15/2020 1133 by Vilma Sen RN  Outcome: Ongoing  Note: Waiting for insurance precert for placement at rehab/TCU      Problem: Pain:  Goal: Pain level will decrease  Description: Pain level will decrease  3/15/2020 1133 by Vilma Sen RN  Outcome: Ongoing  Note: Denies any pain this shift, prn tylenol available.       Problem: Pain:  Goal: Control of acute pain  Description: Control of acute pain  3/15/2020 1133 by Vilma Sen RN  Outcome: Ongoing     Problem: Pain:  Goal: Control of chronic pain  Description: Control of chronic pain  3/15/2020 1133 by Fara Thurman RN  Outcome: Ongoing     Problem: Musculor/Skeletal Functional Status  Goal: Able to perform physical activity  3/15/2020 1133 by Fara Thurman RN  Outcome: Ongoing  Note: Patient is a fall risk, working with PT/OT, ambulates with gait belt, walker, nonskid socks and assistance of staff. Problem: Nutrition  Goal: Optimal nutrition therapy  3/15/2020 1133 by Fara Thurman RN  Outcome: Ongoing  Note: Tolerating diet well. Care plan reviewed with patient. Patient verbalizes understanding of the care plan and contributed to goal setting.

## 2020-03-15 NOTE — PLAN OF CARE
Problem: Falls - Risk of:  Goal: Will remain free from falls  Description: Will remain free from falls    Outcome: Ongoing  Note: No falls noted this shift. Patient ambulates with x1 staff assistance with unsteady gait. Bed kept in low position. Safe environment maintained. Bedside table & call light in reach. Uses call light appropriately when needing assistance. Problem: Falls - Risk of:  Goal: Absence of physical injury  Description: Absence of physical injury    Outcome: Ongoing  Note: No falls noted this shift. Patient ambulates with x1 staff assistance with unsteady gait. Bed kept in low position. Safe environment maintained. Bedside table & call light in reach. Uses call light appropriately when needing assistance. Problem: Risk for Impaired Skin Integrity  Goal: Tissue integrity - skin and mucous membranes  Description: Structural intactness and normal physiological function of skin and  mucous membranes. Outcome: Ongoing  Note: Pt able to make position changes per staff assistance every 2 hours and as needed. Encouraged to make frequent changes in position to prevent skin breakdown. Problem: Safety:  Goal: Free from accidental physical injury  Description: Free from accidental physical injury    Outcome: Ongoing  Note: No falls noted this shift. Patient ambulates with x1 staff assistance with unsteady gait. Bed kept in low position. Safe environment maintained. Bedside table & call light in reach. Uses call light appropriately when needing assistance. Problem: Daily Care:  Goal: Daily care needs are met  Description: Daily care needs are met    Outcome: Ongoing  Note: Pt encouraged to provide self care as able. Continue to monitor needs. Problem: Discharge Planning:  Goal: Patients continuum of care needs are met  Description: Patients continuum of care needs are met    Outcome: Ongoing  Note: Pt plans inpatient rehab at discharge.  Care manager and social working helping

## 2020-03-16 LAB
ANION GAP SERPL CALCULATED.3IONS-SCNC: 13 MEQ/L (ref 8–16)
BASOPHILS # BLD: 0.2 %
BASOPHILS ABSOLUTE: 0 THOU/MM3 (ref 0–0.1)
BUN BLDV-MCNC: 27 MG/DL (ref 7–22)
CALCIUM SERPL-MCNC: 9.3 MG/DL (ref 8.5–10.5)
CHLORIDE BLD-SCNC: 94 MEQ/L (ref 98–111)
CO2: 25 MEQ/L (ref 23–33)
CREAT SERPL-MCNC: 0.8 MG/DL (ref 0.4–1.2)
EOSINOPHIL # BLD: 0 %
EOSINOPHILS ABSOLUTE: 0 THOU/MM3 (ref 0–0.4)
ERYTHROCYTE [DISTWIDTH] IN BLOOD BY AUTOMATED COUNT: 12.7 % (ref 11.5–14.5)
ERYTHROCYTE [DISTWIDTH] IN BLOOD BY AUTOMATED COUNT: 39.3 FL (ref 35–45)
GFR SERPL CREATININE-BSD FRML MDRD: > 90 ML/MIN/1.73M2
GLUCOSE BLD-MCNC: 128 MG/DL (ref 70–108)
GLUCOSE BLD-MCNC: 137 MG/DL (ref 70–108)
GLUCOSE BLD-MCNC: 149 MG/DL (ref 70–108)
GLUCOSE BLD-MCNC: 154 MG/DL (ref 70–108)
GLUCOSE BLD-MCNC: 163 MG/DL (ref 70–108)
HCT VFR BLD CALC: 46.4 % (ref 42–52)
HEMOGLOBIN: 15.4 GM/DL (ref 14–18)
IMMATURE GRANS (ABS): 0.6 THOU/MM3 (ref 0–0.07)
IMMATURE GRANULOCYTES: 3.1 %
LYMPHOCYTES # BLD: 5.3 %
LYMPHOCYTES ABSOLUTE: 1 THOU/MM3 (ref 1–4.8)
MCH RBC QN AUTO: 28.7 PG (ref 26–33)
MCHC RBC AUTO-ENTMCNC: 33.2 GM/DL (ref 32.2–35.5)
MCV RBC AUTO: 86.4 FL (ref 80–94)
MONOCYTES # BLD: 7.4 %
MONOCYTES ABSOLUTE: 1.4 THOU/MM3 (ref 0.4–1.3)
NUCLEATED RED BLOOD CELLS: 0 /100 WBC
PLATELET # BLD: 420 THOU/MM3 (ref 130–400)
PMV BLD AUTO: 8.9 FL (ref 9.4–12.4)
POTASSIUM SERPL-SCNC: 4.3 MEQ/L (ref 3.5–5.2)
RBC # BLD: 5.37 MILL/MM3 (ref 4.7–6.1)
SCAN OF BLOOD SMEAR: NORMAL
SEG NEUTROPHILS: 84 %
SEGMENTED NEUTROPHILS ABSOLUTE COUNT: 16 THOU/MM3 (ref 1.8–7.7)
SODIUM BLD-SCNC: 132 MEQ/L (ref 135–145)
WBC # BLD: 19.1 THOU/MM3 (ref 4.8–10.8)

## 2020-03-16 PROCEDURE — 96376 TX/PRO/DX INJ SAME DRUG ADON: CPT

## 2020-03-16 PROCEDURE — 97112 NEUROMUSCULAR REEDUCATION: CPT

## 2020-03-16 PROCEDURE — 97535 SELF CARE MNGMENT TRAINING: CPT

## 2020-03-16 PROCEDURE — 6370000000 HC RX 637 (ALT 250 FOR IP): Performed by: PSYCHIATRY & NEUROLOGY

## 2020-03-16 PROCEDURE — 97116 GAIT TRAINING THERAPY: CPT

## 2020-03-16 PROCEDURE — 80048 BASIC METABOLIC PNL TOTAL CA: CPT

## 2020-03-16 PROCEDURE — 97530 THERAPEUTIC ACTIVITIES: CPT

## 2020-03-16 PROCEDURE — 99231 SBSQ HOSP IP/OBS SF/LOW 25: CPT | Performed by: INTERNAL MEDICINE

## 2020-03-16 PROCEDURE — 6360000002 HC RX W HCPCS: Performed by: FAMILY MEDICINE

## 2020-03-16 PROCEDURE — 85025 COMPLETE CBC W/AUTO DIFF WBC: CPT

## 2020-03-16 PROCEDURE — 36415 COLL VENOUS BLD VENIPUNCTURE: CPT

## 2020-03-16 PROCEDURE — 1200000000 HC SEMI PRIVATE

## 2020-03-16 PROCEDURE — G0378 HOSPITAL OBSERVATION PER HR: HCPCS

## 2020-03-16 PROCEDURE — 6370000000 HC RX 637 (ALT 250 FOR IP): Performed by: INTERNAL MEDICINE

## 2020-03-16 PROCEDURE — 2580000003 HC RX 258: Performed by: FAMILY MEDICINE

## 2020-03-16 PROCEDURE — 82948 REAGENT STRIP/BLOOD GLUCOSE: CPT

## 2020-03-16 PROCEDURE — 97110 THERAPEUTIC EXERCISES: CPT

## 2020-03-16 RX ADMIN — Medication 10 ML: at 20:21

## 2020-03-16 RX ADMIN — INSULIN LISPRO 2 UNITS: 100 INJECTION, SOLUTION INTRAVENOUS; SUBCUTANEOUS at 16:59

## 2020-03-16 RX ADMIN — DEXAMETHASONE SODIUM PHOSPHATE 4 MG: 4 INJECTION, SOLUTION INTRAMUSCULAR; INTRAVENOUS at 15:30

## 2020-03-16 RX ADMIN — PANTOPRAZOLE SODIUM 40 MG: 40 TABLET, DELAYED RELEASE ORAL at 17:01

## 2020-03-16 RX ADMIN — DEXAMETHASONE SODIUM PHOSPHATE 4 MG: 4 INJECTION, SOLUTION INTRAMUSCULAR; INTRAVENOUS at 09:54

## 2020-03-16 RX ADMIN — PANTOPRAZOLE SODIUM 40 MG: 40 TABLET, DELAYED RELEASE ORAL at 08:08

## 2020-03-16 RX ADMIN — Medication 10 ML: at 09:54

## 2020-03-16 RX ADMIN — DEXAMETHASONE SODIUM PHOSPHATE 4 MG: 4 INJECTION, SOLUTION INTRAMUSCULAR; INTRAVENOUS at 03:28

## 2020-03-16 RX ADMIN — INSULIN LISPRO 1 UNITS: 100 INJECTION, SOLUTION INTRAVENOUS; SUBCUTANEOUS at 20:24

## 2020-03-16 RX ADMIN — Medication 10 ML: at 15:30

## 2020-03-16 RX ADMIN — DEXAMETHASONE SODIUM PHOSPHATE 4 MG: 4 INJECTION, SOLUTION INTRAMUSCULAR; INTRAVENOUS at 20:20

## 2020-03-16 RX ADMIN — Medication 250 MG: at 08:08

## 2020-03-16 ASSESSMENT — PAIN SCALES - GENERAL
PAINLEVEL_OUTOF10: 0
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
## 2020-03-16 NOTE — PROGRESS NOTES
Hospitalist Progress Note    Patient:  Kerri Alfaro      Unit/Bed:7K-20/020-A    YOB: 1975    MRN: [id number]       Acct: [de-identified]     PCP: No primary care provider on file. Date of Admission: 3/5/2020    Assessment/Plan:    Progressive bilateral LE weakness, numbness: cystic spinal lesion T8-9 as noted on MRI. Approximately 0.4 x 0.97 cm transverse and CC dimension with syrinx extending to the upper and lower thoracic cord. .  ? Arachnoid cyst.   -stable without significant change in weakness, per patient.   -Trial of steroids x10 days then wean. 3/8/20: sensation improving. Continue conservative approach. 3/9/20: Continuous improvement, consider IPR.   3/10/20: continues to improve. Pursuing IPR.   3/11: still pending full IPR eval.  Stable today, no worsening symptoms. 3/12: PMR consult completed, awaiting precert now. 2/41: Precert pending. Stable. 3/14: As above  3/15: As above  3/16: AS ABOVE  Per NS, plan MRI t spine w/wo contrast with ciss/vista sequence. F/up 1-2 months  Neurosurgery to determine steroid taper duration  Tylenol prn, PPI while on steroids. Continue PT. OT     Abnormal marrow signal at L5 On MRI. Per read, most consistent with an acute fracture (?secondary to underlying osteopenia). -Vitamin D level ordered for AM. - slightly low start on [place].   -Neurosurgery on consult. Conservative management.      Multi-level DDD (C/T/L spine)  Neurosurgery on consult. Symptom control as needed. PT/OT eval.      Leukocytosis: reactive 2/2 steroids.      Hyperglycemia: A1C 6.1. OK to monitor BG with accuchecks. SSI, CC diet - suspect 2/2 steroids. Well controlled.       Microscopic hematuria  Noted on UA. Recommend additional outpatient follow up by PCP with repeat UA.      Poor dentition: recommend Dental evaluation as OP.      Asthma Without exacerbation. Albuterol prn.         Expected discharge date:  3/16    Disposition:    [] Home       [] TCU       [x] non-distended with normal bowel sounds. Musculoskeletal: passive and active ROM x 4 extremities. Skin: Skin color, texture, turgor normal.    Neurologic:  LLE weakness compared to right 4-/5, subjective decreased sensation over all of LLE except for spared location corresponding to L4-5 dermatome. Cranial nerves: II-XII intact, grossly non-focal.  Psychiatric: Alert and oriented, thought content appropriate  Capillary Refill: Brisk,< 3 seconds   Peripheral Pulses: +2 palpable, equal bilaterally       Labs:   Recent Labs     03/16/20  1152   WBC 19.1*   HGB 15.4   HCT 46.4   *     Recent Labs     03/16/20  1152   *   K 4.3   CL 94*   CO2 25   BUN 27*   CREATININE 0.8   CALCIUM 9.3     No results for input(s): AST, ALT, BILIDIR, BILITOT, ALKPHOS in the last 72 hours. No results for input(s): INR in the last 72 hours. No results for input(s): Lucrecia Medici in the last 72 hours. No results for input(s): PROCAL in the last 72 hours. Microbiology:      Urinalysis:      Lab Results   Component Value Date    NITRU NEGATIVE 03/05/2020    WBCUA NONE SEEN 03/05/2020    BACTERIA NONE SEEN 03/05/2020    RBCUA 5-10 03/05/2020    BLOODU TRACE 03/05/2020    GLUCOSEU NEGATIVE 03/05/2020       Radiology:  RADIOLOGY REPORT   Final Result      CT THORACIC SPINE W CONTRAST   Final Result       1. No evidence of a myelographic block. Intrathecal contrast fills the thecal sac. 2. The possible intrathecal abnormality seen at the T8 and T9 levels displacing the thoracic spinal cord on MRI is not seen on CT myelogram. This abnormality could be filling with contrast. This could also represent subarachnoid adhesions. 3. Ventral epidural contrast is able to displace the thoracic spinal cord away from the posterior margins of the vertebral bodies. 4. There is subtle mass effect upon the dorsal aspect of the thoracic spinal cord at the T8-9 and T9 levels.                **This report has been created using voice are inherent in voice recognition technology. **      Final report electronically signed by Dr. Luly Wilkinson on 3/6/2020 9:35 AM      MRI THORACIC SPINE W WO CONTRAST   Final Result       Mass effect upon the thoracic spinal cord at the T7-8 through T9 levels. The thoracic spinal cord is flattened against the ventral spinal canal at the T8-9 level. There is abnormal cord signal consistent with cord edema versus myelomalacia. Just below    this, there is a cystic abnormality within the cord. This can represent a syrinx. There is a nodule of enhancement. The differential diagnosis includes a cystic mass in the thoracic spinal cord with enhancing nodule however, this does not explain the    mass effect upon the dorsal aspect of the cord. Alternatively, this could be and arachnoid cyst dorsal to the thoracic cord compressing the cord. This could also represent subarachnoid adhesions distorting the cord. **This report has been created using voice recognition software. It may contain minor errors which are inherent in voice recognition technology. **      Final report electronically signed by Dr. Luly Wilkinson on 3/6/2020 9:51 AM      MRI [address]   Final Result       1. Abnormal marrow signal in the superior endplate of L5 for. This is on the right. The pattern and distribution is most consistent with an acute fracture. This is most likely secondary to underlying osteopenia process if the patient does not have a    history of trauma. Clinical correlation is recommended. 2. No evidence of significant spinal canal stenosis at any level. 3. Variable foraminal stenosis at multiple levels as described above. **This report has been created using voice recognition software. It may contain minor errors which are inherent in voice recognition technology. **      Final report electronically signed by Dr. Luly Wilkinson on 3/5/2020 1:50 PM      CT Head WO Contrast   Final Result    No
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
## 2020-03-16 NOTE — CARE COORDINATION
3/16/20, 10:47 AM EDT    Patient goals/plan/ treatment preferences discussed by  and . Patient goals/plan/ treatment preferences reviewed with patient/ family. Patient/ family verbalize understanding of discharge plan and are in agreement with goal/plan/treatment preferences. Understanding was demonstrated using the teach back method. AVS provided by RN at time of discharge, which includes all necessary medical information pertaining to the patients current course of illness, treatment, post-discharge goals of care, and treatment preferences. Services After Discharge  Services At/After Discharge: Nursing Services, OT, PT, Skilled Therapy         Planning discharge/ readmit to [address] [place] [address] when precert approved.   No PCP - he said that he wants appt with Blanche Lara CNP in [facility] at discharge

## 2020-03-16 NOTE — PLAN OF CARE
Problem: Falls - Risk of:  Goal: Will remain free from falls  Description: Will remain free from falls  Outcome: Met This Shift  Note: Alert, oriented. Call light within reach, appropriate use. Rails up x 2. Bed and chair alarms set as appropriate. Belongings within reach. Up with assist, gait belt, walker; gait slow and steady. Problem: Risk for Impaired Skin Integrity  Goal: Tissue integrity - skin and mucous membranes  Description: Structural intactness and normal physiological function of skin and  mucous membranes. Outcome: Ongoing  Note: Elbows reddened, elbow protectors applied. Problem: Discharge Planning:  Goal: Patients continuum of care needs are met  Description: Patients continuum of care needs are met  Outcome: Ongoing  Note: Plan rehab on discharge. Awaiting precert. Problem: Pain:  Goal: Pain level will decrease  Description: Pain level will decrease  Outcome: Met This Shift  Note: Denies pain. Problem: Musculor/Skeletal Functional Status  Goal: Able to perform physical activity  Outcome: Ongoing  Note: Weakness in legs when up. Working with PT and OT. Problem: Nutrition  Goal: Optimal nutrition therapy  Outcome: Met This Shift   Care plan reviewed with patient. Patient verbalizes understanding of the plan of care and contribute to goal setting.

## 2020-03-17 ENCOUNTER — HOSPITAL ENCOUNTER (INPATIENT)
Age: 45
LOS: 10 days | Discharge: HOME | DRG: 057 | End: 2020-03-27
Attending: PHYSICAL MEDICINE & REHABILITATION | Admitting: PHYSICAL MEDICINE & REHABILITATION
Payer: COMMERCIAL

## 2020-03-17 VITALS
SYSTOLIC BLOOD PRESSURE: 130 MMHG | TEMPERATURE: 96.9 F | BODY MASS INDEX: 28.07 KG/M2 | HEIGHT: 70 IN | RESPIRATION RATE: 16 BRPM | DIASTOLIC BLOOD PRESSURE: 81 MMHG | WEIGHT: 196.1 LBS | OXYGEN SATURATION: 96 % | HEART RATE: 59 BPM

## 2020-03-17 PROBLEM — G95.0 SYRINX OF SPINAL CORD (HCC): Status: ACTIVE | Noted: 2020-03-17

## 2020-03-17 LAB
GLUCOSE BLD-MCNC: 122 MG/DL (ref 70–108)
GLUCOSE BLD-MCNC: 152 MG/DL (ref 70–108)
GLUCOSE BLD-MCNC: 185 MG/DL (ref 70–108)
GLUCOSE BLD-MCNC: 234 MG/DL (ref 70–108)

## 2020-03-17 PROCEDURE — 6370000000 HC RX 637 (ALT 250 FOR IP): Performed by: HOSPITALIST

## 2020-03-17 PROCEDURE — 6370000000 HC RX 637 (ALT 250 FOR IP): Performed by: PSYCHIATRY & NEUROLOGY

## 2020-03-17 PROCEDURE — 6370000000 HC RX 637 (ALT 250 FOR IP): Performed by: FAMILY MEDICINE

## 2020-03-17 PROCEDURE — 94760 N-INVAS EAR/PLS OXIMETRY 1: CPT

## 2020-03-17 PROCEDURE — 82948 REAGENT STRIP/BLOOD GLUCOSE: CPT

## 2020-03-17 PROCEDURE — 97535 SELF CARE MNGMENT TRAINING: CPT

## 2020-03-17 PROCEDURE — G0378 HOSPITAL OBSERVATION PER HR: HCPCS

## 2020-03-17 PROCEDURE — 6370000000 HC RX 637 (ALT 250 FOR IP): Performed by: INTERNAL MEDICINE

## 2020-03-17 PROCEDURE — 99232 SBSQ HOSP IP/OBS MODERATE 35: CPT | Performed by: PHYSICAL MEDICINE & REHABILITATION

## 2020-03-17 PROCEDURE — 97530 THERAPEUTIC ACTIVITIES: CPT

## 2020-03-17 PROCEDURE — 99239 HOSP IP/OBS DSCHRG MGMT >30: CPT | Performed by: PHYSICIAN ASSISTANT

## 2020-03-17 PROCEDURE — 1180000000 HC REHAB R&B

## 2020-03-17 PROCEDURE — 6360000002 HC RX W HCPCS: Performed by: HOSPITALIST

## 2020-03-17 PROCEDURE — 2580000003 HC RX 258: Performed by: FAMILY MEDICINE

## 2020-03-17 PROCEDURE — 6360000002 HC RX W HCPCS: Performed by: FAMILY MEDICINE

## 2020-03-17 PROCEDURE — 97110 THERAPEUTIC EXERCISES: CPT

## 2020-03-17 PROCEDURE — 96376 TX/PRO/DX INJ SAME DRUG ADON: CPT

## 2020-03-17 PROCEDURE — 97116 GAIT TRAINING THERAPY: CPT

## 2020-03-17 RX ORDER — MULTIVITAMIN WITH FOLIC ACID 400 MCG
1 TABLET ORAL DAILY
Status: DISCONTINUED | OUTPATIENT
Start: 2020-03-18 | End: 2020-03-27 | Stop reason: HOSPADM

## 2020-03-17 RX ORDER — CALCIUM CARBONATE/VITAMIN D3 250-3.125
1 TABLET ORAL DAILY
Status: DISCONTINUED | OUTPATIENT
Start: 2020-03-18 | End: 2020-03-27 | Stop reason: HOSPADM

## 2020-03-17 RX ORDER — ONDANSETRON 4 MG/1
4 TABLET, FILM COATED ORAL EVERY 8 HOURS PRN
Status: DISCONTINUED | OUTPATIENT
Start: 2020-03-17 | End: 2020-03-27 | Stop reason: HOSPADM

## 2020-03-17 RX ORDER — ACETAMINOPHEN 325 MG/1
650 TABLET ORAL EVERY 4 HOURS PRN
Status: DISCONTINUED | OUTPATIENT
Start: 2020-03-17 | End: 2020-03-27 | Stop reason: HOSPADM

## 2020-03-17 RX ORDER — POLYETHYLENE GLYCOL 3350 17 G/17G
17 POWDER, FOR SOLUTION ORAL DAILY PRN
Status: CANCELLED | OUTPATIENT
Start: 2020-03-17

## 2020-03-17 RX ORDER — ACETAMINOPHEN 325 MG/1
650 TABLET ORAL EVERY 4 HOURS PRN
Status: CANCELLED | OUTPATIENT
Start: 2020-03-17

## 2020-03-17 RX ORDER — ALBUTEROL SULFATE 90 UG/1
2 AEROSOL, METERED RESPIRATORY (INHALATION) EVERY 6 HOURS PRN
Status: CANCELLED | OUTPATIENT
Start: 2020-03-17

## 2020-03-17 RX ORDER — DEXAMETHASONE SODIUM PHOSPHATE 4 MG/ML
4 INJECTION, SOLUTION INTRA-ARTICULAR; INTRALESIONAL; INTRAMUSCULAR; INTRAVENOUS; SOFT TISSUE EVERY 6 HOURS
Status: CANCELLED | OUTPATIENT
Start: 2020-03-17

## 2020-03-17 RX ORDER — CALCIUM CARBONATE/VITAMIN D3 250-3.125
1 TABLET ORAL DAILY
Status: CANCELLED | OUTPATIENT
Start: 2020-03-18

## 2020-03-17 RX ORDER — ALBUTEROL SULFATE 90 UG/1
2 AEROSOL, METERED RESPIRATORY (INHALATION) EVERY 6 HOURS PRN
Status: DISCONTINUED | OUTPATIENT
Start: 2020-03-17 | End: 2020-03-21 | Stop reason: CLARIF

## 2020-03-17 RX ORDER — POLYETHYLENE GLYCOL 3350 17 G/17G
17 POWDER, FOR SOLUTION ORAL DAILY PRN
Status: DISCONTINUED | OUTPATIENT
Start: 2020-03-17 | End: 2020-03-27 | Stop reason: HOSPADM

## 2020-03-17 RX ORDER — PANTOPRAZOLE SODIUM 40 MG/1
40 TABLET, DELAYED RELEASE ORAL
Status: CANCELLED | OUTPATIENT
Start: 2020-03-17

## 2020-03-17 RX ORDER — PANTOPRAZOLE SODIUM 40 MG/1
40 TABLET, DELAYED RELEASE ORAL
Status: DISCONTINUED | OUTPATIENT
Start: 2020-03-17 | End: 2020-03-27 | Stop reason: HOSPADM

## 2020-03-17 RX ADMIN — Medication 10 ML: at 15:27

## 2020-03-17 RX ADMIN — PANTOPRAZOLE SODIUM 40 MG: 40 TABLET, DELAYED RELEASE ORAL at 15:26

## 2020-03-17 RX ADMIN — Medication 10 ML: at 09:28

## 2020-03-17 RX ADMIN — DEXAMETHASONE SODIUM PHOSPHATE 4 MG: 4 INJECTION, SOLUTION INTRAMUSCULAR; INTRAVENOUS at 09:28

## 2020-03-17 RX ADMIN — ACETAMINOPHEN 650 MG: 325 TABLET ORAL at 15:26

## 2020-03-17 RX ADMIN — DEXAMETHASONE SODIUM PHOSPHATE 4 MG: 4 INJECTION, SOLUTION INTRAMUSCULAR; INTRAVENOUS at 15:27

## 2020-03-17 RX ADMIN — INSULIN LISPRO 2 UNITS: 100 INJECTION, SOLUTION INTRAVENOUS; SUBCUTANEOUS at 07:26

## 2020-03-17 RX ADMIN — Medication 250 MG: at 09:27

## 2020-03-17 RX ADMIN — PANTOPRAZOLE SODIUM 40 MG: 40 TABLET, DELAYED RELEASE ORAL at 18:04

## 2020-03-17 RX ADMIN — PANTOPRAZOLE SODIUM 40 MG: 40 TABLET, DELAYED RELEASE ORAL at 05:43

## 2020-03-17 RX ADMIN — ENOXAPARIN SODIUM 40 MG: 40 INJECTION SUBCUTANEOUS at 18:04

## 2020-03-17 RX ADMIN — DEXAMETHASONE SODIUM PHOSPHATE 4 MG: 4 INJECTION, SOLUTION INTRAMUSCULAR; INTRAVENOUS at 03:27

## 2020-03-17 ASSESSMENT — PAIN SCALES - GENERAL
PAINLEVEL_OUTOF10: 4
PAINLEVEL_OUTOF10: 0
PAINLEVEL_OUTOF10: 6
PAINLEVEL_OUTOF10: 0
PAINLEVEL_OUTOF10: 0
PAINLEVEL_OUTOF10: 3

## 2020-03-17 ASSESSMENT — PAIN DESCRIPTION - ONSET: ONSET: ON-GOING

## 2020-03-17 ASSESSMENT — PAIN DESCRIPTION - DESCRIPTORS: DESCRIPTORS: ACHING

## 2020-03-17 ASSESSMENT — PAIN DESCRIPTION - PROGRESSION: CLINICAL_PROGRESSION: NOT CHANGED

## 2020-03-17 ASSESSMENT — PAIN DESCRIPTION - PAIN TYPE: TYPE: ACUTE PAIN

## 2020-03-17 ASSESSMENT — PAIN DESCRIPTION - FREQUENCY: FREQUENCY: CONTINUOUS

## 2020-03-17 ASSESSMENT — PAIN DESCRIPTION - ORIENTATION: ORIENTATION: LOWER

## 2020-03-17 ASSESSMENT — PAIN DESCRIPTION - LOCATION: LOCATION: BACK
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
## 2020-03-17 NOTE — PROGRESS NOTES
admission    Restrictions/Precautions:  Restrictions/Precautions: Fall Risk    SUBJECTIVE: RN approved session. Pt resting in bed upon arrival, very pleasant and agreeable to therapy. PAIN: 0/10: Denies pain. OBJECTIVE:  Bed Mobility:  Rolling to Left: Independent  Supine to Sit: Stand By Assistance    Transfers:  Sit to Stand: Contact Guard Assistance  Stand to [address] [place] [address] to reach back for the chair before sitting. Ambulation:  Contact Guard Assistance  Distance: 150 feet x1   Surface: Level Tile  Device:Rolling Walker  Gait Deviations:  Cuing to stay closer to AD, for improved upright posture and to decrease weight bearing through B UEs. Pt demos improved L step length today, minimal B heel strike, Decreased B step clearance. Stairs:  Contact Guard Assistance  Number of Steps: 8   Height: 6\" step with Bilateral Handrails    Balance:  Dynamic Standing Balance: Minimal Assistance, Dynamic reaching activity requiring pt to reach to out to both L and R sides and from above head to waist level. Pt encouraged to increase weight shifts to L side. Required a loarge JENNA and minimal assistance to safely maintain balance. Exercise:  Patient was guided in 1 set(s) 10 reps of exercise to both lower extremities. Standing heel/toe raises, Standing marches, Standing hip abduction/adduction and Standing hamstring curls. Exercises were completed for increased independence with functional mobility. Functional Outcome Measures: Completed  AM-PAC Inpatient Mobility without Stair Climbing Raw Score : 15  AM-PAC Inpatient without Stair Climbing T-Scale Score : 43.03    ASSESSMENT:  Assessment: Patient progressing toward established goals. and Pt tolerated session well. Limitd by poor balance, decreased strength, decreased endurance. Would benefit from continued therapy before returning home. Activity Tolerance:  Patient tolerance of  treatment: good.         Equipment

## 2020-03-17 NOTE — DISCHARGE SUMMARY
(H) 70 - 108 mg/dL    BUN 27 (H) 7 - 22 mg/dL    CREATININE 0.8 0.4 - 1.2 mg/dL    Calcium 9.3 8.5 - 10.5 mg/dL   Anion Gap    Collection Time: 03/16/20 11:52 AM   Result Value Ref Range    Anion Gap 13.0 8.0 - 16.0 meq/L   Glomerular Filtration Rate, Estimated    Collection Time: 03/16/20 11:52 AM   Result Value Ref Range    Est, Glom Filt Rate >90 ml/min/1.73m2   Scan of Blood Smear    Collection Time: 03/16/20 11:52 AM   Result Value Ref Range    SCAN OF BLOOD SMEAR see below    POCT glucose    Collection Time: 03/16/20 12:14 PM   Result Value Ref Range    POC Glucose 154 (H) 70 - 108 mg/dl   POCT glucose    Collection Time: 03/16/20  4:16 PM   Result Value Ref Range    POC Glucose 149 (H) 70 - 108 mg/dl   POCT glucose    Collection Time: 03/16/20  8:22 PM   Result Value Ref Range    POC Glucose 163 (H) 70 - 108 mg/dl   POCT glucose    Collection Time: 03/17/20  6:26 AM   Result Value Ref Range    POC Glucose 185 (H) 70 - 108 mg/dl   POCT glucose    Collection Time: 03/17/20 10:32 AM   Result Value Ref Range    POC Glucose 122 (H) 70 - 108 mg/dl        Microbiology:    Blood culture #1: No results found for: BC  Blood culture #2:No results found for: BLOODCULT2  Organism:    Lab Results   Component Value Date    LABGRAM  03/06/2020     performed on cytospun specimen Rare segmented neutrophils observed. No bacteria seen.      MRSA culture only:No results found for: [facility]  Urine culture: No results found for: LABURIN  No results found for: ORG   Respiratory culture: No results found for: CULTRESP  Aerobic and Anaerobic :  No results found for: LABAERO  Lab Results   Component Value Date    LABANAE No growth-preliminary No growth  03/06/2020       Urinalysis:     Lab Results   Component Value Date    NITRU NEGATIVE 03/05/2020    WBCUA NONE SEEN 03/05/2020    BACTERIA NONE SEEN 03/05/2020    RBCUA 5-10 03/05/2020    BLOODU TRACE 03/05/2020    GLUCOSEU NEGATIVE 03/05/2020       Radiology:  Ct Head Wo Contrast    Result Date: 3/5/2020  PROCEDURE: CT HEAD WO CONTRAST CLINICAL INFORMATION: numbness below waist, worsening expressive aphasia, Trauma. COMPARISON: No prior study. TECHNIQUE: Noncontrast 5 mm axial images were obtained through the brain. All CT scans at this facility use dose modulation, iterative reconstruction, and/or weight-based dosing when appropriate to reduce radiation dose to as low as reasonably achievable. FINDINGS: There is no hemorrhage. There are no intra-or extra-axial collections. There is no hydrocephalus, midline shift or mass effect. The gray-white matter differentiation is preserved. The paranasal sinuses and mastoid air cells are normally aerated. There is no suspicious calvarial abnormality. No evidence of an acute process. **This report has been created using voice recognition software. It may contain minor errors which are inherent in voice recognition technology. ** Final report electronically signed by Dr. Grzegorz Reeder on 3/5/2020 12:13 PM    Ct Thoracic Spine W Contrast    Result Date: 3/6/2020  PROCEDURE: CT THORACIC SPINE W CONTRAST CLINICAL INFORMATION: myelogram. Bilateral leg weakness. Left leg is worse. COMPARISON: Thoracic spine MRI 3/5/2020. TECHNIQUE: 3 mm axial CT images were obtained through the thoracic spine. Sagittal and coronal reconstructions were obtained. Intrathecal Omnipaque contrast is present. All CT scans at this facility use dose modulation, iterative reconstruction, and/or weight-based dosing when appropriate to reduce radiation dose to as low as reasonably achievable. FINDINGS: There is excellent opacification of the thecal sac by intrathecal contrast. There is a small amount of contrast in the ventral epidural space at the T6-T10 levels. The thoracic spinal cord is again displaced ventrally in the spinal canal at the T7-T10 levels. The posterior margin is mildly flattened at the T9 level.  Of note the ventral epidural contrast displaces the thoracic spinal cord SPINE W WO CONTRAST CLINICAL INFORMATION: ataxia; bilateral ankle clonus. COMPARISON: No prior study. TECHNIQUE: Sagittal T1, T2 and STIR sequences were obtained through the thoracic spine. Axial T2-weighted images were obtained through the discs. Postcontrast axial and sagittal T1-weighted images were obtained. FINDINGS:  At the T9 level, the thoracic spinal cord is abnormal. The central canal is dilated. Just above this, the thoracic spinal cord is flattened ventrally in the thecal sac. There does appear to be abnormal cord signal. This is at the T8-9 disc level. There is mass effect upon the posterior aspect of the cord from an abnormality within the spinal canal. The thoracic spinal cord is flattened ventrally at this level. There is compression of the thoracic spinal cord ventrally. Just above this, there appears to be an intrathecal but extramedullary abnormality. On the axial images, this measures 16 x 9 cm. This is difficult to assess on the other sequences. This is isointense to CSF on all sequences and does not enhance. This appears to extend over a length of 3.2 cm. This may represent an arachnoid cyst within the thecal sac. This could also represent subarachnoid adhesions. On the postcontrast images, there is a punctate focus of enhancement inferiorly at the level of the dilated central canal and abnormal thoracic cord. This is at the T9 level. This can represent a small nodule associated with the tumor versus some vascular enhancement. This is seen on series 10 image 9, the sagittal postcontrast T1 weighted images. This is not well demonstrated on the axial postcontrast images. On the axial postcontrast images, there is no abnormal enhancement. The thoracic vertebral bodies are normally aligned. There are no acute compression fractures. There is an old mild chronic appearing deformity in the superior endplate of T6. There is no suspicious marrow signal abnormality. There is no bone marrow edema.  On the axial images, there is some minimal prominence of the central canal in the upper thoracic cord. Beginning at the T7-8 level, the thoracic spinal cord is displaced ventrally in the spinal canal. There is mass effect upon the posterior aspect of the thoracic spinal cord. This is at the disc level. Just below the disc level there is prominence of central canal. Then, at the disc level at the T8-9 level, the thoracic spinal cord is flattened. There is abnormal cord signal. Below the T9 level, the thoracic spinal cord is of normal caliber. Again there is minimal scattered prominence of the central canal. There are no gross abnormalities on the localizer images. Mass effect upon the thoracic spinal cord at the T7-8 through T9 levels. The thoracic spinal cord is flattened against the ventral spinal canal at the T8-9 level. There is abnormal cord signal consistent with cord edema versus myelomalacia. Just below this, there is a cystic abnormality within the cord. This can represent a syrinx. There is a nodule of enhancement. The differential diagnosis includes a cystic mass in the thoracic spinal cord with enhancing nodule however, this does not explain the mass effect upon the dorsal aspect of the cord. Alternatively, this could be and arachnoid cyst dorsal to the thoracic cord compressing the cord. This could also represent subarachnoid adhesions distorting the cord. **This report has been created using voice recognition software. It may contain minor errors which are inherent in voice recognition technology. ** Final report electronically signed by Dr. Sophie Thompson on 3/6/2020 9:51 AM    Mri Lumbar Spine W Wo Contrast    Result Date: 3/5/2020  PROCEDURE: MRI LUMBAR SPINE W WO CONTRAST CLINICAL INFORMATION: bilateral leg numbness, loss of bladder control. Urinary incontinence. Uncontrollable leg spasms. COMPARISON: No prior study.  TECHNIQUE: Sagittal and axial T1 and T2-weighted images were obtained to the lumbar spine. Postcontrast axial and sagittal T1-weighted images were also obtained. FINDINGS: The lumbar vertebral bodies are normally aligned. There is abnormal bone marrow signal in the superior right aspect of the L4 vertebral body. There is edema on the STIR sequence. Subtle fracture lines are seen. This appears to be an acute compression fracture of the right lateral aspect of the superior endplate. There is no associated soft tissue mass. There is early height loss. The height loss is only 10%. There is some degenerative marrow edema in the endplates at the Y9-A6 level. This is on the right. No suspicious osseous lesions are present. There are no focal bone lesions. No pars defects are noted. There is disc desiccation throughout. The visualized aspects of the distal spinal cord are normal. The nerve roots of the cauda equina and the tip of the conus are normal. There are no gross abnormalities in the distal thoracic spine. On the axial images, at T12-L1, there are no degenerative changes. There is no spinal canal or foraminal stenosis. At L1-L2, there are minimal facet degenerative changes. There is no focal disc abnormality. There is no spinal canal or foraminal stenosis. At L2-3, there is a very mild diffuse disc bulge. There are minimal facet degenerative changes. There is no spinal canal stenosis. There is mild left foraminal stenosis. At L3-4, there are mild facet degenerative changes. There is no focal disc abnormality. There is no spinal canal stenosis. There is mild left foraminal stenosis. At L4-5, there are facet degenerative changes. There is a mild diffuse disc bulge. There is mild spinal canal stenosis. There is mild left foraminal stenosis. At L5-S1, there are mild facet degenerative changes. There is no spinal canal stenosis. There is moderate severity right foraminal stenosis. There is no abnormal enhancement.  There are no suspicious findings in the visualized aspects of the retroperitoneum and paraspinal soft tissues. 1. Abnormal marrow signal in the superior endplate of L5 for. This is on the right. The pattern and distribution is most consistent with an acute fracture. This is most likely secondary to underlying osteopenia process if the patient does not have a history of trauma. Clinical correlation is recommended. 2. No evidence of significant spinal canal stenosis at any level. 3. Variable foraminal stenosis at multiple levels as described above. **This report has been created using voice recognition software. It may contain minor errors which are inherent in voice recognition technology. ** Final report electronically signed by Dr. Barber Cornejo on 3/5/2020 1:50 PM    Xr Chest Portable    Result Date: 3/6/2020  PROCEDURE: XR CHEST PORTABLE CLINICAL INFORMATION: pre-op. COMPARISON: Chest x-ray dated 7/28/2012 TECHNIQUE: AP Portable chest xray FINDINGS: Lines/tubes/devices: none Lungs/pleura:  No pneumonia, pulmonary edema, or obvious mass. No pleural effusion. No pneumothorax. Heart: Heart size is normal. Mediastinum/tiago: No obvious mass or adenopathy. Skeleton: There is multilevel vertebral endplate spondylosis. No acute cardiopulmonary disease. **This report has been created using voice recognition software. It may contain minor errors which are inherent in voice recognition technology. ** Final report electronically signed by Dr. Lee Crodon on 3/6/2020 3:24 AM    Mri Brain Wo Contrast    Result Date: 3/6/2020  PROCEDURE: MRI BRAIN WO CONTRAST CLINICAL INFORMATION ataxia; bilateral ankle clonus. Bilateral leg numbness. Urinary incontinence. COMPARISON: Head CT 3/5/2020. TECHNIQUE: Multiplanar and multiple spin echo MRI images were obtained of the brain without contrast. FINDINGS: The diffusion-weighted images are normal.  The brain volume is normal. There is normal signal intensity in the brain. There are no intra-or extra-axial collections.   There is no hydrocephalus, midline shift or mass effect. There is no susceptibility artifact in the brain. The major intracranial vascular flow voids are present. The midline craniocervical junction structures are normal.  The pituitary gland and brainstem are normal.      Normal MRI of the brain. **This report has been created using voice recognition software. It may contain minor errors which are inherent in voice recognition technology. ** Final report electronically signed by Dr. Luly Wilkinson on 3/6/2020 9:31 AM    Fl Guided For Spine Inject    Result Date: 3/6/2020  PROCEDURE: FL LUMBAR PUNCTURE DIAG, FL GUIDED FOR SPINE INJECT, FL MYELOGRAM THORACIC VIA LUMB INJ S&I CLINICAL INFORMATION: Abnormal MRI examination; fluoroscopy guided lumbar puncture requested for CSF collection and contrast injection for CT of the thoracic spine TECHNIQUE: The procedure, risks and potential complications were explained to the patient. Informed consent was obtained. The patient was placed in a prone position on the fluoroscopy table and a lumbar interspace was localized. The overlying skin was prepped and draped in the usual sterile manner. Following administration of 2% lidocaine, a lumbar puncture was performed using a 20-gauge spinal needle. The subarachnoid space was accessed without difficulty and showed immediate clear cerebrospinal fluid. 13 mL of fluid was collected and sent to cytology. Approximately 10 mL Omnipaque 200 was injected under fluoroscopy. The needle was withdrawn and a bandage was placed over the puncture site. The patient tolerated the procedure well without immediate complications. 2 spot images were obtained. Total fluoroscopy time 2.4 minutes. COMPARISON: None FINDINGS: There is adequate opacification of the lumbar thecal sac with subsequent migration of contrast into the thoracic spine. Fluoroscopy guided lumbar puncture for CSF collection and contrast injection for CT of the thoracic spine.  Final report electronically signed by  Darlene Tao on 3/6/2020 12:34 PM    Fl Lumbar Puncture Diag    Result Date: 3/6/2020  PROCEDURE: FL LUMBAR PUNCTURE DIAG, FL GUIDED FOR SPINE INJECT, FL MYELOGRAM THORACIC VIA LUMB INJ S&I CLINICAL INFORMATION: Abnormal MRI examination; fluoroscopy guided lumbar puncture requested for CSF collection and contrast injection for CT of the thoracic spine TECHNIQUE: The procedure, risks and potential complications were explained to the patient. Informed consent was obtained. The patient was placed in a prone position on the fluoroscopy table and a lumbar interspace was localized. The overlying skin was prepped and draped in the usual sterile manner. Following administration of 2% lidocaine, a lumbar puncture was performed using a 20-gauge spinal needle. The subarachnoid space was accessed without difficulty and showed immediate clear cerebrospinal fluid. 13 mL of fluid was collected and sent to cytology. Approximately 10 mL Omnipaque 200 was injected under fluoroscopy. The needle was withdrawn and a bandage was placed over the puncture site. The patient tolerated the procedure well without immediate complications. 2 spot images were obtained. Total fluoroscopy time 2.4 minutes. COMPARISON: None FINDINGS: There is adequate opacification of the lumbar thecal sac with subsequent migration of contrast into the thoracic spine. Fluoroscopy guided lumbar puncture for CSF collection and contrast injection for CT of the thoracic spine.  Final report electronically signed by Dr. Darlene Tao on 3/6/2020 12:34 PM    Fl Myelogram Thoracic Via Amanda Salud Inj S&i    Result Date: 3/6/2020  PROCEDURE: FL LUMBAR PUNCTURE DIAG, FL GUIDED FOR SPINE INJECT, FL MYELOGRAM THORACIC VIA LUMB INJ S&I CLINICAL INFORMATION: Abnormal MRI examination; fluoroscopy guided lumbar puncture requested for CSF collection and contrast injection for CT of the thoracic spine TECHNIQUE: The procedure, risks and potential complications were explained to the patient. Informed consent was obtained. The patient was placed in a prone position on the fluoroscopy table and a lumbar interspace was localized. The overlying skin was prepped and draped in the usual sterile manner. Following administration of 2% lidocaine, a lumbar puncture was performed using a 20-gauge spinal needle. The subarachnoid space was accessed without difficulty and showed immediate clear cerebrospinal fluid. 13 mL of fluid was collected and sent to cytology. Approximately 10 mL Omnipaque 200 was injected under fluoroscopy. The needle was withdrawn and a bandage was placed over the puncture site. The patient tolerated the procedure well without immediate complications. 2 spot images were obtained. Total fluoroscopy time 2.4 minutes. COMPARISON: None FINDINGS: There is adequate opacification of the lumbar thecal sac with subsequent migration of contrast into the thoracic spine. Fluoroscopy guided lumbar puncture for CSF collection and contrast injection for CT of the thoracic spine. Final report electronically signed by Dr. Jose Cordon on 3/6/2020 12:34 PM       Consults:   IP CONSULT TO NEUROSURGERY  IP CONSULT TO NEUROLOGY  IP CONSULT TO PHYSICAL MEDICINE REHAB  IP CONSULT TO REHAB/TCU ADMISSION COORDINATOR    Discharge Medications:      Medication List      ASK your doctor about these medications    Acetaminophen 325 MG Caps             Patient Instructions:    Discharge lab work: UA, CBC  Activity: activity as tolerated  Diet: DIET GENERAL; Carb Control: 4 carb choices (60 gms)/meal      Follow-up visits:   No follow-up provider specified. Disposition: Inpatient Rehab  Condition at Discharge: Stable    Time Spent: 40 minutes    Signed: Thank you No primary care provider on file. for the opportunity to be involved in this patient's care.     Electronically signed by Avani Limon PA-C on 3/17/2020 at 12:34 PM  Discharging Hospitalist

## 2020-03-17 NOTE — CARE COORDINATION
3/17/20, 7:28 AM EDT    Patient goals/plan/ treatment preferences discussed by  and . Patient goals/plan/ treatment preferences reviewed with patient/ family. Patient/ family verbalize understanding of discharge plan and are in agreement with goal/plan/treatment preferences. Understanding was demonstrated using the teach back method. AVS provided by RN at time of discharge, which includes all necessary medical information pertaining to the patients current course of illness, treatment, post-discharge goals of care, and treatment preferences.     Services After Discharge  Services At/After Discharge: Nursing Services, OT, PT, Skilled Therapy       Approved for  discharge/ readmit to [facility] today  No PCP - he said that he wants appt with Anna Sullivan CNP in [address] [facility] at discharge

## 2020-03-17 NOTE — PROGRESS NOTES
Precert for inpatient rehab approeved. Plan rehab today if patient is medically approved and Dr. Orlin Pizano clears.  Patient will go to [phone]

## 2020-03-18 LAB
GLUCOSE BLD-MCNC: 114 MG/DL (ref 70–108)
GLUCOSE BLD-MCNC: 122 MG/DL (ref 70–108)
GLUCOSE BLD-MCNC: 160 MG/DL (ref 70–108)
GLUCOSE BLD-MCNC: 90 MG/DL (ref 70–108)

## 2020-03-18 PROCEDURE — 6370000000 HC RX 637 (ALT 250 FOR IP): Performed by: PHYSICAL MEDICINE & REHABILITATION

## 2020-03-18 PROCEDURE — 97162 PT EVAL MOD COMPLEX 30 MIN: CPT

## 2020-03-18 PROCEDURE — 1180000000 HC REHAB R&B

## 2020-03-18 PROCEDURE — 82948 REAGENT STRIP/BLOOD GLUCOSE: CPT

## 2020-03-18 PROCEDURE — 97116 GAIT TRAINING THERAPY: CPT

## 2020-03-18 PROCEDURE — 97530 THERAPEUTIC ACTIVITIES: CPT

## 2020-03-18 PROCEDURE — 97165 OT EVAL LOW COMPLEX 30 MIN: CPT

## 2020-03-18 PROCEDURE — 6360000002 HC RX W HCPCS: Performed by: INTERNAL MEDICINE

## 2020-03-18 PROCEDURE — 99232 SBSQ HOSP IP/OBS MODERATE 35: CPT | Performed by: PHYSICAL MEDICINE & REHABILITATION

## 2020-03-18 PROCEDURE — 6360000002 HC RX W HCPCS: Performed by: HOSPITALIST

## 2020-03-18 PROCEDURE — 6370000000 HC RX 637 (ALT 250 FOR IP): Performed by: HOSPITALIST

## 2020-03-18 PROCEDURE — 97535 SELF CARE MNGMENT TRAINING: CPT

## 2020-03-18 PROCEDURE — 97112 NEUROMUSCULAR REEDUCATION: CPT

## 2020-03-18 RX ORDER — DEXAMETHASONE 4 MG/1
4 TABLET ORAL EVERY 12 HOURS SCHEDULED
Status: COMPLETED | OUTPATIENT
Start: 2020-03-20 | End: 2020-03-24

## 2020-03-18 RX ORDER — DEXAMETHASONE 4 MG/1
4 TABLET ORAL DAILY
Status: DISCONTINUED | OUTPATIENT
Start: 2020-03-25 | End: 2020-03-27 | Stop reason: HOSPADM

## 2020-03-18 RX ORDER — DEXAMETHASONE 4 MG/1
4 TABLET ORAL EVERY 8 HOURS SCHEDULED
Status: COMPLETED | OUTPATIENT
Start: 2020-03-18 | End: 2020-03-20

## 2020-03-18 RX ADMIN — ENOXAPARIN SODIUM 40 MG: 40 INJECTION SUBCUTANEOUS at 17:25

## 2020-03-18 RX ADMIN — PANTOPRAZOLE SODIUM 40 MG: 40 TABLET, DELAYED RELEASE ORAL at 17:00

## 2020-03-18 RX ADMIN — ACETAMINOPHEN 650 MG: 325 TABLET ORAL at 12:26

## 2020-03-18 RX ADMIN — Medication 250 MG: at 09:41

## 2020-03-18 RX ADMIN — THERA TABS 1 TABLET: TAB at 09:41

## 2020-03-18 RX ADMIN — PANTOPRAZOLE SODIUM 40 MG: 40 TABLET, DELAYED RELEASE ORAL at 09:41

## 2020-03-18 RX ADMIN — DEXAMETHASONE 4 MG: 4 TABLET ORAL at 16:51

## 2020-03-18 RX ADMIN — DEXAMETHASONE 4 MG: 4 TABLET ORAL at 21:13

## 2020-03-18 ASSESSMENT — PAIN DESCRIPTION - PROGRESSION
CLINICAL_PROGRESSION: NOT CHANGED
CLINICAL_PROGRESSION: NOT CHANGED

## 2020-03-18 ASSESSMENT — PAIN SCALES - GENERAL
PAINLEVEL_OUTOF10: 8
PAINLEVEL_OUTOF10: 10
PAINLEVEL_OUTOF10: 9

## 2020-03-18 ASSESSMENT — PAIN DESCRIPTION - DIRECTION: RADIATING_TOWARDS: NO RADIATING

## 2020-03-18 ASSESSMENT — PAIN DESCRIPTION - ORIENTATION: ORIENTATION: LOWER

## 2020-03-18 ASSESSMENT — PAIN DESCRIPTION - FREQUENCY: FREQUENCY: CONTINUOUS

## 2020-03-18 ASSESSMENT — PAIN DESCRIPTION - PAIN TYPE
TYPE: ACUTE PAIN
TYPE: ACUTE PAIN

## 2020-03-18 ASSESSMENT — PAIN DESCRIPTION - LOCATION
LOCATION: BACK
LOCATION: BACK

## 2020-03-18 ASSESSMENT — PAIN DESCRIPTION - ONSET: ONSET: ON-GOING

## 2020-03-18 ASSESSMENT — PAIN DESCRIPTION - DESCRIPTORS: DESCRIPTORS: ACHING

## 2020-03-18 ASSESSMENT — PAIN - FUNCTIONAL ASSESSMENT: PAIN_FUNCTIONAL_ASSESSMENT: PREVENTS OR INTERFERES SOME ACTIVE ACTIVITIES AND ADLS

## 2020-03-18 NOTE — PROGRESS NOTES
Patient resting in bed watching television, bed in lowest position and bed alarm on, one side rail up, call light in reach, no complaints at this time, continuing to monitor.  MMekolites PNS/RSC
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
## 2020-03-18 NOTE — PROGRESS NOTES
stairs. Additional Comments: Pt had to start having help with driving secondary to weakness and numbness in his legs. He still has been independent with self care. No AD prior to admission. Restrictions/Precautions:  Restrictions/Precautions: Fall Risk, General Precautions  Position Activity Restriction  Spinal Precautions: No Bending, No Lifting, No Twisting(for pain mgt )    SUBJECTIVE: Patient in recliner upon arrival, agreed and cooperative for therapy. Very talkative throughout with difficulty understanding speech at times. PAIN: 6/10: L side of back     OBJECTIVE:  Bed Mobility:  Sit to Supine: Moderate Assistance, X 1, with verbal cues , with increased time for completion, to assist at Jordan LE's     Transfers:  Sit to Stand: [address], Minimal Assistance, X 1, cues for hand placement  Stand to [facility], Minimal Assistance, cues for hand placement, with verbal cues, to eccentrically descent  Stand Pivot:Contact Guard Assistance, with verbal cues, using RW    Ambulation:  Contact Guard Assistance, with verbal cues   Distance: 80 ft. x1   Surface: Level Tile  Device:Rolling Walker  Gait Deviations: Forward Flexed Posture, Slow Kellen, Decreased Step Length on Right, decreased weight shift to left, Decreased Gait Speed, Decreased Heel Strike Bilaterally, Ataxia, Wide Base of Support, Unsteady Gait and demonstrates decreased Jordan hip and knee flexion and decreased foot clearance on RLE during swing phase. Verbal/tactile cues for staying closer to walker for safety. Neuro Re-Ed:   -Patient stood at ladder with no UE support while reaching outside JENNA to knee level and above head using Jordan UE's to challenge dynamic standing balance, erect posture and stability on LLE when in DLS. Anterior trunk sway noted with Min. A at times for stabilization to prevent LOB, tactile cues for placing equal weight in Jordan LE's to improve midline orientation and overall balance.

## 2020-03-18 NOTE — PROGRESS NOTES
Patient walked hallways using gait belt and walker, patient returned to bed with bed in the lowest position and bed alarm on, one side rail up, call light in reach, no complaints at this time, continuing to monitor.  [employer] PNS/RSC

## 2020-03-18 NOTE — PROGRESS NOTES
Nutrition Assessment    Type and Reason for Visit: Reassess, Consult(Assessment )    Nutrition Recommendations:   Agree with MVI. Continue to monitor glucose. Nutrition Assessment:    Pt. nutritionally compromised AEB 4.9 % weight loss in 1 week per EMR, 1.2 # weight loss in 1 week per pt stated weight  At risk for further nutrition compromise r/t admitted with bilateral leg weakness, cystic spinal lesion, 3/7 A1c 6% with average glucose 120, need for steroids,(no hx of DM)and underlying medical condition (asthma & arthritis. ). Nutrition recommendations/interventions as per above. Malnutrition Assessment:  · Malnutrition Status: At risk for malnutrition  · Context: Acute illness or injury  · Findings of the 6 clinical characteristics of malnutrition (Minimum of 2 out of 6 clinical characteristics is required to make the diagnosis of moderate or severe Protein Calorie Malnutrition based on AND/ASPEN Guidelines):  1. Energy Intake-Greater than 75% of estimated energy requirement,      2. Weight Loss-(~4.9 % weight loss in 1 week per EMR, 1.2 # weight loss in 1 week per pt stated weight. ),    3. Fat Loss-No significant subcutaneous fat loss,    4. Muscle Loss-No significant muscle mass loss,    5. Fluid Accumulation-No significant fluid accumulation,    6.   Strength-Not measured    Nutrition Risk Level:      Nutrient Needs:  · Estimated Daily Total Kcal: ~9766-8178 kcals ( 22-24 kcals/kg on actual weight of 88.5 kg)  · Estimated Daily Protein (g): ~98 grams ( 1.3 grams protein/kg on IBW of 75.5 kg)  · Estimated Daily Total Fluid (ml/day):  per Dr    Nutrition Diagnosis:   · Problem: Unintended weight loss  · Etiology: related to Insufficient energy/nutrient consumption(on acute )     Signs and symptoms:  as evidenced by (~4.9 % weight loss in 1 week per EMR, 1.2 # weight loss in 1 week per pt stated weight. )    Objective Information:  · Nutrition-Focused Physical Findings: Pt seen on rehab, reports

## 2020-03-18 NOTE — PROGRESS NOTES
Patient in care of Occupational Therapy, no complaints at this time, continuing to monitor.  [employer] PNS/RSC
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
## 2020-03-18 NOTE — PROGRESS NOTES
Accessibility: Accessible  IADL Comments: Pt reports having difficulty bringing laundry basket up full flight of stairs. Receives Help From: Family(mom who is retired and sister who works nights)  ADL Assistance: Independent  Homemaking Assistance: Independent  Homemaking Responsibilities: Yes  Ambulation Assistance: Independent  Transfer Assistance: Independent    Active : No  Patient's  Info: Patient is able to drive but since having the issues with his legs his mother has taken over driving patient to work,etc..  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 Additional Comments: Pt had to start having help with driving secondary to weakness and numbness in his legs. He still has been independent with self care. No AD prior to admission.      Cognition/Orientation:  Overall Orientation Status: Within Functional Limits  Overall Cognitive Status: WFL  Cognition Comment: occasional cues for problem solving new learning    ADL's:  Feeding: Independent  Grooming: Contact guard assistance  UE Bathing: Stand by assistance  LE Bathing: Minimal assistance  UE Dressing: Stand by assistance  LE Dressing: Minimal assistance(assist to doff pants,  min A to doff shoes )  Toileting: Contact guard assistance  Vision - Basic Assessment  Prior Vision: No visual deficits    Functional Mobility:  Bed mobility  Supine to Sit: Stand by assistance    Functional Mobility  Functional - Mobility Device: Rolling Walker  Activity: (to and from shower )  Assist Level: Contact guard assistance  Functional Mobility Comments: Cues provided for keeping his back session, patient left in safe position with all fall risk precautions in place.

## 2020-03-18 NOTE — PROGRESS NOTES
[address] NOTE    Conference Date: 3/18/2020  Admit Date:  3/17/2020  4:30 PM  Patient Name: Ketty Javed    MRN: [id number]    : 1975  (53 y.o.)  Rehabilitation Admitting Diagnosis:  Leg weakness, bilateral [R29.898]  Syrinx of spinal cord ([facility] [address] [G95.0]         CASE MANAGEMENT  Current issues/needs regarding patient and family discharge status: Patient is a 53year old single male who has admitted to [address] [place] [address] following a stay on acute for debility/bilateral leg weakness. Patient lives in a private residence with his sister and mother, both are supportive and provide care and support. Patient was working full time prior to his hospital stay. Patient plans and is hopefull to be able to return to his place of employment once discharged from [address] [place] [address]. Patient has some speech impediments and at times can be difficult to understand. Patient plans to return home once he is able to discharge from Rehab. Patient was managing his care independently prior to his hospital admission. Patient states he is an active  but in the last few weeks his mother has had to transport him to his place of employment due to his lower leg issues. Patient states he has 32 steps to climb to get to his apartment. Patient reports that the washer and dryers are located on the ground level so he has to carry his laundry down the steps, he explains his mother and sister are willing to assist with this task. SW anticipates patient may benefit from ongoing skilled therapy once he returns home. PHYSICAL THERAPY    Pt presents with B LE weakness. Pt demonstrates decreased static and dynamic standing balance, resulting in difficulty performing transfers and ambulating >30 ft with RW. Demonstrates gait deviations including ant trunk lean, decreased step length on R and difficulty clearing R foot during swing phase, and rigid gait lacking B hip and knee flexion.  Pt scored  on [place]. Pt relies heavily on UE and requires cues for hand placement and safety awareness. Pt to benefit from further skilled PT to improve functional mobility and promote return to PLOF. SPEECH THERAPY      OCCUPATIONAL THERAPY  BALANCE:  Sitting Balance:  Independent. In recliner, w/c, and kitchen chair without arms. Standing Balance: Stand by Assistance, Minimal Assistance, with cues for safety, with increased time for completion.      TRANSFERS:  Sit to Stand:  Contact Guard Assistance. Stand to Sit: Contact Guard Assistance.      FUNCTIONAL MOBILITY:  Assistive Device: Rolling Walker  Assist Level:  Contact Guard Assistance. Distance: To and from therapy apartment  Pt completes functional mobility with slow, guarded movement.      ADDITIONAL ACTIVITIES:  Pt completed laundry task today. He reports that at home he previously carried laundry and detergent down the stairs in his apartment complex. Therapist graded task with goal  to carry laundry basket ~10 feet with no stairs. Pt gave good effort but fearful to attempt without walker. Increased time provided to achieve good balance to lift small basket with ~7 clothing items from waist level surface. Pt unsteady with unsupported static stand. Attempted one small step with Minimal Assistance to correct balance and instructed pt to set basket down. Pt apologetic with therapist encouraging to attempt tasks at therapy for skilled instruction and strategies to improve safety and independence if possible. Reviewed possibility of small bag on walker if pt is able to navigate stairs safely with and AD. Pt able to place laundry into washer and retrieve detergent from cupboard above with standing ~ 7' for completion. Verbal cues for locating correct settings on unfamiliar washing machine. Will continue to address strategies with pt in collaboration with other disciplines.      RECREATIONAL THERAPY  Patient has been offered participation in recreational therapy

## 2020-03-18 NOTE — PROGRESS NOTES
Patient is resting in chair with feet propped up watching television, no complaints at this time, call light in reach, continuing to monitor.  MM[employer] PNS/RSC
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
## 2020-03-19 LAB
GLUCOSE BLD-MCNC: 123 MG/DL (ref 70–108)
GLUCOSE BLD-MCNC: 128 MG/DL (ref 70–108)
GLUCOSE BLD-MCNC: 158 MG/DL (ref 70–108)
GLUCOSE BLD-MCNC: 174 MG/DL (ref 70–108)

## 2020-03-19 PROCEDURE — 97530 THERAPEUTIC ACTIVITIES: CPT

## 2020-03-19 PROCEDURE — 1180000000 HC REHAB R&B

## 2020-03-19 PROCEDURE — 6360000002 HC RX W HCPCS: Performed by: INTERNAL MEDICINE

## 2020-03-19 PROCEDURE — 6370000000 HC RX 637 (ALT 250 FOR IP): Performed by: HOSPITALIST

## 2020-03-19 PROCEDURE — 97535 SELF CARE MNGMENT TRAINING: CPT

## 2020-03-19 PROCEDURE — 82948 REAGENT STRIP/BLOOD GLUCOSE: CPT

## 2020-03-19 PROCEDURE — 6370000000 HC RX 637 (ALT 250 FOR IP): Performed by: PHYSICAL MEDICINE & REHABILITATION

## 2020-03-19 PROCEDURE — 97112 NEUROMUSCULAR REEDUCATION: CPT

## 2020-03-19 PROCEDURE — 6360000002 HC RX W HCPCS: Performed by: HOSPITALIST

## 2020-03-19 PROCEDURE — 97116 GAIT TRAINING THERAPY: CPT

## 2020-03-19 PROCEDURE — 97110 THERAPEUTIC EXERCISES: CPT

## 2020-03-19 PROCEDURE — 94761 N-INVAS EAR/PLS OXIMETRY MLT: CPT

## 2020-03-19 PROCEDURE — 99232 SBSQ HOSP IP/OBS MODERATE 35: CPT | Performed by: PHYSICAL MEDICINE & REHABILITATION

## 2020-03-19 RX ADMIN — PANTOPRAZOLE SODIUM 40 MG: 40 TABLET, DELAYED RELEASE ORAL at 17:32

## 2020-03-19 RX ADMIN — ENOXAPARIN SODIUM 40 MG: 40 INJECTION SUBCUTANEOUS at 17:34

## 2020-03-19 RX ADMIN — ACETAMINOPHEN 650 MG: 325 TABLET ORAL at 20:19

## 2020-03-19 RX ADMIN — THERA TABS 1 TABLET: TAB at 08:06

## 2020-03-19 RX ADMIN — DEXAMETHASONE 4 MG: 4 TABLET ORAL at 05:34

## 2020-03-19 RX ADMIN — Medication 250 MG: at 08:06

## 2020-03-19 RX ADMIN — INSULIN LISPRO 2 UNITS: 100 INJECTION, SOLUTION INTRAVENOUS; SUBCUTANEOUS at 17:32

## 2020-03-19 RX ADMIN — ACETAMINOPHEN 650 MG: 325 TABLET ORAL at 05:34

## 2020-03-19 RX ADMIN — DEXAMETHASONE 4 MG: 4 TABLET ORAL at 12:47

## 2020-03-19 RX ADMIN — ACETAMINOPHEN 650 MG: 325 TABLET ORAL at 12:47

## 2020-03-19 RX ADMIN — DEXAMETHASONE 4 MG: 4 TABLET ORAL at 20:19

## 2020-03-19 RX ADMIN — PANTOPRAZOLE SODIUM 40 MG: 40 TABLET, DELAYED RELEASE ORAL at 08:07

## 2020-03-19 ASSESSMENT — PAIN DESCRIPTION - PROGRESSION

## 2020-03-19 ASSESSMENT — PAIN DESCRIPTION - ORIENTATION
ORIENTATION: LOWER
ORIENTATION: MID

## 2020-03-19 ASSESSMENT — PAIN DESCRIPTION - DESCRIPTORS
DESCRIPTORS: ACHING
DESCRIPTORS: ACHING

## 2020-03-19 ASSESSMENT — PAIN DESCRIPTION - LOCATION
LOCATION: BACK
LOCATION: BACK

## 2020-03-19 ASSESSMENT — PAIN SCALES - GENERAL
PAINLEVEL_OUTOF10: 3
PAINLEVEL_OUTOF10: 5
PAINLEVEL_OUTOF10: 7
PAINLEVEL_OUTOF10: 5

## 2020-03-19 ASSESSMENT — PAIN DESCRIPTION - FREQUENCY
FREQUENCY: INTERMITTENT
FREQUENCY: INTERMITTENT

## 2020-03-19 ASSESSMENT — PAIN - FUNCTIONAL ASSESSMENT
PAIN_FUNCTIONAL_ASSESSMENT: ACTIVITIES ARE NOT PREVENTED
PAIN_FUNCTIONAL_ASSESSMENT: PREVENTS OR INTERFERES SOME ACTIVE ACTIVITIES AND ADLS

## 2020-03-19 ASSESSMENT — PAIN DESCRIPTION - PAIN TYPE
TYPE: ACUTE PAIN
TYPE: ACUTE PAIN

## 2020-03-19 ASSESSMENT — PAIN DESCRIPTION - ONSET
ONSET: ON-GOING
ONSET: ON-GOING

## 2020-03-19 ASSESSMENT — PAIN DESCRIPTION - DIRECTION: RADIATING_TOWARDS: NO RADIATING

## 2020-03-19 NOTE — PROGRESS NOTES
min  Times per day: Daily  Current Treatment Recommendations: Strengthening, Balance Training, Functional Mobility Training, Transfer Training, Endurance Training, Gait Training, Stair training, Neuromuscular Re-education, Home Exercise Program, Safety Education & Training, Patient/Caregiver Education & Training    Patient Education  Patient Education: Plan of Care    Goals:  Patient goals : Get stronger   Short term goals  Time Frame for Short term goals: 1 week  Short term goal 1: pt to perform supine<>sit with S to increase ease of getting in and out of bed  Short term goal 2: pt to perform sit<>stand with CGA and without cues for hand placement to improve safety during transfers at home and in the community   Short term goal 3: pt to ambulate 50 ft with RW and CGA while maintaining proper posture to improve ability to navigate household distances  Short term goal 4: pt to negotiate 4 6\" steps with B HR at CGA to improve ability to enter home  Short term goal 5: pt to perfrom car transfer with CGA and without cues for proper hand placement to improve safety when getting in and out of car. Long term goals  Time Frame for Long term goals : 3 weeks  Long term goal 1: pt to perform supine<>sit with mod I to increase ease of getting in and out of bed  Long term goal 2: pt to perform sit<>stand with mod I and without cues for hand placement to improve safety during transfers at home and in the community   Long term goal 3: pt to ambulate 50ft with no AD at S and 150 ft with RW and S while maintaining proper posture to improve ability to navigate household and community distances  Long term goal 4: pt to negotiate 12 6\" steps at S using B HR to improve ability to enter home  Long term goal 5: pt to perfrom car transfer with mod I and without cues for proper hand placement to improve safety when getting in and out of car    Following session, patient left in safe position with all fall risk precautions in place.
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
## 2020-03-19 NOTE — PROGRESS NOTES
FUNCTIONAL MOBILITY:  Assistive Device: Rolling Walker  Assist Level:  Contact Guard Assistance. Distance: To and from therapy apartment  Pt completes functional mobility with slow, guarded movement. ADDITIONAL ACTIVITIES:  Pt completed laundry task today. He reports that at home he previously carried laundry and detergent down the stairs in his apartment complex. Therapist graded task with goal  to carry laundry basket ~10 feet with no stairs. Pt gave good effort but fearful to attempt without walker. Increased time provided to achieve good balance to lift small basket with ~7 clothing items from waist level surface. Pt unsteady with unsupported static stand. Attempted one small step with Minimal Assistance to correct balance and instructed pt to set basket down. Pt apologetic with therapist encouraging to attempt tasks at therapy for skilled instruction and strategies to improve safety and independence if possible. Reviewed possibility of small bag on walker if pt is able to navigate stairs safely with and AD. Pt able to place laundry into washer and retrieve detergent from cupboard above with standing ~ 7' for completion. Verbal cues for locating correct settings on unfamiliar washing machine. Will continue to address strategies with pt in collaboration with other disciplines. ASSESSMENT:  Assessment: Pt presents to occupational therapy following LE weakness impacting patient's ability to complete self care at prior level of functioning.  Due to patient's performance deficits, patient would greatly benefit from continued occupational therapy for ADL remediation, endurance building, strengthening and adaptive techniques to return to prior level of functioning and safe return to home environment.      Performance deficits / Impairments: Decreased functional mobility , Decreased ADL status, Decreased endurance, Decreased high-level IADLs, Decreased sensation, Decreased safe awareness, Decreased

## 2020-03-19 NOTE — PROGRESS NOTES
Physical Medicine & Rehabilitation   Progress Note    Chief Complaint: Bilateral lower limb weakness and numbness involving the Left lower limb due to a cystic spinal lesion located at the T-8 - T-9 level. Subjective: He reported no Left leg pain at rest but with activity his pain increases to a 5 on a pain scale of 1-10 with 1 being no pain and 10 being intolerable pain. He slept well last evening. His inpatient rehab therapies are going well. He was evaluated while working with [employer]. He had no acute concerns. The results of the Rehab. team conference held on 03/19/20 were discussed with the patient. Rehabilitation:  PT:   Bed Mobility:  Rolling to Left: Stand By Assistance   Supine to Sit: Stand By Assistance  Sit to Supine: Stand By Assistance     Transfers:  Sit to Stand: [employer], Minimal Assistance, X 1, cues for hand placement  Stand to Sit:Contact Guard Assistance, Minimal Assistance, cues for hand placement, with verbal cues, to eccentrically descent  Stand Pivot:Contact [employer], with verbal cues, using RW     Ambulation:  [employer], with verbal cues   Distance: 80 ft. x1   Surface: Level Tile  Device:Rolling Walker  Gait Deviations: Forward Flexed Posture, Slow Kellen, Decreased Step Length on Right, decreased weight shift to left, Decreased Gait Speed, Decreased Heel Strike Bilaterally, Ataxia, Wide Base of Support, Unsteady Gait and demonstrates decreased Jordan hip and knee flexion and decreased foot clearance on RLE during swing phase. Verbal/tactile cues for staying closer to walker for safety.      Neuro Re-Ed:   Pt. Completed stepping activity tapping feet onto 6\" cones in alternating pattern to promote a more reciprocal gait pattern with increased foot clearance and stability over stance leg. Pt. Limited by fatigue throughout. Pt. Relying heavily on walker at times. Multiple rest breaks required throughout.      O.T:  ADL's:  Feeding: with respect to his mobility. Deep Tendon Reflexes: Bilateral upper limb and bilateral achilles reflexes were 2+. The bilateral patellar reflexes were 3+. Skin: warm and dry, no rash or erythema  Edema: None in either lower limb. Diagnostics:   Recent Results (from the past 24 hour(s))   POCT glucose    Collection Time: 03/18/20  4:55 PM   Result Value Ref Range    POC Glucose 114 (H) 70 - 108 mg/dl   POCT glucose    Collection Time: 03/18/20  9:08 PM   Result Value Ref Range    POC Glucose 160 (H) 70 - 108 mg/dl   POCT glucose    Collection Time: 03/19/20  7:49 AM   Result Value Ref Range    POC Glucose 123 (H) 70 - 108 mg/dl   POCT glucose    Collection Time: 03/19/20 12:32 PM   Result Value Ref Range    POC Glucose 128 (H) 70 - 108 mg/dl       Impression:  Gait dysfunction. Deficits with his A.D.L.'s.  Current history of hyperglycemia due to him being on oral Decadron. History of asthma. History of expressive aphasia and dysarthria. Bilateral lower limb muscle weakness along with numbness involving the Left lower limb. Current history of a T-8 - T-9 cystic spinal lesion along with a syrinx. Plan:  · He is to continue with his current inpatient rehab. program.  · A Rehab. team conference was held on 03/19/20. His discharge goal is home with his family on 03/20/20. · His blood sugar has remained stable. · He is currently on a 10 day oral Decadron taper. Greater than 50% of the 25 minutes was spent with the patient on counseling and with respect to coordinating his current inpatient rehab. care. All of his questions were answered.     Missed Therapy Time:  · None    Hilary Orantes MD

## 2020-03-19 NOTE — PROGRESS NOTES
[facility]  Individualized Disclosure Statement      Patient: Hermelinda Pineda  [facility] [address] provides 24 hour individualized service to patients with functional limitations due to, but not limited to: stroke, brain injury, spinal cord injury, major multiple trauma, fractures, amputation, and neurological disorders. The [address] [place] [address] provides rehabilitative nursing and medical services as well as physical, occupational, speech, and recreation therapies. [facility] is fully accredited by the Commission on Accreditation of Rehabilitation Facilities (CARF) as a comprehensive provider of rehabilitation services. Patients admitted to the [address] [place] [address] receive a minimum of three hours of therapy per day, at least six days per week, with a revised therapy schedule on weekends and holidays. Physical therapy, occupational therapy, and speech therapy are provided seven days per week including holidays. Other therapeutic services are available on weekends and evenings as needed or scheduled. Intensity of Treatment  Your treatment program will consist of Nursing Care and:  1.5 hours of Physical Therapy, per day  1.5    hours of Occupational Therapy, per day   1 hours of Recreational Therapy, per week    [address] [address] maintains contracts with most insurance plans. Depending on the type of coverage, the insurance may impose limits on the coverage for rehabilitation care. Coverage is based on the premise that you are able to fully participate in the rehabilitation program and show continued progress. Please verify your own insurance information A copy of this was given to the patient/ family on this date.   Insurance Coverage  Your insurance company has made the following determination relative to the length of your stay:   Your estimated length of stay is 14 days   Your insurance Coverage has been verified as follows:    Primary Insurance:anthem    Deductible:500 Coverage:active   Secondary Insurance:;secondary insurance policies often cover co-pay amounts, but to ensure payment please contact your insurance company.     Alternative Resources: Please ask the  for more information [phone]

## 2020-03-20 LAB
ANION GAP SERPL CALCULATED.3IONS-SCNC: 12 MEQ/L (ref 8–16)
BASOPHILS # BLD: 0.2 %
BASOPHILS ABSOLUTE: 0 THOU/MM3 (ref 0–0.1)
BUN BLDV-MCNC: 23 MG/DL (ref 7–22)
CALCIUM SERPL-MCNC: 8.9 MG/DL (ref 8.5–10.5)
CHLORIDE BLD-SCNC: 93 MEQ/L (ref 98–111)
CO2: 25 MEQ/L (ref 23–33)
CREAT SERPL-MCNC: 0.7 MG/DL (ref 0.4–1.2)
EOSINOPHIL # BLD: 0 %
EOSINOPHILS ABSOLUTE: 0 THOU/MM3 (ref 0–0.4)
ERYTHROCYTE [DISTWIDTH] IN BLOOD BY AUTOMATED COUNT: 12.9 % (ref 11.5–14.5)
ERYTHROCYTE [DISTWIDTH] IN BLOOD BY AUTOMATED COUNT: 39.2 FL (ref 35–45)
GFR SERPL CREATININE-BSD FRML MDRD: > 90 ML/MIN/1.73M2
GLUCOSE BLD-MCNC: 118 MG/DL (ref 70–108)
GLUCOSE BLD-MCNC: 151 MG/DL (ref 70–108)
GLUCOSE BLD-MCNC: 151 MG/DL (ref 70–108)
GLUCOSE BLD-MCNC: 155 MG/DL (ref 70–108)
GLUCOSE BLD-MCNC: 201 MG/DL (ref 70–108)
HCT VFR BLD CALC: 44.4 % (ref 42–52)
HEMOGLOBIN: 14.8 GM/DL (ref 14–18)
IMMATURE GRANS (ABS): 0.35 THOU/MM3 (ref 0–0.07)
IMMATURE GRANULOCYTES: 2 %
LYMPHOCYTES # BLD: 6 %
LYMPHOCYTES ABSOLUTE: 1 THOU/MM3 (ref 1–4.8)
MCH RBC QN AUTO: 28.5 PG (ref 26–33)
MCHC RBC AUTO-ENTMCNC: 33.3 GM/DL (ref 32.2–35.5)
MCV RBC AUTO: 85.4 FL (ref 80–94)
MONOCYTES # BLD: 6.6 %
MONOCYTES ABSOLUTE: 1.1 THOU/MM3 (ref 0.4–1.3)
NUCLEATED RED BLOOD CELLS: 0 /100 WBC
PLATELET # BLD: 304 THOU/MM3 (ref 130–400)
PMV BLD AUTO: 8.5 FL (ref 9.4–12.4)
POTASSIUM SERPL-SCNC: 4.3 MEQ/L (ref 3.5–5.2)
RBC # BLD: 5.2 MILL/MM3 (ref 4.7–6.1)
SEG NEUTROPHILS: 85.2 %
SEGMENTED NEUTROPHILS ABSOLUTE COUNT: 14.7 THOU/MM3 (ref 1.8–7.7)
SODIUM BLD-SCNC: 130 MEQ/L (ref 135–145)
WBC # BLD: 17.2 THOU/MM3 (ref 4.8–10.8)

## 2020-03-20 PROCEDURE — 1180000000 HC REHAB R&B

## 2020-03-20 PROCEDURE — 36415 COLL VENOUS BLD VENIPUNCTURE: CPT

## 2020-03-20 PROCEDURE — 6370000000 HC RX 637 (ALT 250 FOR IP): Performed by: PHYSICAL MEDICINE & REHABILITATION

## 2020-03-20 PROCEDURE — 80048 BASIC METABOLIC PNL TOTAL CA: CPT

## 2020-03-20 PROCEDURE — 6360000002 HC RX W HCPCS: Performed by: HOSPITALIST

## 2020-03-20 PROCEDURE — 97110 THERAPEUTIC EXERCISES: CPT

## 2020-03-20 PROCEDURE — 99232 SBSQ HOSP IP/OBS MODERATE 35: CPT | Performed by: PHYSICAL MEDICINE & REHABILITATION

## 2020-03-20 PROCEDURE — 97530 THERAPEUTIC ACTIVITIES: CPT

## 2020-03-20 PROCEDURE — 85025 COMPLETE CBC W/AUTO DIFF WBC: CPT

## 2020-03-20 PROCEDURE — 6370000000 HC RX 637 (ALT 250 FOR IP): Performed by: HOSPITALIST

## 2020-03-20 PROCEDURE — 97535 SELF CARE MNGMENT TRAINING: CPT

## 2020-03-20 PROCEDURE — 97116 GAIT TRAINING THERAPY: CPT

## 2020-03-20 PROCEDURE — 82948 REAGENT STRIP/BLOOD GLUCOSE: CPT

## 2020-03-20 PROCEDURE — 97112 NEUROMUSCULAR REEDUCATION: CPT

## 2020-03-20 PROCEDURE — 6360000002 HC RX W HCPCS: Performed by: INTERNAL MEDICINE

## 2020-03-20 PROCEDURE — 94760 N-INVAS EAR/PLS OXIMETRY 1: CPT

## 2020-03-20 RX ORDER — CYCLOBENZAPRINE HCL 10 MG
5 TABLET ORAL 3 TIMES DAILY
Status: DISCONTINUED | OUTPATIENT
Start: 2020-03-20 | End: 2020-03-27 | Stop reason: HOSPADM

## 2020-03-20 RX ADMIN — ACETAMINOPHEN 650 MG: 325 TABLET ORAL at 12:53

## 2020-03-20 RX ADMIN — PANTOPRAZOLE SODIUM 40 MG: 40 TABLET, DELAYED RELEASE ORAL at 17:47

## 2020-03-20 RX ADMIN — DEXAMETHASONE 4 MG: 4 TABLET ORAL at 13:08

## 2020-03-20 RX ADMIN — ACETAMINOPHEN 650 MG: 325 TABLET ORAL at 20:45

## 2020-03-20 RX ADMIN — DEXAMETHASONE 4 MG: 4 TABLET ORAL at 05:06

## 2020-03-20 RX ADMIN — Medication 250 MG: at 08:27

## 2020-03-20 RX ADMIN — INSULIN LISPRO 2 UNITS: 100 INJECTION, SOLUTION INTRAVENOUS; SUBCUTANEOUS at 08:28

## 2020-03-20 RX ADMIN — PANTOPRAZOLE SODIUM 40 MG: 40 TABLET, DELAYED RELEASE ORAL at 08:27

## 2020-03-20 RX ADMIN — CYCLOBENZAPRINE 5 MG: 10 TABLET, FILM COATED ORAL at 20:45

## 2020-03-20 RX ADMIN — ENOXAPARIN SODIUM 40 MG: 40 INJECTION SUBCUTANEOUS at 17:47

## 2020-03-20 RX ADMIN — THERA TABS 1 TABLET: TAB at 08:27

## 2020-03-20 RX ADMIN — ACETAMINOPHEN 650 MG: 325 TABLET ORAL at 05:06

## 2020-03-20 RX ADMIN — INSULIN LISPRO 2 UNITS: 100 INJECTION, SOLUTION INTRAVENOUS; SUBCUTANEOUS at 17:42

## 2020-03-20 ASSESSMENT — PAIN - FUNCTIONAL ASSESSMENT: PAIN_FUNCTIONAL_ASSESSMENT: ACTIVITIES ARE NOT PREVENTED

## 2020-03-20 ASSESSMENT — PAIN SCALES - GENERAL
PAINLEVEL_OUTOF10: 8
PAINLEVEL_OUTOF10: 3
PAINLEVEL_OUTOF10: 5

## 2020-03-20 ASSESSMENT — PAIN DESCRIPTION - PAIN TYPE: TYPE: ACUTE PAIN

## 2020-03-20 ASSESSMENT — PAIN DESCRIPTION - FREQUENCY: FREQUENCY: INTERMITTENT

## 2020-03-20 ASSESSMENT — PAIN DESCRIPTION - DESCRIPTORS: DESCRIPTORS: ACHING

## 2020-03-20 ASSESSMENT — PAIN DESCRIPTION - ORIENTATION: ORIENTATION: MID

## 2020-03-20 ASSESSMENT — PAIN DESCRIPTION - PROGRESSION: CLINICAL_PROGRESSION: NOT CHANGED

## 2020-03-20 ASSESSMENT — PAIN DESCRIPTION - ONSET: ONSET: ON-GOING

## 2020-03-20 ASSESSMENT — PAIN DESCRIPTION - LOCATION: LOCATION: BACK

## 2020-03-20 NOTE — PROGRESS NOTES
wheelchair    FUNCTIONAL MOBILITY:  Assistive Device: Rolling Walker  Assist Level:  Contact Guard Assistance. Distance: to rehab apartment,  FCI back, pt propelled wheelchair usign BUEs with supervision rest of way back   Provided instruction for walker safety. Pt advances walker too far forward at times causing forward flexion. OT provided cues to keep it close, relax shoulders and . Pt able to follow commands and demos improved posture and safety. Patient completed IADL homemaking task this date of washing dishes - task was graded to challenge Walker placement and BUE release from walker. Pt tolerated standing x10 minutes with CGA. Seated rest break taken following. Pt given UB strengthening HEP handout. Addressed increasing patient's UB strength through completion of upper extremity  theraband exercises using medium resistance band. Pt completed pull aparts, shoulder flexion, elbow extension, horizontal  abduction and chest press x15  reps each while seated. ASSESSMENT:  Activity Tolerance:  Patient tolerance of  treatment: good. Discharge Recommendations: (Written UB HEP )  Equipment Recommendations: Other: Recommend shower chair, monitor for LHAE needs. Plan: Times per week: 5x/wk for 90 min and 1x/wk for 30 min   Current Treatment Recommendations: Functional Mobility Training, Endurance Training, Balance Training, Safety Education & Training, Equipment Evaluation, Education, & procurement, Self-Care / ADL, Patient/Caregiver Education & Training, Pain Management, Home Management Training    Patient Education  Patient Education: UB HEP,  ADL's and Equipment Education    Goals  Short term goals  Time Frame for Short term goals: 1 week   Short term goal 1: Pt will demonstrate functional mobility to/from the bathroom with a rolling walker and SBA to prepare for doing self care at the sink.    Short term goal 2: Pt will complete LB dressing tasks with CGA using LHAE prn while maintaining spinal precautions for pain mgt to improve indep with daily dressing tasks   Short term goal 3: Pt will stand >3 minutes during 1 and 2 UE release  with SBA to improve dynamic standing balance for sinkside ADLs  Short term goal 4: Pt will complete transfers to/from various sufaces with SBA with 0 cues for hand placement to increase his independence with toileting routine. Long term goals  Time Frame for Long term goals : 2 weeks   Long term goal 1: Pt demonstrate good problem solving to carry laundry basket up 1 flight of stairs with supervision to improve indep with doing laundry at home. Long term goal 2: Pt will complete BADL routine with Mod I and no cues for safety to improve indep with showering at home. Following session, patient left in safe position with all fall risk precautions in place.
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
## 2020-03-20 NOTE — PROGRESS NOTES
Physical Medicine & Rehabilitation   Progress Note    Chief Complaint: Bilateral lower limb weakness and numbness involving the Left lower limb due to a cystic spinal lesion located at the T-8 - T-9 level. Subjective: He reported no Left leg pain at rest but with activity his pain increases to a 3 on a pain scale of 1-10 with 1 being no pain and 10 being intolerable pain. He slept well last evening. His inpatient rehab therapies are going well. He was evaluated while he was resting in his recliner. He had no acute concerns. Rehabilitation:  PT:   Bed Mobility:  Rolling to Left: Stand By Assistance   Supine to Sit: Stand By Assistance  Sit to Supine: Stand By Assistance     Transfers:  Sit to Stand: [employer], Minimal Assistance, X 1, cues for hand placement  Stand to Sit: Contact Guard Assistance, Minimal Assistance, cues for hand placement, with verbal cues, to eccentrically descent  Stand Pivot:Contact [employer], with verbal cues, using RW     Ambulation:  [employer], with verbal cues   Distance: 120 ft. x1   Surface: Level Tile  Device:Rolling Walker  Gait Deviations: Forward Flexed Posture, Slow Kellen, Decreased Step Length on Right, decreased weight shift to left, Decreased Gait Speed, Decreased Heel Strike Bilaterally, Ataxia, Wide Base of Support, Unsteady Gait and demonstrates decreased Jordan hip and knee flexion and decreased foot clearance on RLE during swing phase. Verbal/tactile cues for staying closer to walker for safety.      Neuro Re-Ed:   Pt. Completed stepping activity tapping feet onto 6\" cones in alternating pattern to promote a more reciprocal gait pattern with increased foot clearance and stability over stance leg. Pt. Limited by fatigue throughout. Pt. Relying heavily on walker at times. Multiple rest breaks required throughout.      O.T:  ADL's:  Feeding: Independent  Grooming: Contact guard assistance  UE Bathing: Stand by assistance  LE POCT glucose    Collection Time: 03/20/20 12:47 PM   Result Value Ref Range    POC Glucose 118 (H) 70 - 108 mg/dl   POCT glucose    Collection Time: 03/20/20  5:06 PM   Result Value Ref Range    POC Glucose 155 (H) 70 - 108 mg/dl       Impression:  Gait dysfunction. Deficits with his A.D.L.'s.  Current history of hyperglycemia due to him being on oral Decadron. History of asthma. History of expressive aphasia and dysarthria. Bilateral lower limb muscle weakness along with numbness involving the Left lower limb. Current history of a T-8 - T-9 cystic spinal lesion along with a syrinx. Plan:  · He is to continue with his current inpatient rehab. program.  · A Rehab. team conference was held on 03/19/20. His discharge goal is home with his family on 03/27/20. · His blood sugar has remained stable. · He is currently on a 10 day oral Decadron taper. · A BMP and a CBC with diff. were drawn on 03/20/20. His Sodium was noted to be low at 130. He was started on a fluid restriction of 1,500 cc a day. The rest of his labs remain stable. · A BMP and a CBC with diff. will be drawn on 03/22/20. Greater than 50% of the 25 minutes was spent with the patient on counseling and with respect to coordinating his current inpatient rehab. care. All of his questions were answered.     Missed Therapy Time:  · None    Saida Merchant MD

## 2020-03-21 LAB
GLUCOSE BLD-MCNC: 101 MG/DL (ref 70–108)
GLUCOSE BLD-MCNC: 111 MG/DL (ref 70–108)
GLUCOSE BLD-MCNC: 134 MG/DL (ref 70–108)
GLUCOSE BLD-MCNC: 146 MG/DL (ref 70–108)

## 2020-03-21 PROCEDURE — 97110 THERAPEUTIC EXERCISES: CPT

## 2020-03-21 PROCEDURE — 6360000002 HC RX W HCPCS: Performed by: HOSPITALIST

## 2020-03-21 PROCEDURE — 6370000000 HC RX 637 (ALT 250 FOR IP): Performed by: PHYSICAL MEDICINE & REHABILITATION

## 2020-03-21 PROCEDURE — 6360000002 HC RX W HCPCS: Performed by: INTERNAL MEDICINE

## 2020-03-21 PROCEDURE — 82948 REAGENT STRIP/BLOOD GLUCOSE: CPT

## 2020-03-21 PROCEDURE — 97530 THERAPEUTIC ACTIVITIES: CPT

## 2020-03-21 PROCEDURE — 97116 GAIT TRAINING THERAPY: CPT

## 2020-03-21 PROCEDURE — 6370000000 HC RX 637 (ALT 250 FOR IP): Performed by: HOSPITALIST

## 2020-03-21 PROCEDURE — 97535 SELF CARE MNGMENT TRAINING: CPT

## 2020-03-21 PROCEDURE — 1180000000 HC REHAB R&B

## 2020-03-21 RX ORDER — ALBUTEROL SULFATE 2.5 MG/3ML
2.5 SOLUTION RESPIRATORY (INHALATION) EVERY 6 HOURS PRN
Status: DISCONTINUED | OUTPATIENT
Start: 2020-03-21 | End: 2020-03-27 | Stop reason: HOSPADM

## 2020-03-21 RX ADMIN — DEXAMETHASONE 4 MG: 4 TABLET ORAL at 22:15

## 2020-03-21 RX ADMIN — CYCLOBENZAPRINE 5 MG: 10 TABLET, FILM COATED ORAL at 22:15

## 2020-03-21 RX ADMIN — PANTOPRAZOLE SODIUM 40 MG: 40 TABLET, DELAYED RELEASE ORAL at 18:03

## 2020-03-21 RX ADMIN — CYCLOBENZAPRINE 5 MG: 10 TABLET, FILM COATED ORAL at 14:46

## 2020-03-21 RX ADMIN — Medication 250 MG: at 08:25

## 2020-03-21 RX ADMIN — PANTOPRAZOLE SODIUM 40 MG: 40 TABLET, DELAYED RELEASE ORAL at 08:26

## 2020-03-21 RX ADMIN — ACETAMINOPHEN 650 MG: 325 TABLET ORAL at 06:29

## 2020-03-21 RX ADMIN — ACETAMINOPHEN 650 MG: 325 TABLET ORAL at 22:15

## 2020-03-21 RX ADMIN — DEXAMETHASONE 4 MG: 4 TABLET ORAL at 08:25

## 2020-03-21 RX ADMIN — ENOXAPARIN SODIUM 40 MG: 40 INJECTION SUBCUTANEOUS at 18:03

## 2020-03-21 RX ADMIN — CYCLOBENZAPRINE 5 MG: 10 TABLET, FILM COATED ORAL at 08:25

## 2020-03-21 RX ADMIN — THERA TABS 1 TABLET: TAB at 08:25

## 2020-03-21 ASSESSMENT — PAIN SCALES - GENERAL
PAINLEVEL_OUTOF10: 2
PAINLEVEL_OUTOF10: 0
PAINLEVEL_OUTOF10: 2
PAINLEVEL_OUTOF10: 4

## 2020-03-21 NOTE — PROGRESS NOTES
while maintaining spinal precautions for pain mgt to improve indep with daily dressing tasks   Short term goal 3: Pt will stand >3 minutes during 1 and 2 UE release  with SBA to improve dynamic standing balance for sinkside ADLs  Short term goal 4: Pt will complete transfers to/from various sufaces with SBA with 0 cues for hand placement to increase his independence with toileting routine. Long term goals  Time Frame for Long term goals : 2 weeks   Long term goal 1: Pt demonstrate good problem solving to carry laundry basket up 1 flight of stairs with supervision to improve indep with doing laundry at home. Long term goal 2: Pt will complete BADL routine with Mod I and no cues for safety to improve indep with showering at home. Following session, patient left in safe position with all fall risk precautions in place.

## 2020-03-21 NOTE — PROGRESS NOTES
tolerance of  treatment: good. Equipment Recommendations:Equipment Needed: No  Discharge Recommendations:  Outpatient PT    Plan: Times per week: 5x/wk 90 min, 1x/wk 30 min  Times per day: Daily  Current Treatment Recommendations: Strengthening, Balance Training, Functional Mobility Training, Transfer Training, Endurance Training, Gait Training, Stair training, Neuromuscular Re-education, Home Exercise Program, Safety Education & Training, Patient/Caregiver Education & Training    Patient Education  Patient Education: Home Exercise Program, Transfers, Gait,  - Patient Verbalized Understanding, - Patient Requires Continued Education    Goals:  Patient goals : Get stronger   Short term goals  Time Frame for Short term goals: 1 week  Short term goal 1: pt to perform supine<>sit with S to increase ease of getting in and out of bed  Short term goal 2: pt to perform sit<>stand with CGA and without cues for hand placement to improve safety during transfers at home and in the community   Short term goal 3: pt to ambulate 50 ft with RW and CGA while maintaining proper posture to improve ability to navigate household distances  Short term goal 4: pt to negotiate 4 6\" steps with B HR at CGA to improve ability to enter home  Short term goal 5: pt to perfrom car transfer with CGA and without cues for proper hand placement to improve safety when getting in and out of car.   Long term goals  Time Frame for Long term goals : 3 weeks  Long term goal 1: pt to perform supine<>sit with mod I to increase ease of getting in and out of bed  Long term goal 2: pt to perform sit<>stand with mod I and without cues for hand placement to improve safety during transfers at home and in the community   Long term goal 3: pt to ambulate 50ft with no AD at S and 150 ft with RW and S while maintaining proper posture to improve ability to navigate household and community distances  Long term goal 4: pt to negotiate 12 6\" steps at S using B HR to

## 2020-03-22 LAB
ANION GAP SERPL CALCULATED.3IONS-SCNC: 17 MEQ/L (ref 8–16)
BASOPHILS # BLD: 0.1 %
BASOPHILS ABSOLUTE: 0 THOU/MM3 (ref 0–0.1)
BUN BLDV-MCNC: 24 MG/DL (ref 7–22)
CALCIUM SERPL-MCNC: 9 MG/DL (ref 8.5–10.5)
CHLORIDE BLD-SCNC: 96 MEQ/L (ref 98–111)
CO2: 26 MEQ/L (ref 23–33)
CREAT SERPL-MCNC: 0.6 MG/DL (ref 0.4–1.2)
EOSINOPHIL # BLD: 0 %
EOSINOPHILS ABSOLUTE: 0 THOU/MM3 (ref 0–0.4)
ERYTHROCYTE [DISTWIDTH] IN BLOOD BY AUTOMATED COUNT: 13.1 % (ref 11.5–14.5)
ERYTHROCYTE [DISTWIDTH] IN BLOOD BY AUTOMATED COUNT: 40.2 FL (ref 35–45)
GFR SERPL CREATININE-BSD FRML MDRD: > 90 ML/MIN/1.73M2
GLUCOSE BLD-MCNC: 109 MG/DL (ref 70–108)
GLUCOSE BLD-MCNC: 122 MG/DL (ref 70–108)
GLUCOSE BLD-MCNC: 130 MG/DL (ref 70–108)
GLUCOSE BLD-MCNC: 148 MG/DL (ref 70–108)
GLUCOSE BLD-MCNC: 149 MG/DL (ref 70–108)
HCT VFR BLD CALC: 45.8 % (ref 42–52)
HEMOGLOBIN: 14.8 GM/DL (ref 14–18)
IMMATURE GRANS (ABS): 0.19 THOU/MM3 (ref 0–0.07)
IMMATURE GRANULOCYTES: 1.4 %
LYMPHOCYTES # BLD: 7.6 %
LYMPHOCYTES ABSOLUTE: 1 THOU/MM3 (ref 1–4.8)
MCH RBC QN AUTO: 28 PG (ref 26–33)
MCHC RBC AUTO-ENTMCNC: 32.3 GM/DL (ref 32.2–35.5)
MCV RBC AUTO: 86.6 FL (ref 80–94)
MONOCYTES # BLD: 6.1 %
MONOCYTES ABSOLUTE: 0.8 THOU/MM3 (ref 0.4–1.3)
NUCLEATED RED BLOOD CELLS: 0 /100 WBC
PLATELET # BLD: 273 THOU/MM3 (ref 130–400)
PMV BLD AUTO: 8.6 FL (ref 9.4–12.4)
POTASSIUM SERPL-SCNC: 4.3 MEQ/L (ref 3.5–5.2)
RBC # BLD: 5.29 MILL/MM3 (ref 4.7–6.1)
SEG NEUTROPHILS: 84.8 %
SEGMENTED NEUTROPHILS ABSOLUTE COUNT: 11.6 THOU/MM3 (ref 1.8–7.7)
SODIUM BLD-SCNC: 139 MEQ/L (ref 135–145)
WBC # BLD: 13.7 THOU/MM3 (ref 4.8–10.8)

## 2020-03-22 PROCEDURE — 6370000000 HC RX 637 (ALT 250 FOR IP): Performed by: HOSPITALIST

## 2020-03-22 PROCEDURE — 82948 REAGENT STRIP/BLOOD GLUCOSE: CPT

## 2020-03-22 PROCEDURE — 6360000002 HC RX W HCPCS: Performed by: HOSPITALIST

## 2020-03-22 PROCEDURE — 85025 COMPLETE CBC W/AUTO DIFF WBC: CPT

## 2020-03-22 PROCEDURE — 6360000002 HC RX W HCPCS: Performed by: INTERNAL MEDICINE

## 2020-03-22 PROCEDURE — 94760 N-INVAS EAR/PLS OXIMETRY 1: CPT

## 2020-03-22 PROCEDURE — 6370000000 HC RX 637 (ALT 250 FOR IP): Performed by: PHYSICAL MEDICINE & REHABILITATION

## 2020-03-22 PROCEDURE — 80048 BASIC METABOLIC PNL TOTAL CA: CPT

## 2020-03-22 PROCEDURE — 99232 SBSQ HOSP IP/OBS MODERATE 35: CPT | Performed by: PHYSICAL MEDICINE & REHABILITATION

## 2020-03-22 PROCEDURE — 36415 COLL VENOUS BLD VENIPUNCTURE: CPT

## 2020-03-22 PROCEDURE — 1180000000 HC REHAB R&B

## 2020-03-22 RX ADMIN — INSULIN LISPRO 2 UNITS: 100 INJECTION, SOLUTION INTRAVENOUS; SUBCUTANEOUS at 17:19

## 2020-03-22 RX ADMIN — DEXAMETHASONE 4 MG: 4 TABLET ORAL at 08:39

## 2020-03-22 RX ADMIN — CYCLOBENZAPRINE 5 MG: 10 TABLET, FILM COATED ORAL at 14:25

## 2020-03-22 RX ADMIN — THERA TABS 1 TABLET: TAB at 08:39

## 2020-03-22 RX ADMIN — PANTOPRAZOLE SODIUM 40 MG: 40 TABLET, DELAYED RELEASE ORAL at 17:19

## 2020-03-22 RX ADMIN — CYCLOBENZAPRINE 5 MG: 10 TABLET, FILM COATED ORAL at 08:39

## 2020-03-22 RX ADMIN — PANTOPRAZOLE SODIUM 40 MG: 40 TABLET, DELAYED RELEASE ORAL at 08:39

## 2020-03-22 RX ADMIN — ACETAMINOPHEN 650 MG: 325 TABLET ORAL at 20:22

## 2020-03-22 RX ADMIN — DEXAMETHASONE 4 MG: 4 TABLET ORAL at 20:23

## 2020-03-22 RX ADMIN — Medication 250 MG: at 08:39

## 2020-03-22 RX ADMIN — CYCLOBENZAPRINE 5 MG: 10 TABLET, FILM COATED ORAL at 20:24

## 2020-03-22 ASSESSMENT — PAIN SCALES - GENERAL
PAINLEVEL_OUTOF10: 1
PAINLEVEL_OUTOF10: 5

## 2020-03-22 NOTE — PROGRESS NOTES
Physical Medicine & Rehabilitation   Progress Note    Chief Complaint: Bilateral lower limb weakness and numbness involving the Left lower limb due to a cystic spinal lesion located at the T-8 - T-9 level. Subjective: He reported no Left leg pain at rest but with activity his pain increases to a 4 on a pain scale of 1-10 with 1 being no pain and 10 being intolerable pain. He slept well last evening. His inpatient rehab therapies are going well. He was evaluated while he was resting in his recliner. He had no acute concerns. Rehabilitation:  PT:   Bed Mobility:  Rolling to Left: Stand By Assistance   Supine to Sit: Stand By Assistance  Sit to Supine: Stand By Assistance     Transfers:  Sit to Stand: [address], Minimal Assistance, X 1, cues for hand placement  Stand to Sit: Contact Guard Assistance, Minimal Assistance, cues for hand placement, with verbal cues, to eccentrically descent  Stand Pivot:Contact [employer], with verbal cues, using RW     Ambulation:  [address], with verbal cues   Distance: 120 ft. x1   Surface: Level Tile  Device:Rolling Walker  Gait Deviations: Forward Flexed Posture, Slow Kellen, Decreased Step Length on Right, decreased weight shift to left, Decreased Gait Speed, Decreased Heel Strike Bilaterally, Ataxia, Wide Base of Support, Unsteady Gait and demonstrates decreased Jordan hip and knee flexion and decreased foot clearance on RLE during swing phase. Verbal/tactile cues for staying closer to walker for safety.      Neuro Re-Ed:   Pt. Completed stepping activity tapping feet onto 6\" cones in alternating pattern to promote a more reciprocal gait pattern with increased foot clearance and stability over stance leg. Pt. Limited by fatigue throughout. Pt. Relying heavily on walker at times. Multiple rest breaks required throughout.      O.T:  ADL's:  Feeding: Independent  Grooming: Contact guard assistance  UE Bathing: Stand by assistance  LE

## 2020-03-23 LAB
GLUCOSE BLD-MCNC: 128 MG/DL (ref 70–108)
GLUCOSE BLD-MCNC: 137 MG/DL (ref 70–108)
GLUCOSE BLD-MCNC: 141 MG/DL (ref 70–108)
GLUCOSE BLD-MCNC: 217 MG/DL (ref 70–108)

## 2020-03-23 PROCEDURE — 97116 GAIT TRAINING THERAPY: CPT

## 2020-03-23 PROCEDURE — 97530 THERAPEUTIC ACTIVITIES: CPT

## 2020-03-23 PROCEDURE — 1180000000 HC REHAB R&B

## 2020-03-23 PROCEDURE — 97110 THERAPEUTIC EXERCISES: CPT

## 2020-03-23 PROCEDURE — 6360000002 HC RX W HCPCS: Performed by: INTERNAL MEDICINE

## 2020-03-23 PROCEDURE — 6370000000 HC RX 637 (ALT 250 FOR IP): Performed by: HOSPITALIST

## 2020-03-23 PROCEDURE — 6370000000 HC RX 637 (ALT 250 FOR IP): Performed by: PHYSICAL MEDICINE & REHABILITATION

## 2020-03-23 PROCEDURE — 99232 SBSQ HOSP IP/OBS MODERATE 35: CPT | Performed by: PHYSICAL MEDICINE & REHABILITATION

## 2020-03-23 PROCEDURE — 97112 NEUROMUSCULAR REEDUCATION: CPT

## 2020-03-23 PROCEDURE — 94760 N-INVAS EAR/PLS OXIMETRY 1: CPT

## 2020-03-23 PROCEDURE — 82948 REAGENT STRIP/BLOOD GLUCOSE: CPT

## 2020-03-23 RX ADMIN — THERA TABS 1 TABLET: TAB at 08:36

## 2020-03-23 RX ADMIN — CYCLOBENZAPRINE 5 MG: 10 TABLET, FILM COATED ORAL at 20:30

## 2020-03-23 RX ADMIN — PANTOPRAZOLE SODIUM 40 MG: 40 TABLET, DELAYED RELEASE ORAL at 08:37

## 2020-03-23 RX ADMIN — ACETAMINOPHEN 650 MG: 325 TABLET ORAL at 17:51

## 2020-03-23 RX ADMIN — PANTOPRAZOLE SODIUM 40 MG: 40 TABLET, DELAYED RELEASE ORAL at 17:51

## 2020-03-23 RX ADMIN — DEXAMETHASONE 4 MG: 4 TABLET ORAL at 08:35

## 2020-03-23 RX ADMIN — DEXAMETHASONE 4 MG: 4 TABLET ORAL at 20:31

## 2020-03-23 RX ADMIN — CYCLOBENZAPRINE 5 MG: 10 TABLET, FILM COATED ORAL at 08:35

## 2020-03-23 RX ADMIN — CYCLOBENZAPRINE 5 MG: 10 TABLET, FILM COATED ORAL at 13:11

## 2020-03-23 RX ADMIN — ACETAMINOPHEN 650 MG: 325 TABLET ORAL at 13:10

## 2020-03-23 RX ADMIN — INSULIN LISPRO 4 UNITS: 100 INJECTION, SOLUTION INTRAVENOUS; SUBCUTANEOUS at 13:06

## 2020-03-23 RX ADMIN — ACETAMINOPHEN 650 MG: 325 TABLET ORAL at 04:20

## 2020-03-23 RX ADMIN — Medication 250 MG: at 08:36

## 2020-03-23 ASSESSMENT — PAIN SCALES - GENERAL
PAINLEVEL_OUTOF10: 1
PAINLEVEL_OUTOF10: 1
PAINLEVEL_OUTOF10: 10
PAINLEVEL_OUTOF10: 7
PAINLEVEL_OUTOF10: 2
PAINLEVEL_OUTOF10: 2

## 2020-03-23 ASSESSMENT — PAIN DESCRIPTION - LOCATION: LOCATION: BACK

## 2020-03-23 ASSESSMENT — PAIN DESCRIPTION - FREQUENCY: FREQUENCY: INTERMITTENT

## 2020-03-23 ASSESSMENT — PAIN DESCRIPTION - DESCRIPTORS: DESCRIPTORS: ACHING

## 2020-03-23 ASSESSMENT — PAIN DESCRIPTION - ORIENTATION: ORIENTATION: MID

## 2020-03-23 ASSESSMENT — PAIN DESCRIPTION - PAIN TYPE: TYPE: CHRONIC PAIN
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
## 2020-03-23 NOTE — PROGRESS NOTES
[address] [address]  Recreational Therapy  Daily Note  [address]    Time Spent with Patient: 25 minutes    Date:  3/23/2020       Patient Name: Inés Lindquist      MRN: [id number]      YOB: 1975 (53 y.o.)       Gender: male  Diagnosis: Leg Weakness, bilateral  Referring Practitioner: Dr. Bandar Mccauley     RESTRICTIONS/PRECAUTIONS:  Restrictions/Precautions: Fall Risk, General Precautions  Vision: Within Functional Limits  Hearing: Within functional limits    PAIN: 0-no c/o pain     SUBJECTIVE:  I finished my last 2 this am    OBJECTIVE:  Pt requesting more word search puzzles-states he finished his last 2 this am-affect bright and social-ran off several more pages for him to enjoy-states he played some cards over the weekend and has been staying in touch with his family through telephone-has been listening to his radio and watching tv          Patient Education  New Education Provided: Importance of Leisure,     Electronically signed by: TALIA Leong  Date: 3/23/2020
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
## 2020-03-23 NOTE — PROGRESS NOTES
glucose    Collection Time: 03/22/20  8:14 PM   Result Value Ref Range    POC Glucose 149 (H) 70 - 108 mg/dl   POCT glucose    Collection Time: 03/23/20  7:29 AM   Result Value Ref Range    POC Glucose 137 (H) 70 - 108 mg/dl   POCT glucose    Collection Time: 03/23/20 11:14 AM   Result Value Ref Range    POC Glucose 217 (H) 70 - 108 mg/dl   POCT glucose    Collection Time: 03/23/20  5:01 PM   Result Value Ref Range    POC Glucose 128 (H) 70 - 108 mg/dl       Impression:  Gait dysfunction. Deficits with his A.D.L.'s.  Current history of hyperglycemia due to him being on oral Decadron. History of asthma. History of expressive aphasia and dysarthria. Bilateral lower limb muscle weakness along with numbness involving the Left lower limb. Current history of a T-8 - T-9 cystic spinal lesion along with a syrinx. Current history of epistaxis. Plan:  · He is to continue with his current inpatient rehab. program.  · A Rehab. team conference was held on 03/19/20. His discharge goal is home with his family on 03/27/20. · His blood sugar has remained stable. · He is currently on a 10 day oral Decadron taper. · A BMP and a CBC with diff. were drawn on 03/22/20. All of his labs are stable including his Sodium improving to 139 from 130. Increased his fluid restriction to 1,800 cc a day. · His Lovenox was discontinued due to recent episodes of epistaxis. Greater than 50% of the 25 minutes was spent with the patient on counseling and with respect to coordinating his current inpatient rehab. care. All of his questions were answered.     Missed Therapy Time:  · None    Dianelys Levy MD
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
## 2020-03-24 LAB
GLUCOSE BLD-MCNC: 112 MG/DL (ref 70–108)
GLUCOSE BLD-MCNC: 115 MG/DL (ref 70–108)
GLUCOSE BLD-MCNC: 134 MG/DL (ref 70–108)
GLUCOSE BLD-MCNC: 146 MG/DL (ref 70–108)

## 2020-03-24 PROCEDURE — 97535 SELF CARE MNGMENT TRAINING: CPT

## 2020-03-24 PROCEDURE — 6370000000 HC RX 637 (ALT 250 FOR IP): Performed by: HOSPITALIST

## 2020-03-24 PROCEDURE — 97116 GAIT TRAINING THERAPY: CPT

## 2020-03-24 PROCEDURE — 97530 THERAPEUTIC ACTIVITIES: CPT

## 2020-03-24 PROCEDURE — 6370000000 HC RX 637 (ALT 250 FOR IP): Performed by: PHYSICAL MEDICINE & REHABILITATION

## 2020-03-24 PROCEDURE — 1180000000 HC REHAB R&B

## 2020-03-24 PROCEDURE — 82948 REAGENT STRIP/BLOOD GLUCOSE: CPT

## 2020-03-24 PROCEDURE — 94760 N-INVAS EAR/PLS OXIMETRY 1: CPT

## 2020-03-24 PROCEDURE — 6360000002 HC RX W HCPCS: Performed by: INTERNAL MEDICINE

## 2020-03-24 PROCEDURE — 97110 THERAPEUTIC EXERCISES: CPT

## 2020-03-24 PROCEDURE — 99232 SBSQ HOSP IP/OBS MODERATE 35: CPT | Performed by: PHYSICAL MEDICINE & REHABILITATION

## 2020-03-24 RX ADMIN — THERA TABS 1 TABLET: TAB at 08:14

## 2020-03-24 RX ADMIN — ACETAMINOPHEN 650 MG: 325 TABLET ORAL at 08:14

## 2020-03-24 RX ADMIN — INSULIN LISPRO 2 UNITS: 100 INJECTION, SOLUTION INTRAVENOUS; SUBCUTANEOUS at 17:36

## 2020-03-24 RX ADMIN — DEXAMETHASONE 4 MG: 4 TABLET ORAL at 08:14

## 2020-03-24 RX ADMIN — CYCLOBENZAPRINE 5 MG: 10 TABLET, FILM COATED ORAL at 21:01

## 2020-03-24 RX ADMIN — CYCLOBENZAPRINE 5 MG: 10 TABLET, FILM COATED ORAL at 08:13

## 2020-03-24 RX ADMIN — CYCLOBENZAPRINE 5 MG: 10 TABLET, FILM COATED ORAL at 15:36

## 2020-03-24 RX ADMIN — PANTOPRAZOLE SODIUM 40 MG: 40 TABLET, DELAYED RELEASE ORAL at 08:14

## 2020-03-24 RX ADMIN — PANTOPRAZOLE SODIUM 40 MG: 40 TABLET, DELAYED RELEASE ORAL at 17:37

## 2020-03-24 RX ADMIN — ACETAMINOPHEN 650 MG: 325 TABLET ORAL at 03:50

## 2020-03-24 RX ADMIN — Medication 250 MG: at 08:14

## 2020-03-24 ASSESSMENT — PAIN DESCRIPTION - FREQUENCY: FREQUENCY: CONTINUOUS

## 2020-03-24 ASSESSMENT — PAIN SCALES - GENERAL
PAINLEVEL_OUTOF10: 6
PAINLEVEL_OUTOF10: 10
PAINLEVEL_OUTOF10: 4
PAINLEVEL_OUTOF10: 7

## 2020-03-24 ASSESSMENT — PAIN DESCRIPTION - ORIENTATION: ORIENTATION: RIGHT;LEFT

## 2020-03-24 ASSESSMENT — PAIN DESCRIPTION - PAIN TYPE: TYPE: NEUROPATHIC PAIN;CHRONIC PAIN

## 2020-03-24 ASSESSMENT — PAIN DESCRIPTION - ONSET: ONSET: ON-GOING

## 2020-03-24 ASSESSMENT — PAIN DESCRIPTION - LOCATION: LOCATION: LEG

## 2020-03-24 ASSESSMENT — PAIN DESCRIPTION - DESCRIPTORS: DESCRIPTORS: NUMBNESS;TINGLING

## 2020-03-24 ASSESSMENT — PAIN DESCRIPTION - PROGRESSION: CLINICAL_PROGRESSION: NOT CHANGED

## 2020-03-24 NOTE — PROGRESS NOTES
goals. Activity Tolerance:  Patient tolerance of  treatment: good. Equipment Recommendations:Equipment Needed: No  Discharge Recommendations:    Outpatient PT    Plan: Times per week: 5x/wk 90 min, 1x/wk 30 min  Times per day: Daily  Current Treatment Recommendations: Strengthening, Balance Training, Functional Mobility Training, Transfer Training, Endurance Training, Gait Training, Stair training, Neuromuscular Re-education, Home Exercise Program, Safety Education & Training, Patient/Caregiver Education & Training    Patient Education  Patient Education: Plan of Care, Gait    Goals:  Patient goals : Get stronger   Short term goals  Time Frame for Short term goals: 1 week  Short term goal 1: pt to perform supine<>sit with S to increase ease of getting in and out of bed  Short term goal 2: pt to perform sit<>stand with CGA and without cues for hand placement to improve safety during transfers at home and in the community   Short term goal 3: pt to ambulate 50 ft with RW and CGA while maintaining proper posture to improve ability to navigate household distances  Short term goal 4: pt to negotiate 4 6\" steps with B HR at CGA to improve ability to enter home  Short term goal 5: pt to perfrom car transfer with CGA and without cues for proper hand placement to improve safety when getting in and out of car.   Long term goals  Time Frame for Long term goals : 3 weeks  Long term goal 1: pt to perform supine<>sit with mod I to increase ease of getting in and out of bed  Long term goal 2: pt to perform sit<>stand with mod I and without cues for hand placement to improve safety during transfers at home and in the community   Long term goal 3: pt to ambulate 50ft with no AD at S and 150 ft with RW and S while maintaining proper posture to improve ability to navigate household and community distances  Long term goal 4: pt to negotiate 12 6\" steps at S using B HR to improve ability to enter home  Long term goal 5: pt to perfrom car transfer with mod I and without cues for proper hand placement to improve safety when getting in and out of car    Following session, patient left in safe position with all fall risk precautions in place.

## 2020-03-24 NOTE — PROGRESS NOTES
Physical Medicine & Rehabilitation   Progress Note    Chief Complaint: Bilateral lower limb weakness and numbness involving the Left lower limb due to a cystic spinal lesion located at the T-8 - T-9 level. Subjective: He reported no Left leg pain at rest but with activity his pain increases to a 2 on a pain scale of 1-10 with 1 being no pain and 10 being intolerable pain. He slept well last evening. His inpatient rehab therapies are going well. He was evaluated while he was resting in his recliner. He had no acute concerns. Rehabilitation:  PT:   Bed Mobility:  Rolling to Left: Stand By Assistance   Supine to Sit: Stand By Assistance  Sit to Supine: Stand By Assistance     Transfers:  Sit to Stand: Stand By Assistance  Stand to Sit:Stand By Assistance    Ambulation:  [employer], with verbal cues   Distance: 160 feet X 2. Surface: Level Tile  Device:Rolling Walker  Gait Deviations: Forward Flexed Posture, Slow Kellen, Decreased Step Length on Right, decreased weight shift to left, Decreased Gait Speed, Decreased Heel Strike Bilaterally, Ataxia, Wide Base of Support, Unsteady Gait and demonstrates decreased Jordan hip and knee flexion and decreased foot clearance on RLE during swing phase. Verbal/tactile cues for staying closer to walker for safety.      Neuro Re-Ed:   Pt. Completed stepping activity tapping feet onto 6\" cones in alternating pattern to promote a more reciprocal gait pattern with increased foot clearance and stability over stance leg. Pt. Limited by fatigue throughout. Pt. Relying heavily on walker at times. Multiple rest breaks required throughout. O.T:  ADL's:  Feeding: Independent  Grooming: Stand By Assistance while standing at the sink during oral care.    UE Bathing: Stand by assistance  LE Bathing: Minimal assistance  UE Dressing: Supervision  LE Dressing: Supervision with min vcs for back precautions at times   Toileting: Contact guard assistance  Vision - Basic address strategies with pt in collaboration with other disciplines. Review of Systems:  CONSTITUTIONAL:  negative  EYES:  negative  HEENT:  negative  RESPIRATORY:  negative  CARDIOVASCULAR:  negative  GASTROINTESTINAL:  positive for a recent history of incontinence. GENITOURINARY:  positive for urinary incontinence  SKIN:  negative  HEMATOLOGIC/LYMPHATIC:  negative  MUSCULOSKELETAL:  positive for  myalgias, arthralgias, pain, muscle weakness and bone pain  NEUROLOGICAL:  positive for speech problems, coordination problems, gait problems, weakness, numbness and pain  BEHAVIOR/PSYCH:  negative  10 point system review otherwise negative. Objective:  /79   Pulse 65   Temp 97.9 °F (36.6 °C) (Oral)   Resp 15   Ht 5' 10\" (1.778 m)   Wt 198 lb 9.6 oz (90.1 kg)   SpO2 95%   BMI 28.50 kg/m²   General appearance: Patient is well nourished, well developed, well groomed and in no acute distress, overweight, appearing stated age, normal affect. Memory:   normal,   Attention/Concentration: abnormal - with a mild deficit. Language:  abnormal - with a mild expressive aphasia and dysarthria. Heart: S-1 and S-2 with a regular rate and rhythm. No murmur, gallop, or rub. Lungs: C. T.A bilaterally. No rales, rhonchi, or wheezes. Abdomen: Positive bowel sounds in all 4 quadrants. Soft, non-tender, without masses. Cranial Nerves:  cranial nerves II-XII are grossly intact  ROM:  normal  Motor Exam:  Motor exam is symmetrical 5 out of 5 in both upper and Right lower limbs. Left hip strength was 4/5. Left leg and ankle were W.N.L. Tone:  normal  Muscle bulk: within normal limits  Sensory: Diminished throughout the Left thigh with respect to light touch and to the pinwheel. Coordination:   abnormal - with respect to his mobility. Deep Tendon Reflexes: Bilateral upper limb and bilateral achilles reflexes were 2+. The bilateral patellar reflexes were 3+.   Skin: warm and dry, no rash or erythema  Edema: None in
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
## 2020-03-24 NOTE — PROGRESS NOTES
routine with Mod I and no cues for safety to improve indep with showering at home. Following session, patient left in safe position with all fall risk precautions in place.

## 2020-03-24 NOTE — PROGRESS NOTES
None  · Current Nutrition Therapies:  · Oral Diet Orders: General   · Oral Diet intake: %  · Anthropometric Measures:  · Ht: 5' 10\" (177.8 cm)   · Current Body Wt: 198 lb 9.6 oz (90.1 kg)(3/24; no edema noted)  · Admission Body Wt: 193 lb 12.6 oz (87.9 kg)(3/17  no edema)  · Usual Body Wt: 195 lb (88.5 kg)(per pt report.  Per EMR: 196 lb 1.6 oz on 3/5/20)  · % Weight Change: no weight loss in 3 weeks per EMR  · Ideal Body Wt: 166 lb (75.3 kg)   · BMI Classification: BMI 25.0 - 29.9 Overweight(28.6)    Nutrition Interventions:   Continue current diet  Continued Inpatient Monitoring, Education Initiated, Coordination of Care    Nutrition Evaluation:   · Evaluation: Goals set   · Goals: 75% or more of healthy po intake  during LOS    · Monitoring: Nutrition Progression, Meal Intake, Diet Tolerance, Skin Integrity, Weight, Pertinent Labs, Monitor Bowel Function    Electronically signed by Gwendolyn Moore RD, LD on 3/24/20 at 11:20 AM EDT    Contact Number: [phone]

## 2020-03-25 LAB
ANION GAP SERPL CALCULATED.3IONS-SCNC: 13 MEQ/L (ref 8–16)
BASOPHILS # BLD: 0.2 %
BASOPHILS ABSOLUTE: 0 THOU/MM3 (ref 0–0.1)
BUN BLDV-MCNC: 25 MG/DL (ref 7–22)
CALCIUM SERPL-MCNC: 8.7 MG/DL (ref 8.5–10.5)
CHLORIDE BLD-SCNC: 99 MEQ/L (ref 98–111)
CO2: 26 MEQ/L (ref 23–33)
CREAT SERPL-MCNC: 0.7 MG/DL (ref 0.4–1.2)
EOSINOPHIL # BLD: 0.4 %
EOSINOPHILS ABSOLUTE: 0.1 THOU/MM3 (ref 0–0.4)
ERYTHROCYTE [DISTWIDTH] IN BLOOD BY AUTOMATED COUNT: 13.4 % (ref 11.5–14.5)
ERYTHROCYTE [DISTWIDTH] IN BLOOD BY AUTOMATED COUNT: 42.7 FL (ref 35–45)
GFR SERPL CREATININE-BSD FRML MDRD: > 90 ML/MIN/1.73M2
GLUCOSE BLD-MCNC: 115 MG/DL (ref 70–108)
GLUCOSE BLD-MCNC: 125 MG/DL (ref 70–108)
GLUCOSE BLD-MCNC: 130 MG/DL (ref 70–108)
GLUCOSE BLD-MCNC: 89 MG/DL (ref 70–108)
GLUCOSE BLD-MCNC: 96 MG/DL (ref 70–108)
HCT VFR BLD CALC: 47.2 % (ref 42–52)
HEMOGLOBIN: 15 GM/DL (ref 14–18)
IMMATURE GRANS (ABS): 0.14 THOU/MM3 (ref 0–0.07)
IMMATURE GRANULOCYTES: 1.1 %
LYMPHOCYTES # BLD: 16 %
LYMPHOCYTES ABSOLUTE: 2 THOU/MM3 (ref 1–4.8)
MCH RBC QN AUTO: 28.1 PG (ref 26–33)
MCHC RBC AUTO-ENTMCNC: 31.8 GM/DL (ref 32.2–35.5)
MCV RBC AUTO: 88.6 FL (ref 80–94)
MONOCYTES # BLD: 8.2 %
MONOCYTES ABSOLUTE: 1 THOU/MM3 (ref 0.4–1.3)
NUCLEATED RED BLOOD CELLS: 0 /100 WBC
PLATELET # BLD: 244 THOU/MM3 (ref 130–400)
PMV BLD AUTO: 8.6 FL (ref 9.4–12.4)
POTASSIUM SERPL-SCNC: 4.2 MEQ/L (ref 3.5–5.2)
RBC # BLD: 5.33 MILL/MM3 (ref 4.7–6.1)
SEG NEUTROPHILS: 74.1 %
SEGMENTED NEUTROPHILS ABSOLUTE COUNT: 9.3 THOU/MM3 (ref 1.8–7.7)
SODIUM BLD-SCNC: 138 MEQ/L (ref 135–145)
WBC # BLD: 12.5 THOU/MM3 (ref 4.8–10.8)

## 2020-03-25 PROCEDURE — 82948 REAGENT STRIP/BLOOD GLUCOSE: CPT

## 2020-03-25 PROCEDURE — 6360000002 HC RX W HCPCS: Performed by: INTERNAL MEDICINE

## 2020-03-25 PROCEDURE — 97110 THERAPEUTIC EXERCISES: CPT

## 2020-03-25 PROCEDURE — 97116 GAIT TRAINING THERAPY: CPT

## 2020-03-25 PROCEDURE — 97530 THERAPEUTIC ACTIVITIES: CPT

## 2020-03-25 PROCEDURE — 94760 N-INVAS EAR/PLS OXIMETRY 1: CPT

## 2020-03-25 PROCEDURE — 36415 COLL VENOUS BLD VENIPUNCTURE: CPT

## 2020-03-25 PROCEDURE — 99232 SBSQ HOSP IP/OBS MODERATE 35: CPT | Performed by: PHYSICAL MEDICINE & REHABILITATION

## 2020-03-25 PROCEDURE — 97535 SELF CARE MNGMENT TRAINING: CPT

## 2020-03-25 PROCEDURE — 80048 BASIC METABOLIC PNL TOTAL CA: CPT

## 2020-03-25 PROCEDURE — 6370000000 HC RX 637 (ALT 250 FOR IP): Performed by: HOSPITALIST

## 2020-03-25 PROCEDURE — 6370000000 HC RX 637 (ALT 250 FOR IP): Performed by: PHYSICAL MEDICINE & REHABILITATION

## 2020-03-25 PROCEDURE — 1180000000 HC REHAB R&B

## 2020-03-25 PROCEDURE — 85025 COMPLETE CBC W/AUTO DIFF WBC: CPT

## 2020-03-25 RX ADMIN — CYCLOBENZAPRINE 5 MG: 10 TABLET, FILM COATED ORAL at 14:11

## 2020-03-25 RX ADMIN — ACETAMINOPHEN 650 MG: 325 TABLET ORAL at 23:49

## 2020-03-25 RX ADMIN — THERA TABS 1 TABLET: TAB at 08:18

## 2020-03-25 RX ADMIN — ACETAMINOPHEN 650 MG: 325 TABLET ORAL at 15:42

## 2020-03-25 RX ADMIN — ACETAMINOPHEN 650 MG: 325 TABLET ORAL at 19:58

## 2020-03-25 RX ADMIN — CYCLOBENZAPRINE 5 MG: 10 TABLET, FILM COATED ORAL at 04:23

## 2020-03-25 RX ADMIN — PANTOPRAZOLE SODIUM 40 MG: 40 TABLET, DELAYED RELEASE ORAL at 08:18

## 2020-03-25 RX ADMIN — ACETAMINOPHEN 650 MG: 325 TABLET ORAL at 04:26

## 2020-03-25 RX ADMIN — CYCLOBENZAPRINE 5 MG: 10 TABLET, FILM COATED ORAL at 19:58

## 2020-03-25 RX ADMIN — DEXAMETHASONE 4 MG: 4 TABLET ORAL at 08:18

## 2020-03-25 RX ADMIN — Medication 250 MG: at 08:18

## 2020-03-25 RX ADMIN — PANTOPRAZOLE SODIUM 40 MG: 40 TABLET, DELAYED RELEASE ORAL at 17:31

## 2020-03-25 ASSESSMENT — PAIN DESCRIPTION - PAIN TYPE
TYPE: NEUROPATHIC PAIN;CHRONIC PAIN
TYPE: CHRONIC PAIN
TYPE: CHRONIC PAIN

## 2020-03-25 ASSESSMENT — PAIN DESCRIPTION - ORIENTATION
ORIENTATION: LEFT;RIGHT
ORIENTATION: RIGHT;LEFT
ORIENTATION: RIGHT;LEFT

## 2020-03-25 ASSESSMENT — PAIN SCALES - GENERAL
PAINLEVEL_OUTOF10: 4
PAINLEVEL_OUTOF10: 5
PAINLEVEL_OUTOF10: 3
PAINLEVEL_OUTOF10: 6
PAINLEVEL_OUTOF10: 2

## 2020-03-25 ASSESSMENT — PAIN DESCRIPTION - LOCATION
LOCATION: LEG

## 2020-03-25 ASSESSMENT — PAIN DESCRIPTION - DESCRIPTORS: DESCRIPTORS: NUMBNESS;TINGLING

## 2020-03-25 ASSESSMENT — PAIN DESCRIPTION - FREQUENCY: FREQUENCY: CONTINUOUS

## 2020-03-25 ASSESSMENT — PAIN - FUNCTIONAL ASSESSMENT: PAIN_FUNCTIONAL_ASSESSMENT: ACTIVITIES ARE NOT PREVENTED

## 2020-03-25 NOTE — DISCHARGE INSTR - COC
Continuity of Care Form    Patient Name: Kimberley Trinidad   :  1975  MRN:  [id number]    Admit date:  3/17/2020  Discharge date:  ***    Code Status Order: Full Code   Advance Directives:   Advance Care Flowsheet Documentation     Date/Time Healthcare Directive Type of Healthcare Directive Copy in [address] Agent's Name Healthcare Agent's Phone Number    [phone]  No, patient does not have an advance directive for healthcare treatment -- -- -- -- --          Admitting Physician:  Dee Dee Ruiz MD  PCP: Charly Richardson APRN - CNP    Discharging Nurse: [facility] Unit/Room#: 7E-65/065-A  Discharging Unit Phone Number: ***    Emergency Contact:   Extended Emergency Contact Information  Primary Emergency Contact: Nazanin Mathews [address] Phone: [phone]  Mobile Phone: [phone]  Relation: Parent    Past Surgical History:  History reviewed. No pertinent surgical history. Immunization History: There is no immunization history on file for this patient.     Active Problems:  Patient Active Problem List   Diagnosis Code    Leg weakness, bilateral R29.898    Ataxia R27.0    Syrinx of spinal cord (HCC) G95.0       Isolation/Infection:   Isolation          No Isolation        Patient Infection Status     None to display          Nurse Assessment:  Last Vital Signs: BP 99/70   Pulse 58   Temp 97.3 °F (36.3 °C) (Oral)   Resp 18   Ht 5' 10\" (1.778 m)   Wt 195 lb 8 oz (88.7 kg)   SpO2 97%   BMI 28.05 kg/m²     Last documented pain score (0-10 scale): Pain Level: 2  Last Weight:   Wt Readings from Last 1 Encounters:   20 195 lb 8 oz (88.7 kg)     Mental Status:  {IP PT MENTAL STATUS:}    IV Access:  { MILY IV ACCESS:746579832}    Nursing Mobility/ADLs:  Walking   {CHP DME CBJD:325811819}  Transfer  {CHP DME PYKK:147936065}  Bathing  {CHP DME AOYH:806790838}  Dressing  {CHP DME SULJ:073923083}  Toileting
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
## 2020-03-26 LAB
GLUCOSE BLD-MCNC: 106 MG/DL (ref 70–108)
GLUCOSE BLD-MCNC: 138 MG/DL (ref 70–108)
GLUCOSE BLD-MCNC: 151 MG/DL (ref 70–108)
GLUCOSE BLD-MCNC: 84 MG/DL (ref 70–108)

## 2020-03-26 PROCEDURE — 6370000000 HC RX 637 (ALT 250 FOR IP): Performed by: PHYSICAL MEDICINE & REHABILITATION

## 2020-03-26 PROCEDURE — 97110 THERAPEUTIC EXERCISES: CPT

## 2020-03-26 PROCEDURE — 6370000000 HC RX 637 (ALT 250 FOR IP): Performed by: HOSPITALIST

## 2020-03-26 PROCEDURE — 82948 REAGENT STRIP/BLOOD GLUCOSE: CPT

## 2020-03-26 PROCEDURE — 97530 THERAPEUTIC ACTIVITIES: CPT

## 2020-03-26 PROCEDURE — 97535 SELF CARE MNGMENT TRAINING: CPT

## 2020-03-26 PROCEDURE — 94760 N-INVAS EAR/PLS OXIMETRY 1: CPT

## 2020-03-26 PROCEDURE — 99232 SBSQ HOSP IP/OBS MODERATE 35: CPT | Performed by: PHYSICAL MEDICINE & REHABILITATION

## 2020-03-26 PROCEDURE — 1180000000 HC REHAB R&B

## 2020-03-26 PROCEDURE — 6360000002 HC RX W HCPCS: Performed by: INTERNAL MEDICINE

## 2020-03-26 PROCEDURE — 97116 GAIT TRAINING THERAPY: CPT

## 2020-03-26 RX ORDER — CYCLOBENZAPRINE HCL 5 MG
5 TABLET ORAL 3 TIMES DAILY
Qty: 45 TABLET | Refills: 0 | Status: SHIPPED | OUTPATIENT
Start: 2020-03-26 | End: 2020-04-05

## 2020-03-26 RX ADMIN — CYCLOBENZAPRINE 5 MG: 10 TABLET, FILM COATED ORAL at 13:29

## 2020-03-26 RX ADMIN — PANTOPRAZOLE SODIUM 40 MG: 40 TABLET, DELAYED RELEASE ORAL at 16:49

## 2020-03-26 RX ADMIN — THERA TABS 1 TABLET: TAB at 08:12

## 2020-03-26 RX ADMIN — ACETAMINOPHEN 650 MG: 325 TABLET ORAL at 06:16

## 2020-03-26 RX ADMIN — DEXAMETHASONE 4 MG: 4 TABLET ORAL at 08:12

## 2020-03-26 RX ADMIN — CYCLOBENZAPRINE 5 MG: 10 TABLET, FILM COATED ORAL at 21:08

## 2020-03-26 RX ADMIN — Medication 250 MG: at 08:12

## 2020-03-26 RX ADMIN — PANTOPRAZOLE SODIUM 40 MG: 40 TABLET, DELAYED RELEASE ORAL at 06:20

## 2020-03-26 RX ADMIN — CYCLOBENZAPRINE 5 MG: 10 TABLET, FILM COATED ORAL at 06:17

## 2020-03-26 ASSESSMENT — PAIN SCALES - GENERAL
PAINLEVEL_OUTOF10: 0
PAINLEVEL_OUTOF10: 0
PAINLEVEL_OUTOF10: 1

## 2020-03-26 NOTE — PROGRESS NOTES
stairs. Additional Comments: Pt had to start having help with driving secondary to weakness and numbness in his legs. He still has been independent with self care. No AD prior to admission. Restrictions/Precautions:  Restrictions/Precautions: Fall Risk, General Precautions  Position Activity Restriction  Spinal Precautions: No Bending, No Lifting, No Twisting(for pain mgt )    SUBJECTIVE: Patient in recliner upon arrival, agreed and cooperative for therapy. PAIN: No pain reported. OBJECTIVE:  Bed Mobility:  Rolling to Left: Modified Independent   Rolling to Right: Modified Independent   Supine to Sit: Modified Independent  Sit to Supine: Modified Independent     Transfers:  Sit to Stand: Modified Independent  Stand to Sit:Modified Independent  Stand Pivot:Modified Independent, using RW, cues for posture but safe technique  Car:Supervision, with verbal cues, for safe hand placements    Ambulation:  Stand By Assistance, with verbal cues , with increased time for completion  Distance: 165 ft. X1, 80 ft. X1; 85 ft. x1   Surface: Level Tile and Ramp  Device:Rolling Walker  Gait Deviations: Forward Flexed Posture, Slow Kellen, Decreased Step Length on Right, Decreased Weight Shift Left, Decreased Gait Speed, Decreased Heel Strike Bilaterally, Narrow Base of Support, Mild Path Deviations. Increased verbal cues for anterior translation of pelvis to improve posture. Also verbal cues for staying in closer proximity to walker. Stairs:  Stairs:  6\" steps. X 16 using Bilateral Handrails and Supervision, with cues for safety, with verbal cues . Platform:  6\" platform X 2 using [employer] and Supervision, with cues for safety, with verbal cues . Balance:  Dynamic Standing Balance: Modified Independent, Using RW, patient able to use adaptive reacher to  object from floor, safe and good technique used.     -Patient stood at toilet while urinating and completing clothing management with transfer with S and without cues for proper hand placement to improve safety when getting in and out of car. Long term goals  Time Frame for Long term goals : 3 weeks  Long term goal 1: pt to perform supine<>sit with mod I to increase ease of getting in and out of bed  Long term goal 2: pt to perform sit<>stand with mod I and without cues for hand placement to improve safety during transfers at home and in the community   Long term goal 3: pt to ambulate 50ft with no AD at MOD I and 150 ft with RW and S while maintaining proper posture to improve ability to navigate household and community distances  Long term goal 4: pt to negotiate 12 6\" steps at S using B HR to improve ability to enter home  Long term goal 5: pt to perfrom car transfer with mod I and without cues for proper hand placement to improve safety when getting in and out of car    Following session, patient left in safe position with all fall risk precautions in place.

## 2020-03-26 NOTE — PROGRESS NOTES
independence with toileting routine. Long term goals  Time Frame for Long term goals : 2 weeks   Long term goal 1: Pt demonstrate good problem solving to carry laundry basket up 1 flight of stairs with supervision to improve indep with doing laundry at home. Long term goal 2: Pt will complete BADL routine with Mod I and no cues for safety to improve indep with showering at home. Following session, patient left in safe position with all fall risk precautions in place.
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
## 2020-03-26 NOTE — PROGRESS NOTES
stairs. Additional Comments: Pt had to start having help with driving secondary to weakness and numbness in his legs. He still has been independent with self care. No AD prior to admission. Restrictions/Precautions:  Restrictions/Precautions: Fall Risk, General Precautions  Position Activity Restriction  Spinal Precautions: No Bending, No Lifting, No Twisting(for pain mgt )    SUBJECTIVE: Pt. Seated in BS chair upon arrival and pleasantly agrees to therapy session. PAIN: None indicated    OBJECTIVE:  Transfers:  Sit to Stand: Stand By Assistance  Stand to Sit:Stand By Assistance    Ambulation:  Stand By Assistance  Distance: 150' x 2  Surface: Level Tile  Device:Rolling Walker  Gait Deviations: Forward Flexed Posture, Decreased Step Length on Right, Decreased Weight Shift Left, Decreased Gait Speed and Decreased Heel Strike on Right    Stairs:  Stairs:  6\" steps. X 4 using Bilateral Handrails and Stand By Assistance. Exercise:  Pt. Completed 8' on nu-step L 3 achieving an average of 91 SPM.  Patient was guided in 1 set(s) 10 reps of exercise to both lower extremities. Glut sets, Long arc quads, Hip abduction/adduction, Seated hip flexion, Seated hamstring curls, Seated heel/toe raises, Seated isometric hip adduction and Step ups. Exercises were completed for increased independence with functional mobility. Functional Outcome Measures: Not completed       ASSESSMENT:  Assessment: Patient progressing toward established goals. Activity Tolerance:  Patient tolerance of  treatment: good.       Equipment Recommendations:Equipment Needed: No  Discharge Recommendations:  Outpatient PT    Plan: Times per week: 5x/wk 90 min, 1x/wk 30 min  Times per day: Daily  Current Treatment Recommendations: Strengthening, Balance Training, Functional Mobility Training, Transfer Training, Endurance Training, Gait Training, Stair training, Neuromuscular Re-education, Home Exercise Program, Safety Education & Training,

## 2020-03-26 NOTE — PROGRESS NOTES
increased foot clearance and stability over stance leg. Pt. Limited by fatigue throughout. Pt. Relying heavily on walker at times. Multiple rest breaks required throughout. Balance:  Dynamic Standing Balance: Contact Guard Assistance, patient stood on blue balance pad while raising/lowering ball over head and then side-to-side. No LOB but Jordan LE's starting to weaken and increased tremors after standing for longer period. O.T:  ADL's:  Feeding: Independent  Grooming: Supervision  UE Bathing: Stand by assistance  LE Bathing: Minimal assistance  UE Dressing: Supervision  LE Dressing: Supervision with min vcs for back precautions at times   Toileting: Stand by assistance  Vision - Basic Assessment  Prior Vision: No visual deficits  Tub Transfer: Contact Guard Assistance. Verbal cues for safe technqiue. Toilet Transfer: Stand By Assistance. Functional Mobility:  Bed mobility  Supine to Sit: Stand by assistance     Functional Mobility  Functional - Mobility Device: Rolling Walker  Activity: (to and from shower )  Assist Level: Contact guard assistance  Functional Mobility Comments: Cues provided for keeping his back straight and to be as tall as possible. Pt favors his RLE.      Balance:  Sitting Balance: Independent. In recliner, w/c, and kitchen chair without arms. Standing Balance: Supervision. >10 minutes for IADL task.        Transfers:  Sit to stand: Supervision. Stand to sit: Supervision. ADDITIONAL ACTIVITIES:  Review of Systems:  CONSTITUTIONAL:  negative  EYES:  negative  HEENT:  negative  RESPIRATORY:  negative  CARDIOVASCULAR:  negative  GASTROINTESTINAL:  positive for a recent history of incontinence.   GENITOURINARY:  positive for urinary incontinence  SKIN:  negative  HEMATOLOGIC/LYMPHATIC:  negative  MUSCULOSKELETAL:  positive for  myalgias, arthralgias, pain, muscle weakness and bone pain  NEUROLOGICAL:  positive for speech problems, coordination problems, gait problems, weakness, 106 70 - 108 mg/dl       Impression:  Gait dysfunction. Deficits with his A.D.L.'s.  Current history of hyperglycemia due to him being on oral Decadron. History of asthma. History of expressive aphasia and dysarthria. Bilateral lower limb muscle weakness along with numbness involving the Left lower limb. Current history of a T-8 - T-9 cystic spinal lesion along with a syrinx. Current history of epistaxis. Plan:  · He is to continue with his current inpatient rehab. program.  · A Rehab. team conference was held on 03/26/20. His discharge goal remains home with his family on 03/27/20. · His blood sugar has remained stable. · He is currently on a 10 day oral Decadron taper. This taper will be completed as of 03/27/20. · His Lovenox was discontinued due to recent episodes of epistaxis. · A BMP and a CBC with diff. were drawn on 03/25/20. All of his labs remain stable. He is to remain on his current 1,800 cc/day fluid restriction. · His discharge medication prescription for Flexaril 5 mg was completed and sent to the [facility] outpatient pharmacy. · He is to receive outpatient P.T. at the [facility] in [address] [place] [address]. Greater than 50% of the 25 minutes was spent with the patient on counseling and with respect to coordinating his current inpatient rehab. care. All of his questions were answered.     Missed Therapy Time:  · None    Denisse Hartman MD

## 2020-03-27 VITALS
WEIGHT: 195.5 LBS | BODY MASS INDEX: 27.99 KG/M2 | HEART RATE: 60 BPM | OXYGEN SATURATION: 97 % | DIASTOLIC BLOOD PRESSURE: 72 MMHG | RESPIRATION RATE: 16 BRPM | TEMPERATURE: 96.5 F | HEIGHT: 70 IN | SYSTOLIC BLOOD PRESSURE: 123 MMHG

## 2020-03-27 LAB
GLUCOSE BLD-MCNC: 109 MG/DL (ref 70–108)
GLUCOSE BLD-MCNC: 97 MG/DL (ref 70–108)

## 2020-03-27 PROCEDURE — 6370000000 HC RX 637 (ALT 250 FOR IP): Performed by: PHYSICAL MEDICINE & REHABILITATION

## 2020-03-27 PROCEDURE — 97535 SELF CARE MNGMENT TRAINING: CPT

## 2020-03-27 PROCEDURE — 82948 REAGENT STRIP/BLOOD GLUCOSE: CPT

## 2020-03-27 PROCEDURE — 97530 THERAPEUTIC ACTIVITIES: CPT

## 2020-03-27 PROCEDURE — 97116 GAIT TRAINING THERAPY: CPT

## 2020-03-27 PROCEDURE — 99239 HOSP IP/OBS DSCHRG MGMT >30: CPT | Performed by: PHYSICAL MEDICINE & REHABILITATION

## 2020-03-27 PROCEDURE — 6360000002 HC RX W HCPCS: Performed by: INTERNAL MEDICINE

## 2020-03-27 PROCEDURE — 6370000000 HC RX 637 (ALT 250 FOR IP): Performed by: HOSPITALIST

## 2020-03-27 PROCEDURE — 94760 N-INVAS EAR/PLS OXIMETRY 1: CPT

## 2020-03-27 RX ADMIN — ACETAMINOPHEN 650 MG: 325 TABLET ORAL at 00:11

## 2020-03-27 RX ADMIN — PANTOPRAZOLE SODIUM 40 MG: 40 TABLET, DELAYED RELEASE ORAL at 08:10

## 2020-03-27 RX ADMIN — DEXAMETHASONE 4 MG: 4 TABLET ORAL at 08:10

## 2020-03-27 RX ADMIN — THERA TABS 1 TABLET: TAB at 08:10

## 2020-03-27 RX ADMIN — CYCLOBENZAPRINE 5 MG: 10 TABLET, FILM COATED ORAL at 08:10

## 2020-03-27 RX ADMIN — Medication 250 MG: at 08:10

## 2020-03-27 ASSESSMENT — PAIN DESCRIPTION - LOCATION: LOCATION: BACK

## 2020-03-27 ASSESSMENT — PAIN DESCRIPTION - ONSET: ONSET: ON-GOING

## 2020-03-27 ASSESSMENT — PAIN DESCRIPTION - DESCRIPTORS: DESCRIPTORS: ACHING

## 2020-03-27 ASSESSMENT — PAIN DESCRIPTION - PROGRESSION: CLINICAL_PROGRESSION: NOT CHANGED

## 2020-03-27 ASSESSMENT — PAIN SCALES - GENERAL
PAINLEVEL_OUTOF10: 6
PAINLEVEL_OUTOF10: 0

## 2020-03-27 ASSESSMENT — PAIN DESCRIPTION - FREQUENCY: FREQUENCY: INTERMITTENT

## 2020-03-27 ASSESSMENT — PAIN - FUNCTIONAL ASSESSMENT: PAIN_FUNCTIONAL_ASSESSMENT: ACTIVITIES ARE NOT PREVENTED

## 2020-03-27 ASSESSMENT — PAIN DESCRIPTION - ORIENTATION: ORIENTATION: LOWER;MID

## 2020-03-27 ASSESSMENT — PAIN DESCRIPTION - PAIN TYPE: TYPE: ACUTE PAIN

## 2020-03-27 NOTE — PLAN OF CARE
I have reviewed the patient's plan of care and based on this review, the patient treatment plan has been updated. Problem: Pain:  Goal: Pain level will decrease  Description: Pain level will decrease  Outcome: Ongoing  Note: Patient satisfied with pain control. with pain control     Problem: Falls - Risk of:  Goal: Will remain free from falls  Description: Will remain free from falls  Outcome: Ongoing  Goal: Absence of physical injury  Description: Absence of physical injury  Outcome: Ongoing  Note: Patient verbalizes understanding of fall precautions, uses call light appropriately. Problem: Bleeding:  Goal: Will show no signs and symptoms of excessive bleeding  Description: Will show no signs and symptoms of excessive bleeding  Outcome: Ongoing  Note: No signs or symptoms of excessive bleeding noted. Problem: Discharge Planning:  Goal: Patients continuum of care needs are met  Description: Patients continuum of care needs are met  Outcome: Ongoing  Note: Working toward goal of discharge to home. Problem: Nutrition  Goal: Optimal nutrition therapy  Outcome: Ongoing  Note: Eating % of most meals.
Problem: Discharge Planning:  Goal: Patients continuum of care needs are met  Description: Patients continuum of care needs are met   3/27/2020 0940 by BRANDEN Boswell  Note:   3/27/20, 9:40 AM EDT    Patient goals/plan/ treatment preferences discussed by BRANDEN Boswell; / . Patient goals/plan/ treatment preferences reviewed with patient/ family. Patient reports wanting to speak with finance department regarding his bill and payment arrangement. SW connected with Mary Bautista from [facility] who states that she will contact patient via phone or face to face before he leaves the hospital. This information was shared with patient; who verbalized understanding. Patient/ family verbalize understanding of discharge plan and are in agreement with goal/plan/treatment preferences. Understanding was demonstrated using the teach back method. AVS provided by RN at time of discharge, which includes all necessary medical information pertaining to the patients current course of illness, treatment, post-discharge goals of care, and treatment preferences.
Problem: Pain:  Description: Pain management should include both nonpharmacologic and pharmacologic interventions. Goal: Pain level will decrease  Description: Pain level will decrease  Outcome: Ongoing  Note: Scheduled 5mg flexeril administered per MAR. Tylenol q 4 hours PRN     Problem: Falls - Risk of:  Goal: Will remain free from falls  Description: Will remain free from falls  Outcome: Ongoing  Note: No falls this shift. Falling star program in place. Bed and chair alarms used at all times. Non skid slippers/shoes on while out of bed/transferring. Ambulates via 1 assist, gait belt, and walker. Uses call light appropriately. Problem: Bleeding:  Goal: Will show no signs and symptoms of excessive bleeding  Description: Will show no signs and symptoms of excessive bleeding  Outcome: Ongoing  Note: Lovenox administered per MAR. No signs of bleeding. Care plan reviewed with patient. Patient  verbalize understanding of the plan of care and contribute to goal setting.
Problem: Pain:  Description: Pain management should include both nonpharmacologic and pharmacologic interventions. Goal: Pain level will decrease  Description: Pain level will decrease  Outcome: Ongoing  Note: Tylenol q 4 hours PRN     Problem: Falls - Risk of:  Goal: Will remain free from falls  Description: Will remain free from falls  Outcome: Ongoing  Note: No falls this shift. Falling star program in place. Non skid slippers/shoes on while ambulating/transferring. 1 assist gaitbelt/walker. Bed and chair alarms used at all times. Problem: Bleeding:  Goal: Will show no signs and symptoms of excessive bleeding  Description: Will show no signs and symptoms of excessive bleeding  Outcome: Ongoing  Note: No signs of bleeding noted. Patient takes Lovenox daily per [facility]    Care plan reviewed with patient. Patient  verbalize understanding of the plan of care and contribute to goal setting.
expects to be discharged to[de-identified] Home with Mother and Sister  Expected Discharge Date: (undetermined)      Lico Fung 3/18/2020 12:18 PM

## 2020-03-27 NOTE — PROGRESS NOTES
minutes during 1 and 2 UE release  with S to improve dynamic standing balance for sinkside ADLs MET  Short term goal 4: Pt will complete transfers to/from various sufaces with S with 0 cues for hand placement to increase his independence with toileting routine. MET  Long term goals  Time Frame for Long term goals : 2 weeks   Long term goal 1: Pt demonstrate good problem solving to carry laundry basket up 1 flight of stairs with supervision to improve indep with doing laundry at home. NOT MET   Long term goal 2: Pt will complete BADL routine with Mod I and no cues for safety to improve indep with showering at home. MET    Following session, patient left in safe position with all fall risk precautions in place.

## 2020-03-31 ENCOUNTER — HOSPITAL ENCOUNTER (OUTPATIENT)
Dept: PHYSICAL THERAPY | Age: 45
Setting detail: THERAPIES SERIES
Discharge: HOME | End: 2020-03-31
Payer: COMMERCIAL

## 2020-03-31 PROCEDURE — 97161 PT EVAL LOW COMPLEX 20 MIN: CPT
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
## 2020-04-07 ENCOUNTER — HOSPITAL ENCOUNTER (OUTPATIENT)
Dept: PHYSICAL THERAPY | Age: 45
Setting detail: THERAPIES SERIES
Discharge: HOME | End: 2020-04-07
Payer: COMMERCIAL

## 2020-04-07 PROCEDURE — 97116 GAIT TRAINING THERAPY: CPT

## 2020-04-07 PROCEDURE — 97110 THERAPEUTIC EXERCISES: CPT

## 2020-04-07 NOTE — PROGRESS NOTES
Exercise 4: Supine hip flexors,  piriformis leg crossed 10 sec x2 reps each. As tolerated add gastroc and hamstring stretching. Exercise 5: leg raises x10 each, Bridge x20 reps, As tolerated add clamshells, reverse clam  Exercise 6: next time add: scapular/thoracic strengthening, thoracic extension with ball, pec stretch in corner,   Exercise 7: Total gym squat Level J x20, heel raise  x10  Exercise 8: NK table knee flex 7.5#, knee ext 5# x20 reps each         Activity Tolerance:  Activity Tolerance: Patient Tolerated treatment well    Assessment: Body structures, Functions, Activity limitations: Decreased functional mobility , Decreased ADL status, Decreased ROM, Decreased strength, Decreased balance, Increased pain, Decreased sensation, Decreased high-level IADLs, Decreased posture  Assessment: occasional complaint of back pain when laying on total gym or plinth, good follow up on forward trunk lean with sit <> stand transfer. Prognosis: Excellent       Patient Education:  Patient Education: Updated HEP handout given, sit <> stand transfers, SLR, piriformis and hip flexor stretching supine. Plan:  Times per week: 2  Plan weeks: 12  Specific instructions for Next Treatment: NuStep warm up, scapular/thoracic strengthening, thoracic extension with ball, pec stretch in corner, standing balance activities, gait training, sit <> stand transfers, and core strengthening on plinth (leg raises, clamshells, reverse clam, bridges) Hip and leg flexibiltiy (hamstring, hip flexors, gastroc, piriformis).  Monitor lower lumbar pain, endplate fx L5 avoid aggressive trunk extension/flexion  Current Treatment Recommendations: Strengthening, ROM, Balance Training, Functional Mobility Training, Manual Therapy - Soft Tissue Mobilization, Manual Therapy - Joint Manipulation, Gait Training, Stair training, Home Exercise Program, Modalities, Aquatics    Goals:       Short term goals  Time Frame for Short term goals: 6 weeks  Short term goal 1: Patient will report less than 2/10 back pain during therapy activities including core strengthening on mat or standing program in order to return to work activity. Short term goal 2: Patient  will increase BLE flexibility to 0-90 deg hamstring in order to bend forward without straining back and to perform ADLs. Short term goal 3: Patient will demonstrate good scapular retraction posture during exercises in order to stabilize thoracic regions. Short term goal 4: Patient will be IND with HEP in order to meet long term goals. Long term goals  Time Frame for Long term goals : 12 weeks  Long term goal 1: Patient will improve score of Tinetti assessment from baseline 17/28 to at least 24/28 to reduce risk of falling from HIGH risk to LOW. Long term goal 2: Patient will tolerate 30 sec sit <> stand test scoring at least 12 reps in order to safely rise from chair without risk of falling. Long term goal 3: Patient will ambulate with cane vs least restrictive device x500 ft in order to return to working full time at [employer].     Pablito Mata, PT

## 2020-04-10 ENCOUNTER — HOSPITAL ENCOUNTER (OUTPATIENT)
Dept: PHYSICAL THERAPY | Age: 45
Setting detail: THERAPIES SERIES
Discharge: HOME | End: 2020-04-10
Payer: COMMERCIAL

## 2020-04-10 PROCEDURE — 97110 THERAPEUTIC EXERCISES: CPT

## 2020-04-10 PROCEDURE — 97112 NEUROMUSCULAR REEDUCATION: CPT

## 2020-04-10 PROCEDURE — 97530 THERAPEUTIC ACTIVITIES: CPT

## 2020-04-10 NOTE — PROGRESS NOTES
hamstring in order to bend forward without straining back and to perform ADLs. Short term goal 3: Patient will demonstrate good scapular retraction posture during exercises in order to stabilize thoracic regions. Short term goal 4: Patient will be IND with HEP in order to meet long term goals. Long term goals  Time Frame for Long term goals : 12 weeks  Long term goal 1: Patient will improve score of Tinetti assessment from baseline 17/28 to at least 24/28 to reduce risk of falling from HIGH risk to LOW. Long term goal 2: Patient will tolerate 30 sec sit <> stand test scoring at least 12 reps in order to safely rise from chair without risk of falling. Long term goal 3: Patient will ambulate with cane vs least restrictive device x500 ft in order to return to working full time at [employer].     Kellie Garcia, PT

## 2020-04-14 ENCOUNTER — HOSPITAL ENCOUNTER (OUTPATIENT)
Dept: PHYSICAL THERAPY | Age: 45
Setting detail: THERAPIES SERIES
Discharge: HOME | End: 2020-04-14
Payer: COMMERCIAL

## 2020-04-14 PROCEDURE — 97530 THERAPEUTIC ACTIVITIES: CPT

## 2020-04-14 PROCEDURE — 97110 THERAPEUTIC EXERCISES: CPT

## 2020-04-14 PROCEDURE — 97112 NEUROMUSCULAR REEDUCATION: CPT

## 2020-04-14 NOTE — PROGRESS NOTES
Raji Barerra 58  OUTPATIENT PHYSICAL THERAPY  DAILY NOTE  [facility]     Time In: 1118  Time Out: 1200  Minutes: 42  Timed Code Treatment Minutes: 42 Minutes                Date: 2020  Patient Name: Christie Salguero,  Gender:  male        CSN: [id number]   : 1975  (53 y.o.)  Referral Date : 20    Referring Practitioner: Isael May to follow      Diagnosis: S24.103A (ICD-10-CM) - Spinal cord injury at T9 level, initial encounter ([facility] [address] G95.0 (ICD-10-CM) - Syrinx of spinal cord ([facility] [address] R29.898 (ICD-10-CM) - Leg weakness, bilateral, R27.0 (ICD-10-CM) - Ataxia   Treatment Diagnosis: Muscular weakness, B hip stiffness, unsteady, fall risk                      General:  PT Visit Information  Onset Date: 20  PT Insurance Information: BCBS - 20% copay, 20 visits per nerissa yr, aquatic and modalities covered no massage, precert after Eval through [employer]. Received auth for 7 visits through 20  Total # of Visits Approved: 8  Total # of Visits to Date: 4  Plan of Care/Certification Expiration Date: 20               Subjective:  Comments: 2 month follow up with Dr. David Carrillo to review need for neurosurgery     Subjective: Doing okay; transfers are slowly improving, especially with car transfers      Pain:  Patient Currently in Pain: No         Objective             Exercises  Exercise 1: NuStep warm up - Noted clonus, cues for  slower  speed, Seat 8   Exercise 2: repeated sit <> stand from elevated plinth x10 reps, and regular chair height x5 reps; added sit to stand onto airex: 10x   Exercise 3: gait training with cane 2x 100 ft - cane held in RUE, noted R ankle  internal rotation and excessively large left step length.   Exercise 5: leg raises x10 each, Bridge x20 reps; hip abd with green band 15x; ball squeezes: 15x; bridge with ball squeeze and bridge with hip abd into green band: 10x each   Exercise 9: UE: chest pull and bilateral ER: orange band 10

## 2020-04-17 ENCOUNTER — HOSPITAL ENCOUNTER (OUTPATIENT)
Dept: PHYSICAL THERAPY | Age: 45
Setting detail: THERAPIES SERIES
Discharge: HOME | End: 2020-04-17
Payer: COMMERCIAL

## 2020-04-17 ENCOUNTER — TELEPHONE (OUTPATIENT)
Dept: NEUROSURGERY | Age: 45
End: 2020-04-17

## 2020-04-17 PROCEDURE — 97530 THERAPEUTIC ACTIVITIES: CPT

## 2020-04-17 PROCEDURE — 97110 THERAPEUTIC EXERCISES: CPT

## 2020-04-17 PROCEDURE — 97112 NEUROMUSCULAR REEDUCATION: CPT

## 2020-04-17 NOTE — PROGRESS NOTES
10x; marches: 10x  Exercise 13: green hurdles: forward and lateral step over 4x while walking on blue foam mat  Exercise 14: Forward step ups: 8 inch 10x bilaterally         Activity Tolerance:  Activity Tolerance: Patient Tolerated treatment well  Activity Tolerance: liked adding the blue foam mat for added balance challenge     Assessment: Body structures, Functions, Activity limitations: Decreased functional mobility , Decreased ADL status, Decreased ROM, Decreased strength, Decreased balance, Increased pain, Decreased sensation, Decreased high-level IADLs, Decreased posture  Assessment: Progressed dynamic balance and gait activities by incorporating blue foam mat for all // bar walking activities. Heavily dependent upon vision when walking on compliant surfaces; when cued to look up he would often mis-step. Prognosis: Excellent       Patient Education:  PT Education: Plan of Care, PT Role                      Plan:  Times per week: 2  Plan weeks: 12  Specific instructions for Next Treatment: NuStep warm up, scapular/thoracic strengthening, thoracic extension with ball, pec stretch in corner, standing balance activities, gait training, sit <> stand transfers, and core strengthening on plinth (leg raises, clamshells, reverse clam, bridges) Hip and leg flexibiltiy (hamstring, hip flexors, gastroc, piriformis). Monitor lower lumbar pain, endplate fx L5 avoid aggressive trunk extension/flexion  Current Treatment Recommendations: Strengthening, ROM, Balance Training, Functional Mobility Training, Manual Therapy - Soft Tissue Mobilization, Manual Therapy - Joint Manipulation, Gait Training, Stair training, Home Exercise Program, Modalities, Aquatics    Goals:       Short term goals  Time Frame for Short term goals: 6 weeks  Short term goal 1: Patient will report less than 2/10 back pain during therapy activities including core strengthening on mat or standing program in order to return to work activity.   Short term goal 2: Patient  will increase BLE flexibility to 0-90 deg hamstring in order to bend forward without straining back and to perform ADLs. Short term goal 3: Patient will demonstrate good scapular retraction posture during exercises in order to stabilize thoracic regions. Short term goal 4: Patient will be IND with HEP in order to meet long term goals. Long term goals  Time Frame for Long term goals : 12 weeks  Long term goal 1: Patient will improve score of Tinetti assessment from baseline 17/28 to at least 24/28 to reduce risk of falling from HIGH risk to LOW. Long term goal 2: Patient will tolerate 30 sec sit <> stand test scoring at least 12 reps in order to safely rise from chair without risk of falling. Long term goal 3: Patient will ambulate with cane vs least restrictive device x500 ft in order to return to working full time at [employer].     Shelby Osborn, PT

## 2020-04-17 NOTE — TELEPHONE ENCOUNTER
Called [employer] spoke to Marilu Acosta. CPT: 16708 MRI thoracic spine w wo contrast is approved. Approval # [id number] valid 4/17/2020-7/15/2020.

## 2020-04-21 ENCOUNTER — HOSPITAL ENCOUNTER (OUTPATIENT)
Dept: PHYSICAL THERAPY | Age: 45
Setting detail: THERAPIES SERIES
Discharge: HOME | End: 2020-04-21
Payer: COMMERCIAL

## 2020-04-21 PROCEDURE — 97110 THERAPEUTIC EXERCISES: CPT

## 2020-04-21 PROCEDURE — 97112 NEUROMUSCULAR REEDUCATION: CPT

## 2020-04-21 PROCEDURE — 97530 THERAPEUTIC ACTIVITIES: CPT

## 2020-04-21 NOTE — PROGRESS NOTES
marches: 10x  Exercise 13: green hurdles: forward and lateral step over 4x   Exercise 14: Forward step ups: 8 inch 10x bilaterally; side step up 8 inch step: 10x bilaterally          Activity Tolerance:  Activity Tolerance: Patient Tolerated treatment well  Activity Tolerance: added hurdles to clinic walk    Assessment: Body structures, Functions, Activity limitations: Decreased functional mobility , Decreased ADL status, Decreased ROM, Decreased strength, Decreased balance, Increased pain, Decreased sensation, Decreased high-level IADLs, Decreased posture  Assessment: Greater struggle with walking around the clinic while stepping over hurdles; required CGA and he hesitated as he stepped over each ishmael. Prognosis: Excellent       Patient Education:  PT Education: Plan of Care, PT Role                      Plan:  Times per week: 2  Plan weeks: 12  Specific instructions for Next Treatment: NuStep warm up, scapular/thoracic strengthening, thoracic extension with ball, pec stretch in corner, standing balance activities, gait training, sit <> stand transfers, and core strengthening on plinth (leg raises, clamshells, reverse clam, bridges) Hip and leg flexibiltiy (hamstring, hip flexors, gastroc, piriformis). Monitor lower lumbar pain, endplate fx L5 avoid aggressive trunk extension/flexion  Current Treatment Recommendations: Strengthening, ROM, Balance Training, Functional Mobility Training, Manual Therapy - Soft Tissue Mobilization, Manual Therapy - Joint Manipulation, Gait Training, Stair training, Home Exercise Program, Modalities, Aquatics    Goals:       Short term goals  Time Frame for Short term goals: 6 weeks  Short term goal 1: Patient will report less than 2/10 back pain during therapy activities including core strengthening on mat or standing program in order to return to work activity.   Short term goal 2: Patient  will increase BLE flexibility to 0-90 deg hamstring in order to bend forward without

## 2020-04-22 ENCOUNTER — HOSPITAL ENCOUNTER (OUTPATIENT)
Dept: MRI IMAGING | Age: 45
Discharge: HOME | End: 2020-04-22
Payer: COMMERCIAL

## 2020-04-22 PROCEDURE — 6360000004 HC RX CONTRAST MEDICATION: Performed by: NEUROLOGICAL SURGERY

## 2020-04-22 PROCEDURE — 72157 MRI CHEST SPINE W/O & W/DYE: CPT

## 2020-04-22 PROCEDURE — A9579 GAD-BASE MR CONTRAST NOS,1ML: HCPCS | Performed by: NEUROLOGICAL SURGERY

## 2020-04-22 RX ADMIN — GADOTERIDOL 15 ML: 279.3 INJECTION, SOLUTION INTRAVENOUS at 10:00

## 2020-04-24 ENCOUNTER — HOSPITAL ENCOUNTER (OUTPATIENT)
Dept: PHYSICAL THERAPY | Age: 45
Setting detail: THERAPIES SERIES
Discharge: HOME | End: 2020-04-24
Payer: COMMERCIAL

## 2020-04-24 PROCEDURE — 97112 NEUROMUSCULAR REEDUCATION: CPT

## 2020-04-24 PROCEDURE — 97530 THERAPEUTIC ACTIVITIES: CPT

## 2020-04-24 PROCEDURE — 97110 THERAPEUTIC EXERCISES: CPT

## 2020-04-24 NOTE — PROGRESS NOTES
with emphasis on correcting sway at ankles; standing head turns in horizontal and vertical plane 5x each         Activity Tolerance:  Activity Tolerance: Patient Tolerated treatment well  Activity Tolerance: incorporated head turns while on blue mat for greater balance challenge     Assessment: Body structures, Functions, Activity limitations: Decreased functional mobility , Decreased ADL status, Decreased ROM, Decreased strength, Decreased balance, Increased pain, Decreased sensation, Decreased high-level IADLs, Decreased posture  Assessment: Struggled with balance more today; left LE was more shaky and difficult to control. Incorporated head turns with standing balance exercises today. Prognosis: Excellent       Patient Education:  PT Education: Plan of Care, PT Role                      Plan:  Times per week: 2  Plan weeks: 12  Specific instructions for Next Treatment: NuStep warm up, scapular/thoracic strengthening, thoracic extension with ball, pec stretch in corner, standing balance activities, gait training, sit <> stand transfers, and core strengthening on plinth (leg raises, clamshells, reverse clam, bridges) Hip and leg flexibiltiy (hamstring, hip flexors, gastroc, piriformis). Monitor lower lumbar pain, endplate fx L5 avoid aggressive trunk extension/flexion  Current Treatment Recommendations: Strengthening, ROM, Balance Training, Functional Mobility Training, Manual Therapy - Soft Tissue Mobilization, Manual Therapy - Joint Manipulation, Gait Training, Stair training, Home Exercise Program, Modalities, Aquatics    Goals:       Short term goals  Time Frame for Short term goals: 6 weeks  Short term goal 1: Patient will report less than 2/10 back pain during therapy activities including core strengthening on mat or standing program in order to return to work activity.   Short term goal 2: Patient  will increase BLE flexibility to 0-90 deg hamstring in order to bend forward without straining back and to perform ADLs. Short term goal 3: Patient will demonstrate good scapular retraction posture during exercises in order to stabilize thoracic regions. Short term goal 4: Patient will be IND with HEP in order to meet long term goals. Long term goals  Time Frame for Long term goals : 12 weeks  Long term goal 1: Patient will improve score of Tinetti assessment from baseline 17/28 to at least 24/28 to reduce risk of falling from HIGH risk to LOW. Long term goal 2: Patient will tolerate 30 sec sit <> stand test scoring at least 12 reps in order to safely rise from chair without risk of falling. Long term goal 3: Patient will ambulate with cane vs least restrictive device x500 ft in order to return to working full time at [employer].     Lindsey Barrientos, PT

## 2020-04-28 ENCOUNTER — HOSPITAL ENCOUNTER (OUTPATIENT)
Dept: PHYSICAL THERAPY | Age: 45
Setting detail: THERAPIES SERIES
Discharge: HOME | End: 2020-04-28
Payer: COMMERCIAL

## 2020-04-28 PROCEDURE — 97112 NEUROMUSCULAR REEDUCATION: CPT

## 2020-04-28 PROCEDURE — 97530 THERAPEUTIC ACTIVITIES: CPT

## 2020-04-28 NOTE — FLOWSHEET NOTE
Raji Barrera 58  OUTPATIENT PHYSICAL THERAPY  PROGRESS NOTE  Latoya Eden     Time In: 0  Time Out: 9093  Minutes: 35  Timed Code Treatment Minutes: 35 Minutes                Date: 2020  Patient Name: Melody Rodriguez,  Gender:  male        CSN: [id number]   : 1975  (53 y.o.)  Referral Date : 20    Referring Practitioner: Merna Boykin to follow      Diagnosis: S24.103A (ICD-10-CM) - Spinal cord injury at T9 level, initial encounter ([facility] [address] G95.0 (ICD-10-CM) - Syrinx of spinal cord ([facility] [address] R29.898 (ICD-10-CM) - Leg weakness, bilateral, R27.0 (ICD-10-CM) - Ataxia   Treatment Diagnosis: Muscular weakness, B hip stiffness, unsteady, fall risk                      General:  PT Visit Information  Onset Date: 20  PT Insurance Information: BCBS - 20% copay, 20 visits per nerissa yr, aquatic and modalities covered no massage, precert after Eval through [facility]. Received auth for 7 visits through 20  Total # of Visits Approved: 8  Total # of Visits to Date: 8  Plan of Care/Certification Expiration Date: 20  Progress Note Counter:                Subjective:  Comments: 2 month follow up with Dr. Austen Robbins to review need for neurosurgery     Subjective: Tomorrow is the big day-follow up with the neurosurgeon at 11:30 AM. Feeling good overall; no complaints. The last 2 days have not been great-struggling with coordination issues, especially throughout the left leg. Pain:  Patient Currently in Pain: No         Objective             TINETTI BALANCE TEST    BALANCE Patient is seated in a hard, armless chair   1 SITTING BALANCE 1 - Steady, safe   2. RISES FROM CHAIR 1 - Able, uses arms to help   3. ATTEMPTS TO RISE 2 - Able to rise, 1 attempt   4. IMMEDIATE STANDING BALANCE (FIRST 5 SECONDS) 2 - Steady without walker or other support   5. STANDING BALANCE 1 - Steady but wide stance and uses support   6. NUDGED 1 - Staggers, grabs, catches   7.   EYES CLOSED 0 - Unsteady   8. TURNING 360 DEGREES 1 - Continuous and 1 - Steady   9. SITTING DOWN 1 - Uses arms or not a smooth motion   BALANCE SCORE 11/16       GAIT SECTION Patient stands with therapist, walks across room (+/- aids), fist at usual pace, then at rapid pace. 1.  INDICATION OF GAIT (Immediately after told to \"go\" 1 - No hesitancy   2. STEP LENGTH AND HEIGHT 1 - Step through Left   3. FOOT CLEARANCE 1 - Left foot clears floor and 1 - Right foot clears floor   4. STEP SYMMETRY 0 - Right and left step length not equal   5. STEP CONTINUITY 1 - Steps appear continuous   6. PATH 1 - Mild/moderate deviation or uses walking aid   7. TRUNK 0 - Marked sway or uses walking aid   8. WALKING TIME 1 - Heels almost touching while walking   GAIT SCORE    TOTAL SCORE 18/   Risk Indicators:  Less than/equal to 18 = high risk  19-23 Moderate risk  Greater than/equal to 24 = low risk                             Ambulation 1  Surface: level tile  Device: Rolling Walker  Assistance: Independent  Distance: 200+ft      Stairs  # Steps : 4  Stairs Height: 6\"  Rails: Bilateral  Assistance: Independent               Exercises  Exercise 1: NuStep warm up - Noted clonus, cues for  slower  speed, Seat 8   Exercise 2: repeated sit <> stand from elevated plinth x10 reps, and regular chair height x5 reps; added sit to stand onto airex: 10x   Exercise 3: gait training with cane 2x 100 ft - cane held in RUE; Exercise 10: // bars: side steppin lengths on blue pillow/mat for 4 lengths   Exercise 16: Tinetti; sit-stand timed; reveiw of goals         Activity Tolerance:  Activity Tolerance: Patient Tolerated treatment well  Activity Tolerance: Ended treatment early due to increase in stiffness and tone in bilateral LE    Assessment:   Body structures, Functions, Activity limitations: Decreased functional mobility , Decreased ADL status, Decreased ROM, Decreased strength, Decreased balance, Increased pain, Decreased

## 2020-04-29 ENCOUNTER — OFFICE VISIT (OUTPATIENT)
Dept: NEUROSURGERY | Age: 45
End: 2020-04-29
Payer: COMMERCIAL

## 2020-04-29 VITALS
WEIGHT: 195 LBS | DIASTOLIC BLOOD PRESSURE: 93 MMHG | HEIGHT: 70 IN | SYSTOLIC BLOOD PRESSURE: 145 MMHG | TEMPERATURE: 97.7 F | HEART RATE: 124 BPM | BODY MASS INDEX: 27.92 KG/M2

## 2020-04-29 PROCEDURE — 1111F DSCHRG MED/CURRENT MED MERGE: CPT | Performed by: NEUROLOGICAL SURGERY

## 2020-04-29 PROCEDURE — 99214 OFFICE O/P EST MOD 30 MIN: CPT | Performed by: NEUROLOGICAL SURGERY

## 2020-05-07 ENCOUNTER — TELEPHONE (OUTPATIENT)
Dept: NEUROSURGERY | Age: 45
End: 2020-05-07

## 2020-05-07 NOTE — TELEPHONE ENCOUNTER
Called spoke to patient informed him of his upcoming surgery that is scheduled with Dr. Tye Santiago on 5/14/2020 at 8:00 am. Patient is to arrive to [facility] at 6:00 a.m. NPO after midnight the night before, take a shower with antibacterial soap the night before and the morning of surgery, stop ASA and blood thinners 7 days prior to surgery, patient is to have a  to drive home after surgery and there is to be no visitors at this time. I also informed patient I put in an order for a COVID-19 testing to be done 72 hrs. prior to surgery. Patient verbalized understanding.

## 2020-05-07 NOTE — TELEPHONE ENCOUNTER
SURGERY 8[address]  [address] [place] [address] [address] [facility] [address] [facility], [address]      Phone *[phone] *[phone]   Surgical Scheduling Direct Line Phone *[phone] Fax *[phone]      Carolyn Hayes   1975   male    [address] [place] [address]  [address] [facility] [id number]           Home Phone: [phone]    Cell Phone:    Telephone Information:   Mobile [phone]          Surgeon: Merlene Cox  Surgery Date: 5/14/2020  Time: 8:00 am    Procedure: Resection of arachnoid cyst vs tumor    Diagnosis: Arachnoid cyst of spine, thoracic myelopathy     Important Medical History:       Special Inst/Equip: Position: Prone, Alfredo table, neuropsology, microscope, ultrasound, cusa, fluoro, high speed dril, general spinal inst    Anesthesia:  Gen            Admission Type: Inpatient       Case Location:      Main OR         Need Preop Cardiac Clearance:       Does Patient have Cardiologist/physician?          Surgery Confirmation #: __________________________________________________    : ________________________   Date: __________________________     [employer] Name: Christy SPRING

## 2020-05-08 RX ORDER — ACETAMINOPHEN 500 MG
500 TABLET ORAL EVERY 6 HOURS PRN
COMMUNITY

## 2020-05-11 ENCOUNTER — HOSPITAL ENCOUNTER (OUTPATIENT)
Age: 45
Discharge: HOME | DRG: 028 | End: 2020-05-11
Payer: COMMERCIAL

## 2020-05-11 LAB
PERFORMING LAB: NORMAL
REPORT: NORMAL
SARS-COV-2: NOT DETECTED

## 2020-05-11 PROCEDURE — U0002 COVID-19 LAB TEST NON-CDC: HCPCS

## 2020-05-13 ENCOUNTER — ANESTHESIA EVENT (OUTPATIENT)
Dept: OPERATING ROOM | Age: 45
DRG: 028 | End: 2020-05-13
Payer: COMMERCIAL

## 2020-05-14 ENCOUNTER — ANESTHESIA (OUTPATIENT)
Dept: OPERATING ROOM | Age: 45
DRG: 028 | End: 2020-05-14
Payer: COMMERCIAL

## 2020-05-14 ENCOUNTER — APPOINTMENT (OUTPATIENT)
Dept: GENERAL RADIOLOGY | Age: 45
DRG: 028 | End: 2020-05-14
Attending: NEUROLOGICAL SURGERY
Payer: COMMERCIAL

## 2020-05-14 ENCOUNTER — HOSPITAL ENCOUNTER (INPATIENT)
Age: 45
LOS: 5 days | Discharge: NURSING, REHAB OR LONG-TERM CARE | DRG: 028 | End: 2020-05-19
Attending: NEUROLOGICAL SURGERY | Admitting: NEUROLOGICAL SURGERY
Payer: COMMERCIAL

## 2020-05-14 VITALS — SYSTOLIC BLOOD PRESSURE: 108 MMHG | OXYGEN SATURATION: 97 % | TEMPERATURE: 99.5 F | DIASTOLIC BLOOD PRESSURE: 64 MMHG

## 2020-05-14 PROBLEM — Z98.890 POST-OPERATIVE STATE: Status: ACTIVE | Noted: 2020-05-14

## 2020-05-14 LAB
ANION GAP SERPL CALCULATED.3IONS-SCNC: 11 MEQ/L (ref 8–16)
AVERAGE GLUCOSE: 120 MG/DL (ref 70–126)
BUN BLDV-MCNC: 15 MG/DL (ref 7–22)
CALCIUM SERPL-MCNC: 8 MG/DL (ref 8.5–10.5)
CHLORIDE BLD-SCNC: 104 MEQ/L (ref 98–111)
CO2: 21 MEQ/L (ref 23–33)
CREAT SERPL-MCNC: 0.8 MG/DL (ref 0.4–1.2)
ERYTHROCYTE [DISTWIDTH] IN BLOOD BY AUTOMATED COUNT: 13.8 % (ref 11.5–14.5)
ERYTHROCYTE [DISTWIDTH] IN BLOOD BY AUTOMATED COUNT: 46.1 FL (ref 35–45)
GFR SERPL CREATININE-BSD FRML MDRD: > 90 ML/MIN/1.73M2
GLUCOSE BLD-MCNC: 118 MG/DL (ref 70–108)
GLUCOSE BLD-MCNC: 168 MG/DL (ref 70–108)
HBA1C MFR BLD: 6 % (ref 4.4–6.4)
HCT VFR BLD CALC: 39.6 % (ref 42–52)
HEMOGLOBIN: 12.4 GM/DL (ref 14–18)
INR BLD: 0.95 (ref 0.85–1.13)
MCH RBC QN AUTO: 28.5 PG (ref 26–33)
MCHC RBC AUTO-ENTMCNC: 31.3 GM/DL (ref 32.2–35.5)
MCV RBC AUTO: 91 FL (ref 80–94)
PLATELET # BLD: 272 THOU/MM3 (ref 130–400)
PMV BLD AUTO: 8.5 FL (ref 9.4–12.4)
POTASSIUM SERPL-SCNC: 4.6 MEQ/L (ref 3.5–5.2)
RBC # BLD: 4.35 MILL/MM3 (ref 4.7–6.1)
SODIUM BLD-SCNC: 136 MEQ/L (ref 135–145)
WBC # BLD: 12.8 THOU/MM3 (ref 4.8–10.8)

## 2020-05-14 PROCEDURE — 2580000003 HC RX 258: Performed by: NEUROLOGICAL SURGERY

## 2020-05-14 PROCEDURE — 87184 SC STD DISK METHOD PER PLATE: CPT

## 2020-05-14 PROCEDURE — 69990 MICROSURGERY ADD-ON: CPT | Performed by: PHYSICIAN ASSISTANT

## 2020-05-14 PROCEDURE — 7100000001 HC PACU RECOVERY - ADDTL 15 MIN: Performed by: NEUROLOGICAL SURGERY

## 2020-05-14 PROCEDURE — 2709999900 HC NON-CHARGEABLE SUPPLY

## 2020-05-14 PROCEDURE — 63281 BX/EXC IDRL SPINE LESN THRC: CPT | Performed by: PHYSICIAN ASSISTANT

## 2020-05-14 PROCEDURE — 6370000000 HC RX 637 (ALT 250 FOR IP): Performed by: PHYSICIAN ASSISTANT

## 2020-05-14 PROCEDURE — 3700000000 HC ANESTHESIA ATTENDED CARE: Performed by: NEUROLOGICAL SURGERY

## 2020-05-14 PROCEDURE — 3600000006 HC SURGERY LEVEL 6 BASE: Performed by: NEUROLOGICAL SURGERY

## 2020-05-14 PROCEDURE — 2720000010 HC SURG SUPPLY STERILE: Performed by: NEUROLOGICAL SURGERY

## 2020-05-14 PROCEDURE — 2500000003 HC RX 250 WO HCPCS: Performed by: REGISTERED NURSE

## 2020-05-14 PROCEDURE — 69990 MICROSURGERY ADD-ON: CPT | Performed by: NEUROLOGICAL SURGERY

## 2020-05-14 PROCEDURE — 6360000002 HC RX W HCPCS

## 2020-05-14 PROCEDURE — 87147 CULTURE TYPE IMMUNOLOGIC: CPT

## 2020-05-14 PROCEDURE — 6360000002 HC RX W HCPCS: Performed by: PHYSICIAN ASSISTANT

## 2020-05-14 PROCEDURE — 6360000002 HC RX W HCPCS: Performed by: NEUROLOGICAL SURGERY

## 2020-05-14 PROCEDURE — 2500000003 HC RX 250 WO HCPCS

## 2020-05-14 PROCEDURE — 99233 SBSQ HOSP IP/OBS HIGH 50: CPT | Performed by: INTERNAL MEDICINE

## 2020-05-14 PROCEDURE — 6370000000 HC RX 637 (ALT 250 FOR IP): Performed by: NEUROLOGICAL SURGERY

## 2020-05-14 PROCEDURE — 00BX0ZZ EXCISION OF THORACIC SPINAL CORD, OPEN APPROACH: ICD-10-PCS | Performed by: NEUROLOGICAL SURGERY

## 2020-05-14 PROCEDURE — 2580000003 HC RX 258

## 2020-05-14 PROCEDURE — 83036 HEMOGLOBIN GLYCOSYLATED A1C: CPT

## 2020-05-14 PROCEDURE — APPSS180 APP SPLIT SHARED TIME > 60 MINUTES: Performed by: NURSE PRACTITIONER

## 2020-05-14 PROCEDURE — 2709999900 HC NON-CHARGEABLE SUPPLY: Performed by: NEUROLOGICAL SURGERY

## 2020-05-14 PROCEDURE — 85610 PROTHROMBIN TIME: CPT

## 2020-05-14 PROCEDURE — 80048 BASIC METABOLIC PNL TOTAL CA: CPT

## 2020-05-14 PROCEDURE — C1751 CATH, INF, PER/CENT/MIDLINE: HCPCS | Performed by: NEUROLOGICAL SURGERY

## 2020-05-14 PROCEDURE — 2500000003 HC RX 250 WO HCPCS: Performed by: NEUROLOGICAL SURGERY

## 2020-05-14 PROCEDURE — 2780000010 HC IMPLANT OTHER: Performed by: NEUROLOGICAL SURGERY

## 2020-05-14 PROCEDURE — 36415 COLL VENOUS BLD VENIPUNCTURE: CPT

## 2020-05-14 PROCEDURE — 63281 BX/EXC IDRL SPINE LESN THRC: CPT | Performed by: NEUROLOGICAL SURGERY

## 2020-05-14 PROCEDURE — 3700000001 HC ADD 15 MINUTES (ANESTHESIA): Performed by: NEUROLOGICAL SURGERY

## 2020-05-14 PROCEDURE — 72020 X-RAY EXAM OF SPINE 1 VIEW: CPT

## 2020-05-14 PROCEDURE — 85027 COMPLETE CBC AUTOMATED: CPT

## 2020-05-14 PROCEDURE — 3209999900 FLUORO FOR SURGICAL PROCEDURES

## 2020-05-14 PROCEDURE — 6360000002 HC RX W HCPCS: Performed by: REGISTERED NURSE

## 2020-05-14 PROCEDURE — 87086 URINE CULTURE/COLONY COUNT: CPT

## 2020-05-14 PROCEDURE — 3600000016 HC SURGERY LEVEL 6 ADDTL 15MIN: Performed by: NEUROLOGICAL SURGERY

## 2020-05-14 PROCEDURE — 88305 TISSUE EXAM BY PATHOLOGIST: CPT

## 2020-05-14 PROCEDURE — 00BT0ZZ EXCISION OF SPINAL MENINGES, OPEN APPROACH: ICD-10-PCS | Performed by: NEUROLOGICAL SURGERY

## 2020-05-14 PROCEDURE — 2580000003 HC RX 258: Performed by: PHYSICIAN ASSISTANT

## 2020-05-14 PROCEDURE — 7100000000 HC PACU RECOVERY - FIRST 15 MIN: Performed by: NEUROLOGICAL SURGERY

## 2020-05-14 PROCEDURE — 87081 CULTURE SCREEN ONLY: CPT

## 2020-05-14 PROCEDURE — 2000000000 HC ICU R&B

## 2020-05-14 PROCEDURE — 94760 N-INVAS EAR/PLS OXIMETRY 1: CPT

## 2020-05-14 PROCEDURE — 82948 REAGENT STRIP/BLOOD GLUCOSE: CPT

## 2020-05-14 DEVICE — AGENT HEMOSTATIC SURGIFLOW MATRIX KIT W/THROMBIN: Type: IMPLANTABLE DEVICE | Site: SPINE LUMBAR | Status: FUNCTIONAL

## 2020-05-14 DEVICE — DURAGEN® SUTURABLE DURAL REGENERATION MATRIX, 3 IN X 3 IN (7.5 CM X 7.5 CM)
Type: IMPLANTABLE DEVICE | Site: SPINE THORACIC | Status: FUNCTIONAL
Brand: DURAGEN® SUTURABLE

## 2020-05-14 RX ORDER — DEXAMETHASONE SODIUM PHOSPHATE 4 MG/ML
INJECTION, SOLUTION INTRA-ARTICULAR; INTRALESIONAL; INTRAMUSCULAR; INTRAVENOUS; SOFT TISSUE PRN
Status: DISCONTINUED | OUTPATIENT
Start: 2020-05-14 | End: 2020-05-14 | Stop reason: SDUPTHER

## 2020-05-14 RX ORDER — GINSENG 100 MG
CAPSULE ORAL PRN
Status: DISCONTINUED | OUTPATIENT
Start: 2020-05-14 | End: 2020-05-14 | Stop reason: ALTCHOICE

## 2020-05-14 RX ORDER — ONDANSETRON 2 MG/ML
4 INJECTION INTRAMUSCULAR; INTRAVENOUS EVERY 6 HOURS PRN
Status: DISCONTINUED | OUTPATIENT
Start: 2020-05-14 | End: 2020-05-19 | Stop reason: HOSPADM

## 2020-05-14 RX ORDER — KETOROLAC TROMETHAMINE 30 MG/ML
15 INJECTION, SOLUTION INTRAMUSCULAR; INTRAVENOUS EVERY 6 HOURS PRN
Status: DISPENSED | OUTPATIENT
Start: 2020-05-14 | End: 2020-05-19

## 2020-05-14 RX ORDER — SODIUM CHLORIDE 9 MG/ML
INJECTION, SOLUTION INTRAVENOUS CONTINUOUS
Status: DISCONTINUED | OUTPATIENT
Start: 2020-05-14 | End: 2020-05-15

## 2020-05-14 RX ORDER — GLYCOPYRROLATE 1 MG/5 ML
SYRINGE (ML) INTRAVENOUS PRN
Status: DISCONTINUED | OUTPATIENT
Start: 2020-05-14 | End: 2020-05-14 | Stop reason: SDUPTHER

## 2020-05-14 RX ORDER — DEXAMETHASONE SODIUM PHOSPHATE 4 MG/ML
4 INJECTION, SOLUTION INTRA-ARTICULAR; INTRALESIONAL; INTRAMUSCULAR; INTRAVENOUS; SOFT TISSUE EVERY 6 HOURS
Status: COMPLETED | OUTPATIENT
Start: 2020-05-14 | End: 2020-05-16

## 2020-05-14 RX ORDER — FENTANYL CITRATE 50 UG/ML
INJECTION, SOLUTION INTRAMUSCULAR; INTRAVENOUS PRN
Status: DISCONTINUED | OUTPATIENT
Start: 2020-05-14 | End: 2020-05-14 | Stop reason: SDUPTHER

## 2020-05-14 RX ORDER — TRANEXAMIC ACID 100 MG/ML
INJECTION, SOLUTION INTRAVENOUS PRN
Status: DISCONTINUED | OUTPATIENT
Start: 2020-05-14 | End: 2020-05-14 | Stop reason: SDUPTHER

## 2020-05-14 RX ORDER — ONDANSETRON 2 MG/ML
INJECTION INTRAMUSCULAR; INTRAVENOUS PRN
Status: DISCONTINUED | OUTPATIENT
Start: 2020-05-14 | End: 2020-05-14 | Stop reason: SDUPTHER

## 2020-05-14 RX ORDER — LIDOCAINE HYDROCHLORIDE 20 MG/ML
INJECTION, SOLUTION INTRAVENOUS PRN
Status: DISCONTINUED | OUTPATIENT
Start: 2020-05-14 | End: 2020-05-14 | Stop reason: SDUPTHER

## 2020-05-14 RX ORDER — SODIUM CHLORIDE 0.9 % (FLUSH) 0.9 %
10 SYRINGE (ML) INJECTION PRN
Status: DISCONTINUED | OUTPATIENT
Start: 2020-05-14 | End: 2020-05-19 | Stop reason: HOSPADM

## 2020-05-14 RX ORDER — SODIUM CHLORIDE 9 MG/ML
INJECTION, SOLUTION INTRAVENOUS CONTINUOUS
Status: DISCONTINUED | OUTPATIENT
Start: 2020-05-14 | End: 2020-05-14

## 2020-05-14 RX ORDER — PROPOFOL 10 MG/ML
INJECTION, EMULSION INTRAVENOUS CONTINUOUS PRN
Status: DISCONTINUED | OUTPATIENT
Start: 2020-05-14 | End: 2020-05-14 | Stop reason: SDUPTHER

## 2020-05-14 RX ORDER — CYCLOBENZAPRINE HCL 10 MG
10 TABLET ORAL 3 TIMES DAILY PRN
Status: DISCONTINUED | OUTPATIENT
Start: 2020-05-14 | End: 2020-05-19 | Stop reason: HOSPADM

## 2020-05-14 RX ORDER — HYDROCODONE BITARTRATE AND ACETAMINOPHEN 5; 325 MG/1; MG/1
1 TABLET ORAL EVERY 6 HOURS PRN
Status: DISCONTINUED | OUTPATIENT
Start: 2020-05-14 | End: 2020-05-19 | Stop reason: HOSPADM

## 2020-05-14 RX ORDER — SODIUM CHLORIDE 9 MG/ML
INJECTION, SOLUTION INTRAVENOUS CONTINUOUS PRN
Status: DISCONTINUED | OUTPATIENT
Start: 2020-05-14 | End: 2020-05-14 | Stop reason: SDUPTHER

## 2020-05-14 RX ORDER — KETOROLAC TROMETHAMINE 30 MG/ML
INJECTION, SOLUTION INTRAMUSCULAR; INTRAVENOUS
Status: COMPLETED
Start: 2020-05-14 | End: 2020-05-14

## 2020-05-14 RX ORDER — HYDROMORPHONE HCL 110MG/55ML
PATIENT CONTROLLED ANALGESIA SYRINGE INTRAVENOUS PRN
Status: DISCONTINUED | OUTPATIENT
Start: 2020-05-14 | End: 2020-05-14 | Stop reason: SDUPTHER

## 2020-05-14 RX ORDER — SODIUM CHLORIDE 0.9 % (FLUSH) 0.9 %
10 SYRINGE (ML) INJECTION EVERY 12 HOURS SCHEDULED
Status: DISCONTINUED | OUTPATIENT
Start: 2020-05-14 | End: 2020-05-19 | Stop reason: HOSPADM

## 2020-05-14 RX ORDER — PROPOFOL 10 MG/ML
INJECTION, EMULSION INTRAVENOUS PRN
Status: DISCONTINUED | OUTPATIENT
Start: 2020-05-14 | End: 2020-05-14 | Stop reason: SDUPTHER

## 2020-05-14 RX ORDER — MIDAZOLAM HYDROCHLORIDE 1 MG/ML
INJECTION INTRAMUSCULAR; INTRAVENOUS PRN
Status: DISCONTINUED | OUTPATIENT
Start: 2020-05-14 | End: 2020-05-14 | Stop reason: SDUPTHER

## 2020-05-14 RX ORDER — PROMETHAZINE HYDROCHLORIDE 25 MG/1
12.5 TABLET ORAL EVERY 6 HOURS PRN
Status: DISCONTINUED | OUTPATIENT
Start: 2020-05-14 | End: 2020-05-15

## 2020-05-14 RX ORDER — ROCURONIUM BROMIDE 10 MG/ML
INJECTION, SOLUTION INTRAVENOUS PRN
Status: DISCONTINUED | OUTPATIENT
Start: 2020-05-14 | End: 2020-05-14 | Stop reason: SDUPTHER

## 2020-05-14 RX ORDER — SUCCINYLCHOLINE/SOD CL,ISO/PF 200MG/10ML
SYRINGE (ML) INTRAVENOUS PRN
Status: DISCONTINUED | OUTPATIENT
Start: 2020-05-14 | End: 2020-05-14 | Stop reason: SDUPTHER

## 2020-05-14 RX ADMIN — HYDROCODONE BITARTRATE AND ACETAMINOPHEN 1 TABLET: 5; 325 TABLET ORAL at 20:57

## 2020-05-14 RX ADMIN — SODIUM CHLORIDE: 9 INJECTION, SOLUTION INTRAVENOUS at 06:41

## 2020-05-14 RX ADMIN — CEFAZOLIN 2 G: 10 INJECTION, POWDER, FOR SOLUTION INTRAVENOUS at 09:23

## 2020-05-14 RX ADMIN — PHENYLEPHRINE HYDROCHLORIDE 100 MCG: 10 INJECTION INTRAVENOUS at 09:49

## 2020-05-14 RX ADMIN — PROPOFOL: 10 INJECTION, EMULSION INTRAVENOUS at 10:34

## 2020-05-14 RX ADMIN — HYDROMORPHONE HYDROCHLORIDE 1 MG: 2 INJECTION INTRAMUSCULAR; INTRAVENOUS; SUBCUTANEOUS at 09:46

## 2020-05-14 RX ADMIN — PHENYLEPHRINE HYDROCHLORIDE 200 MCG: 10 INJECTION INTRAVENOUS at 09:40

## 2020-05-14 RX ADMIN — FENTANYL CITRATE 100 MCG: 50 INJECTION, SOLUTION INTRAMUSCULAR; INTRAVENOUS at 10:03

## 2020-05-14 RX ADMIN — PHENYLEPHRINE HYDROCHLORIDE 200 MCG: 10 INJECTION INTRAVENOUS at 08:44

## 2020-05-14 RX ADMIN — PHENYLEPHRINE HYDROCHLORIDE 100 MCG: 10 INJECTION INTRAVENOUS at 09:54

## 2020-05-14 RX ADMIN — KETOROLAC TROMETHAMINE 15 MG: 30 INJECTION, SOLUTION INTRAMUSCULAR at 14:22

## 2020-05-14 RX ADMIN — SODIUM CHLORIDE: 9 INJECTION, SOLUTION INTRAVENOUS at 08:50

## 2020-05-14 RX ADMIN — PROPOFOL 200 MG: 10 INJECTION, EMULSION INTRAVENOUS at 08:37

## 2020-05-14 RX ADMIN — PHENYLEPHRINE HYDROCHLORIDE 100 MCG: 10 INJECTION INTRAVENOUS at 08:42

## 2020-05-14 RX ADMIN — ROCURONIUM BROMIDE 20 MG: 10 INJECTION INTRAVENOUS at 10:15

## 2020-05-14 RX ADMIN — DEXAMETHASONE SODIUM PHOSPHATE 10 MG: 4 INJECTION, SOLUTION INTRAMUSCULAR; INTRAVENOUS at 08:37

## 2020-05-14 RX ADMIN — MIDAZOLAM HYDROCHLORIDE 2 MG: 1 INJECTION, SOLUTION INTRAMUSCULAR; INTRAVENOUS at 08:37

## 2020-05-14 RX ADMIN — PHENYLEPHRINE HYDROCHLORIDE 200 MCG: 10 INJECTION INTRAVENOUS at 08:54

## 2020-05-14 RX ADMIN — PHENYLEPHRINE HYDROCHLORIDE 100 MCG: 10 INJECTION INTRAVENOUS at 08:52

## 2020-05-14 RX ADMIN — CYCLOBENZAPRINE HYDROCHLORIDE 10 MG: 10 TABLET, FILM COATED ORAL at 17:55

## 2020-05-14 RX ADMIN — PHENYLEPHRINE HYDROCHLORIDE 200 MCG: 10 INJECTION INTRAVENOUS at 08:57

## 2020-05-14 RX ADMIN — FENTANYL CITRATE 100 MCG: 50 INJECTION, SOLUTION INTRAMUSCULAR; INTRAVENOUS at 08:37

## 2020-05-14 RX ADMIN — SODIUM CHLORIDE: 9 INJECTION, SOLUTION INTRAVENOUS at 10:18

## 2020-05-14 RX ADMIN — Medication 140 MG: at 08:37

## 2020-05-14 RX ADMIN — Medication 10 ML: at 20:59

## 2020-05-14 RX ADMIN — HYDROMORPHONE HYDROCHLORIDE 1 MG: 2 INJECTION INTRAMUSCULAR; INTRAVENOUS; SUBCUTANEOUS at 12:35

## 2020-05-14 RX ADMIN — SUGAMMADEX 400 MG: 100 INJECTION, SOLUTION INTRAVENOUS at 10:36

## 2020-05-14 RX ADMIN — PHENYLEPHRINE HYDROCHLORIDE 200 MCG: 10 INJECTION INTRAVENOUS at 08:49

## 2020-05-14 RX ADMIN — CEFAZOLIN 2 G: 10 INJECTION, POWDER, FOR SOLUTION INTRAVENOUS at 13:34

## 2020-05-14 RX ADMIN — DEXAMETHASONE SODIUM PHOSPHATE 4 MG: 4 INJECTION, SOLUTION INTRA-ARTICULAR; INTRALESIONAL; INTRAMUSCULAR; INTRAVENOUS; SOFT TISSUE at 20:58

## 2020-05-14 RX ADMIN — PROPOFOL 100 MCG/KG/MIN: 10 INJECTION, EMULSION INTRAVENOUS at 09:07

## 2020-05-14 RX ADMIN — LIDOCAINE HYDROCHLORIDE 100 MG: 20 INJECTION, SOLUTION INTRAVENOUS at 08:37

## 2020-05-14 RX ADMIN — ROCURONIUM BROMIDE 50 MG: 10 INJECTION INTRAVENOUS at 09:43

## 2020-05-14 RX ADMIN — ONDANSETRON HYDROCHLORIDE 4 MG: 2 SOLUTION INTRAMUSCULAR; INTRAVENOUS at 20:58

## 2020-05-14 RX ADMIN — Medication 0.4 MG: at 08:56

## 2020-05-14 RX ADMIN — DEXTROSE MONOHYDRATE 2 G: 50 INJECTION, SOLUTION INTRAVENOUS at 20:58

## 2020-05-14 RX ADMIN — DEXAMETHASONE SODIUM PHOSPHATE 4 MG: 4 INJECTION, SOLUTION INTRA-ARTICULAR; INTRALESIONAL; INTRAMUSCULAR; INTRAVENOUS; SOFT TISSUE at 15:22

## 2020-05-14 RX ADMIN — PHENYLEPHRINE HYDROCHLORIDE 200 MCG: 10 INJECTION INTRAVENOUS at 09:30

## 2020-05-14 RX ADMIN — FENTANYL CITRATE 100 MCG: 50 INJECTION, SOLUTION INTRAMUSCULAR; INTRAVENOUS at 11:42

## 2020-05-14 RX ADMIN — TRANEXAMIC ACID 1000 MG: 1 INJECTION, SOLUTION INTRAVENOUS at 09:43

## 2020-05-14 RX ADMIN — PHENYLEPHRINE HYDROCHLORIDE 200 MCG: 10 INJECTION INTRAVENOUS at 08:46

## 2020-05-14 RX ADMIN — ONDANSETRON HYDROCHLORIDE 4 MG: 4 INJECTION, SOLUTION INTRAMUSCULAR; INTRAVENOUS at 13:11

## 2020-05-14 RX ADMIN — ONDANSETRON HYDROCHLORIDE 4 MG: 2 SOLUTION INTRAMUSCULAR; INTRAVENOUS at 15:18

## 2020-05-14 RX ADMIN — HYDROMORPHONE HYDROCHLORIDE 1 MG: 2 INJECTION INTRAMUSCULAR; INTRAVENOUS; SUBCUTANEOUS at 08:37

## 2020-05-14 RX ADMIN — SODIUM CHLORIDE: 9 INJECTION, SOLUTION INTRAVENOUS at 08:25

## 2020-05-14 RX ADMIN — PROPOFOL 150 MCG/KG/MIN: 10 INJECTION, EMULSION INTRAVENOUS at 12:17

## 2020-05-14 ASSESSMENT — PULMONARY FUNCTION TESTS
PIF_VALUE: 20
PIF_VALUE: 26
PIF_VALUE: 25
PIF_VALUE: 26
PIF_VALUE: 25
PIF_VALUE: 32
PIF_VALUE: 25
PIF_VALUE: 29
PIF_VALUE: 32
PIF_VALUE: 20
PIF_VALUE: 22
PIF_VALUE: 25
PIF_VALUE: 28
PIF_VALUE: 27
PIF_VALUE: 25
PIF_VALUE: 24
PIF_VALUE: 26
PIF_VALUE: 30
PIF_VALUE: 28
PIF_VALUE: 26
PIF_VALUE: 31
PIF_VALUE: 28
PIF_VALUE: 30
PIF_VALUE: 27
PIF_VALUE: 29
PIF_VALUE: 27
PIF_VALUE: 24
PIF_VALUE: 29
PIF_VALUE: 20
PIF_VALUE: 20
PIF_VALUE: 21
PIF_VALUE: 30
PIF_VALUE: 25
PIF_VALUE: 29
PIF_VALUE: 27
PIF_VALUE: 26
PIF_VALUE: 28
PIF_VALUE: 27
PIF_VALUE: 28
PIF_VALUE: 28
PIF_VALUE: 27
PIF_VALUE: 24
PIF_VALUE: 29
PIF_VALUE: 26
PIF_VALUE: 26
PIF_VALUE: 30
PIF_VALUE: 26
PIF_VALUE: 17
PIF_VALUE: 26
PIF_VALUE: 27
PIF_VALUE: 30
PIF_VALUE: 26
PIF_VALUE: 4
PIF_VALUE: 29
PIF_VALUE: 20
PIF_VALUE: 27
PIF_VALUE: 29
PIF_VALUE: 28
PIF_VALUE: 27
PIF_VALUE: 28
PIF_VALUE: 29
PIF_VALUE: 17
PIF_VALUE: 27
PIF_VALUE: 26
PIF_VALUE: 26
PIF_VALUE: 28
PIF_VALUE: 1
PIF_VALUE: 28
PIF_VALUE: 2
PIF_VALUE: 9
PIF_VALUE: 27
PIF_VALUE: 28
PIF_VALUE: 9
PIF_VALUE: 26
PIF_VALUE: 29
PIF_VALUE: 1
PIF_VALUE: 28
PIF_VALUE: 26
PIF_VALUE: 28
PIF_VALUE: 27
PIF_VALUE: 4
PIF_VALUE: 25
PIF_VALUE: 28
PIF_VALUE: 26
PIF_VALUE: 27
PIF_VALUE: 28
PIF_VALUE: 24
PIF_VALUE: 27
PIF_VALUE: 28
PIF_VALUE: 28
PIF_VALUE: 2
PIF_VALUE: 27
PIF_VALUE: 27
PIF_VALUE: 19
PIF_VALUE: 26
PIF_VALUE: 28
PIF_VALUE: 27
PIF_VALUE: 24
PIF_VALUE: 25
PIF_VALUE: 27
PIF_VALUE: 28
PIF_VALUE: 25
PIF_VALUE: 29
PIF_VALUE: 27
PIF_VALUE: 27
PIF_VALUE: 25
PIF_VALUE: 27
PIF_VALUE: 26
PIF_VALUE: 19
PIF_VALUE: 26
PIF_VALUE: 25
PIF_VALUE: 19
PIF_VALUE: 27
PIF_VALUE: 27
PIF_VALUE: 26
PIF_VALUE: 27
PIF_VALUE: 26
PIF_VALUE: 27
PIF_VALUE: 28
PIF_VALUE: 30
PIF_VALUE: 20
PIF_VALUE: 25
PIF_VALUE: 28
PIF_VALUE: 0
PIF_VALUE: 26
PIF_VALUE: 26
PIF_VALUE: 27
PIF_VALUE: 27
PIF_VALUE: 8
PIF_VALUE: 29
PIF_VALUE: 28
PIF_VALUE: 26
PIF_VALUE: 28
PIF_VALUE: 18
PIF_VALUE: 27
PIF_VALUE: 30
PIF_VALUE: 24
PIF_VALUE: 2
PIF_VALUE: 26
PIF_VALUE: 25
PIF_VALUE: 26
PIF_VALUE: 27
PIF_VALUE: 26
PIF_VALUE: 27
PIF_VALUE: 27
PIF_VALUE: 21
PIF_VALUE: 1
PIF_VALUE: 21
PIF_VALUE: 32
PIF_VALUE: 18
PIF_VALUE: 28
PIF_VALUE: 24
PIF_VALUE: 26
PIF_VALUE: 28
PIF_VALUE: 27
PIF_VALUE: 25
PIF_VALUE: 28
PIF_VALUE: 27
PIF_VALUE: 29
PIF_VALUE: 7
PIF_VALUE: 1
PIF_VALUE: 26
PIF_VALUE: 24
PIF_VALUE: 25
PIF_VALUE: 21
PIF_VALUE: 26
PIF_VALUE: 25
PIF_VALUE: 8
PIF_VALUE: 26
PIF_VALUE: 28
PIF_VALUE: 26
PIF_VALUE: 27
PIF_VALUE: 27
PIF_VALUE: 26
PIF_VALUE: 7
PIF_VALUE: 0
PIF_VALUE: 25
PIF_VALUE: 19
PIF_VALUE: 27
PIF_VALUE: 24
PIF_VALUE: 30
PIF_VALUE: 24
PIF_VALUE: 28
PIF_VALUE: 21
PIF_VALUE: 25
PIF_VALUE: 26
PIF_VALUE: 0
PIF_VALUE: 17
PIF_VALUE: 27
PIF_VALUE: 26
PIF_VALUE: 29
PIF_VALUE: 26
PIF_VALUE: 27
PIF_VALUE: 25
PIF_VALUE: 19
PIF_VALUE: 26
PIF_VALUE: 27
PIF_VALUE: 0
PIF_VALUE: 27
PIF_VALUE: 28
PIF_VALUE: 20
PIF_VALUE: 28
PIF_VALUE: 0
PIF_VALUE: 27
PIF_VALUE: 21
PIF_VALUE: 27
PIF_VALUE: 21
PIF_VALUE: 28
PIF_VALUE: 1
PIF_VALUE: 19
PIF_VALUE: 25
PIF_VALUE: 4
PIF_VALUE: 19
PIF_VALUE: 22
PIF_VALUE: 27
PIF_VALUE: 29
PIF_VALUE: 28
PIF_VALUE: 26
PIF_VALUE: 27
PIF_VALUE: 27
PIF_VALUE: 26
PIF_VALUE: 26
PIF_VALUE: 34
PIF_VALUE: 24
PIF_VALUE: 24
PIF_VALUE: 4
PIF_VALUE: 20
PIF_VALUE: 27
PIF_VALUE: 29
PIF_VALUE: 24
PIF_VALUE: 3
PIF_VALUE: 27
PIF_VALUE: 1
PIF_VALUE: 28
PIF_VALUE: 26
PIF_VALUE: 23
PIF_VALUE: 26
PIF_VALUE: 17
PIF_VALUE: 26
PIF_VALUE: 16
PIF_VALUE: 28
PIF_VALUE: 30
PIF_VALUE: 25
PIF_VALUE: 28
PIF_VALUE: 26
PIF_VALUE: 27
PIF_VALUE: 19
PIF_VALUE: 27
PIF_VALUE: 27
PIF_VALUE: 21
PIF_VALUE: 27
PIF_VALUE: 28
PIF_VALUE: 25
PIF_VALUE: 25
PIF_VALUE: 26
PIF_VALUE: 25
PIF_VALUE: 26
PIF_VALUE: 28
PIF_VALUE: 17
PIF_VALUE: 28
PIF_VALUE: 21
PIF_VALUE: 26
PIF_VALUE: 27
PIF_VALUE: 25
PIF_VALUE: 26
PIF_VALUE: 25
PIF_VALUE: 26
PIF_VALUE: 29
PIF_VALUE: 26
PIF_VALUE: 25
PIF_VALUE: 20
PIF_VALUE: 28
PIF_VALUE: 1
PIF_VALUE: 11
PIF_VALUE: 24
PIF_VALUE: 27
PIF_VALUE: 26
PIF_VALUE: 26
PIF_VALUE: 27
PIF_VALUE: 19
PIF_VALUE: 25
PIF_VALUE: 25
PIF_VALUE: 26
PIF_VALUE: 28
PIF_VALUE: 29
PIF_VALUE: 27
PIF_VALUE: 26
PIF_VALUE: 27
PIF_VALUE: 26
PIF_VALUE: 25
PIF_VALUE: 21
PIF_VALUE: 27
PIF_VALUE: 27
PIF_VALUE: 28
PIF_VALUE: 27
PIF_VALUE: 24
PIF_VALUE: 19
PIF_VALUE: 26
PIF_VALUE: 29
PIF_VALUE: 25
PIF_VALUE: 26
PIF_VALUE: 31
PIF_VALUE: 26
PIF_VALUE: 28
PIF_VALUE: 31
PIF_VALUE: 0
PIF_VALUE: 31
PIF_VALUE: 19
PIF_VALUE: 20
PIF_VALUE: 28
PIF_VALUE: 28
PIF_VALUE: 30
PIF_VALUE: 28
PIF_VALUE: 19
PIF_VALUE: 28
PIF_VALUE: 1
PIF_VALUE: 26
PIF_VALUE: 27
PIF_VALUE: 26
PIF_VALUE: 28
PIF_VALUE: 24
PIF_VALUE: 27
PIF_VALUE: 28
PIF_VALUE: 27
PIF_VALUE: 26
PIF_VALUE: 26
PIF_VALUE: 24
PIF_VALUE: 27
PIF_VALUE: 28
PIF_VALUE: 4
PIF_VALUE: 26

## 2020-05-14 ASSESSMENT — ENCOUNTER SYMPTOMS
VOMITING: 0
CHEST TIGHTNESS: 0
TROUBLE SWALLOWING: 0
SHORTNESS OF BREATH: 0
APNEA: 0
PHOTOPHOBIA: 0
NAUSEA: 1
ABDOMINAL PAIN: 0
WHEEZING: 0
COLOR CHANGE: 0
BACK PAIN: 1
CONSTIPATION: 0
ABDOMINAL DISTENTION: 0
BACK PAIN: 0
SORE THROAT: 0

## 2020-05-14 ASSESSMENT — PAIN DESCRIPTION - PAIN TYPE: TYPE: ACUTE PAIN;SURGICAL PAIN

## 2020-05-14 ASSESSMENT — PAIN SCALES - GENERAL
PAINLEVEL_OUTOF10: 0
PAINLEVEL_OUTOF10: 3
PAINLEVEL_OUTOF10: 8
PAINLEVEL_OUTOF10: 0

## 2020-05-14 ASSESSMENT — PAIN DESCRIPTION - ORIENTATION: ORIENTATION: MID

## 2020-05-14 ASSESSMENT — PAIN - FUNCTIONAL ASSESSMENT
PAIN_FUNCTIONAL_ASSESSMENT: 0-10
PAIN_FUNCTIONAL_ASSESSMENT: ACTIVITIES ARE NOT PREVENTED

## 2020-05-14 ASSESSMENT — PAIN DESCRIPTION - LOCATION: LOCATION: BACK

## 2020-05-14 ASSESSMENT — PAIN DESCRIPTION - DESCRIPTORS: DESCRIPTORS: ACHING;CONSTANT

## 2020-05-14 ASSESSMENT — PAIN DESCRIPTION - ONSET: ONSET: GRADUAL

## 2020-05-14 ASSESSMENT — PAIN DESCRIPTION - PROGRESSION: CLINICAL_PROGRESSION: GRADUALLY WORSENING

## 2020-05-14 ASSESSMENT — PAIN DESCRIPTION - FREQUENCY: FREQUENCY: INTERMITTENT

## 2020-05-14 NOTE — PROGRESS NOTES
Patient was seen and examined in the pre op area in conjunction with neurosurgery AILIN Garcia PA-C). - Patient stated that he feels more weakness in his lower extremities especially his left leg ( feels his left leg is getting heavier). Also, he has more blanace issue and feels more numbnesss and tingling in his left leg.  -Stated that he has more headache and back pain issue now. - Patient Denied a signifcant issue in his urination or bowel habits. .    -Today physical exam was : signifcant for:  · Weakness patient lower extremities:3+/5 in the left, 4/5 in the right (grossly). · Hyperreflexia, clonus patient lower extremities. · decreasee sensation in patient lower extremities espiically left leg. · preperceptions impairment in the feet. -Today we had another long discussion with patient and his family (mother) guarding today planned surgical intervention in detail including the associated risks and benefits, as well as, the alternative treatment options. - I discussed with the possible complication of surgery ( in front our neurosurgery PAGladys) in detail  including excessive blood loss requiring transfusion, infection that may become meningitis, problem with heart lung and kidney as a result of general anesthesia such as heart attack, pneumonia, kidney shutdown and stroke. I also discussed the possible complication of the operations in and around the spine such as, death, paralysis, weakness on one side or the other of the body, decreased sensation or pain on one side or the other of the body, inability to control bowel/bladder, problems with sexual function, postoperative meningitis, postoperative development of a blood clot that may require another operation, leakage of fluid from the wound that may require another operation. The unlikely event of blindness following surgery in the prone position was also discussed with the patient.  The patient understands that no

## 2020-05-14 NOTE — OP NOTE
difficult make an absolute conclusion regarding the relationship between current patient neurological/ pain symptoms and his arachnoid cyst/ band at the level of T9. For this reason, it is difficult to have any guarantee regarding the extent of post-operative pain relief or other pre op symptoms (especially with his history of sarcoidosis and low back pain). - I discussed the recommended surgical intervention with patient and his family in detail including the associated risks and benefits, as well as, the alternative treatment options. I discussed with the possible complication of surgery ( in front our neurosurgery PA, Endy Prince) in detail  including excessive blood loss requiring transfusion, infection that may become meningitis, problem with heart lung and kidney as a result of general anesthesia such as heart attack, pneumonia, kidney shutdown and stroke. I also discussed the possible complication of the operations in and around the spine such as, death, paralysis, weakness on one side or the other of the body, decreased sensation or pain on one side or the other of the body, inability to control bowel/bladder, problems with sexual function, postoperative meningitis, postoperative development of a blood clot that may require another operation, leakage of fluid from the wound that may require another operation. The unlikely event of blindness following surgery in the prone position was also discussed with the patient. The patient understands that no guarantee can be made regarding the extent of post-operative pain relief. All questions were answered. The patient and his family elected to proceed with recommended surgery and signed the surgical consent. PROCEDURE:      The patient was brought to the OR where he was placed  supine initially, then general anesthesia was inducted. Next, Ortez  catheter and IV lines were inserted and maintained.  The neurophysiology team connected their needles and electrodes according to their protocol. Next, we flipped the  patient on Eyal frame table in prone position. Then, we proceeded with  localizing the  T7-T10 levels  using the the intraoperative fluoroscopy. Next, we proceeded with making the  A midline skin incision at the levels of T7-T10. Then,  the paraspinal muscles were dissected bilaterally. After reconfirming the the levels of T8 and T9 again using the fluoroscopy, I performed T8 and T9 laminectomy with preservation of facet joints. Then, I brought the microscope in and open the dura under microscope  with preservation of the underlying arachnoid membrane. At that time, we were able to see a mixed of arachnoid cyst and a thick whitish  arachnoid band under a superficial arachnoid layer. Then, I used of combination of microsurgical techniques and bipolar to resect the cyst and the associated arachnoid band. I also freed the cord in both sides from several arachnoid adhesions. Then, I excised the ventral aspect( the part with intimate relationship with cord)  of the cyst/band as much as safely possible hopefuly to prevent any recurrence. We sent the resected materials to the permament biopsy. At this point, an abnormal vascular structure ( vessel ) ocated inside the dura mater close to the spinal nerve root ( right T9 nerve root) and associated  perimedullary dilated vessels was noticed. 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 The abnormal dilated vessel ( the one the is close to nerve root) was coagulation and clipped by vascular clips and the excised ( after a temporal aneurysm clip was applied and the intraoperative neurovisual physiology tests, MEPs and SSEPs,  showed no changes during the application of temporal aneurysm clip). Following that , I irrigated the surgical field with warm saline. Next, we ran another intraoperative neurophysiological tests (MEPs and SSEPs) which showed no changes from the baselines. Then,  we closed the dura in watertight fashion. I applied on the tourist sutures fat graft and DuraGen and supported it with a layer of Tisseel . Next, we proceeded with doing meticulous  irrigation and hemostasis. Once we were satisfied with the hemostasis, we proceeded with closing the skin incision in the standard fashion after  applying a PHUONG drain. The patient tolerated the surgery very well without  intraoperative complications and there were no changes in neurophysiology  parameters during the surgery. At the end of surgery, the patient was  flipped back in her bed . Then, he was extubated and transferred to PACU  for further  observation and treatment.     *. Note: Kushal Cueto PA-C ( Neurosurgery PA) assisted throughout the procedure with positioning, draping, retraction, wound closure, and dressing application    Mode Shukla MD  Electronically signed by me on 5/14/2020 at 6:49 PM

## 2020-05-14 NOTE — PROGRESS NOTES
Oriented to sds         Refuses flu and pneumonia vaccine. pt was asked and agreed to first name and last initial being put on white boards. Allergy and fall risks applied. SCD Applied to patient. Warming blanket applied to patient. Pt denies any abuse or thoughts of suicide.  Sister instructed to where to go when pt is at surgery

## 2020-05-14 NOTE — CONSULTS
for sore throat and trouble swallowing. Eyes: Negative for photophobia and visual disturbance. Respiratory: Negative for chest tightness, shortness of breath and wheezing. Cardiovascular: Negative for chest pain and leg swelling. Gastrointestinal: Positive for nausea. Negative for constipation and vomiting. Endocrine: Negative for polydipsia and polyphagia. Genitourinary: Negative for decreased urine volume and dysuria. Musculoskeletal: Negative for back pain and neck pain. Skin: Positive for wound. Negative for color change, pallor and rash. Allergic/Immunologic: Negative for food allergies and immunocompromised state. Neurological: Negative for dizziness, seizures and weakness. Hematological: Negative for adenopathy. Psychiatric/Behavioral: Negative for agitation and confusion. The patient is not hyperactive. Scheduled Meds:   sodium chloride flush  10 mL Intravenous 2 times per day    ceFAZolin (ANCEF) IVPB  2 g Intravenous Q8H    dexamethasone  4 mg Intravenous Q6H     Continuous Infusions:   sodium chloride 75 mL/hr at 05/14/20 1522       PHYSICAL EXAMINATION:  T:  97.8. P:  98. RR:  13. B/P:  97/61. O2:  3. O2 Sat:  96.  I/O: 3000/1300  Body mass index is 32.58 kg/m². General:   Acute on chronically ill-appearing white male  HEENT:  normocephalic and atraumatic. No scleral icterus. PERR  Neck: supple. No Thyromegaly. Lungs: clear to auscultation. No retractions  Cardiac: RRR. No JVD. Abdomen: soft. Nontender. Extremities:  No clubbing, cyanosis, or edema x 4. Vasculature: capillary refill < 3 seconds. Palpable dorsalis pedis pulses. Skin:  warm and dry. Psych:  Alert and oriented x3. Affect appropriate  Lymph:  No supraclavicular adenopathy. Neurologic:  No focal deficit. No seizures. Data: (All radiographs, tracings, PFTs, and imaging are personally viewed and interpreted unless otherwise noted).     Sodium 136, potassium 4.6, chloride 104, CO2 21, BUN 15, creatinine 0.8, anion gap 11.0, glucose 168.  WBC 12.8, hemoglobin 12.4, hematocrit 39.6, platelet count 870.  Telemetry shows normal sinus rhythm   CT thoracic spine  3/6/2020 reports arachnoid adhesions dorsal aspect thoracic cord at T8-T9   MRI thoracic spine/23/20 reports dorsal compression of the spinal cord at T8-T9 with focal dilation of the central canal of the spinal cord at T9. This likely represents subarachnoid adhesions. Meets Continued ICU Level Care Criteria:    [x] Yes   [] No - Transfer Planned to listed location:  [] HOSPITALIST CONTACTED-      Case and plan discussed with Dr. Lana Jackson and Dr. Lisha Del Rio. Electronically signed by Cindi Rodriguez. ISHAAN Torres - CNP  CRITICAL CARE SPECIALIST  Patient seen by me. Case discussed with nurse practitioner. Case discussed with Dr. Lisha Del Rio. Target mean arterial pressure of 85. Continue with steroids. Electronically signed by Kirti Jackson MD.

## 2020-05-14 NOTE — BRIEF OP NOTE
Brief Postoperative Note      Patient: Elisabeth Bazan  YOB: 1975  MRN: [id number]    Date of Procedure: 5/14/2020    Pre-Op Diagnosis: ARACHNOID CYST OF SPINE, THORACIC MEYLOPATHY    Post-Op Diagnosis: Same       Procedure(s):  Thoracic Spine and Laminectomy Extraction of Arachnoid Cyst/Band and 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    Surgeon(s):  Yuki Gipson MD    Assistant:  Physician Assistant: Floyd Zhao PA-C    Anesthesia: General    Estimated Blood Loss (mL): 584     Complications: None    Specimens:   ID Type Source Tests Collected by Time Destination   A : lesion on dural aspect on spinal cord at level T9 Tissue Spine [address] Canas MD 5/14/2020 1208        Implants:  Implant Name Type Inv.  Item Serial No.  Lot No. LRB No. Used Action   KIT SEALANT SURGIFLO HEMOSTATIC MATRIX Bone/Graft/Tissue/Human/Synth KIT SEALANT SURGIFLO HEMOSTATIC MATRIX  [employer]: [employer] [id number] N/A 1 Implanted   RICO-GRAFT DURAGEN SUTURABLE 3 X 3 Vascular/Graft/Patch/Filter RICO-GRAFT DURAGEN SUTURABLE 3 X 3  [employer] [facility] [id number] N/A 1 Implanted         Drains: Hemovac to 1/2 pressure  Closed/Suction Drain Midline;Medial Back Accordion 10 Tunisian (Active)       Findings: Intrathecal Thoracic lesion    Electronically signed by Floyd Zhao PA-C on 5/14/2020 at 2:03 PM

## 2020-05-14 NOTE — PROGRESS NOTES
- Attending Note:     - Patient seen and examined in PACU and ICU post operativelly in conjunction with neurosurgery AILIN Cueto PA-C). - Patient is moving his lower extremities with good strength. -There is no new neurological deficit    Mode Shukla MD      Neurosurgery Progress Note    Patient:  Calista Garcia      Unit/Bed:4D-01/001-A    YOB: 1975    MRN: [id number]     Acct: [de-identified]     Admit date: 5/14/2020    No chief complaint on file. Patient Seen, Chart, Physician notes, Labs, Radiology studies reviewed. Subjective: Mr. Lincoln Valera is resting comfortably having tolerated thoracic laminectomy and resection of intradural mass performed by Dr. Cheyenne Phillips, without complication. Pain is well controlled    Past, Family, Social History unchanged from admission. Diet:  No diet orders on file    Medications:  Scheduled Meds:   sodium chloride flush  10 mL Intravenous 2 times per day    ceFAZolin (ANCEF) IVPB  2 g Intravenous Q8H    dexamethasone  4 mg Intravenous Q6H     Continuous Infusions:   sodium chloride       PRN Meds:sodium chloride flush, promethazine **OR** ondansetron, HYDROcodone 5 mg - acetaminophen, HYDROmorphone, ketorolac, cyclobenzaprine    Objective: Dressings are intact over a flat and dry incision and Hemovac drain is intact and functioning at one half pressure as indicated. Patient is alert with pain well-controlled and obeys commands with purposeful movement for lower extremity groups bilaterally. Vitals: /62   Pulse 83   Temp 97.8 °F (36.6 °C) (Temporal)   Resp (!) 6   Ht 5' 10\" (1.778 m)   Wt 221 lb (100.2 kg)   SpO2 93%   BMI 31.71 kg/m²   Physical Exam:  Alert and attentive. Language appropriate, with no aphasia. Pupils equal.  Facial strength symmetric. Physical Exam   No change from admission. See HPI    ROS:  Review of Systems   Constitutional: Negative for activity change.    Respiratory: Negative for apnea, chest tightness and shortness of breath. Cardiovascular: Negative for chest pain and leg swelling. Gastrointestinal: Negative for abdominal distention and abdominal pain. Musculoskeletal: Positive for back pain. Negative for neck pain and neck stiffness. Neurological: Negative for headaches. Psychiatric/Behavioral: Negative for agitation, behavioral problems, confusion and decreased concentration. 24 hour intake/output:    Intake/Output Summary (Last 24 hours) at 5/14/2020 1502  Last data filed at 5/14/2020 1422  Gross per 24 hour   Intake 3000 ml   Output 1300 ml   Net 1700 ml     Last 3 weights: Wt Readings from Last 3 Encounters:   05/14/20 221 lb (100.2 kg)   04/29/20 195 lb (88.5 kg)   03/25/20 195 lb 8 oz (88.7 kg)         CBC: No results for input(s): WBC, HGB, PLT in the last 72 hours. BMP:  No results for input(s): NA, K, CL, CO2, BUN, CREATININE, GLUCOSE in the last 72 hours. Calcium:No results for input(s): CALCIUM in the last 72 hours. Magnesium:No results for input(s): MG in the last 72 hours. Glucose:  Recent Labs     05/14/20  0623   POCGLU 118*     HgbA1C: No results for input(s): LABA1C in the last 72 hours. Lipids: No results for input(s): CHOL, TRIG, HDL, LDLCALC in the last 72 hours. Invalid input(s): LDL    Radiology reports as per the Radiologist  Radiology: Mri Thoracic Spine W Wo Contrast    Result Date: 4/22/2020  PROCEDURE: MRI THORACIC SPINE W WO CONTRAST CLINICAL INFORMATION: Arachnoid cyst of spine, Thoracic myelopathy. Bilateral lower extremity weakness and numbness. Follow-up. COMPARISON: 3/5/2020. TECHNIQUE: Sagittal T1, T2 and STIR sequences were obtained through the thoracic spine. Axial T2-weighted images were obtained through the discs. Postcontrast axial and sagittal T1-weighted images were obtained. In addition, heavily T2-weighted axial and  sagittal CISS images of the thoracic spine were obtained. 15 mL ProHance was injected in the right AC.  FINDINGS:  There is a

## 2020-05-14 NOTE — H&P
change. Respiratory: Negative for apnea, chest tightness and shortness of breath. Cardiovascular: Negative for chest pain and leg swelling. Gastrointestinal: Negative for abdominal distention and abdominal pain. Musculoskeletal: Positive for back pain and gait problem. Negative for neck pain and neck stiffness. Neurological: Positive for weakness and numbness. Negative for dizziness and headaches. Psychiatric/Behavioral: Negative for agitation, behavioral problems, confusion and decreased concentration. Physical Exam  Constitutional:       Appearance: He is normal weight. HENT:      Head: Normocephalic and atraumatic. Eyes:      Extraocular Movements: Extraocular movements intact. Pupils: Pupils are equal, round, and reactive to light. Neck:      Musculoskeletal: Normal range of motion and neck supple. Cardiovascular:      Rate and Rhythm: Normal rate and regular rhythm. Pulses: Normal pulses. Heart sounds: No murmur. Pulmonary:      Effort: Pulmonary effort is normal. No respiratory distress. Breath sounds: No wheezing. Abdominal:      General: Abdomen is flat. Bowel sounds are normal. There is distension. Tenderness: There is no abdominal tenderness. Musculoskeletal: Normal range of motion. General: No tenderness or deformity. Skin:     General: Skin is warm and dry. Neurological:      General: No focal deficit present. Mental Status: He is alert and oriented to person, place, and time. Sensory: Sensory deficit present. Motor: Weakness present. Gait: Gait abnormal.   Psychiatric:         Mood and Affect: Mood normal.         Behavior: Behavior normal.         Thought Content: Thought content normal.         Judgment: Judgment normal.         Assessment:  Arachnoid cyst of the thoracic spinal cord versus possible cystic mass in the thoracic spinal cord. Plan:   With findings on exam along with imaging findings and

## 2020-05-15 LAB
ANION GAP SERPL CALCULATED.3IONS-SCNC: 12 MEQ/L (ref 8–16)
BUN BLDV-MCNC: 17 MG/DL (ref 7–22)
CALCIUM SERPL-MCNC: 8.3 MG/DL (ref 8.5–10.5)
CHLORIDE BLD-SCNC: 103 MEQ/L (ref 98–111)
CO2: 23 MEQ/L (ref 23–33)
CREAT SERPL-MCNC: 0.7 MG/DL (ref 0.4–1.2)
EKG ATRIAL RATE: 115 BPM
EKG P AXIS: 29 DEGREES
EKG P-R INTERVAL: 120 MS
EKG Q-T INTERVAL: 320 MS
EKG QRS DURATION: 94 MS
EKG QTC CALCULATION (BAZETT): 442 MS
EKG R AXIS: 33 DEGREES
EKG T AXIS: -13 DEGREES
EKG VENTRICULAR RATE: 115 BPM
ERYTHROCYTE [DISTWIDTH] IN BLOOD BY AUTOMATED COUNT: 13.9 % (ref 11.5–14.5)
ERYTHROCYTE [DISTWIDTH] IN BLOOD BY AUTOMATED COUNT: 44.8 FL (ref 35–45)
GFR SERPL CREATININE-BSD FRML MDRD: > 90 ML/MIN/1.73M2
GLUCOSE BLD-MCNC: 160 MG/DL (ref 70–108)
GLUCOSE BLD-MCNC: 166 MG/DL (ref 70–108)
GLUCOSE BLD-MCNC: 166 MG/DL (ref 70–108)
GLUCOSE BLD-MCNC: 169 MG/DL (ref 70–108)
GLUCOSE BLD-MCNC: 170 MG/DL (ref 70–108)
GLUCOSE BLD-MCNC: 229 MG/DL (ref 70–108)
HCT VFR BLD CALC: 34.9 % (ref 42–52)
HEMOGLOBIN: 11.3 GM/DL (ref 14–18)
MCH RBC QN AUTO: 28.5 PG (ref 26–33)
MCHC RBC AUTO-ENTMCNC: 32.4 GM/DL (ref 32.2–35.5)
MCV RBC AUTO: 88.1 FL (ref 80–94)
PLATELET # BLD: 283 THOU/MM3 (ref 130–400)
PMV BLD AUTO: 8.2 FL (ref 9.4–12.4)
POTASSIUM SERPL-SCNC: 4 MEQ/L (ref 3.5–5.2)
RBC # BLD: 3.96 MILL/MM3 (ref 4.7–6.1)
SODIUM BLD-SCNC: 138 MEQ/L (ref 135–145)
WBC # BLD: 15.8 THOU/MM3 (ref 4.8–10.8)

## 2020-05-15 PROCEDURE — 97535 SELF CARE MNGMENT TRAINING: CPT

## 2020-05-15 PROCEDURE — APPSS180 APP SPLIT SHARED TIME > 60 MINUTES: Performed by: NURSE PRACTITIONER

## 2020-05-15 PROCEDURE — 2580000003 HC RX 258: Performed by: FAMILY MEDICINE

## 2020-05-15 PROCEDURE — 97163 PT EVAL HIGH COMPLEX 45 MIN: CPT

## 2020-05-15 PROCEDURE — 6360000002 HC RX W HCPCS: Performed by: PHYSICIAN ASSISTANT

## 2020-05-15 PROCEDURE — 97166 OT EVAL MOD COMPLEX 45 MIN: CPT

## 2020-05-15 PROCEDURE — 94761 N-INVAS EAR/PLS OXIMETRY MLT: CPT

## 2020-05-15 PROCEDURE — 80048 BASIC METABOLIC PNL TOTAL CA: CPT

## 2020-05-15 PROCEDURE — 85027 COMPLETE CBC AUTOMATED: CPT

## 2020-05-15 PROCEDURE — 6360000002 HC RX W HCPCS: Performed by: NURSE PRACTITIONER

## 2020-05-15 PROCEDURE — 2060000000 HC ICU INTERMEDIATE R&B

## 2020-05-15 PROCEDURE — 99233 SBSQ HOSP IP/OBS HIGH 50: CPT | Performed by: INTERNAL MEDICINE

## 2020-05-15 PROCEDURE — 2580000003 HC RX 258: Performed by: PHYSICIAN ASSISTANT

## 2020-05-15 PROCEDURE — 6370000000 HC RX 637 (ALT 250 FOR IP): Performed by: PHYSICIAN ASSISTANT

## 2020-05-15 PROCEDURE — 82948 REAGENT STRIP/BLOOD GLUCOSE: CPT

## 2020-05-15 PROCEDURE — 97110 THERAPEUTIC EXERCISES: CPT

## 2020-05-15 PROCEDURE — 6370000000 HC RX 637 (ALT 250 FOR IP): Performed by: NURSE PRACTITIONER

## 2020-05-15 PROCEDURE — 93010 ELECTROCARDIOGRAM REPORT: CPT | Performed by: INTERNAL MEDICINE

## 2020-05-15 PROCEDURE — 6360000002 HC RX W HCPCS

## 2020-05-15 PROCEDURE — 93005 ELECTROCARDIOGRAM TRACING: CPT | Performed by: NURSE PRACTITIONER

## 2020-05-15 PROCEDURE — 36415 COLL VENOUS BLD VENIPUNCTURE: CPT

## 2020-05-15 RX ORDER — PROMETHAZINE HYDROCHLORIDE 25 MG/ML
6.25 INJECTION, SOLUTION INTRAMUSCULAR; INTRAVENOUS EVERY 6 HOURS PRN
Status: DISCONTINUED | OUTPATIENT
Start: 2020-05-15 | End: 2020-05-19 | Stop reason: HOSPADM

## 2020-05-15 RX ORDER — NICOTINE POLACRILEX 4 MG
15 LOZENGE BUCCAL PRN
Status: DISCONTINUED | OUTPATIENT
Start: 2020-05-15 | End: 2020-05-19 | Stop reason: HOSPADM

## 2020-05-15 RX ORDER — SODIUM CHLORIDE 9 MG/ML
INJECTION, SOLUTION INTRAVENOUS CONTINUOUS
Status: DISCONTINUED | OUTPATIENT
Start: 2020-05-15 | End: 2020-05-16

## 2020-05-15 RX ORDER — DEXTROSE MONOHYDRATE 25 G/50ML
12.5 INJECTION, SOLUTION INTRAVENOUS PRN
Status: DISCONTINUED | OUTPATIENT
Start: 2020-05-15 | End: 2020-05-19 | Stop reason: HOSPADM

## 2020-05-15 RX ORDER — DEXTROSE MONOHYDRATE 50 MG/ML
100 INJECTION, SOLUTION INTRAVENOUS PRN
Status: DISCONTINUED | OUTPATIENT
Start: 2020-05-15 | End: 2020-05-19 | Stop reason: HOSPADM

## 2020-05-15 RX ADMIN — INSULIN LISPRO 1 UNITS: 100 INJECTION, SOLUTION INTRAVENOUS; SUBCUTANEOUS at 13:05

## 2020-05-15 RX ADMIN — Medication 10 ML: at 20:52

## 2020-05-15 RX ADMIN — SODIUM CHLORIDE: 9 INJECTION, SOLUTION INTRAVENOUS at 13:05

## 2020-05-15 RX ADMIN — DEXAMETHASONE SODIUM PHOSPHATE 4 MG: 4 INJECTION, SOLUTION INTRA-ARTICULAR; INTRALESIONAL; INTRAMUSCULAR; INTRAVENOUS; SOFT TISSUE at 08:35

## 2020-05-15 RX ADMIN — Medication 10 ML: at 08:37

## 2020-05-15 RX ADMIN — CYCLOBENZAPRINE HYDROCHLORIDE 10 MG: 10 TABLET, FILM COATED ORAL at 16:18

## 2020-05-15 RX ADMIN — CYCLOBENZAPRINE HYDROCHLORIDE 10 MG: 10 TABLET, FILM COATED ORAL at 03:12

## 2020-05-15 RX ADMIN — DEXAMETHASONE SODIUM PHOSPHATE 4 MG: 4 INJECTION, SOLUTION INTRA-ARTICULAR; INTRALESIONAL; INTRAMUSCULAR; INTRAVENOUS; SOFT TISSUE at 03:12

## 2020-05-15 RX ADMIN — DEXTROSE MONOHYDRATE 2 G: 50 INJECTION, SOLUTION INTRAVENOUS at 20:54

## 2020-05-15 RX ADMIN — DEXAMETHASONE SODIUM PHOSPHATE 4 MG: 4 INJECTION, SOLUTION INTRA-ARTICULAR; INTRALESIONAL; INTRAMUSCULAR; INTRAVENOUS; SOFT TISSUE at 16:16

## 2020-05-15 RX ADMIN — PROMETHAZINE HYDROCHLORIDE 6.25 MG: 25 INJECTION INTRAMUSCULAR; INTRAVENOUS at 02:05

## 2020-05-15 RX ADMIN — INSULIN LISPRO 1 UNITS: 100 INJECTION, SOLUTION INTRAVENOUS; SUBCUTANEOUS at 18:14

## 2020-05-15 RX ADMIN — CEFAZOLIN 2 G: 10 INJECTION, POWDER, FOR SOLUTION INTRAVENOUS at 05:30

## 2020-05-15 RX ADMIN — INSULIN LISPRO 1 UNITS: 100 INJECTION, SOLUTION INTRAVENOUS; SUBCUTANEOUS at 10:21

## 2020-05-15 RX ADMIN — DEXAMETHASONE SODIUM PHOSPHATE 4 MG: 4 INJECTION, SOLUTION INTRA-ARTICULAR; INTRALESIONAL; INTRAMUSCULAR; INTRAVENOUS; SOFT TISSUE at 20:50

## 2020-05-15 RX ADMIN — KETOROLAC TROMETHAMINE 15 MG: 30 INJECTION, SOLUTION INTRAMUSCULAR at 00:15

## 2020-05-15 RX ADMIN — DEXTROSE MONOHYDRATE 2 G: 50 INJECTION, SOLUTION INTRAVENOUS at 12:16

## 2020-05-15 RX ADMIN — DEXTROSE MONOHYDRATE 2 G: 50 INJECTION, SOLUTION INTRAVENOUS at 05:30

## 2020-05-15 ASSESSMENT — PAIN DESCRIPTION - FREQUENCY
FREQUENCY: INTERMITTENT
FREQUENCY: INTERMITTENT

## 2020-05-15 ASSESSMENT — PAIN DESCRIPTION - PAIN TYPE
TYPE: SURGICAL PAIN
TYPE: SURGICAL PAIN;ACUTE PAIN
TYPE: SURGICAL PAIN
TYPE: SURGICAL PAIN

## 2020-05-15 ASSESSMENT — PAIN DESCRIPTION - ONSET
ONSET: GRADUAL
ONSET: GRADUAL

## 2020-05-15 ASSESSMENT — PAIN DESCRIPTION - PROGRESSION
CLINICAL_PROGRESSION: NOT CHANGED

## 2020-05-15 ASSESSMENT — PAIN SCALES - GENERAL
PAINLEVEL_OUTOF10: 8
PAINLEVEL_OUTOF10: 0
PAINLEVEL_OUTOF10: 7
PAINLEVEL_OUTOF10: 3
PAINLEVEL_OUTOF10: 3

## 2020-05-15 ASSESSMENT — PAIN DESCRIPTION - ORIENTATION
ORIENTATION: MID

## 2020-05-15 ASSESSMENT — PAIN DESCRIPTION - LOCATION
LOCATION: BACK

## 2020-05-15 ASSESSMENT — ENCOUNTER SYMPTOMS
COLOR CHANGE: 0
SORE THROAT: 0
TROUBLE SWALLOWING: 0
SHORTNESS OF BREATH: 0
BACK PAIN: 0
PHOTOPHOBIA: 0
VOMITING: 0
NAUSEA: 0
WHEEZING: 0

## 2020-05-15 ASSESSMENT — PAIN DESCRIPTION - DESCRIPTORS
DESCRIPTORS: ACHING
DESCRIPTORS: ACHING

## 2020-05-15 ASSESSMENT — PAIN - FUNCTIONAL ASSESSMENT
PAIN_FUNCTIONAL_ASSESSMENT: ACTIVITIES ARE NOT PREVENTED
PAIN_FUNCTIONAL_ASSESSMENT: ACTIVITIES ARE NOT PREVENTED

## 2020-05-15 NOTE — PROGRESS NOTES
Neurosurgery Progress Note    Patient:  Elie Alcocer      Unit/Bed:4D-01/001-A    YOB: 1975    MRN: [id number]     Acct: [de-identified]     Admit date: 5/14/2020    No chief complaint on file. Patient Seen, Chart, Physician notes, Labs, Radiology studies reviewed. Subjective: He remains POD #1 following thoracic laminectomy for resection of intradural mass performed by Dr. Tye Santiago, without complication. Pain is well controlled this morning and he is without significant complaints other than some swelling in the lower extremities bilaterally. Past, Family, Social History unchanged from admission. Diet:  DIET CARB CONTROL;    Medications:  Scheduled Meds:   insulin lispro  0-6 Units Subcutaneous TID WC    insulin lispro  0-3 Units Subcutaneous Nightly    sodium chloride flush  10 mL Intravenous 2 times per day    ceFAZolin (ANCEF) IVPB  2 g Intravenous Q8H    dexamethasone  4 mg Intravenous Q6H     Continuous Infusions:   dextrose       PRN Meds:promethazine, glucose, dextrose, glucagon (rDNA), dextrose, sodium chloride flush, [DISCONTINUED] promethazine **OR** ondansetron, HYDROcodone 5 mg - acetaminophen, HYDROmorphone, ketorolac, cyclobenzaprine    Objective: He is sitting up in bed with the head of the bed elevated approximately 30 degrees, comfortably. Incision is dry with dressings intact and Hemovac drain is intact and functioning at 100 cc per shift. He is intact for motor lower extremities bilaterally with improved sensation on exam this morning. He still displays spasticity for lower extremities with effort. Vitals: /76   Pulse 112   Temp 98.2 °F (36.8 °C) (Oral)   Resp 16   Ht 5' 10\" (1.778 m)   Wt 227 lb 0.5 oz (103 kg)   SpO2 95%   BMI 32.58 kg/m²   Physical Exam:  Alert and attentive. Language appropriate, with no aphasia. Pupils equal.  Facial strength symmetric. Physical Exam  Little change from admission.   Mild improvement for lower extremity strength with continued spasticity. Pain is well controlled. See HPI for detailed exam.  ROS:  Review of Systems  She denies headache, nausea or vomiting, chest pain, shortness of breath. Positive for mild incisional site discomfort. Remaining 12 points the review of systems otherwise negative    24 hour intake/output:    Intake/Output Summary (Last 24 hours) at 5/15/2020 0726  Last data filed at 5/14/2020 2305  Gross per 24 hour   Intake 3692.7 ml   Output 2100 ml   Net 1592.7 ml     Last 3 weights: Wt Readings from Last 3 Encounters:   05/14/20 227 lb 0.5 oz (103 kg)   04/29/20 195 lb (88.5 kg)   03/25/20 195 lb 8 oz (88.7 kg)         CBC:   Recent Labs     05/14/20  1558 05/15/20  0330   WBC 12.8* 15.8*   HGB 12.4* 11.3*    283     BMP:    Recent Labs     05/14/20  1558 05/15/20  0330    138   K 4.6 4.0    103   CO2 21* 23   BUN 15 17   CREATININE 0.8 0.7   GLUCOSE 168* 166*     Calcium:  Recent Labs     05/15/20  0330   CALCIUM 8.3*     Magnesium:No results for input(s): MG in the last 72 hours. Glucose:  Recent Labs     05/14/20  0623 05/15/20  0149   POCGLU 118* 166*     HgbA1C:   Recent Labs     05/14/20  1558   LABA1C 6.0     Lipids: No results for input(s): CHOL, TRIG, HDL, LDLCALC in the last 72 hours. Invalid input(s): LDL    Radiology reports as per the Radiologist  Radiology: Mri Thoracic Spine W Wo Contrast    Result Date: 4/22/2020  PROCEDURE: MRI THORACIC SPINE W WO CONTRAST CLINICAL INFORMATION: Arachnoid cyst of spine, Thoracic myelopathy. Bilateral lower extremity weakness and numbness. Follow-up. COMPARISON: 3/5/2020. TECHNIQUE: Sagittal T1, T2 and STIR sequences were obtained through the thoracic spine. Axial T2-weighted images were obtained through the discs. Postcontrast axial and sagittal T1-weighted images were obtained. In addition, heavily T2-weighted axial and  sagittal CISS images of the thoracic spine were obtained.  15 mL ProHance was injected in the right

## 2020-05-15 NOTE — PROGRESS NOTES
Patient complains of parasthesia in fingers, new, post back surgery. Contacted Dr. Girtha Alpers to make aware, he instructed this nurse to continue observation.  Will do and contact physician should new issues occur

## 2020-05-15 NOTE — PROGRESS NOTES
History:   Lifetime non-smoker, denies EtOH use, denies drug use    Review of Systems   Constitutional: Negative for fatigue and fever. HENT: Negative for sore throat and trouble swallowing. Eyes: Negative for photophobia. Respiratory: Negative for shortness of breath and wheezing. Cardiovascular: Negative for chest pain and leg swelling. Gastrointestinal: Negative for nausea and vomiting. Endocrine: Negative for polydipsia and polyphagia. Genitourinary: Negative for decreased urine volume and flank pain. Musculoskeletal: Negative for back pain and neck pain. Skin: Positive for wound. Negative for color change, pallor and rash. Allergic/Immunologic: Negative for food allergies and immunocompromised state. Neurological: Negative for dizziness, weakness and numbness. Hematological: Negative for adenopathy. Psychiatric/Behavioral: Negative for agitation, confusion and self-injury. The patient is not nervous/anxious. Scheduled Meds:   insulin lispro  0-6 Units Subcutaneous TID WC    insulin lispro  0-3 Units Subcutaneous Nightly    sodium chloride flush  10 mL Intravenous 2 times per day    ceFAZolin (ANCEF) IVPB  2 g Intravenous Q8H    dexamethasone  4 mg Intravenous Q6H     Continuous Infusions:   dextrose         PHYSICAL EXAMINATION:  T:  98.2.  P:  112. RR:  16. B/P:  127/76. FiO2:  0. O2 Sat:  94.  I/O:  3692/2100  Body mass index is 32.58 kg/m². General:   Acute on chronically ill-appearing white male  HEENT:  normocephalic and atraumatic. No scleral icterus. PERR  Neck: supple. No Thyromegaly. Lungs: clear to auscultation. No retractions  Cardiac: sinus tachycardia. No JVD. Abdomen: soft. Nontender. Extremities:  No clubbing, cyanosis, or edema x 4. Vasculature: capillary refill < 3 seconds. Palpable dorsalis pedis pulses. Skin:  warm and dry. Psych:  Alert and oriented x3. Affect appropriate  Lymph:  No supraclavicular adenopathy.   Neurologic:  No

## 2020-05-15 NOTE — PROGRESS NOTES
Patient admitted to 4A Room 05  IV in left forearm. IV site free of s/s of infection or infiltration. Vital signs obtained. Assessment and data collection initiated. Oriented to room. Policies and procedures for 4a explained All questions answered with no further questions at this time. Fall prevention and safety brochure discussed with patient. 2 person skin check completed. Patient declines PCP notification  Patient declines family notification.

## 2020-05-15 NOTE — PLAN OF CARE
"Individual Psychotherapy (PhD/LCSW)    2018    Site:  [facility]         Therapeutic Intervention: Met with patient.  Outpatient - Insight oriented psychotherapy 45 min - CPT code 61891    Chief complaint/reason for encounter: depression     Interval history and content of current session:  Patient presents to ongoing individual therapy due to depression.  He admits that he has had thoughts of writing a good bye letter.  Her notes that he does not "want to do that!"  He struggles more with a depressed mood on his days off.  He is able recognize that he was struggling yesterday because he was watching television programs where relationships were featured.  He admits that, after his wife , he did not think he would have another relationship.  He was friends with his co worker, Solange, but he did not see her as a romantic interest.  He did not think he would find anyone better than his wife, but he found someone "100%" better.  He still has not heard anything from his love interest.  He has considered trying to contact her again.  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  He has been exercising on his days off.  He is reaching some physical goal with increased repetitions.  His son is not coming to [place] because he is able to communicate with court through a teleconference.  The patient was not able to help his son with a loan due to his credit being poor.  He is looking forward to a Ga[place] party.  He has been planning his costume.  He is encourage to notice and dispute negative ruminative thoughts.    Treatment plan:  Target symptoms: depression  Why chosen therapy is appropriate versus another modality: relevant to diagnosis  Outcome monitoring methods: self-report, observation  Therapeutic intervention type: insight oriented psychotherapy, supportive psychotherapy, interactive psychotherapy     Risk parameters:  Patient reports no suicidal ideation  Patient reports no homicidal ideation  Patient " Problem: Falls - Risk of:  Goal: Will remain free from falls  Description: Will remain free from falls  Outcome: Ongoing  Note: Patient is in bed, wheels locked bed in lowest position. Call light in reach and bed alarm on. Patient was instructed on how to use call light if assistance is needed. Goal: Absence of physical injury  Description: Absence of physical injury  Outcome: Ongoing  Note: Patient in bed, bed alarm on, no injury     Problem: Pain:  Goal: Pain level will decrease  Description: Pain level will decrease  Outcome: Ongoing  Note: Patient complains of pain, and was given prn medications with some effectiveness. Goal: Control of chronic pain  Description: Control of chronic pain  Outcome: Ongoing     Problem: Pain:  Goal: Control of acute pain  Description: Control of acute pain  Note: Patients pain is assessed frequently.  He is also offered pain medication when available, for pain reports no self-injurious behavior  Patient reports no violent behavior     Verbal deficits: Nonee     Patient's response to intervention:  The patient's response to intervention is accepting, motivated.     Progress toward goals and other mental status changes:  The patient's progress toward goals is fair .     Diagnosis:   Major depression recurrent moderate     Plan:  individual psychotherapy and medication management by physician     Return to clinic: as scheduled     Length of Service (minutes): 45

## 2020-05-15 NOTE — PLAN OF CARE
Problem: Falls - Risk of:  Goal: Will remain free from falls  Description: Will remain free from falls  5/15/2020 1500 by Constantino Davis RN  Outcome: Ongoing  Note: Pt working with therapy. Pt up with one assist and walker and tolerating well at this time. Bed alarm on, call light in reach. Problem: Pain:  Goal: Pain level will decrease  Description: Pain level will decrease  5/15/2020 1500 by Constantino Davis RN  Outcome: Ongoing  Note: Pt's stated pain goal is \"0\". Pt has denied any pain so far this shift. Problem: Neurological  Goal: Maximum potential motor/sensory/cognitive function  Outcome: Ongoing  Note: Pt alert and oriented x 4 at this time. Pt does have dysarthria. Pt has N/T in bilateral lower extremities. Problem: Skin Integrity:  Goal: Will show no infection signs and symptoms  Description: Will show no infection signs and symptoms  Outcome: Ongoing  Note: Pt has scattered abrasions. Pt has redness on bilateral heels and buttocks that blanches. Pt turned and repositioned q 2 hrs and prn. Pt has surgical incision on back with hemovac. Dressing changed this shift. Care plan reviewed with patient. Patient verbalizes understanding of the plan of care and contributes to goal setting at this time.

## 2020-05-15 NOTE — CARE COORDINATION
in past. Explained insurance would need to approve and he would have to meet criteria. Discussed HH vs ECF as back-up options. Await PMR consult and therapy evals. Trasnferred to 4a, handoff report given. Transportation/Food Security/Housekeeping Addressed:  No issues identified.     Evaluation: no

## 2020-05-15 NOTE — PROGRESS NOTES
Raji Barrera 58  INPATIENT OCCUPATIONAL THERAPY  [facility] NEUROSCIENCES 4A  EVALUATION    Time:   Time In: 4968  Time Out: 1109  Timed Code Treatment Minutes: 14 Minutes  Minutes: 24          Date: 5/15/2020  Patient Name: Ana Zhou,   Gender: male      MRN: [id number]  : 1975  (53 y.o.)        Additional Pertinent Hx: \" Vaibhav Quiñones is a 47-year-old white male who presented to [facility] on 2020 for scheduled thoracic laminectomy with Dr. Annamaria Bautista. He has a past medical history of diabetes mellitus. Presented with progressive weakness in lower extremities over several weeks associated with progressive numbness in the legs. He also experienced episodes of loss of control of urine. Symptoms improved temporarily with steroids but then he again began to deteriorate over the last few weeks. He had significant weakness in lower extremities and hyperreflexia of the lower extremities. Lumbar spine revealed on thoracic pain requiring T7-9 cord signal consistent with cord edema versus myomalacia. Thoracic laminectomy and resection of intradural mass performed 2020\"    Restrictions/Precautions:  Restrictions/Precautions: General Precautions, Fall Risk  Position Activity Restriction  Spinal Precautions: No Bending, No Lifting, No Twisting    Subjective  Chart Reviewed: Yes, Progress Notes, Orders  Patient assessed for rehabilitation services?: Yes    Subjective: RN okayed therapy session. Pt sitting in recliner and agreeable to OT session.      Pain:  Pain Assessment  Patient Currently in Pain: Yes  Pain Type: Surgical pain  Pain Location: Back    Social/Functional History:  Lives With: Family  Type of Home: Apartment  Home Layout: Two level(Pt lives in second story of apartment building)  Home Access: Stairs to enter with rails  Entrance Stairs - Number of Steps: 8 + 8 and 2 HRs to second floor apt  Home Equipment: Rolling walker   Bathroom Shower/Tub: Tub/Shower

## 2020-05-15 NOTE — PROGRESS NOTES
with bedrail   Rolling to Right: Moderate Assistance, X 1, with increased time for completion, to bedrail   Supine to Sit: Moderate Assistance, X 1, with head of bed raised, with increased time for completion, with bedrail  Scooting: Moderate Assistance    Transfers:  Sit to Stand: Minimal Assistance, X 1, with increased time for completion, cues for hand placement, to/from chair without arms  Stand to Sit:Minimal Assistance, X 1, with increased time for completion, cues for hand placement, to/from chair without arms    Ambulation:  Minimal Assistance, X 1, with cues for safety, with increased time for completion  Distance: 3 feet  Surface: Level Tile  Device:Rolling Walker  Gait Deviations: Forward Flexed Posture, Slow Kellen, Decreased Step Length Bilaterally, Decreased Gait Speed and Decreased Heel Strike Bilaterally    Exercise:  Patient was guided in 1 set(s) 10 reps of exercise to both lower extremities. Ankle pumps, Long arc quads and Seated hip flexion. Exercises were completed for increased independence with functional mobility. Functional Outcome Measures: Completed  AM-PAC Inpatient Mobility without Stair Climbing Raw Score : 13  AM-PAC Inpatient without Stair Climbing T-Scale Score : 38.96    ASSESSMENT:  Activity Tolerance:  Patient tolerance of  treatment: good. Treatment Initiated: Treatment and education initiated within context of evaluation. Evaluation time included review of current medical information, gathering information related to past medical, social and functional history, completion of standardized testing, formal and informal observation of tasks, assessment of data and development of plan of care and goals. Treatment time included skilled education and facilitation of tasks to increase safety and independence with functional mobility for improved independence and quality of life. Assessment:   Body structures, Functions, Activity limitations: Decreased functional mobility , Decreased endurance, Decreased balance, Decreased strength, Increased pain  Assessment: Pt with generalized weakness , decreased balance, functional mobility and acitivty tolerance. Pt has pain in back from the sx 5-14. He reports numbness in the left LE getting worse more distally. Pt need skilled PT to improve safety with mob ility and to return home  after rehab  Prognosis: Good    REQUIRES PT FOLLOW UP: Yes    Discharge Recommendations:  Discharge Recommendations: IP Rehab, Patient would benefit from continued therapy after discharge    Patient Education:  PT Education: Goals, PT Role, Precautions, Plan of Care, Home Exercise Program, Gait Training, Functional Mobility Training    Equipment Recommendations:  Equipment Needed: No    Plan:  Times per week: 6-7 X O  Current Treatment Recommendations: Strengthening, Transfer Training, Endurance Training, Balance Training, Gait Training, Stair training, Functional Mobility Training, Home Exercise Program    Goals:  Patient goals : Go to rehab  Short term goals  Time Frame for Short term goals: by discharge  Short term goal 1: supine to sit and return with log roll with Min A to get in and out of bed   Short term goal 2: sit to stand with CGA of 1 to get on and off various surfaces  Short term goal 3: Ambulate with RW 40 feet with CGA to walk to bathroom  Long term goals  Time Frame for Long term goals : NA DUE TO SHORT ELOS    Following session, patient left in safe position with all fall risk precautions in place.

## 2020-05-16 LAB
ANION GAP SERPL CALCULATED.3IONS-SCNC: 10 MEQ/L (ref 8–16)
BUN BLDV-MCNC: 14 MG/DL (ref 7–22)
CALCIUM SERPL-MCNC: 8.6 MG/DL (ref 8.5–10.5)
CHLORIDE BLD-SCNC: 106 MEQ/L (ref 98–111)
CO2: 26 MEQ/L (ref 23–33)
CREAT SERPL-MCNC: 0.7 MG/DL (ref 0.4–1.2)
ERYTHROCYTE [DISTWIDTH] IN BLOOD BY AUTOMATED COUNT: 14.2 % (ref 11.5–14.5)
ERYTHROCYTE [DISTWIDTH] IN BLOOD BY AUTOMATED COUNT: 46.1 FL (ref 35–45)
GFR SERPL CREATININE-BSD FRML MDRD: > 90 ML/MIN/1.73M2
GLUCOSE BLD-MCNC: 152 MG/DL (ref 70–108)
GLUCOSE BLD-MCNC: 159 MG/DL (ref 70–108)
GLUCOSE BLD-MCNC: 163 MG/DL (ref 70–108)
GLUCOSE BLD-MCNC: 169 MG/DL (ref 70–108)
GLUCOSE BLD-MCNC: 205 MG/DL (ref 70–108)
HCT VFR BLD CALC: 35.4 % (ref 42–52)
HEMOGLOBIN: 11.6 GM/DL (ref 14–18)
MCH RBC QN AUTO: 29.2 PG (ref 26–33)
MCHC RBC AUTO-ENTMCNC: 32.8 GM/DL (ref 32.2–35.5)
MCV RBC AUTO: 89.2 FL (ref 80–94)
MRSA SCREEN: NORMAL
PLATELET # BLD: 273 THOU/MM3 (ref 130–400)
PMV BLD AUTO: 8.6 FL (ref 9.4–12.4)
POTASSIUM SERPL-SCNC: 4.1 MEQ/L (ref 3.5–5.2)
RBC # BLD: 3.97 MILL/MM3 (ref 4.7–6.1)
SODIUM BLD-SCNC: 142 MEQ/L (ref 135–145)
URINE CULTURE, ROUTINE: NORMAL
VRE CULTURE: NORMAL
WBC # BLD: 13.5 THOU/MM3 (ref 4.8–10.8)

## 2020-05-16 PROCEDURE — 94760 N-INVAS EAR/PLS OXIMETRY 1: CPT

## 2020-05-16 PROCEDURE — 6370000000 HC RX 637 (ALT 250 FOR IP): Performed by: FAMILY MEDICINE

## 2020-05-16 PROCEDURE — 97110 THERAPEUTIC EXERCISES: CPT

## 2020-05-16 PROCEDURE — 6370000000 HC RX 637 (ALT 250 FOR IP): Performed by: PHYSICIAN ASSISTANT

## 2020-05-16 PROCEDURE — 6360000002 HC RX W HCPCS: Performed by: PHYSICIAN ASSISTANT

## 2020-05-16 PROCEDURE — 85027 COMPLETE CBC AUTOMATED: CPT

## 2020-05-16 PROCEDURE — 82948 REAGENT STRIP/BLOOD GLUCOSE: CPT

## 2020-05-16 PROCEDURE — 2060000000 HC ICU INTERMEDIATE R&B

## 2020-05-16 PROCEDURE — 2580000003 HC RX 258: Performed by: PHYSICIAN ASSISTANT

## 2020-05-16 PROCEDURE — 80048 BASIC METABOLIC PNL TOTAL CA: CPT

## 2020-05-16 PROCEDURE — 97116 GAIT TRAINING THERAPY: CPT

## 2020-05-16 PROCEDURE — 97530 THERAPEUTIC ACTIVITIES: CPT

## 2020-05-16 PROCEDURE — 36415 COLL VENOUS BLD VENIPUNCTURE: CPT

## 2020-05-16 PROCEDURE — 99232 SBSQ HOSP IP/OBS MODERATE 35: CPT | Performed by: FAMILY MEDICINE

## 2020-05-16 RX ORDER — DEXAMETHASONE SODIUM PHOSPHATE 4 MG/ML
2 INJECTION, SOLUTION INTRA-ARTICULAR; INTRALESIONAL; INTRAMUSCULAR; INTRAVENOUS; SOFT TISSUE EVERY 12 HOURS
Status: DISCONTINUED | OUTPATIENT
Start: 2020-05-17 | End: 2020-05-17

## 2020-05-16 RX ORDER — DOCUSATE SODIUM 100 MG/1
CAPSULE, LIQUID FILLED ORAL
Status: DISPENSED
Start: 2020-05-16 | End: 2020-05-16

## 2020-05-16 RX ORDER — POLYETHYLENE GLYCOL 3350 17 G/17G
17 POWDER, FOR SOLUTION ORAL DAILY
Status: DISCONTINUED | OUTPATIENT
Start: 2020-05-16 | End: 2020-05-19 | Stop reason: HOSPADM

## 2020-05-16 RX ORDER — DOCUSATE SODIUM 100 MG/1
100 CAPSULE, LIQUID FILLED ORAL 2 TIMES DAILY
Status: DISCONTINUED | OUTPATIENT
Start: 2020-05-16 | End: 2020-05-19 | Stop reason: HOSPADM

## 2020-05-16 RX ADMIN — POLYETHYLENE GLYCOL 3350 17 G: 17 POWDER, FOR SOLUTION ORAL at 23:36

## 2020-05-16 RX ADMIN — DOCUSATE SODIUM 100 MG: 100 CAPSULE, LIQUID FILLED ORAL at 20:41

## 2020-05-16 RX ADMIN — MAGNESIUM HYDROXIDE 30 ML: 2400 SUSPENSION ORAL at 14:02

## 2020-05-16 RX ADMIN — INSULIN LISPRO 1 UNITS: 100 INJECTION, SOLUTION INTRAVENOUS; SUBCUTANEOUS at 18:09

## 2020-05-16 RX ADMIN — INSULIN LISPRO 1 UNITS: 100 INJECTION, SOLUTION INTRAVENOUS; SUBCUTANEOUS at 08:26

## 2020-05-16 RX ADMIN — DOCUSATE SODIUM 100 MG: 100 CAPSULE, LIQUID FILLED ORAL at 08:25

## 2020-05-16 RX ADMIN — INSULIN LISPRO 1 UNITS: 100 INJECTION, SOLUTION INTRAVENOUS; SUBCUTANEOUS at 13:02

## 2020-05-16 RX ADMIN — DEXTROSE MONOHYDRATE 2 G: 50 INJECTION, SOLUTION INTRAVENOUS at 04:43

## 2020-05-16 RX ADMIN — HYDROCODONE BITARTRATE AND ACETAMINOPHEN 1 TABLET: 5; 325 TABLET ORAL at 20:40

## 2020-05-16 RX ADMIN — DEXAMETHASONE SODIUM PHOSPHATE 4 MG: 4 INJECTION, SOLUTION INTRA-ARTICULAR; INTRALESIONAL; INTRAMUSCULAR; INTRAVENOUS; SOFT TISSUE at 03:16

## 2020-05-16 RX ADMIN — CYCLOBENZAPRINE HYDROCHLORIDE 10 MG: 10 TABLET, FILM COATED ORAL at 03:51

## 2020-05-16 RX ADMIN — Medication 10 ML: at 20:42

## 2020-05-16 RX ADMIN — DEXAMETHASONE SODIUM PHOSPHATE 4 MG: 4 INJECTION, SOLUTION INTRA-ARTICULAR; INTRALESIONAL; INTRAMUSCULAR; INTRAVENOUS; SOFT TISSUE at 08:25

## 2020-05-16 RX ADMIN — DEXTROSE MONOHYDRATE 2 G: 50 INJECTION, SOLUTION INTRAVENOUS at 14:01

## 2020-05-16 ASSESSMENT — ENCOUNTER SYMPTOMS
ABDOMINAL PAIN: 0
APNEA: 0
CONSTIPATION: 1
CHEST TIGHTNESS: 0
ABDOMINAL DISTENTION: 0
SHORTNESS OF BREATH: 0

## 2020-05-16 ASSESSMENT — PAIN SCALES - GENERAL: PAINLEVEL_OUTOF10: 4

## 2020-05-16 NOTE — PROGRESS NOTES
for input(s): Michelle Js in the last 72 hours. Urinalysis:      Lab Results   Component Value Date    NITRU NEGATIVE 03/05/2020    WBCUA NONE SEEN 03/05/2020    BACTERIA NONE SEEN 03/05/2020    RBCUA 5-10 03/05/2020    BLOODU TRACE 03/05/2020    GLUCOSEU NEGATIVE 03/05/2020       Radiology:  [facility] FOR SURGICAL PROCEDURES   Final Result      XR Thoracic Spine 1 VW   Final Result   Intraoperative appearance thoracic spine. **This report has been created using voice recognition software. It may contain minor errors which are inherent in voice recognition technology. **      Final report electronically signed by Dr. Lance Al on 5/14/2020 2:41 PM          Diet: DIET CARB CONTROL;    DVT prophylaxis: [] Lovenox                                 [] SCDs                                 [] SQ Heparin                                 [] Encourage ambulation           [] Already on Anticoagulation     Disposition:    [] Home       [] TCU       [] Rehab       [] Psych       [] SNF       [] [place]       [] Other-    Code Status: Full Code    PT/OT Eval Status:         Electronically signed by David Pedraza MD on 5/16/2020 at 8:08 AM

## 2020-05-16 NOTE — PROGRESS NOTES
Neurosurgery Progress Note    Patient:  Onelia Gonzalez      Unit/Bed:4A-05/005-A    YOB: 1975    MRN: [id number]     Acct: [de-identified]     Admit date: 5/14/2020    No chief complaint on file. Patient Seen, Chart, Physician notes, Labs, Radiology studies reviewed. Subjective: He remains POD #2 following thoracic laminectomy for resection of intradural mass performed by Dr. Adarsh Tejada, without complication. Pain is well controlled this morning and he is without significant complaints other than mild incisional site discomfort. Past, Family, Social History unchanged from admission. Diet:  DIET CARB CONTROL;    Medications:  Scheduled Meds:   docusate sodium  100 mg Oral BID    docusate sodium        insulin lispro  0-6 Units Subcutaneous TID WC    insulin lispro  0-3 Units Subcutaneous Nightly    sodium chloride flush  10 mL Intravenous 2 times per day    ceFAZolin (ANCEF) IVPB  2 g Intravenous Q8H     Continuous Infusions:   dextrose       PRN Meds:magnesium hydroxide, promethazine, glucose, dextrose, glucagon (rDNA), dextrose, sodium chloride flush, [DISCONTINUED] promethazine **OR** ondansetron, HYDROcodone 5 mg - acetaminophen, HYDROmorphone, ketorolac, cyclobenzaprine    Objective: He is sitting in bed comfortably with the head of the bed elevated 30 degrees with pain well-controlled. Incision is flat with dry intact dressings in place and Hemovac drain in place and functioning at 30 cc per shift. He demonstrates improvement for lower extremity strength and sensation and an improvement for proprioception bilaterally. Vitals: /71   Pulse 89   Temp 98.1 °F (36.7 °C) (Oral)   Resp 16   Ht 5' 10\" (1.778 m)   Wt 235 lb 12.8 oz (107 kg)   SpO2 93%   BMI 33.83 kg/m²   Physical Exam:  Alert and attentive. Language appropriate, with no aphasia. Pupils equal.  Facial strength symmetric. Physical Exam  Constitutional:       Appearance: He is obese.    HENT:      Head: Normocephalic and atraumatic. Eyes:      Extraocular Movements: Extraocular movements intact. Pupils: Pupils are equal, round, and reactive to light. Neck:      Musculoskeletal: Normal range of motion. No muscular tenderness. Cardiovascular:      Rate and Rhythm: Normal rate and regular rhythm. Pulses: Normal pulses. Heart sounds: No murmur. Pulmonary:      Effort: Pulmonary effort is normal. No respiratory distress. Breath sounds: Normal breath sounds. No wheezing. Abdominal:      General: Abdomen is flat. Bowel sounds are normal. There is no distension. Tenderness: There is no abdominal tenderness. Musculoskeletal: Normal range of motion. General: Tenderness present. Skin:     General: Skin is warm and dry. Neurological:      General: No focal deficit present. Mental Status: He is alert and oriented to person, place, and time. Sensory: Sensory deficit present. Motor: Weakness present. Gait: Gait abnormal.   Psychiatric:         Mood and Affect: Mood normal.         Behavior: Behavior normal.         Thought Content: Thought content normal.         Judgment: Judgment normal.         ROS:  Review of Systems   Constitutional: Positive for activity change. Respiratory: Negative for apnea, chest tightness and shortness of breath. Cardiovascular: Negative for chest pain and leg swelling. Gastrointestinal: Positive for constipation. Negative for abdominal distention and abdominal pain. Genitourinary: Negative for difficulty urinating. Neurological: Positive for weakness and numbness. Negative for dizziness, speech difficulty and headaches. Psychiatric/Behavioral: Negative for agitation, behavioral problems, confusion and decreased concentration.          24 hour intake/output:    Intake/Output Summary (Last 24 hours) at 5/16/2020 1018  Last data filed at 5/16/2020 0352  Gross per 24 hour   Intake 1678.65 ml   Output 2830 ml   Net -1151.35 ml Last 3 weights: Wt Readings from Last 3 Encounters:   05/16/20 235 lb 12.8 oz (107 kg)   04/29/20 195 lb (88.5 kg)   03/25/20 195 lb 8 oz (88.7 kg)         CBC:   Recent Labs     05/14/20  1558 05/15/20  0330 05/16/20  0313   WBC 12.8* 15.8* 13.5*   HGB 12.4* 11.3* 11.6*    283 273     BMP:    Recent Labs     05/14/20  1558 05/15/20  0330 05/16/20  0313    138 142   K 4.6 4.0 4.1    103 106   CO2 21* 23 26   BUN 15 17 14   CREATININE 0.8 0.7 0.7   GLUCOSE 168* 166* 163*     Calcium:  Recent Labs     05/16/20 0313   CALCIUM 8.6     Magnesium:No results for input(s): MG in the last 72 hours. Glucose:  Recent Labs     05/15/20  1744 05/15/20  2005 05/16/20  0814   POCGLU 160* 229* 159*     HgbA1C:   Recent Labs     05/14/20  1558   LABA1C 6.0     Lipids: No results for input(s): CHOL, TRIG, HDL, LDLCALC in the last 72 hours. Invalid input(s): LDL    Radiology reports as per the Radiologist  Radiology: Mri Thoracic Spine W Wo Contrast    Result Date: 4/22/2020  PROCEDURE: MRI THORACIC SPINE W WO CONTRAST CLINICAL INFORMATION: Arachnoid cyst of spine, Thoracic myelopathy. Bilateral lower extremity weakness and numbness. Follow-up. COMPARISON: 3/5/2020. TECHNIQUE: Sagittal T1, T2 and STIR sequences were obtained through the thoracic spine. Axial T2-weighted images were obtained through the discs. Postcontrast axial and sagittal T1-weighted images were obtained. In addition, heavily T2-weighted axial and  sagittal CISS images of the thoracic spine were obtained. 15 mL ProHance was injected in the right AC. FINDINGS:  There is a mildly exaggerated thoracic kyphosis, stable compared to prior exam. There is again noted to be a focal area of dilatation of the central canal of the spinal cord at the T9 level with associated expansion of the cord.  This measures 4 mm in greatest diameter, stable compared to prior exam. There is a small amount of hyperintense T2 signal in the cord just superior to the focus of dilatation. No definite enhancement in the cord at this level is identified. There is a small focus of nodular enhancement dorsal to the cord at this level although this appears to be contiguous with a small dorsal vessel and is likely vascular. There is again noted to be mild dorsal compression of the spinal cord immediately superior to the focal area of cord expansion at the T8-9 level. No definite mass is identified although there is artifact from CSF flow in this area which appears asymmetric right to left. No other abnormal signals identified within the thoracic spinal cord. There are stable minimal disc bulges at T7-8 and T8-9 which contacts the ventral aspect of the cord secondary to curvature the spine and dorsal compression of the cord. There is otherwise no significant spinal canal or neuroforaminal stenosis at any thoracic level. Paraspinal soft tissues are unremarkable. Redemonstration of mild dorsal compression of the spinal cord at the T8-9 level with focal dilatation of central canal of the spinal cord at the T9 level. This likely represents subarachnoid adhesions or arachnoid cyst causing the mild dorsal compression of the spinal cord. The focal dilatation of the cord at this level likely represents an associated syrinx. No definite abnormal enhancement to suggest an neoplasm is identified. Subtle nodular enhancement dorsal to the cord appears to represent a mildly prominent vessel. **This report has been created using voice recognition software. It may contain minor errors which are inherent in voice recognition technology. ** Final report electronically signed by Dr. Luis E Mena MD on 4/22/2020 10:22 AM    Xr Thoracic Spine 1 Vw    Result Date: 5/14/2020  PROCEDURE: XR THORACIC SPINE 1 VW CLINICAL INFORMATION: surgery COMPARISON: No prior study. TECHNIQUE: 7 fluoroscopic spot films of the lumbar spine were obtained during surgery performed Dr. Americo Perry.  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  - Doing well. - No acute event over the night. - Feels his legs are stronger comparing with before surgery,  - His gait is better. - Today Neurological exam today showed: improvement in proprioception sensation in both feet comparing with before surgery.    - PT and OT.  - Drain to be removed tomorrow.   -Patient rehab/TCU consultation for discharge plan.   -Can be transferred to the floor from neurosurgical perspective.  Gem Hercules MD
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
## 2020-05-17 LAB
ERYTHROCYTE [DISTWIDTH] IN BLOOD BY AUTOMATED COUNT: 14.5 % (ref 11.5–14.5)
ERYTHROCYTE [DISTWIDTH] IN BLOOD BY AUTOMATED COUNT: 46.8 FL (ref 35–45)
GLUCOSE BLD-MCNC: 108 MG/DL (ref 70–108)
GLUCOSE BLD-MCNC: 110 MG/DL (ref 70–108)
GLUCOSE BLD-MCNC: 137 MG/DL (ref 70–108)
GLUCOSE BLD-MCNC: 141 MG/DL (ref 70–108)
GLUCOSE BLD-MCNC: 156 MG/DL (ref 70–108)
HCT VFR BLD CALC: 35.3 % (ref 42–52)
HEMOGLOBIN: 11.5 GM/DL (ref 14–18)
MCH RBC QN AUTO: 29.3 PG (ref 26–33)
MCHC RBC AUTO-ENTMCNC: 32.6 GM/DL (ref 32.2–35.5)
MCV RBC AUTO: 90.1 FL (ref 80–94)
MRSA SCREEN: NORMAL
PLATELET # BLD: 279 THOU/MM3 (ref 130–400)
PMV BLD AUTO: 8.6 FL (ref 9.4–12.4)
RBC # BLD: 3.92 MILL/MM3 (ref 4.7–6.1)
WBC # BLD: 12.8 THOU/MM3 (ref 4.8–10.8)

## 2020-05-17 PROCEDURE — 2709999900 HC NON-CHARGEABLE SUPPLY

## 2020-05-17 PROCEDURE — 6370000000 HC RX 637 (ALT 250 FOR IP): Performed by: FAMILY MEDICINE

## 2020-05-17 PROCEDURE — 6360000002 HC RX W HCPCS: Performed by: FAMILY MEDICINE

## 2020-05-17 PROCEDURE — 99232 SBSQ HOSP IP/OBS MODERATE 35: CPT | Performed by: FAMILY MEDICINE

## 2020-05-17 PROCEDURE — 99024 POSTOP FOLLOW-UP VISIT: CPT | Performed by: NEUROLOGICAL SURGERY

## 2020-05-17 PROCEDURE — 82948 REAGENT STRIP/BLOOD GLUCOSE: CPT

## 2020-05-17 PROCEDURE — 36415 COLL VENOUS BLD VENIPUNCTURE: CPT

## 2020-05-17 PROCEDURE — 94760 N-INVAS EAR/PLS OXIMETRY 1: CPT

## 2020-05-17 PROCEDURE — 85027 COMPLETE CBC AUTOMATED: CPT

## 2020-05-17 PROCEDURE — 2580000003 HC RX 258: Performed by: PHYSICIAN ASSISTANT

## 2020-05-17 PROCEDURE — 2060000000 HC ICU INTERMEDIATE R&B

## 2020-05-17 PROCEDURE — 6370000000 HC RX 637 (ALT 250 FOR IP): Performed by: PHYSICIAN ASSISTANT

## 2020-05-17 RX ORDER — BISACODYL 10 MG
10 SUPPOSITORY, RECTAL RECTAL DAILY PRN
Status: DISCONTINUED | OUTPATIENT
Start: 2020-05-17 | End: 2020-05-19 | Stop reason: HOSPADM

## 2020-05-17 RX ORDER — SENNA PLUS 8.6 MG/1
1 TABLET ORAL 2 TIMES DAILY
Status: DISCONTINUED | OUTPATIENT
Start: 2020-05-17 | End: 2020-05-19 | Stop reason: HOSPADM

## 2020-05-17 RX ORDER — DEXAMETHASONE SODIUM PHOSPHATE 4 MG/ML
2 INJECTION, SOLUTION INTRA-ARTICULAR; INTRALESIONAL; INTRAMUSCULAR; INTRAVENOUS; SOFT TISSUE EVERY 12 HOURS
Status: COMPLETED | OUTPATIENT
Start: 2020-05-17 | End: 2020-05-17

## 2020-05-17 RX ADMIN — SENNOSIDES 8.6 MG: 8.6 TABLET, FILM COATED ORAL at 09:55

## 2020-05-17 RX ADMIN — INSULIN LISPRO 1 UNITS: 100 INJECTION, SOLUTION INTRAVENOUS; SUBCUTANEOUS at 16:49

## 2020-05-17 RX ADMIN — Medication 10 ML: at 08:42

## 2020-05-17 RX ADMIN — DEXAMETHASONE SODIUM PHOSPHATE 2 MG: 4 INJECTION, SOLUTION INTRA-ARTICULAR; INTRALESIONAL; INTRAMUSCULAR; INTRAVENOUS; SOFT TISSUE at 08:41

## 2020-05-17 RX ADMIN — MAGNESIUM HYDROXIDE 30 ML: 2400 SUSPENSION ORAL at 16:48

## 2020-05-17 RX ADMIN — POLYETHYLENE GLYCOL 3350 17 G: 17 POWDER, FOR SOLUTION ORAL at 08:41

## 2020-05-17 RX ADMIN — DOCUSATE SODIUM 100 MG: 100 CAPSULE, LIQUID FILLED ORAL at 08:41

## 2020-05-17 RX ADMIN — DOCUSATE SODIUM 100 MG: 100 CAPSULE, LIQUID FILLED ORAL at 22:34

## 2020-05-17 RX ADMIN — SENNOSIDES 8.6 MG: 8.6 TABLET, FILM COATED ORAL at 22:34

## 2020-05-17 RX ADMIN — DEXAMETHASONE SODIUM PHOSPHATE 2 MG: 4 INJECTION, SOLUTION INTRA-ARTICULAR; INTRALESIONAL; INTRAMUSCULAR; INTRAVENOUS; SOFT TISSUE at 22:28

## 2020-05-17 RX ADMIN — Medication 10 ML: at 22:28

## 2020-05-17 ASSESSMENT — ENCOUNTER SYMPTOMS
EYE PAIN: 0
ALLERGIC/IMMUNOLOGIC NEGATIVE: 1
NAUSEA: 0
VOICE CHANGE: 0
BACK PAIN: 1
SHORTNESS OF BREATH: 0
TROUBLE SWALLOWING: 0
DIARRHEA: 0
COUGH: 0
CONSTIPATION: 0

## 2020-05-17 NOTE — PROGRESS NOTES
BID  Milk of magnesia daily prn  Dulcolax supp daily prn    Non-anion gap metabolic acidosis, resolved      Chief Complaint:   Lower extremity weakness and numbness    Hospital Course:   Nimisha Corcoran is a 24-year-old white male who presented to [facility] on 5/14/2020 for scheduled thoracic laminectomy with Dr. Janice Alves has a past medical history of diabetes mellitus.  Presented with progressive weakness in lower extremities over several weeks associated with progressive numbness in the legs. He also experienced episodes of loss of control of urine. Symptoms improved temporarily with steroids but then he again began to deteriorate over the last few weeks.  He had significant weakness in lower extremities and hyperreflexia of the lower extremities.  Lumbar spine revealed on thoracic pain requiring T7-9 cord signal consistent with cord edema versus myomalacia. Post surgical intervention patient did return to the ICU. On 5/15, patient was transferred to . Patient was noted to have sinus tachycardia but also receiving high-dose steroids. He denied any chest pain, palpitations or shortness of breath. Tachycardia resolved spontaneously. Steroids weaned for 3 days. Subjective:   Patient seen and examined. He is currently doing well, denies any significant pain postoperatively. He was able to walk along outside his room with physical therapy. He has a mild lower abdominal discomfort. He has not had any bowel movement for the past 4 days. Otherwise no nausea, vomiting and tolerating p.o. Vital signs stable, afebrile.     Medications:  Reviewed    Infusion Medications    dextrose       Scheduled Medications    docusate sodium  100 mg Oral BID    dexamethasone  2 mg Intravenous Q12H    polyethylene glycol  17 g Oral Daily    insulin lispro  0-6 Units Subcutaneous TID WC    insulin lispro  0-3 Units Subcutaneous Nightly    sodium chloride flush  10 mL Intravenous 2 times per day     PRN Meds: magnesium hydroxide, promethazine, glucose, dextrose, glucagon (rDNA), dextrose, sodium chloride flush, [DISCONTINUED] promethazine **OR** ondansetron, HYDROcodone 5 mg - acetaminophen, HYDROmorphone, ketorolac, cyclobenzaprine      Intake/Output Summary (Last 24 hours) at 5/17/2020 0815  Last data filed at 5/16/2020 1625  Gross per 24 hour   Intake 1025 ml   Output 385 ml   Net 640 ml       Diet:  DIET CARB CONTROL; Exam:  /76   Pulse 62   Temp 98 °F (36.7 °C) (Oral)   Resp 16   Ht 5' 10\" (1.778 m)   Wt 235 lb 12.8 oz (107 kg)   SpO2 96%   BMI 33.83 kg/m²     General appearance: No apparent distress, appears stated age and cooperative. HEENT: Pupils equal, round, and reactive to light. Conjunctivae/corneas clear. Neck: Supple, with full range of motion. No jugular venous distention. Trachea midline. Respiratory:  Normal respiratory effort. Clear to auscultation, bilaterally without Rales/Wheezes/Rhonchi. Cardiovascular: Regular rate and rhythm with normal S1/S2 without murmurs, rubs or gallops. Abdomen: Soft, non-tender, non-distended with normal bowel sounds. Musculoskeletal: passive and active ROM x 4 extremities. Skin: Skin color, texture, turgor normal.  No rashes or lesions. Neurologic:  Neurovascularly intact without any focal sensory/motor deficits.  Cranial nerves: II-XII intact, grossly non-focal.  Psychiatric: Alert and oriented, thought content appropriate, normal insight  Capillary Refill: Brisk,< 3 seconds   Peripheral Pulses: +2 palpable, equal bilaterally       Labs:   Recent Labs     05/15/20  0330 05/16/20  0313 05/17/20  0335   WBC 15.8* 13.5* 12.8*   HGB 11.3* 11.6* 11.5*   HCT 34.9* 35.4* 35.3*    273 279     Recent Labs     05/14/20  1558 05/15/20  0330 05/16/20  0313    138 142   K 4.6 4.0 4.1    103 106   CO2 21* 23 26   BUN 15 17 14   CREATININE 0.8 0.7 0.7   CALCIUM 8.0* 8.3* 8.6     No results for input(s): AST, ALT, BILIDIR, BILITOT, ALKPHOS in the last 72 hours. No results for input(s): INR in the last 72 hours. No results for input(s): Jethro Saran in the last 72 hours. Urinalysis:      Lab Results   Component Value Date    NITRU NEGATIVE 03/05/2020    WBCUA NONE SEEN 03/05/2020    BACTERIA NONE SEEN 03/05/2020    RBCUA 5-10 03/05/2020    BLOODU TRACE 03/05/2020    GLUCOSEU NEGATIVE 03/05/2020       Radiology:  FLUORO FOR SURGICAL PROCEDURES   Final Result      XR Thoracic Spine 1 VW   Final Result   Intraoperative appearance thoracic spine. **This report has been created using voice recognition software. It may contain minor errors which are inherent in voice recognition technology. **      Final report electronically signed by Dr. Beverley Dukes on 5/14/2020 2:41 PM          Diet: DIET CARB CONTROL;    DVT prophylaxis: [] Lovenox                                 [] SCDs                                 [] SQ Heparin                                 [] Encourage ambulation           [] Already on Anticoagulation     Disposition:    [] Home       [] TCU       [] Rehab       [] Psych       [] SNF       [] [place]       [] Other-    Code Status: Full Code    PT/OT Eval Status:         Electronically signed by Cehle Lowery MD on 5/17/2020 at 8:15 AM

## 2020-05-17 NOTE — PROGRESS NOTES
in the floor in conjunction with neurosurgery AILIN Ashley PA-C).  Discussed with patient and his nurse and hospitalist service as well as well.  All data and imaging reviewed by myself. I agree with examination assessment and plan as documented above.   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 - Doing well. - No acute event over the night.  - Reported further improvement in his lower extremities. Feels his legs are stronger further comparing with before surgery,  - His gait is better. - Improvement in proprioception sensation in both feet comparing with before surgery.    - PT and OT.  - Drain to be removed today. - Wound: D/C/I   -Patient rehab/TCU consultation for discharge plan. - The intraoperative findings especially that consistent with spinal dural fistula were discussed with the patient.   I told with the patient that I will discuss with him in the future the next step in his treatment plan and if he needs any further specific neuro-imaging in the future for a follow up.  -Patient can be discharged tomorrow from be neurosurgical prospective with a follow up after 12 days from surgery with neurosurgery out pt clinic for sutures removal.    Bogdan Mak MD

## 2020-05-18 LAB
GLUCOSE BLD-MCNC: 114 MG/DL (ref 70–108)
GLUCOSE BLD-MCNC: 129 MG/DL (ref 70–108)
GLUCOSE BLD-MCNC: 136 MG/DL (ref 70–108)
GLUCOSE BLD-MCNC: 97 MG/DL (ref 70–108)

## 2020-05-18 PROCEDURE — 97530 THERAPEUTIC ACTIVITIES: CPT

## 2020-05-18 PROCEDURE — 97116 GAIT TRAINING THERAPY: CPT

## 2020-05-18 PROCEDURE — 99024 POSTOP FOLLOW-UP VISIT: CPT | Performed by: NEUROLOGICAL SURGERY

## 2020-05-18 PROCEDURE — 97110 THERAPEUTIC EXERCISES: CPT

## 2020-05-18 PROCEDURE — 2580000003 HC RX 258: Performed by: PHYSICIAN ASSISTANT

## 2020-05-18 PROCEDURE — 99232 SBSQ HOSP IP/OBS MODERATE 35: CPT | Performed by: FAMILY MEDICINE

## 2020-05-18 PROCEDURE — 6370000000 HC RX 637 (ALT 250 FOR IP): Performed by: PHYSICIAN ASSISTANT

## 2020-05-18 PROCEDURE — 6370000000 HC RX 637 (ALT 250 FOR IP): Performed by: FAMILY MEDICINE

## 2020-05-18 PROCEDURE — 82948 REAGENT STRIP/BLOOD GLUCOSE: CPT

## 2020-05-18 PROCEDURE — 2060000000 HC ICU INTERMEDIATE R&B

## 2020-05-18 RX ADMIN — Medication 10 ML: at 22:14

## 2020-05-18 RX ADMIN — SENNOSIDES 8.6 MG: 8.6 TABLET, FILM COATED ORAL at 09:24

## 2020-05-18 RX ADMIN — POLYETHYLENE GLYCOL 3350 17 G: 17 POWDER, FOR SOLUTION ORAL at 09:24

## 2020-05-18 RX ADMIN — DOCUSATE SODIUM 100 MG: 100 CAPSULE, LIQUID FILLED ORAL at 09:25

## 2020-05-18 RX ADMIN — SENNOSIDES 8.6 MG: 8.6 TABLET, FILM COATED ORAL at 20:50

## 2020-05-18 RX ADMIN — Medication 10 ML: at 09:24

## 2020-05-18 RX ADMIN — DOCUSATE SODIUM 100 MG: 100 CAPSULE, LIQUID FILLED ORAL at 20:50

## 2020-05-18 ASSESSMENT — PAIN SCALES - GENERAL
PAINLEVEL_OUTOF10: 9
PAINLEVEL_OUTOF10: 3

## 2020-05-18 ASSESSMENT — PAIN - FUNCTIONAL ASSESSMENT: PAIN_FUNCTIONAL_ASSESSMENT: ACTIVITIES ARE NOT PREVENTED

## 2020-05-18 ASSESSMENT — PAIN DESCRIPTION - ONSET: ONSET: ON-GOING

## 2020-05-18 ASSESSMENT — PAIN DESCRIPTION - DESCRIPTORS: DESCRIPTORS: ACHING

## 2020-05-18 ASSESSMENT — PAIN DESCRIPTION - PAIN TYPE: TYPE: ACUTE PAIN

## 2020-05-18 ASSESSMENT — PAIN DESCRIPTION - LOCATION: LOCATION: ABDOMEN

## 2020-05-18 ASSESSMENT — PAIN DESCRIPTION - FREQUENCY: FREQUENCY: CONTINUOUS

## 2020-05-18 ASSESSMENT — PAIN DESCRIPTION - PROGRESSION: CLINICAL_PROGRESSION: RAPIDLY WORSENING

## 2020-05-18 ASSESSMENT — PAIN DESCRIPTION - DIRECTION: RADIATING_TOWARDS: NO

## 2020-05-18 ASSESSMENT — PAIN DESCRIPTION - ORIENTATION: ORIENTATION: MID

## 2020-05-18 NOTE — PROGRESS NOTES
sodium chloride flush  10 mL Intravenous 2 times per day     PRN Meds: bisacodyl, magnesium hydroxide, promethazine, glucose, dextrose, glucagon (rDNA), dextrose, sodium chloride flush, [DISCONTINUED] promethazine **OR** ondansetron, HYDROcodone 5 mg - acetaminophen, HYDROmorphone, ketorolac, cyclobenzaprine      Intake/Output Summary (Last 24 hours) at 5/18/2020 1606  Last data filed at 5/18/2020 1444  Gross per 24 hour   Intake 755 ml   Output 1925 ml   Net -1170 ml       Diet:  DIET CARB CONTROL; Exam:  /81   Pulse 83   Temp 98.1 °F (36.7 °C) (Oral)   Resp 16   Ht 5' 10\" (1.778 m)   Wt 226 lb 11.2 oz (102.8 kg)   SpO2 95%   BMI 32.53 kg/m²     General appearance: No apparent distress, appears stated age and cooperative. HEENT: Pupils equal, round, and reactive to light. Conjunctivae/corneas clear. Neck: Supple, with full range of motion. No jugular venous distention. Trachea midline. Respiratory:  Normal respiratory effort. Clear to auscultation, bilaterally without Rales/Wheezes/Rhonchi. Cardiovascular: Regular rate and rhythm with normal S1/S2 without murmurs, rubs or gallops. Abdomen: Soft, non-tender, non-distended with normal bowel sounds. Musculoskeletal: passive and active ROM x 4 extremities. Skin: Skin color, texture, turgor normal.  No rashes or lesions. Neurologic:  Neurovascularly intact without any focal sensory/motor deficits. Cranial nerves: II-XII intact, grossly non-focal.  Psychiatric: Alert and oriented, thought content appropriate, normal insight  Capillary Refill: Brisk,< 3 seconds   Peripheral Pulses: +2 palpable, equal bilaterally       Labs:   Recent Labs     05/16/20  0313 05/17/20  0335   WBC 13.5* 12.8*   HGB 11.6* 11.5*   HCT 35.4* 35.3*    279     Recent Labs     05/16/20  0313      K 4.1      CO2 26   BUN 14   CREATININE 0.7   CALCIUM 8.6     No results for input(s): AST, ALT, BILIDIR, BILITOT, ALKPHOS in the last 72 hours.   No results for input(s): INR in the last 72 hours. No results for input(s): Gattman Ofelia in the last 72 hours. Urinalysis:      Lab Results   Component Value Date    NITRU NEGATIVE 03/05/2020    WBCUA NONE SEEN 03/05/2020    BACTERIA NONE SEEN 03/05/2020    RBCUA 5-10 03/05/2020    BLOODU TRACE 03/05/2020    GLUCOSEU NEGATIVE 03/05/2020       Radiology:  FLU[facility]O FOR SURGICAL PROCEDURES   Final Result      XR Thoracic Spine 1 VW   Final Result   Intraoperative appearance thoracic spine. **This report has been created using voice recognition software. It may contain minor errors which are inherent in voice recognition technology. **      Final report electronically signed by Dr. Vitaliy Ohara on 5/14/2020 2:41 PM          Diet: DIET CARB CONTROL;    DVT prophylaxis: [] Lovenox                                 [] SCDs                                 [] SQ Heparin                                 [] Encourage ambulation           [] Already on Anticoagulation     Disposition:    [] Home       [] TCU       [] Rehab       [] Psych       [] SNF       [] [place]       [] Other-    Code Status: Full Code    PT/OT Eval Status:         Electronically signed by Vikash Burrell MD on 5/18/2020 at 4:06 PM

## 2020-05-18 NOTE — PROGRESS NOTES
Attending Note:     Patient seen and examined in the floor in conjunction with neurosurgery AILIN Bello PA-C).  Discussed with patient and his nurse and hospitalist service as well as well.  All data and imaging reviewed by myself. I agree with examination assessment and plan as documented above.   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 - Doing well. - No acute event over the night.  - Reported further improvement in his lower extremities. Feels his legs are stronger further comparing with before surgery,  - His gait is better.  -cont. To show improvement in his pre op thoracic spine myelopathic symptoms.  - Improvement in proprioception sensation in both feet comparing with before surgery.    - PT and OT.  - Drain to be removed today. - Wound: D/C/I   discharge plan: To inpatient rehab tomorrow. - The intraoperative findings especially that consistent with spinal dural fistula were discussed  Again with the patient.   I told with the patient that I will discuss with him in the future the next step in his treatment plan and if he needs any further specific neuro-imaging (such spinal angiogram) in the future for a follow up.  -Patient can be discharged transferred to inpatient rehab tomorrow from be neurosurgical prospective with a follow up after 12 days from surgery with neurosurgery out pt clinic for sutures removal.   - From this on, neurosurgery service will see this patient as needed as long as he is in the hospital.  Please call me if you have any further questions or concerns regarding this patient.  Karen Coy MD        Neurosurgery Progress Note    Patient:  Onelia Gonzalez      Unit/Bed:4A-05/005-A    YOB: 1975    MRN: [id number]     Acct: [de-identified]     Admit date: round, and reactive to light. Neck:      Musculoskeletal: Normal range of motion. No muscular tenderness. Cardiovascular:      Rate and Rhythm: Normal rate and regular rhythm.      Pulses: Normal pulses.      Heart sounds: No murmur. Pulmonary:      Effort: Pulmonary effort is normal. No respiratory distress.      Breath sounds: Normal breath sounds. No wheezing. Abdominal:      General: Abdomen is flat. Bowel sounds are normal. There is no distension.      Tenderness: There is no abdominal tenderness. Musculoskeletal: Normal range of motion.         General: Tenderness present. Skin:     General: Skin is warm and dry. Neurological:      General: No focal deficit present.      Mental Status: He is alert and oriented to person, place, and time.      Sensory: Sensory deficit present but dramatically improved on exam.      Motor: [place] but dramatically improved on exam  Psychiatric:         Mood and Affect: Mood normal.         Behavior: Behavior normal.         Thought Content: Thought content normal.         Judgment: Judgment normal.     ROS:  Review of Systems  Constitutional: Positive for activity change. Respiratory: Negative for apnea, chest tightness and shortness of breath.    Cardiovascular: Negative for chest pain and leg swelling. Gastrointestinal: Positive for constipation. Negative for abdominal distention and abdominal pain. Genitourinary: Negative for difficulty urinating. Neurological: Positive for  numbness, improving. Negative for dizziness, speech difficulty and headaches. Psychiatric/Behavioral: Negative for agitation, behavioral problems, confusion and decreased concentration.     24 hour intake/output:    Intake/Output Summary (Last 24 hours) at 5/18/2020 1256  Last data filed at 5/18/2020 0924  Gross per 24 hour   Intake 3965 ml   Output 1400 ml   Net 2565 ml     Last 3 weights:   Wt Readings from Last 3 Encounters:   05/18/20 226 lb 11.2 oz (102.8 kg) levels. Determination of exact levels as limited due to the absence of bony landmarks and no AP films. . The actual fluoroscopy time was not reported by our technologist.     Intraoperative appearance thoracic spine. **This report has been created using voice recognition software. It may contain minor errors which are inherent in voice recognition technology. ** Final report electronically signed by Dr. Bindu Shahid on 5/14/2020 2:41 PM    Fluoro For Surgical Procedures    Result Date: 5/14/2020  Radiology exam is complete. No Radiologist dictation. Please follow up with ordering provider. A/P: S/P he remains status POD #3 following thoracic laminectomy for resection of intradural mass performed by Dr. Migdalia Woods, without complication. Rated removal of the drain yesterday without incident and on inspection has a dry incision with intact dressings. Patient has ambulated with and without assistance and is scheduled to transition from the stepdown to TCU for inpatient rehabilitation.   Neurosurgery recommends suture removal at 12 days postoperatively with a follow-up with neurosurgery as an outpatient approximately 2 weeks following his ultimate discharge from the hospital.    Electronically signed by Linward Merlin, PA-C on 5/18/2020 at 12:56 PM

## 2020-05-18 NOTE — PROGRESS NOTES
[address] [address]  INPATIENT PHYSICAL THERAPY  DAILY NOTE  [facility] 4A - 4A-05/005-A    Time In: 0745  Time Out: 0808  Timed Code Treatment Minutes: 23 Minutes  Minutes: 23          Date: 2020  Patient Name: Jack Huddleston,  Gender:  male        MRN: [id number]  : 1975  (53 y.o.)     Referring Practitioner: Stacie TAVERAS  Diagnosis: Post-op state  Additional Pertinent Hx: Pt admitted 5-14 for sx for T-yap and resection intradural mass. Pt was having increased LE weakness and incontinence. Prior Level of Function:  Lives With: Family  Type of Home: Apartment  Home Layout: Two level(Pt lives in second story of apartment building)  Home Access: Stairs to enter with rails  Entrance Stairs - Number of Steps: 8 + 8 and 2 HRs to second floor apt  Home Equipment: Rolling walker   Bathroom Shower/Tub: Tub/Shower unit  Bathroom Toilet: Skyler Deputado [address] Help From: Family  ADL Assistance: [address] [place] [address] Avenue: Independent  Homemaking Responsibilities: Yes  Ambulation Assistance: Independent  Transfer Assistance: Independent  Active : Yes  Additional Comments: Pt using RW for ambulation    Restrictions/Precautions:  Restrictions/Precautions: General Precautions, Fall Risk  Position Activity Restriction  Spinal Precautions: No Bending, No Lifting, No Twisting    SUBJECTIVE: pleasant and cooperative,    PAIN: no c/o pain    OBJECTIVE:  Bed Mobility:  Rolling to Right: Modified Independent   Supine to Sit: Stand By Assistance  Scooting: Stand By Assistance  Cues for log roll technique, HOB flat and no BR  Transfers:  Sit to Stand: Stand By Assistance  Stand to Sit:Stand By Assistance  From edge of bed, toilet, and bedside chair  Ambulation:  Stand By Assistance  Distance: 10'x1, 160'x1  Surface: Level Tile  Device:Rolling Walker  Gait Deviations:   Forward Flexed Posture and min ataxic movement LLE, min unsteady, no LOB, per pt more steps he takes the better his legs move    Balance:  Static Sitting Balance:  Modified Independent  Dynamic Sitting Balance: Modified Independent, reaching shoulder to waist level  Static Standing Balance: Supervision  Dynamic Standing Balance: Stand By Assistance, 0-1 UE support, reaching shoulder to waist level, min unsteady, no LOB    Exercise:  Patient was guided in 1 set(s) 10 reps of exercise to both lower extremities. Standing heel/toe raises, Standing marches, Standing hip abduction/adduction and Mini squats. Exercises were completed for increased independence with functional mobility. Functional Outcome Measures: Completed  -PAC Inpatient Mobility without Stair Climbing Raw Score : 16  -PAC Inpatient without Stair Climbing T-Scale Score : 45.54    ASSESSMENT:  Assessment: Patient progressing toward established goals. Activity Tolerance:  Patient tolerance of  treatment: good. Equipment Recommendations:Equipment Needed: No  Discharge Recommendations: home with HHPT vs. IP Rehab, Patient would benefit from continued therapy after discharge    Plan: Times per week: 6-7 X O  Current Treatment Recommendations: Strengthening, Transfer Training, Endurance Training, Balance Training, Gait Training, Stair training, Functional Mobility Training, Home Exercise Program    Patient Education  Patient Education: Home Exercise Program, Precautions/Restrictions, Gait    Goals:  Patient goals : Go to rehab  Short term goals  Time Frame for Short term goals: by discharge  Short term goal 1: supine to sit and return with log roll with Min A to get in and out of bed   Short term goal 2: sit to stand with CGA of 1 to get on and off various surfaces  Short term goal 3: Ambulate with RW 40 feet with CGA to walk to bathroom  Long term goals  Time Frame for Long term goals : NA DUE TO SHORT ELOS    Following session, patient left in safe position with all fall risk precautions in place.

## 2020-05-19 ENCOUNTER — HOSPITAL ENCOUNTER (INPATIENT)
Age: 45
LOS: 10 days | Discharge: HOME | DRG: 560 | End: 2020-05-29
Attending: PHYSICAL MEDICINE & REHABILITATION | Admitting: PHYSICAL MEDICINE & REHABILITATION
Payer: COMMERCIAL

## 2020-05-19 VITALS
RESPIRATION RATE: 18 BRPM | HEART RATE: 102 BPM | HEIGHT: 70 IN | OXYGEN SATURATION: 94 % | TEMPERATURE: 98.1 F | SYSTOLIC BLOOD PRESSURE: 104 MMHG | WEIGHT: 217 LBS | BODY MASS INDEX: 31.07 KG/M2 | DIASTOLIC BLOOD PRESSURE: 66 MMHG

## 2020-05-19 PROBLEM — M47.14 MYELOPATHY OF THORACIC REGION: Status: ACTIVE | Noted: 2020-05-19

## 2020-05-19 LAB
ERYTHROCYTE [DISTWIDTH] IN BLOOD BY AUTOMATED COUNT: 14.4 % (ref 11.5–14.5)
ERYTHROCYTE [DISTWIDTH] IN BLOOD BY AUTOMATED COUNT: 45.9 FL (ref 35–45)
GLUCOSE BLD-MCNC: 115 MG/DL (ref 70–108)
GLUCOSE BLD-MCNC: 97 MG/DL (ref 70–108)
HCT VFR BLD CALC: 39.4 % (ref 42–52)
HEMOGLOBIN: 13 GM/DL (ref 14–18)
MCH RBC QN AUTO: 29.5 PG (ref 26–33)
MCHC RBC AUTO-ENTMCNC: 33 GM/DL (ref 32.2–35.5)
MCV RBC AUTO: 89.3 FL (ref 80–94)
PLATELET # BLD: 340 THOU/MM3 (ref 130–400)
PMV BLD AUTO: 8.3 FL (ref 9.4–12.4)
RBC # BLD: 4.41 MILL/MM3 (ref 4.7–6.1)
WBC # BLD: 12.1 THOU/MM3 (ref 4.8–10.8)

## 2020-05-19 PROCEDURE — 1180000000 HC REHAB R&B

## 2020-05-19 PROCEDURE — 6370000000 HC RX 637 (ALT 250 FOR IP): Performed by: PHYSICIAN ASSISTANT

## 2020-05-19 PROCEDURE — 36415 COLL VENOUS BLD VENIPUNCTURE: CPT

## 2020-05-19 PROCEDURE — 6370000000 HC RX 637 (ALT 250 FOR IP): Performed by: FAMILY MEDICINE

## 2020-05-19 PROCEDURE — 82948 REAGENT STRIP/BLOOD GLUCOSE: CPT

## 2020-05-19 PROCEDURE — 97110 THERAPEUTIC EXERCISES: CPT

## 2020-05-19 PROCEDURE — 85027 COMPLETE CBC AUTOMATED: CPT

## 2020-05-19 PROCEDURE — 94760 N-INVAS EAR/PLS OXIMETRY 1: CPT

## 2020-05-19 PROCEDURE — 97535 SELF CARE MNGMENT TRAINING: CPT

## 2020-05-19 PROCEDURE — 2580000003 HC RX 258: Performed by: FAMILY MEDICINE

## 2020-05-19 PROCEDURE — 97116 GAIT TRAINING THERAPY: CPT

## 2020-05-19 PROCEDURE — 97530 THERAPEUTIC ACTIVITIES: CPT

## 2020-05-19 PROCEDURE — 99239 HOSP IP/OBS DSCHRG MGMT >30: CPT | Performed by: FAMILY MEDICINE

## 2020-05-19 PROCEDURE — 6370000000 HC RX 637 (ALT 250 FOR IP): Performed by: PHYSICAL MEDICINE & REHABILITATION

## 2020-05-19 PROCEDURE — 2580000003 HC RX 258: Performed by: PHYSICIAN ASSISTANT

## 2020-05-19 RX ORDER — ONDANSETRON 4 MG/1
4 TABLET, FILM COATED ORAL EVERY 8 HOURS PRN
Status: DISCONTINUED | OUTPATIENT
Start: 2020-05-19 | End: 2020-05-29 | Stop reason: HOSPADM

## 2020-05-19 RX ORDER — SODIUM CHLORIDE 0.9 % (FLUSH) 0.9 %
10 SYRINGE (ML) INJECTION EVERY 12 HOURS SCHEDULED
Status: DISCONTINUED | OUTPATIENT
Start: 2020-05-19 | End: 2020-05-20

## 2020-05-19 RX ORDER — POLYETHYLENE GLYCOL 3350 17 G/17G
17 POWDER, FOR SOLUTION ORAL DAILY
Status: DISCONTINUED | OUTPATIENT
Start: 2020-05-20 | End: 2020-05-29 | Stop reason: HOSPADM

## 2020-05-19 RX ORDER — DOCUSATE SODIUM 100 MG/1
100 CAPSULE, LIQUID FILLED ORAL 2 TIMES DAILY
Status: DISCONTINUED | OUTPATIENT
Start: 2020-05-19 | End: 2020-05-29 | Stop reason: HOSPADM

## 2020-05-19 RX ORDER — HYDROCODONE BITARTRATE AND ACETAMINOPHEN 5; 325 MG/1; MG/1
1 TABLET ORAL EVERY 6 HOURS PRN
Status: CANCELLED | OUTPATIENT
Start: 2020-05-19

## 2020-05-19 RX ORDER — SENNA PLUS 8.6 MG/1
1 TABLET ORAL 2 TIMES DAILY
Status: CANCELLED | OUTPATIENT
Start: 2020-05-19

## 2020-05-19 RX ORDER — SODIUM CHLORIDE 0.9 % (FLUSH) 0.9 %
10 SYRINGE (ML) INJECTION PRN
Status: CANCELLED | OUTPATIENT
Start: 2020-05-19

## 2020-05-19 RX ORDER — ACETAMINOPHEN 325 MG/1
650 TABLET ORAL EVERY 4 HOURS PRN
Status: CANCELLED | OUTPATIENT
Start: 2020-05-19

## 2020-05-19 RX ORDER — CYCLOBENZAPRINE HCL 10 MG
10 TABLET ORAL 3 TIMES DAILY PRN
Status: DISCONTINUED | OUTPATIENT
Start: 2020-05-19 | End: 2020-05-29 | Stop reason: HOSPADM

## 2020-05-19 RX ORDER — POLYETHYLENE GLYCOL 3350 17 G/17G
17 POWDER, FOR SOLUTION ORAL DAILY
Status: CANCELLED | OUTPATIENT
Start: 2020-05-20

## 2020-05-19 RX ORDER — BISACODYL 10 MG
10 SUPPOSITORY, RECTAL RECTAL DAILY PRN
Status: DISCONTINUED | OUTPATIENT
Start: 2020-05-19 | End: 2020-05-29 | Stop reason: HOSPADM

## 2020-05-19 RX ORDER — VITAMIN B COMPLEX
5000 TABLET ORAL DAILY
Status: DISCONTINUED | OUTPATIENT
Start: 2020-05-19 | End: 2020-05-29 | Stop reason: HOSPADM

## 2020-05-19 RX ORDER — SODIUM CHLORIDE 0.9 % (FLUSH) 0.9 %
10 SYRINGE (ML) INJECTION PRN
Status: DISCONTINUED | OUTPATIENT
Start: 2020-05-19 | End: 2020-05-22

## 2020-05-19 RX ORDER — ACETAMINOPHEN 325 MG/1
650 TABLET ORAL EVERY 4 HOURS PRN
Status: DISCONTINUED | OUTPATIENT
Start: 2020-05-19 | End: 2020-05-29 | Stop reason: HOSPADM

## 2020-05-19 RX ORDER — BISACODYL 10 MG
10 SUPPOSITORY, RECTAL RECTAL DAILY PRN
Status: CANCELLED | OUTPATIENT
Start: 2020-05-19

## 2020-05-19 RX ORDER — SENNA PLUS 8.6 MG/1
1 TABLET ORAL 2 TIMES DAILY
Status: DISCONTINUED | OUTPATIENT
Start: 2020-05-19 | End: 2020-05-19

## 2020-05-19 RX ORDER — DOCUSATE SODIUM 100 MG/1
100 CAPSULE, LIQUID FILLED ORAL 2 TIMES DAILY
Status: CANCELLED | OUTPATIENT
Start: 2020-05-19

## 2020-05-19 RX ORDER — CYCLOBENZAPRINE HCL 10 MG
10 TABLET ORAL 3 TIMES DAILY PRN
Status: CANCELLED | OUTPATIENT
Start: 2020-05-19

## 2020-05-19 RX ORDER — ONDANSETRON 2 MG/ML
4 INJECTION INTRAMUSCULAR; INTRAVENOUS EVERY 6 HOURS PRN
Status: CANCELLED | OUTPATIENT
Start: 2020-05-19

## 2020-05-19 RX ORDER — ACETAMINOPHEN 325 MG/1
650 TABLET ORAL EVERY 4 HOURS PRN
Status: DISCONTINUED | OUTPATIENT
Start: 2020-05-19 | End: 2020-05-19 | Stop reason: HOSPADM

## 2020-05-19 RX ORDER — SODIUM CHLORIDE 0.9 % (FLUSH) 0.9 %
10 SYRINGE (ML) INJECTION EVERY 12 HOURS SCHEDULED
Status: CANCELLED | OUTPATIENT
Start: 2020-05-19

## 2020-05-19 RX ORDER — HYDROCODONE BITARTRATE AND ACETAMINOPHEN 5; 325 MG/1; MG/1
1 TABLET ORAL EVERY 6 HOURS PRN
Status: DISCONTINUED | OUTPATIENT
Start: 2020-05-19 | End: 2020-05-22 | Stop reason: SINTOL

## 2020-05-19 RX ORDER — ONDANSETRON 2 MG/ML
4 INJECTION INTRAMUSCULAR; INTRAVENOUS EVERY 6 HOURS PRN
Status: DISCONTINUED | OUTPATIENT
Start: 2020-05-19 | End: 2020-05-19

## 2020-05-19 RX ORDER — SENNA PLUS 8.6 MG/1
1 TABLET ORAL NIGHTLY
Status: DISCONTINUED | OUTPATIENT
Start: 2020-05-19 | End: 2020-05-29 | Stop reason: HOSPADM

## 2020-05-19 RX ADMIN — ACETAMINOPHEN 650 MG: 325 TABLET ORAL at 09:09

## 2020-05-19 RX ADMIN — SENNOSIDES 8.6 MG: 8.6 TABLET, FILM COATED ORAL at 20:13

## 2020-05-19 RX ADMIN — VITAMIN D, TAB 1000IU (100/BT) 5000 UNITS: 25 TAB at 18:10

## 2020-05-19 RX ADMIN — DOCUSATE SODIUM 100 MG: 100 CAPSULE, LIQUID FILLED ORAL at 20:13

## 2020-05-19 RX ADMIN — Medication 10 ML: at 20:16

## 2020-05-19 RX ADMIN — POLYETHYLENE GLYCOL 3350 17 G: 17 POWDER, FOR SOLUTION ORAL at 09:09

## 2020-05-19 RX ADMIN — SENNOSIDES 8.6 MG: 8.6 TABLET, FILM COATED ORAL at 09:09

## 2020-05-19 RX ADMIN — Medication 10 ML: at 09:09

## 2020-05-19 RX ADMIN — CYCLOBENZAPRINE 10 MG: 10 TABLET, FILM COATED ORAL at 20:12

## 2020-05-19 RX ADMIN — DOCUSATE SODIUM 100 MG: 100 CAPSULE, LIQUID FILLED ORAL at 09:09

## 2020-05-19 ASSESSMENT — PAIN DESCRIPTION - PROGRESSION
CLINICAL_PROGRESSION: NOT CHANGED
CLINICAL_PROGRESSION: NOT CHANGED

## 2020-05-19 ASSESSMENT — PAIN DESCRIPTION - ONSET
ONSET: ON-GOING
ONSET: ON-GOING

## 2020-05-19 ASSESSMENT — PAIN DESCRIPTION - PAIN TYPE
TYPE: ACUTE PAIN
TYPE: ACUTE PAIN

## 2020-05-19 ASSESSMENT — PAIN SCALES - GENERAL
PAINLEVEL_OUTOF10: 6
PAINLEVEL_OUTOF10: 3
PAINLEVEL_OUTOF10: 2
PAINLEVEL_OUTOF10: 1
PAINLEVEL_OUTOF10: 3
PAINLEVEL_OUTOF10: 0

## 2020-05-19 ASSESSMENT — ENCOUNTER SYMPTOMS
NAUSEA: 0
TROUBLE SWALLOWING: 0
CONSTIPATION: 0
DIARRHEA: 0
VOICE CHANGE: 0
SHORTNESS OF BREATH: 0
COUGH: 0
EYE PAIN: 0
ALLERGIC/IMMUNOLOGIC NEGATIVE: 1
BACK PAIN: 1

## 2020-05-19 ASSESSMENT — PAIN DESCRIPTION - FREQUENCY
FREQUENCY: CONTINUOUS
FREQUENCY: CONTINUOUS

## 2020-05-19 ASSESSMENT — PAIN DESCRIPTION - DESCRIPTORS
DESCRIPTORS: ACHING
DESCRIPTORS: ACHING

## 2020-05-19 ASSESSMENT — PAIN DESCRIPTION - LOCATION
LOCATION: BACK
LOCATION: BACK

## 2020-05-19 ASSESSMENT — PAIN DESCRIPTION - ORIENTATION
ORIENTATION: MID
ORIENTATION: MID

## 2020-05-19 NOTE — DISCHARGE SUMMARY
recurvatum noted.      Stairs:  Stairs:  6\" steps. X 12 using Bilateral Handrails and Modified Independent.     Exercise:  Patient was guided in 1 set(s) 10 reps of exercise to both lower extremities. Standing heel/toe raises, Standing marches, Standing hip abduction/adduction, Standing hip extension, Standing hamstring curls and Mini squats. Exercises were completed for increased independence with functional mobility.     Functional Outcome Measures: Completed   Timed up and Go Test (TUG)  22.12 seconds     Normative Reference Values  60-69 years:  8.1 seconds  70-79 years: 9.2 seconds  80-99 years: 11.3 seconds     < 10 seconds is normal, a score of > 14 seconds indicates high fall risk    ASSESSMENT:   Assessment: Patient progressing toward established goals. The patient tolerated session well this date and is progressing towards all goals at this time. Scored a 22.12 on the TUG test, which demonstrates that patient is still at risk for falls at this time. Patient reports no additional questions regarding home discharge at this time. Being discharged today with family to assist PRN and OP PT to follow up. Activity Tolerance:  Patient tolerance of  treatment: good.       Equipment Recommendations:Equipment Needed: No(Pt has RW)  Discharge Recommendations: Home with assist PRN, OP PT  Plan: Times per week: 5x/wk 90 min, 1x/ wk 30 min  Current Treatment Recommendations: Strengthening, Transfer Training, Endurance Training, Balance Training, Gait Training, Stair training, Functional Mobility Training, Home Exercise Program, Equipment Evaluation, Education, & procurement, Safety Education & Training, Patient/Caregiver Education & Training     Patient Education  Patient Education: Plan of Care, Home Exercise Program, Precautions/Restrictions, Transfers, Reviewed Prior Education, Gait, Stairs, Education Related to Potential Risks and Complications Due to Impairment/Illness/Injury, Health Promotion and Wellness Education, Home Safety Education,  - Patient Verbalized Understanding     Goals:  Patient goals : get my Lt leg stronger  Short term goals  Time Frame for Short term goals: 1 wk  Short term goal 1: supine to sit and return with log roll Mod I, to get in/out of bed-MET, see LTG  Short term goal 2: Pt to get up/down from various seated surfaces, CGA, to get up to walk-MET, see LTG. Short term goal 3: Pt to walk with RW >= 50 ft, CGA, showing no recurvatum LLE, controlled swing into stance phase, to progress to home mobility-MET, see LTG   Short term goal 4: Pt to ascend/descend 4 steps with trevor rails, cGA, to progress to home entry. MET, see LTG. Short term goal 5: Pt to get in/out of car, Mod I, for transportation needs. -MET, See LTG. Long term goals  Time Frame for Long term goals : 3 wks  Long term goal 1: Pt to get up/down from various seated surfaces, Mod I, to get up to walk-MET   Long term goal 2: Pt to walk with RW >= 150 ft, Mod I, showing no recurvatum LLE, for home and community mobility-MET   Long term goal 3: Pt to ascend/descend 8+8 steps with trevor rails, mod I, for home entry-MET   Long term goal 4: Pt to perform TUG <= 20 seconds indicating improved balance-NOT MET     Mobility:  , PT Equipment Recommendations  Equipment Needed: No(Pt has RW), Assessment: Pt demonstrates a decrease in baseline by way of bed mobility, transfers, gait due to decreased functional strength in BLEs and core, decreased endurance, decreased balance resulting in need for assist with functional mobility and gait. Pt will benefit from skilled PT to advance in these areas for improved functional mobility and safety. Occupational therapy  COGNITION: Decreased Safety Awareness     ADL:   Grooming: Supervision. Tolerated x 3 minutes standing at sink side to wash face and hands. Lower Extremity Dressing: Supervision.   Donned B shoes with footrest on recliner inclined while bending knees in order to follow spinal precautions . Toileting: Supervision. BUE release to complete clothing management. Toilet Transfer: Supervision. RTS with use of R grab bar. .        BALANCE:  Sitting Balance:  Supervision. Standing Balance: Stand By Assistance, [address] toward end of session patient reported his legs feeling really shaky.      BED MOBILITY:  Not Tested     TRANSFERS:  Sit to Stand:  Supervision. from recliner, chair with arms and RTS  Stand to Sit: Supervision. to recliner, chair with arms, and RTS.    FUNCTIONAL MOBILITY:  Assistive Device: Rolling Walker  Assist Level:  Stand By Assistance. Distance: To and from therapy apartment  V/c required to slow down pace to reduce risk of falls. No LOB noted.       ADDITIONAL ACTIVITIES:  Patient completed IADL homemaking task this date of making oatmeal and washing dishes - task was graded to challenge patients kitchen safety. Patient was able to retrieve all items from kitchen cabinets  with RW and x1VCs for safety of RW placement during the retrieval and transportation of items. Patient required S throughout task with a standing endurance of x 7 minutes and 20 seconds x1 trial and x3 minutes and 30 seconds x1 trial. Patient demonstrated good ability to safely transport items with use of kitchen counter as support.     Patient simulated HEP with handout given to patient with patient able to successfully perform exercises. ASSESSMENT:  Activity Tolerance:  Patient tolerance of  treatment: good. Assessment:  Patient made good progress towards goals. Patient has spinal precautions and cognitive barrier and is unsteady at times with RW. Patient met 5/5 STG and 0/2 LTG.   Discharge Recommendations: Outpatient OT  Equipment Recommendations: Equipment Needed: No  Other: Pt reports ownership of RW and extended tub bench, denies need for [facility] or [facility]  Plan: Outpatient OT   Current Treatment Recommendations: Functional Mobility Training, ROM, Balance Training, Rehab Unit to address these deficits. Without these services, Dharmesh Blanton is at risk for hospital readmission and caregiver burden. Speech therapy:  Not applicable    Wt Readings from Last 3 Encounters:   05/27/20 212 lb 12.8 oz (96.5 kg)   05/19/20 217 lb (98.4 kg)   04/29/20 195 lb (88.5 kg)     Vitals:    05/27/20 1939 05/28/20 0756 05/28/20 1016 05/28/20 1945   BP: 115/78 120/82  130/82   Pulse: 94 94  91   Resp: 20 18 18   Temp: 98.1 °F (36.7 °C) 97.2 °F (36.2 °C)  98 °F (36.7 °C)   TempSrc: Oral Axillary  Oral   SpO2: 97% 94% 95% 95%   Weight:       Height:          Inpatient Rehabilitation Course:   Dakota Hutchinson is a 53 y.o. male admitted to inpatient rehabilitation on 5/19/2020.   Original admission 5/14/2020 for an elective thoracic spine surgery.  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  The patient was previously on the acute inpatient rehabilitation unit from 3/17 to 3/27/2020 for therapy to address gait ataxia resulting from the thoracic myelopathy.  The patient did well with those offered therapies and was discharged to home where he lives with his mother.     On initial consultation exam the patient is resting comfortably in his hospital bed. Myron Young has 1 surgical drain in place which he states will likely be removed tomorrow. Myron Young has been up and sat in the chair for an hour and has already walked to the bathroom.  His Ortez was removed this morning and he has already voided successfully.  Muscle motor testing on the right hemibody is normal; for his left leg strength is slightly decreased at 4+/5 and it is noted that his foot exhibits on elicited clonus and the right foot has elicitable clonus.  The patient states that he would be interested in

## 2020-05-19 NOTE — DISCHARGE SUMMARY
with any daily progress note from day of discharge. Hospital Course:   Kimberley Trinidad is a 53 y.o. male admitted to [address] [place] [address] on 5/14/2020 for for scheduled thoracic laminectomy with Dr. Stark Began has a past medical history of diabetes mellitus.  Presented with progressive weakness in lower extremities over several weeks associated with progressive numbness in the legs. He also experienced episodes of loss of control of urine. Symptoms improved temporarily with steroids but then he again began to deteriorate over the last few weeks. Reza Beck had significant weakness in lower extremities and hyperreflexia of the lower extremities.  Lumbar spine revealed on thoracic pain requiring T7-9 cord signal consistent with cord edema versus myomalacia. s/p thoracic spine laminectomy and exertion of cyst. Post surgical intervention patient did return to the ICU.      On 5/15, patient was transferred to . Patient was noted to have sinus tachycardia but also receiving high-dose steroids. He denied any chest pain, palpitations or shortness of breath. Tachycardia resolved spontaneously. Exam:     Vitals:  Vitals:    05/18/20 2045 05/19/20 0303 05/19/20 0810 05/19/20 1110   BP: 137/85 109/72 111/78 104/66   Pulse: 105 86 112 102   Resp: 20 18 18 18   Temp: 98.3 °F (36.8 °C) 99 °F (37.2 °C) 98.1 °F (36.7 °C) 98.1 °F (36.7 °C)   TempSrc: Oral Oral Oral Oral   SpO2: 96% 94% 94% 94%   Weight:  217 lb (98.4 kg)     Height:         Weight: Weight: 217 lb (98.4 kg)     24 hour intake/output:    Intake/Output Summary (Last 24 hours) at 5/19/2020 1121  Last data filed at 5/19/2020 4055  Gross per 24 hour   Intake 1355 ml   Output 3550 ml   Net -2195 ml     General appearance: No apparent distress, appears stated age and cooperative. HEENT: Pupils equal, round, and reactive to light. Conjunctivae/corneas clear. Neck: Supple, with full range of motion. No jugular venous distention. Trachea midline.   Respiratory: Normal respiratory effort. Clear to auscultation, bilaterally without Rales/Wheezes/Rhonchi. Cardiovascular: Regular rate and rhythm with normal S1/S2 without murmurs, rubs or gallops. Abdomen: Soft, non-tender, non-distended with normal bowel sounds. Musculoskeletal: passive and active ROM x 4 extremities. Skin: Skin color, texture, turgor normal.  No rashes or lesions. Neurologic:  Neurovascularly intact without any focal sensory/motor deficits. Cranial nerves: II-XII intact, grossly non-focal.  Psychiatric: Alert and oriented, thought content appropriate, normal insight  Capillary Refill: Brisk,< 3 seconds   Peripheral Pulses: +2 palpable, equal bilaterally     Labs: For convenience and continuity at follow-up the following most recent labs are provided:      CBC:    Lab Results   Component Value Date    WBC 12.1 05/19/2020    HGB 13.0 05/19/2020    HCT 39.4 05/19/2020     05/19/2020       Renal:    Lab Results   Component Value Date     05/16/2020    K 4.1 05/16/2020    K 4.7 03/06/2020     05/16/2020    CO2 26 05/16/2020    BUN 14 05/16/2020    CREATININE 0.7 05/16/2020    CALCIUM 8.6 05/16/2020         Significant Diagnostic Studies    Radiology:   [facility] FOR SURGICAL PROCEDURES   Final Result      XR Thoracic Spine 1 VW   Final Result   Intraoperative appearance thoracic spine. **This report has been created using voice recognition software. It may contain minor errors which are inherent in voice recognition technology. **      Final report electronically signed by Dr. Chelo Wallace on 5/14/2020 2:41 PM             Consults:     IP CONSULT TO CRITICAL CARE  IP CONSULT TO REHAB/TCU ADMISSION COORDINATOR  IP CONSULT TO PHYSICAL MEDICINE REHAB  IP CONSULT TO REHAB/TCU ADMISSION COORDINATOR    Disposition:    [] Home       [] TCU       [] Rehab       [] Psych       [] SNF       [] [place]       [] Other-    Condition at Discharge: Stable    Code Status:  Full

## 2020-05-19 NOTE — PLAN OF CARE
bowel/bladder training, education for medical assistive devices, medication education, O2 saturation management, energy conservation, stress management techniques, fall prevention, alarms protocol, seating and positioning, skin/wound care, pressure relief instruction, dressing changes, infection protection, DVT prophylaxis, assistance with safe transfers , and/or assistance with bathroom activities and hygiene. PHYSICAL THERAPY:  Goals:        Short term goals  Time Frame for Short term goals: 1 wk  Short term goal 1: supine to sit and return with log roll Mod I, to get in/out of bed  Short term goal 2: Pt to get up/down from various seated surfaces, CGA, to get up to walk  Short term goal 3: Pt to walk with RW >= 50 ft, CGA, showing no recurvatum LLE, controlled swing into stance phase, to progress to home mobility  Short term goal 4: Pt to ascend/descend 4 steps with trevor rails, cGA, to progress to home entry. Short term goal 5: Pt to get in/out of car, Mod I, for transportation needs. Long term goals  Time Frame for Long term goals : 3 wks  Long term goal 1: Pt to get up/down from various seated surfaces, Mod I, to get up to walk  Long term goal 2: Pt to walk with RW >= 150 ft, Mod I, showing no recurvatum LLE, for home and community mobility  Long term goal 3: Pt to ascend/descend 8+8 steps with trevor rails, mod I, for home entry  Long term goal 4: Pt to perform TUG <= 20 seconds indicating improved balance    Plan of Care:  Patient to be seen by physical therapy services 90 minutes per day Monday through Friday and 30 minutes on Saturday or Sunday    Anticipated interventions may include therapeutic exercises, gait training, neuromuscular re-ed, transfer training, community reintegration, bed mobility, w/c mobility and training.     OCCUPATIONAL THERAPY:  Goals:             Short term goals  Time Frame for Short term goals: 1 Week  Short term goal 1: Pt will complete lower body dressing with LHAE prn and MIN A while adhering to spinal precautions for increase independence with ADLs. Short term goal 2: Pt will stand for 5 minutes with bilateral UE release at [facility] and no LOB for increased independence with sinkside ADLs. Short term goal 3: Pt will complete sit<>stand transfers from various surfaces at SBA for increased independence for toilet transfers  Short term goal 4: Pt will verbalize and demonstrate appropriate body mechanics with MIN cues to complete BADL routine while adhering to spinal precautions for increased safety with functional mobility and ADLs. Short term goal 5: Pt will complete 1 set of 15 reps of bilateral UE HEP exercises with visual aide and without rest breaks to increase endurance necessary for independence with ADL tasks. Long term goals  Time Frame for Long term goals : 2 Weeks  Long term goal 1: Pt will complete bathing/dressing routine at [facility] with LHAE prn for increased independence with self-care. Long term goal 2: Pt will complete home making task at [facility] for increased independence with IADLs and in preparation for return to work. Plan of Care: Patient to be seen by occupational therapy services 90 minutes per day Monday through Friday and 30 minutes on Saturday or Sunday    Anticipated interventions may include ADL and IADL retraining, strengthening, safety education and training, patient/caregiver education and training, equipment evaluation/ training/procurement, neuromuscular reeducation, wheelchair mobility training. SPEECH THERAPY:   Goals:  N/A    CASE MANAGEMENT:  Goals:   Assist patient/family with discharge planning, patient/family counseling,  and coordination with insurance during the inpatient rehabilitation stay. Other members of the multidisciplinary rehabilitation team that will be involved in the patient's plan of care include recreational therapy, dietary, respiratory therapy, and neuropsychology.     Medical issues being managed closely and that require 24 hour availability of a physician:  Bowel/Bladder function, Weight bearing precautions, Wound care, Pain management, Infection protection, DVT prophylaxis, Fall precautions, Fluid/Electrolyte balance and Nutritional status                                           Physician anticipated functional outcomes: Improved independence with functional measures   Estimated length of stay for this admission 10 days. Medical Prognosis: Good  Anticipated disposition: Home. The potential to achieve the above medical and rehabilitative goals is very good. This plan of care has been developed with the assistance and input of the multidisciplinary rehabilitation team.  The plan was reviewed with the patient. The patient has had the opportunity to provide input to the therapy team.    I have reviewed this Individualized Plan of Care and agree with its contents. Above documentation has been expanded, modified, adjusted to reflect the findings of my evaluations and goals for the patient.     Physician:  Agueda Nieto MD

## 2020-05-19 NOTE — PROGRESS NOTES
[address]  Occupational Therapy  Daily Note  Time:   Time In:   Time Out: 0802  Timed Code Treatment Minutes: 24 Minutes  Minutes: 24          Date: 2020  Patient Name: Fam Bailey,   Gender: male      Room: [facility]-A  MRN: [id number]  : 1975  (53 y.o.)        Additional Pertinent Hx: \" Cici Gilliam is a 70-year-old white male who presented to [facility] on 2020 for scheduled thoracic laminectomy with Dr. John Parker. He has a past medical history of diabetes mellitus. Presented with progressive weakness in lower extremities over several weeks associated with progressive numbness in the legs. He also experienced episodes of loss of control of urine. Symptoms improved temporarily with steroids but then he again began to deteriorate over the last few weeks. He had significant weakness in lower extremities and hyperreflexia of the lower extremities. Lumbar spine revealed on thoracic pain requiring T7-9 cord signal consistent with cord edema versus myomalacia. Thoracic laminectomy and resection of intradural mass performed 2020\"    Restrictions/Precautions:  Restrictions/Precautions: General Precautions, Fall Risk  Position Activity Restriction  Spinal Precautions: No Bending, No Lifting, No Twisting    SUBJECTIVE: RN okayed therapy session. Pt on toilet upon entering room. Pt agreeable to OT session. PAIN: Denies     COGNITION: Slow Processing, Decreased Problem Solving    ADL:   Grooming: Stand By Assistance. Wash/dry face standing sink side ~3 minutes, 1-2 UE release  Upper Extremity Dressing: Set up. Doffed dirty gown and donned cleaned gown sitting EOB, assisted with buttoning and tying   Toileting: Stand By Assistance. Pt stood with L UE unilateral support on sink countertop for ludwin care  Toilet Transfer: Stand By Assistance. BALANCE:  Sitting Balance:  Supervision. Standing Balance: Stand By Assistance. BED MOBILITY:  Rolling to Left: Supervision    Sit to Supine: Supervision    Scooting: Supervision      TRANSFERS:  Sit to Stand:  Stand By Assistance. from toilet, EOB  Stand to Sit: Stand By Assistance. to EOB    FUNCTIONAL MOBILITY:  Assistive Device: Rolling Walker  Assist Level:  Stand By Assistance. Distance: To and from bathroom and Unit hallway  No LOB, good pace, short step length, vcs for forward flexed posture  -Completed to increase tolerance needed for ADLs    ADDITIONAL ACTIVITIES:  Pt attempted B UE resistance exercises with green resistance band X 10 reps X 1 set with min vcs for technique, 2 RBs d/t fatigue, and mod vcs for posture and standing balance to increase tolerance needed for ADLs. Unable to finish exercises d/t increased HR. RN stated his HR passed 150 during exercises     ASSESSMENT:     Activity Tolerance:  Patient tolerance of  treatment: good.         Discharge Recommendations: Continue to assess pending progress, Patient would benefit from continued therapy after discharge, IP Rehab  Equipment Recommendations: Equipment Needed: (Defer to next level of care)  Plan: Times per week: 6x  Current Treatment Recommendations: Strengthening, Functional Mobility Training, ROM, Balance Training, Self-Care / ADL, Safety Education & Training, Endurance Training, Positioning    Patient Education  Patient Education: Role of OT, Plan of Care and ADL's    Goals  Short term goals  Time Frame for Short term goals: Until d/c  Short term goal 1: Pt will complete functional mobility to/from restroom using RW with S to increase tolerance needed to complete ADLs  Short term goal 2: Pt will complete LB ADL using LHAE with Min A to increase indep with ADL routine  Short term goal 3: Pt will complete dynamic standing task ~5 minutes 1-2 UE release with CGA to increase indep with ADLs  Short term goal 4: Pt will complete B UE resistance exercises X 15 reps X 2 sets with 0 vcs for technique to increase tolerance needed for ADLs  Long term goals  Time Frame for Long term goals : N/A d/t short LOS    Following session, patient left in safe position with all fall risk precautions in place.
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
## 2020-05-19 NOTE — PROGRESS NOTES
[address] [address]  Acute Inpatient Rehab Preadmission Assessment    Patient Name: Hermelinda Bermudez        MRN: [id number]    : 1975  (53 y.o.)  Gender: male     Admitted from:[address]  Initial Assessment    Date of admission to the hospital: 2020  5:48 AM  Date patient eligible for admission:2020    Primary Diagnosis: S/P T8 and T9 laminectomy      · Did patient have surgery? yes - T8 and T9 laminectomy   · Surgical resection of intradural extramedullary spinal lesion (arachmoid cyst/ arachnoid band) at the level of T9.  ·       Resection of abnormal intradural vascular structure that was consistent with spinal intradural fistula at the level of T9 ( please see below for more detail). · Using the surgical microscope. · Using the interoperative ultrasound. Using the intraoperative neurophysiology monitoring     Physicians: Cherise More MD,  Dr. Raina Hair, Dr. Ebony Do for clinical complications/co-morbidities:   Past Medical History:   Diagnosis Date    Asthma     Ataxic gait     Developmental delay     Diet-controlled diabetes mellitus ([facility] [address]     History of syrinx of spinal cord ([facility] [address] 2020    Myelopathy of thoracic region     Osteopenia determined by x-ray     Vitamin D deficiency 2020    Vitamin D 25OH 24       Financial Information  Primary insurance: Commercial: Company: ., Contact Information: . Secondary Insurance:  . Has the patient had two or more falls in the past year or any fall with injury in the past year? yes    Did the patient have major surgery during the 100 days prior to admission?   yes    Precautions:   falls, infections and skin  Restrictions/Precautions: General Precautions, Fall Risk    Isolation Precautions: None       Physiatrist: Dr. Cristhian Randall    Patients Occupation: Retired  Reviewed Lab and Diagnostic reports from Current Admission: Yes    Patients Prior Functional  Level: Prior Function  Receives Help From:

## 2020-05-20 LAB
ALBUMIN SERPL-MCNC: 3.8 G/DL (ref 3.5–5.1)
ALP BLD-CCNC: 70 U/L (ref 38–126)
ALT SERPL-CCNC: 25 U/L (ref 11–66)
ANION GAP SERPL CALCULATED.3IONS-SCNC: 12 MEQ/L (ref 8–16)
AST SERPL-CCNC: 18 U/L (ref 5–40)
BILIRUB SERPL-MCNC: 0.5 MG/DL (ref 0.3–1.2)
BUN BLDV-MCNC: 21 MG/DL (ref 7–22)
CALCIUM SERPL-MCNC: 9.4 MG/DL (ref 8.5–10.5)
CHLORIDE BLD-SCNC: 96 MEQ/L (ref 98–111)
CO2: 29 MEQ/L (ref 23–33)
CREAT SERPL-MCNC: 0.8 MG/DL (ref 0.4–1.2)
ERYTHROCYTE [DISTWIDTH] IN BLOOD BY AUTOMATED COUNT: 14.4 % (ref 11.5–14.5)
ERYTHROCYTE [DISTWIDTH] IN BLOOD BY AUTOMATED COUNT: 46.1 FL (ref 35–45)
GFR SERPL CREATININE-BSD FRML MDRD: > 90 ML/MIN/1.73M2
GLUCOSE BLD-MCNC: 103 MG/DL (ref 70–108)
HCT VFR BLD CALC: 43.9 % (ref 42–52)
HEMOGLOBIN: 14.2 GM/DL (ref 14–18)
MCH RBC QN AUTO: 28.9 PG (ref 26–33)
MCHC RBC AUTO-ENTMCNC: 32.3 GM/DL (ref 32.2–35.5)
MCV RBC AUTO: 89.2 FL (ref 80–94)
PLATELET # BLD: 371 THOU/MM3 (ref 130–400)
PMV BLD AUTO: 8.3 FL (ref 9.4–12.4)
POTASSIUM SERPL-SCNC: 4.3 MEQ/L (ref 3.5–5.2)
RBC # BLD: 4.92 MILL/MM3 (ref 4.7–6.1)
SODIUM BLD-SCNC: 137 MEQ/L (ref 135–145)
TOTAL PROTEIN: 6.7 G/DL (ref 6.1–8)
WBC # BLD: 12 THOU/MM3 (ref 4.8–10.8)

## 2020-05-20 PROCEDURE — 97530 THERAPEUTIC ACTIVITIES: CPT

## 2020-05-20 PROCEDURE — 80053 COMPREHEN METABOLIC PANEL: CPT

## 2020-05-20 PROCEDURE — 85027 COMPLETE CBC AUTOMATED: CPT

## 2020-05-20 PROCEDURE — 1180000000 HC REHAB R&B

## 2020-05-20 PROCEDURE — 94760 N-INVAS EAR/PLS OXIMETRY 1: CPT

## 2020-05-20 PROCEDURE — 2580000003 HC RX 258: Performed by: FAMILY MEDICINE

## 2020-05-20 PROCEDURE — 97116 GAIT TRAINING THERAPY: CPT

## 2020-05-20 PROCEDURE — 6370000000 HC RX 637 (ALT 250 FOR IP): Performed by: FAMILY MEDICINE

## 2020-05-20 PROCEDURE — 97162 PT EVAL MOD COMPLEX 30 MIN: CPT

## 2020-05-20 PROCEDURE — 97110 THERAPEUTIC EXERCISES: CPT

## 2020-05-20 PROCEDURE — 97166 OT EVAL MOD COMPLEX 45 MIN: CPT

## 2020-05-20 PROCEDURE — 36415 COLL VENOUS BLD VENIPUNCTURE: CPT

## 2020-05-20 PROCEDURE — 97535 SELF CARE MNGMENT TRAINING: CPT

## 2020-05-20 PROCEDURE — 6370000000 HC RX 637 (ALT 250 FOR IP): Performed by: PHYSICAL MEDICINE & REHABILITATION

## 2020-05-20 RX ADMIN — DOCUSATE SODIUM 100 MG: 100 CAPSULE, LIQUID FILLED ORAL at 19:44

## 2020-05-20 RX ADMIN — POLYETHYLENE GLYCOL 3350 17 G: 17 POWDER, FOR SOLUTION ORAL at 09:27

## 2020-05-20 RX ADMIN — Medication 10 ML: at 09:28

## 2020-05-20 RX ADMIN — SENNOSIDES 8.6 MG: 8.6 TABLET, FILM COATED ORAL at 19:44

## 2020-05-20 RX ADMIN — CYCLOBENZAPRINE 10 MG: 10 TABLET, FILM COATED ORAL at 19:52

## 2020-05-20 RX ADMIN — VITAMIN D, TAB 1000IU (100/BT) 5000 UNITS: 25 TAB at 09:27

## 2020-05-20 RX ADMIN — DOCUSATE SODIUM 100 MG: 100 CAPSULE, LIQUID FILLED ORAL at 09:27

## 2020-05-20 RX ADMIN — HYDROCODONE BITARTRATE AND ACETAMINOPHEN 1 TABLET: 5; 325 TABLET ORAL at 14:11

## 2020-05-20 ASSESSMENT — PAIN SCALES - GENERAL
PAINLEVEL_OUTOF10: 7
PAINLEVEL_OUTOF10: 0
PAINLEVEL_OUTOF10: 0
PAINLEVEL_OUTOF10: 7
PAINLEVEL_OUTOF10: 0
PAINLEVEL_OUTOF10: 7
PAINLEVEL_OUTOF10: 0
PAINLEVEL_OUTOF10: 7
PAINLEVEL_OUTOF10: 0
PAINLEVEL_OUTOF10: 3
PAINLEVEL_OUTOF10: 0
PAINLEVEL_OUTOF10: 0

## 2020-05-20 ASSESSMENT — PAIN DESCRIPTION - DESCRIPTORS: DESCRIPTORS: ACHING

## 2020-05-20 ASSESSMENT — PAIN - FUNCTIONAL ASSESSMENT
PAIN_FUNCTIONAL_ASSESSMENT: ACTIVITIES ARE NOT PREVENTED

## 2020-05-20 ASSESSMENT — ENCOUNTER SYMPTOMS
EYE PAIN: 0
VOICE CHANGE: 0
TROUBLE SWALLOWING: 0
DIARRHEA: 0
CONSTIPATION: 0
ALLERGIC/IMMUNOLOGIC NEGATIVE: 1
BACK PAIN: 1
SHORTNESS OF BREATH: 0
NAUSEA: 0
COUGH: 0

## 2020-05-20 ASSESSMENT — PAIN DESCRIPTION - ONSET: ONSET: GRADUAL

## 2020-05-20 ASSESSMENT — PAIN DESCRIPTION - LOCATION
LOCATION: BACK

## 2020-05-20 ASSESSMENT — PAIN DESCRIPTION - FREQUENCY: FREQUENCY: INTERMITTENT

## 2020-05-20 ASSESSMENT — PAIN DESCRIPTION - PAIN TYPE
TYPE: SURGICAL PAIN

## 2020-05-20 NOTE — PROGRESS NOTES
Yes  Pain is Managed:  Yes  Signs and Symptoms of Infection:  No  Signs and Symptoms of Skin Breakdown:  No  Injury and Fall Free during Inpatient Rehabilitation Admission: Yes    Consultations/Labs/X-rays: Neurosurgery, Critical Care, Physical Medicine    Family Education: Need to make contact with family to initiate education  Fall Risk:  Falling star program initiated  Is the patient appropriate for a stay in the functional apartment? no    Discharge Plan   Estimated Discharge Date: Continue to assess   Destination: outpatient therapies  Services at Discharge: Outpatient Physical Therapy and Occupational Therapy 2x week  Is patient appropriate for an outpatient driving evaluation? no  Equipment at Discharge: hip kit, BSC  Factors facilitating achievement of predicted outcomes: Family support, Motivated, Cooperative, Pleasant, Sense of humor, Good insight into deficits and Knowledge about rehab  Barriers to the achievement of predicted outcomes: Pain, Cognitive deficit and Long standing deficits    Team Members Present at Conference:  Case [address] [place], [place]  Occupational Therapist:Tamela [address] [facility] [address] OTR/L Krishna Moscoso 103 [facility]  Speech Therapist:N/A  [address] [facility] Jakub, MANOLO  Psychologist: Young Graf, PhD.    I approve the established interdisciplinary plan of care as documented within the medical record of Carolyn Hayes.     Sanchez Randall

## 2020-05-20 NOTE — PROGRESS NOTES
thoroughness. )  LE Bathing: Moderate assistance(To bathe bilateral LEs distal to knees and buttocks.)  UE Dressing: Minimal assistance(To don t-shirt over back. )  LE Dressing: Moderate assistance(To thread pants/undergarments over bilateral LEs to don/doff, and DEP on writer to don/doff bilateral socks. )  Toileting: Contact guard assistance  Vision - Basic Assessment  Prior Vision: No visual deficits     Functional Mobility:  Bed mobility  Sit to Supine: Stand by assistance     Functional Mobility  Functional - Mobility Device: Standard Walker  Activity: To/from bathroom  Assist Level: Contact guard assistance      Balance:  Balance  Sitting Balance: Stand by assistance  Standing Balance: Minimal assistance(to maintain standing balance, pt reports L LE weakness. )  Standing Balance  Time: ~3 minutes  Activity: oral care sinkside     Transfers:  Sit to stand: Moderate assistance(to lift pelvis from seat.)  Stand to sit: Minimal assistance(to guide transfer. )  Toilet Transfers  Toilet - Technique: Ambulating  Equipment Used: Standard bedside commode(BSC over toilet. )  Toilet Transfer: Contact guard assistance  Shower Transfers  Shower - Transfer From: [place] - Transfer Type: To and From  Shower - Transfer To: Shower seat with back  Shower - Technique: Ambulating  Shower Transfers: Contact Guard     Upper Extremity Assessment:   LUE AROM : WFL  RUE AROM : WFL     LUE Strength  L Hand General: 5/5  RUE Strength  R Hand General: 5/5     Sensation  Overall Sensation Status: Impaired(Pt reports L LE weakness and numbness/tingling while standing. )  RUE Tone: Normotonic  LUE Tone: Normotonic     Activity Tolerance: Patient Tolerated treatment well     Assessment:  Assessment: Travis Lloyd is a 53 yearl old male, admitted to the Inpatient Rehab Unit on 5/19/2020 due to Thoracic Myelopathy s/p thoracic spine laminectomy and exertion of cyst on 5/14/2020.  He reports independence with ADLs, receiving assist from his surgery  Motor Exam:  Motor exam is 5 out of 5 right upper and right lower extremities     Manual Muscle Testing:     Hip Flexion  Hip Extension  Hip Abduction  Hip Adduction    Left  4+         Right                Knee Flex  Knee Ext  Ankle DF  Ankle PF  Ankle Inv  Ankle Ev  EHL    Left    4+ 4+ 4+         Right                    Sensory:  Sensory intact with mild decrease over the left lower limb  Coordination:   normal  Deep Tendon Reflexes:    Right Knee:  3+  Left Knee:  3+  Right Ankle:  clonus  Left Ankle:  Clonus at rest     Skin: warm and dry, no rash or erythema  Peripheral vascular: Pulses: Normal upper and lower extremity pulses; Edema: trace    Diagnostics:   Recent Results (from the past 24 hour(s))   CBC    Collection Time: 05/20/20  5:34 AM   Result Value Ref Range    WBC 12.0 (H) 4.8 - 10.8 thou/mm3    RBC 4.92 4.70 - 6.10 mill/mm3    Hemoglobin 14.2 14.0 - 18.0 gm/dl    Hematocrit 43.9 42.0 - 52.0 %    MCV 89.2 80.0 - 94.0 fL    MCH 28.9 26.0 - 33.0 pg    MCHC 32.3 32.2 - 35.5 gm/dl    RDW-CV 14.4 11.5 - 14.5 %    RDW-SD 46.1 (H) 35.0 - 45.0 fL    Platelets 132 550 - 314 thou/mm3    MPV 8.3 (L) 9.4 - 12.4 fL   Comprehensive Metabolic Panel    Collection Time: 05/20/20  5:34 AM   Result Value Ref Range    Glucose 103 70 - 108 mg/dL    CREATININE 0.8 0.4 - 1.2 mg/dL    BUN 21 7 - 22 mg/dL    Sodium 137 135 - 145 meq/L    Potassium 4.3 3.5 - 5.2 meq/L    Chloride 96 (L) 98 - 111 meq/L    CO2 29 23 - 33 meq/L    Calcium 9.4 8.5 - 10.5 mg/dL    AST 18 5 - 40 U/L    Alkaline Phosphatase 70 38 - 126 U/L    Total Protein 6.7 6.1 - 8.0 g/dL    Alb 3.8 3.5 - 5.1 g/dL    Total Bilirubin 0.5 0.3 - 1.2 mg/dL    ALT 25 11 - 66 U/L   Anion Gap    Collection Time: 05/20/20  5:34 AM   Result Value Ref Range    Anion Gap 12.0 8.0 - 16.0 meq/L   Glomerular Filtration Rate, Estimated    Collection Time: 05/20/20  5:34 AM   Result Value Ref Range    Est, Glom Filt Rate >90 ml/min/1.73m2     Labs Renal Latest Ref Rng

## 2020-05-20 NOTE — PROGRESS NOTES
flexion 3/5, ABD tolerating min resistance, ankle df 3+. Pt showing decreased control with eccentric action of trevor hamstring with t-band resisted ham curls    Balance:  Static Sitting Balance:  Modified Independent  Static Standing Balance: Stand By Assistance with RW support    Bed Mobility:  Rolling to Left: Supervision   Rolling to Right: Supervision   Supine to Sit: Stand By Assistance  Sit to Supine: Stand By Assistance   Cues given for safe technique, log roll. Transfers:  Sit to Stand: Contact Guard Assistance  Stand to [address] [address] [facility] for controlled descent. Pt tends to rely heavily on UEs  Stand [place]:  [address] for the shorter distances. Min to control LLE recurvatum with longer distance  Distance: 10 ft, 55 ft with turns to ea direction. 20 ft x2  Surface: Level Tile  Device:Rolling Walker  Gait Deviations:  Slow Kellen, Decreased Step Length Bilaterally and minimally decreased eccentric control noted trevor with end swing, Lt worse than Rt. Lt recurvatum noted after approx 30 ft  Stairs:  Contact Guard Assistance on descent, min for balance with ascent, leading with RLE  Number of Steps: 2  Height: 6\" step with Parallel Bars     EXERCISES:  The following exercises were completed to improve functional mobility: supine- trevor hip ADD isometrics x10 reps,  Seated: ham curls with t-band resistance x10 reps BLEs, standing:  LT TKE with t-band resistance x10 reps. Functional Outcome Measures: Completed  Timed up and Go Test (TUG)  38 seconds       Normative Reference Values  60-69 years:  8.1 seconds  70-79 years: 9.2 seconds  80-99 years: 11.3 seconds    < 10 seconds is normal, a score of > 14 seconds indicates high fall risk          ASSESSMENT:  Activity Tolerance:  Patient tolerance of  treatment: good. Treatment Initiated: Treatment and education initiated within context of evaluation.   Evaluation time included review of current medical information, gathering information related to past medical, social and functional history, completion of standardized testing, formal and informal observation of tasks, assessment of data and development of plan of care and goals. Treatment time included skilled education and facilitation of tasks to increase safety and independence with functional mobility for improved independence and quality of life. Assessment:  Assessment: Pt demonstrates a decrease in baseline by way of bed mobility, transfers, gait due to decreased functional strength in BLEs and core, decreased endurance, decreased balance resulting in need for assist with functional mobility and gait. Pt will benefit from skilled PT to advance in these areas for improved functional mobility and safety.            Discharge Recommendations:  Discharge Recommendations: Continue to assess pending progress, Patient would benefit from continued therapy after discharge    Patient Education:  PT Education: Goals, PT Role, Precautions, Plan of Care, Gait Training, Functional Mobility Training, Transfer Training    Equipment Recommendations:  Equipment Needed: No(Pt has RW)    Plan:  Times per week: 5x/wk 90 min, 1x/ wk 30 min  Current Treatment Recommendations: Strengthening, Transfer Training, Endurance Training, Balance Training, Gait Training, Stair training, Functional Mobility Training, Home Exercise Program, Equipment Evaluation, Education, & procurement, Safety Education & Training, Patient/Caregiver Education & Training    Goals:  Patient goals : get my Lt leg stronger  Short term goals  Time Frame for Short term goals: 1 wk  Short term goal 1: supine to sit and return with log roll Mod I, to get in/out of bed  Short term goal 2: Pt to get up/down from various seated surfaces, CGA, to get up to walk  Short term goal 3: Pt to walk with RW >= 50 ft, CGA, showing no recurvatum LLE, controlled swing into stance phase, to

## 2020-05-20 NOTE — PROGRESS NOTES
reports his appetite is great, is eating meat, eggs, protein foods, does drink diet pop & keeps his blood  sugars in line unless he is on steroids because steroids push it up. Pt is very happy is is on a regular  diet. 1 bm recorded yesterday, & per pt it was rather small, but getting bm meds for .5/14/20  A1c 6% with average glucose of 120. current glucose is 103, WBC 12  · Wound Type: Surgical Wound(5/14 Thoracic Spine and Laminectomy Extraction of Arachnoid Cyst/Band )  · Current Nutrition Therapies:  · Oral Diet Orders: General   · Oral Diet intake: %  · Oral Nutrition Supplement (ONS) Orders: None  · ONS intake: (Pt eating protein foods well)  · Anthropometric Measures:  · Ht: 5' 10\" (177.8 cm)   · Current Body Wt: 220 lb 0.3 oz (99.8 kg)(5/19 trace edema)  · Admission Body Wt: 220 lb 0.3 oz (99.8 kg)(5/19 trace edema )  · Usual Body Wt: 198 lb 10.2 oz (90.1 kg)(3/24/20 no edema . wt per EMR)  · % Weight Change:  ,  as above.  edema on admit.    · Ideal Body Wt: 166 lb (75.3 kg), % Ideal Body 132%  · BMI Classification: BMI 30.0 - 34.9 Obese Class I(31.7)    Nutrition Interventions:   (Diet per Dr)  Continued Inpatient Monitoring, Education Initiated, Coordination of Care    Nutrition Evaluation:   · Evaluation: Goals set   · Goals: 75% or more of healthy po itnake during LOS to assist with healing    · Monitoring: Nutrition Progression, Meal Intake, Diet Tolerance, Skin Integrity, Wound Healing, Weight, Pertinent Labs, Monitor Bowel Function      Electronically signed by Estrellita Leon RD, LD on 5/20/20 at 12:26 PM EDT    Contact Number: [phone]

## 2020-05-20 NOTE — PROGRESS NOTES
[facility]  Recreational Therapy  Evaluation  Inpatient Rehabilitation Unit      Time Spent with Patient: 30 minutes    Date:  5/20/2020       Patient Name: Duglas Gauthier      MRN: [id number]       YOB: 1975 (47 y.o.)       Gender: male          RESTRICTIONS/PRECAUTIONS:  Restrictions/Precautions: General Precautions, Fall Risk          PAIN: 2-back at incision site     SUBJECTIVE:  Pt lives with mom and sister-has another sister that lives close with her family     VISION:  Within Normal Limit    HEARING: Within Normal Limit    LEISURE INTERESTS:   Pt states he was independent with his bathing and dressing, he drives- prior to covid  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 lately all he did was eat and sleep due to fatigue prior to surgery-states he is feeling much better now and looking forward to getting stronger to go home-he requested word search puzzles, he uses his phone a lot to talk with family and friends and watches some tv-pleasant-social-very motivated    BARRIERS TO LEISURE INTERESTS:    Decreased endurance           Patient Education  New Education Provided: Importance of Leisure, RT Plan of Care    Plan:  Continue to follow patient through this admission  See patient individually    Electronically signed by: TALIA Cervantes  Date: 5/20/2020

## 2020-05-20 NOTE — PROGRESS NOTES
guard assistance  Shower Transfers  Shower - Transfer From: [employer] - Transfer Type: To and From  Shower - Transfer To: Shower seat with back  Shower - Technique: Ambulating  Shower Transfers: Contact Guard    Upper Extremity Assessment:   LUE AROM : WFL  RUE AROM : WFL    LUE Strength  L Hand General: 5/5  RUE Strength  R Hand General: 5/5    Sensation  Overall Sensation Status: Impaired(Pt reports L LE weakness and numbness/tingling while standing. )  RUE Tone: Normotonic  LUE Tone: Normotonic       Activity Tolerance: Patient Tolerated treatment well       Assessment:  Assessment: Codie Jennings is a 53 yearl old male, admitted to the Inpatient Rehab Unit on 5/19/2020 due to Thoracic Myelopathy s/p thoracic spine laminectomy and exertion of cyst on 5/14/2020. He reports independence with ADLs, receiving assist from his mother/sister prn for lower body tasks, and pt assists family with IADLs prn. Pt now requires CGA to complete toileting tasks, and MOD A with bathing/dressing while sitting to complete. He also requires MIN A to maintain standing balance and reports L LE weakness. Codie Jennings is limited by decreased balance, endurance, and ADL independence. Codie Jennings would benefit from skilled OT services during his stay at the Inpatient Rehab Unit to address these deficits. Without these services, Codie Jennings is at risk for hospital readmission and caregiver burden. Performance deficits / Impairments: Decreased functional mobility , Decreased ADL status, Decreased safe awareness, Decreased ROM, Decreased balance, Decreased coordination, Decreased posture, Decreased endurance  Prognosis: Good  Decision Making: Medium Complexity  Safety Devices in place: Yes  Type of devices: All fall risk precautions in place, Bed alarm in place, Call light within reach, Left in bed    Treatment Initiated: Treatment and education initiated within context of evaluation.   Evaluation time included review of current medical information, gathering information

## 2020-05-20 NOTE — PROGRESS NOTES
Assistance  Distance: 10 ft x2 to destination  Surface: Level Tile  Device:Rolling Walker  Gait Deviations:  Slow Kellen, Decreased Step Length Bilaterally and inconsistent recurvatum noted, minimally on LT with stance. EXERCISES:  The following exercises were completed to improve functional mobility: standing:  TKE LLE with t-band resistance with emphasis on controlled ext without recurvatum. Seated- coordination ex to include: Alternating heel raises, alt. Toe raises, drawing circles ea LE x5 reps ea direction, drawing alphabet with ea LE. Decreased accuracy noted BLEs with drawing, but more impaired LLE. Slower response time noted with LLE with alternating heel and toe raises. ASSESSMENT:  Assessment: Patient progressing toward established goals. Activity Tolerance:  Patient tolerance of  treatment: good.       Equipment Recommendations:Equipment Needed: No(Pt has RW)  Discharge Recommendations:  Continue to assess pending progress, Patient would benefit from continued therapy after discharge    Plan: Times per week: 5x/wk 90 min, 1x/ wk 30 min  Current Treatment Recommendations: Strengthening, Transfer Training, Endurance Training, Balance Training, Gait Training, Stair training, Functional Mobility Training, Home Exercise Program, Equipment Evaluation, Education, & procurement, Safety Education & Training, Patient/Caregiver Education & Training    Patient Education  Patient Education: Home Exercise Program, Transfers,  - Patient Verbalized Understanding, - Patient Requires Continued Education    Goals:  Patient goals : get my Lt leg stronger  Short term goals  Time Frame for Short term goals: 1 wk  Short term goal 1: supine to sit and return with log roll Mod I, to get in/out of bed  Short term goal 2: Pt to get up/down from various seated surfaces, CGA, to get up to walk  Short term goal 3: Pt to walk with RW >= 50 ft, CGA, showing no recurvatum LLE, controlled swing into stance phase, to progress

## 2020-05-20 NOTE — PLAN OF CARE
Problem: DISCHARGE BARRIERS  Goal: Patient's continuum of care needs are met  Note:   [address] [address]  Physical Medicine Case Management Assessment    [x] Inpatient Rehabilitation Unit  [] Transitional Care Unit    Patient Name: Alia Sutton        MRN: [id number]    : 1975  (53 y.o.)  Gender: male     Date of Admission: 2020      Family/Social/Home Environment: Patient familiar to  from previous rehab stay. Prior to hospitalization and surgery, patient indicates completing outpatient PT through [address] [place] [address] through [facility] at the end of April. Patient living with mother, Lorne Brewer, and sister, Davion Kenny. Patient has not driven since previous hospitalization. Patient indicates managing own medications and finances. Patient reports completing light meal preparation, but mother, Lorne Brewer, and sister, Davion Kenny, complete housekeeping, laundry, and meal preparation. Patient reports ability to ambulate short distances without walker, but would use rolling walker for longer distances. Patient indicates motherLorne, and sister, Davion Kenny, will continue to be primary support upon discharge. Social/Functional History  Lives With: Family  Type of Home: Apartment  Home Layout: Two level(Pt lives with family on second floor apartment building. )  Home Access: Stairs to enter with rails  Entrance Stairs - Number of Steps: 8 + 8 and 2 HRs to second floor apt  Bathroom Shower/Tub: Tub/Shower unit  Bathroom Toilet: Standard  Bathroom Equipment: Tub transfer bench  Home Equipment: Rolling walker  Receives Help From: Family  ADL Assistance: Needs assistance(Pt reports assistance prn from mother/sister with bathing/dressing lower half. )  Ambulation Assistance: Independent  Transfer Assistance: Independent  Active : No  Mode of Transportation: Car  Type of occupation: Patient work as  at [employer] in [place] and as  with other employer.    Leisure &

## 2020-05-21 PROCEDURE — 94760 N-INVAS EAR/PLS OXIMETRY 1: CPT

## 2020-05-21 PROCEDURE — 97535 SELF CARE MNGMENT TRAINING: CPT

## 2020-05-21 PROCEDURE — 1180000000 HC REHAB R&B

## 2020-05-21 PROCEDURE — 6370000000 HC RX 637 (ALT 250 FOR IP): Performed by: PHYSICAL MEDICINE & REHABILITATION

## 2020-05-21 PROCEDURE — 6370000000 HC RX 637 (ALT 250 FOR IP): Performed by: FAMILY MEDICINE

## 2020-05-21 PROCEDURE — 97110 THERAPEUTIC EXERCISES: CPT

## 2020-05-21 PROCEDURE — 97530 THERAPEUTIC ACTIVITIES: CPT

## 2020-05-21 PROCEDURE — 97116 GAIT TRAINING THERAPY: CPT

## 2020-05-21 PROCEDURE — 97112 NEUROMUSCULAR REEDUCATION: CPT

## 2020-05-21 RX ADMIN — POLYETHYLENE GLYCOL 3350 17 G: 17 POWDER, FOR SOLUTION ORAL at 08:08

## 2020-05-21 RX ADMIN — ACETAMINOPHEN 650 MG: 325 TABLET ORAL at 12:33

## 2020-05-21 RX ADMIN — HYDROCODONE BITARTRATE AND ACETAMINOPHEN 1 TABLET: 5; 325 TABLET ORAL at 08:06

## 2020-05-21 RX ADMIN — SENNOSIDES 8.6 MG: 8.6 TABLET, FILM COATED ORAL at 20:22

## 2020-05-21 RX ADMIN — DOCUSATE SODIUM 100 MG: 100 CAPSULE, LIQUID FILLED ORAL at 08:07

## 2020-05-21 RX ADMIN — CYCLOBENZAPRINE 10 MG: 10 TABLET, FILM COATED ORAL at 20:22

## 2020-05-21 RX ADMIN — DOCUSATE SODIUM 100 MG: 100 CAPSULE, LIQUID FILLED ORAL at 20:22

## 2020-05-21 RX ADMIN — VITAMIN D, TAB 1000IU (100/BT) 5000 UNITS: 25 TAB at 08:07

## 2020-05-21 ASSESSMENT — ENCOUNTER SYMPTOMS
NAUSEA: 0
TROUBLE SWALLOWING: 0
COUGH: 0
BACK PAIN: 1
ALLERGIC/IMMUNOLOGIC NEGATIVE: 1
VOICE CHANGE: 0
EYE PAIN: 0
CONSTIPATION: 0
SHORTNESS OF BREATH: 0
DIARRHEA: 0

## 2020-05-21 ASSESSMENT — PAIN SCALES - GENERAL
PAINLEVEL_OUTOF10: 1
PAINLEVEL_OUTOF10: 3
PAINLEVEL_OUTOF10: 5
PAINLEVEL_OUTOF10: 1
PAINLEVEL_OUTOF10: 2

## 2020-05-21 ASSESSMENT — PAIN DESCRIPTION - LOCATION: LOCATION: BACK

## 2020-05-21 ASSESSMENT — PAIN DESCRIPTION - PAIN TYPE: TYPE: SURGICAL PAIN

## 2020-05-21 NOTE — PROGRESS NOTES
Physical Medicine & Rehabilitation  Progress Note    Chief Complaint:  Thoracic myelopathy s/p T8-T9 laminectomy with gait instability    Subjective:   Care conference held today with proposed discharge date to be determined with proposedOutpatient Physical Therapy and Occupational Therapy. Family not present. He is in agreement with plan and timing. He states that his pain remains well controlled and is only taking a single Norco tablet a day. He is happy with his current progress with therapies. He has no other specific concerns or questions at this time. Rehabilitation:   Physical Therapy:  OBJECTIVE:  Bed Mobility:  Sit to Supine: Stand By Assistance   Scooting: Stand By Assistance     Transfers:  Sit to Stand: Contact Guard Assistance  Stand to [employer] Assistance     Ambulation:  Contact Guard Assistance, with cues for safety  Distance: 100ft  Surface: Level Tile  Device:Rolling Walker  Gait Deviations: Forward Flexed Posture, Slow Shay, Decreased Gait Speed, Wide Base of Support, Mild Path Deviations, Unsteady Gait, Decreased Terminal Knee Extension and quickl shay, larger steps, forward flexed, cues to slow down, unsteady, trevor knee instability noted     Stairs:  Contact Guard Assistance  Number of Steps: 8  Height: 6\" step with Bilateral Handrails  Cues for step to pattern, and to ensure whole foot is on the step, good pace, steady     Neuromuscular Education:   Pt completed cone taps with RW support with R and L LEs on cue. Pt also completed cone taps in 6\" step with trevor hand rail support. Pt needing cues for knee stability trevor and core engagement. Pt able to complete without knocking over any cones. Completed for strength and LE stability and control and body mechanics     Exercise:  Patient was guided in 1 set(s) 1 reps of exercise to both lower extremities. Ankle pumps, Heelslides, Hip abduction/adduction and Straight leg raises.   Exercises were completed for increased independence with functional mobility.     Functional Outcome Measures: Completed     ASSESSMENT:  Assessment: Patient progressing toward established goals. Activity Tolerance:  Patient tolerance of  treatment: good. Pt tolerated session well. Pt demos decreased stability on feet, strength and safety with mobility. Pt will benefit fromc ont PT at this time to ensure safety and to decrease the risk for falls. Equipment Recommendations:Equipment Needed: No(Pt has RW)  Discharge Recommendations:  Continue to assess pending progress, Patient would benefit from continued therapy after discharge     Occupational Therapy:  COGNITION: Decreased Insight, Decreased Problem Solving and Decreased Safety Awareness     ADL:   No ADL's completed this session. .     BALANCE:  Standing Balance: Contact Guard Assistance, Minimal Assistance, with cues for safety, with increased time for completion. standing at RW      BED MOBILITY:  Not Tested     TRANSFERS:  Sit to Stand:  Contact Guard Assistance. from recliner   Stand to Sit: [address] to recliner     FUNCTIONAL MOBILITY:  Assistive Device: NephosityheveExerscrip 298 Level:  Contact Guard Assistance, with verbal cues  and with increased time for completion. Distance: around unit   Pt had no LOB and did require min verbal cues for posture throughout and less weigth through BUE's on RW       ADDITIONAL ACTIVITIES:  .Pt completed BUE strengthening exercises [id number]  set this date with a moderate resistive band in all joints and all planes in order to increase strength and improve activity tolerance required for functional toilet & shower transfers and ADL routine. Pt tolerated in sitting , requiring short  rest breaks throughout task.      Pt completed dynamic standing task this date standing x3-5  min while requiring CGA   for balance - task was graded to involve 1-2 hand release .  Completed in order to challenge dynamic standing balance and standing tolerance for the completion of self-care ADLs & homemaking activities.     ASSESSMENT:  Activity Tolerance:  Patient tolerance of  treatment: good. Pt talkative throuhgout session and did require min cues to focus on task       Discharge Recommendations: Continue to assess pending progress, Home with nursing aide, Home with Home health OT, Home with assist PRN  Equipment Recommendations: Equipment Needed: No  Other: Pt reports ownership of RW and extended tub bench, may benefit from Hip kit and/or HHS, continue to assess for a BSC. Speech Therapy:  Not applicable    Review of Systems:  Review of Systems   Constitutional: Positive for activity change. Negative for appetite change, fatigue and fever. HENT: Negative for trouble swallowing and voice change. Eyes: Negative for pain. Respiratory: Negative for cough and shortness of breath. Cardiovascular: Negative for leg swelling. Gastrointestinal: Negative for constipation, diarrhea and nausea. Endocrine: Negative for cold intolerance. Genitourinary: Negative for frequency and urgency. Musculoskeletal: Positive for back pain and gait problem. Skin: Negative for pallor. Allergic/Immunologic: Negative. Neurological: Positive for weakness and numbness. Negative for dizziness, tremors, seizures and headaches. Psychiatric/Behavioral: Positive for decreased concentration. Negative for confusion and dysphoric mood. All other systems reviewed and are negative. Objective:  /70   Pulse 92   Temp 98 °F (36.7 °C) (Oral)   Resp 16   Ht 5' 10\" (1.778 m)   Wt 220 lb 2 oz (99.8 kg)   SpO2 94%   BMI 31.58 kg/m²   CURRENT VITALS:  height is 5' 10\" (1.778 m) and weight is 220 lb 2 oz (99.8 kg). His oral temperature is 98 °F (36.7 °C). His blood pressure is 114/70 and his pulse is 92. His respiration is 16 and oxygen saturation is 94%. Body mass index is 31.58 kg/m².   Temperature Range (24h):Temp: 98 °F (36.7 °C) Temp  Av °F (36.7 °C)  Min: 98 °F (36.7 °C)  Max: 98 °F (36.7 °C)  BP Range (78L): Systolic (06WCV), GX , Min:114 , VJS:626     Diastolic (31ZGH), QBU:54, Min:70, Max:70    Pulse Range (24h): Pulse  Av  Min: 92  Max: 92  Respiration Range (24h): Resp  Av  Min: 16  Max: 16  Current Pulse Ox (24h):  SpO2: 94 %  Pulse Ox Range (24h):  SpO2  Av %  Min: 94 %  Max: 94 %  Oxygen Amount and Delivery:      awake  Orientation:   person, place, time, situation  Mood: within normal limits  Affect: calm  General appearance:  in no acute distress, up in wheelchair     Memory:   grossly normal  Attention/Concentration: normal  Language:  normal    ROM:  abnormal - thoracic spine surgery  Motor Exam:  Motor exam is 5 out of 5 right upper and right lower extremities     Manual Muscle Testing:     Hip Flexion  Hip Extension  Hip Abduction  Hip Adduction    Left  4+         Right                Knee Flex  Knee Ext  Ankle DF  Ankle PF  Ankle Inv  Ankle Ev  EHL    Left    4+ 4+ 4+         Right                    Sensory:  Sensory intact with mild decrease over the left lower limb  Coordination:   normal  Deep Tendon Reflexes:    Right Knee:  3+  Left Knee:  3+  Right Ankle:  clonus  Left Ankle:  Clonus at rest     Skin: warm and dry, no rash or erythema  Peripheral vascular: Pulses: Normal upper and lower extremity pulses; Edema: trace    Diagnostics:   No results found for this or any previous visit (from the past 24 hour(s)). Labs Renal Latest Ref Rng & Units 2020 2020 5/15/2020 2020 3/25/2020   BUN 7 - 22 mg/dL 21 14 17 15 25(H)   Cr 0.4 - 1.2 mg/dL 0.8 0.7 0.7 0.8 0.7   K 3.5 - 5.2 meq/L 4.3 4.1 4.0 4.6 4.2   Na 135 - 145 meq/L 137 142 138 136 138      Recent Labs     20  0534 20  0340 20  0335   WBC 12.0* 12.1* 12.8*   HGB 14.2 13.0* 11.5*   HCT 43.9 39.4* 35.3*   MCV 89.2 89.3 90.1    340 356      Impression:  1.  Status post T8 and T9 laminectomies with surgical resection of an intradural

## 2020-05-21 NOTE — PROGRESS NOTES
Level Tile  Device:Rolling Walker  Gait Deviations: Forward Flexed Posture, Slow Kellen, Decreased Step Length Bilaterally, Decreased Gait Speed, Decreased Heel Strike Bilaterally and Unsteady Gait. Rest breaks required. Stairs:  Stairs:  4\" steps. X 6 using Bilateral Handrails and Contact Guard Assistance. Decreased     Balance:  Dynamic Standing Balance: Contact Guard Assistance, Minimal Assistance   Pt. Completed standing balance activities tapping feet onto cones as instructed by therapist and reaching/tossing rings. Pt. Slightly unsteady throughout with decreased quad control. Pt. With varying levels of UE support throughout. Exercise:  Patient was guided in 1 set(s) 10 reps of exercise to both lower extremities. Glut sets, Long arc quads, Hip abduction/adduction, Seated hip flexion, Seated hamstring curls, Seated heel/toe raises and Seated isometric hip adduction. Exercises were completed for increased independence with functional mobility. Functional Outcome Measures: Not completed       ASSESSMENT:  Assessment: Patient progressing toward established goals, meeting 3/5 short term goals. Pt continues to show balance deficits combined with decreased LE strength and endurance, requiring cGA with transfers, gait and steps. Pt will benefit from continued skilled PT to further advance in these areas. Activity Tolerance:  Patient tolerance of  treatment: good.       Equipment Recommendations:Equipment Needed: No(Pt has RW)  Discharge Recommendations:  Continue to assess pending progress, Patient would benefit from continued therapy after discharge    Plan: Times per week: 5x/wk 90 min, 1x/ wk 30 min  Current Treatment Recommendations: Strengthening, Transfer Training, Endurance Training, Balance Training, Gait Training, Stair training, Functional Mobility Training, Home Exercise Program, Equipment Evaluation, Education, & procurement, Safety Education & Training, Patient/Caregiver Education &

## 2020-05-21 NOTE — PLAN OF CARE
Team conference held 05/21/20. Recommendations of the team were explained to the patient by BRANDEN Guerrero and Dr. Carmella Rubio. Team is recommending that patient continue on acute inpatient rehab. Expected discharge date has not been determined yet. Whiteboard to be updated once date has been given. Following discharge, team is recommending outpatient PT and OT. Prior to admission patient was receiving outpatient PT and OT with [facility], being serviced at the [facility] in [facility]. Upon discharge, patient would like to resume services at this location. Once discharge date has been verified, SW to schedule follow up appointment for patient. SW to maintain involvement in discharge planning.

## 2020-05-21 NOTE — FLOWSHEET NOTE
Pt is a 44y. o. male propped up in bed watching television in [id number].  actually saw him back in March when he had a previous surgery; this was his second day of inpatient rehabilitation. Bj Melton was talkative, pleasant conversation and shared in detail what led to this surgery. He connected with me immediately as we had talked some months back, and he also shared his current progress with rehab. One leg is working well and the other is taking time to come back around, but while the surgery was risky and rare, expect him to make a full recovery. Bj Melton has a great attitude towards getting better and takes it a day-at-a-time.  prayed with him and encouraged him in his progress. 05/20/20 2100   Encounter Summary   Services provided to: Patient   Referral/Consult From: Alexsandra   Continue Visiting Yes  (5/20)   Complexity of Encounter Low   Length of Encounter 15 minutes   Spiritual/Jehovah's witness   Type Spiritual support   Assessment Calm; Approachable;Coping; Hopeful   Intervention Active listening;Prayer   Outcome Expressed gratitude;Receptive;Engaged in conversation;Comfort; Connection/belonging

## 2020-05-21 NOTE — PROGRESS NOTES
days   Your insurance Coverage has been verified as follows:    Primary Insurance:bcbs   Deductible:500 Coverage:active  Secondary Insurance:;secondary insurance policies often cover co-pay amounts, but to ensure payment please contact your insurance company.     Alternative Resources: Please ask the  for more information [phone]

## 2020-05-21 NOTE — PROGRESS NOTES
[address] [address]  Recreational Therapy  Daily Note  [address]    Time Spent with Patient: 25 minutes    Date:  5/21/2020       Patient Name: Milagros Angeles      MRN: [id number]      YOB: 1975 (53 y.o.)       Gender: male  Diagnosis: Myelopathy of thoracic region  Referring Practitioner: Dr. Flaquito Tate    RESTRICTIONS/PRECAUTIONS:  Restrictions/Precautions: Fall Risk, General Precautions  Vision: Within Functional Limits  Hearing: Within functional limits    PAIN: 0-no c/o pain after placing pillow to back while in chair     SUBJECTIVE:  That was fun    OBJECTIVE:  Pt came to the dining room to plant flowers for [employer] Day to take home with him at discharge-he came up with design and RT used hot glue gun to place pom poms on cup -he placed the dirt and the flowers in cup and watered it-pt very social-sit to stand from standard chair with CGA-ambulated back to room with RW and CGA-comfort measures given in chair          Patient Education  New Education Provided: Importance of Leisure, Walker Safety    Electronically signed by: TALIA Jarrell  Date: 5/21/2020

## 2020-05-21 NOTE — PROGRESS NOTES
Walker  Gait Deviations: Forward Flexed Posture, Slow Shay, Decreased Gait Speed, Wide Base of Support, Mild Path Deviations, Unsteady Gait, Decreased Terminal Knee Extension and quickl shay, larger steps, forward flexed, cues to slow down, unsteady, trevor knee instability noted    Stairs:  Contact Guard Assistance  Number of Steps: 8  Height: 6\" step with Bilateral Handrails  Cues for step to pattern, and to ensure whole foot is on the step, good pace, steady    Neuromuscular Education:   Pt completed cone taps with RW support with R and L LEs on cue. Pt also completed cone taps in 6\" step with trevor hand rail support. Pt needing cues for knee stability trevor and core engagement. Pt able to complete without knocking over any cones. Completed for strength and LE stability and control and body mechanics    Exercise:  Patient was guided in 1 set(s) 1 reps of exercise to both lower extremities. Ankle pumps, Heelslides, Hip abduction/adduction and Straight leg raises. Exercises were completed for increased independence with functional mobility. Functional Outcome Measures: Completed       ASSESSMENT:  Assessment: Patient progressing toward established goals. Activity Tolerance:  Patient tolerance of  treatment: good. Pt tolerated session well. Pt demos decreased stability on feet, strength and safety with mobility. Pt will benefit fromc ont PT at this time to ensure safety and to decrease the risk for falls.      Equipment Recommendations:Equipment Needed: No(Pt has RW)  Discharge Recommendations:  Continue to assess pending progress, Patient would benefit from continued therapy after discharge    Plan: Times per week: 5x/wk 90 min, 1x/ wk 30 min  Current Treatment Recommendations: Strengthening, Transfer Training, Endurance Training, Balance Training, Gait Training, Stair training, Functional Mobility Training, Home Exercise Program, Equipment Evaluation, Education, & procurement, Safety Education & Training, Patient/Caregiver Education & Training    Patient Education  Patient Education: Plan of Care, Precautions/Restrictions, Bed Mobility, Transfers, Gait, Stairs    Goals:  Patient goals : get my Lt leg stronger  Short term goals  Time Frame for Short term goals: 1 wk  Short term goal 1: supine to sit and return with log roll Mod I, to get in/out of bed  Short term goal 2: Pt to get up/down from various seated surfaces, CGA, to get up to walk  Short term goal 3: Pt to walk with RW >= 50 ft, CGA, showing no recurvatum LLE, controlled swing into stance phase, to progress to home mobility  Short term goal 4: Pt to ascend/descend 4 steps with trevor rails, cGA, to progress to home entry. Short term goal 5: Pt to get in/out of car, Mod I, for transportation needs. Long term goals  Time Frame for Long term goals : 3 wks  Long term goal 1: Pt to get up/down from various seated surfaces, Mod I, to get up to walk  Long term goal 2: Pt to walk with RW >= 150 ft, Mod I, showing no recurvatum LLE, for home and community mobility  Long term goal 3: Pt to ascend/descend 8+8 steps with trevor rails, mod I, for home entry  Long term goal 4: Pt to perform TUG <= 20 seconds indicating improved balance    Following session, patient left in safe position with all fall risk precautions in place.

## 2020-05-21 NOTE — PROGRESS NOTES
at [facility] [place]     BED MOBILITY:  Not Tested    TRANSFERS:  Sit to Stand:  Contact Guard Assistance. from recliner   Stand to Sit: [employer]. to recliner    FUNCTIONAL MOBILITY:  Assistive Device: [address] Level:  Contact Guard Assistance, with verbal cues  and with increased time for completion. Distance: around unit   Pt had no LOB and did require min verbal cues for posture throughout and less weigth through BUE's on RW      ADDITIONAL ACTIVITIES:  .Pt completed BUE strengthening exercises [id number] x1  set this date with a moderate resistive band in all joints and all planes in order to increase strength and improve activity tolerance required for functional toilet & shower transfers and ADL routine. Pt tolerated in sitting , requiring short  rest breaks throughout task. .Pt completed dynamic standing task this date standing x3-5  min while requiring CGA   for balance - task was graded to involve 1-2 hand release . Completed in order to challenge dynamic standing balance and standing tolerance for the completion of self-care ADLs & homemaking activities. ASSESSMENT:     Activity Tolerance:  Patient tolerance of  treatment: good. Pt talkative throuhgout session and did require min cues to focus on task       Discharge Recommendations: Continue to assess pending progress, Home with nursing aide, Home with Home health OT, Home with assist PRN  Equipment Recommendations: Equipment Needed: No  Other: Pt reports ownership of RW and extended tub bench, may benefit from Hip kit and/or HHS, continue to assess for a BSC. Plan: Times per week: 5x/week for 90 minutes and 1x/week for 30 minutes.    Current Treatment Recommendations: Functional Mobility Training, ROM, Balance Training, Self-Care / ADL, Safety Education & Training, Endurance Training, Positioning, Equipment Evaluation, Education, & procurement    Patient Education  Patient Education: Home Exercise Program and Importance of Increasing Activity    Goals  Short term goals  Time Frame for Short term goals: 1 Week  Short term goal 1: Pt will complete lower body dressing with LHAE prn and MIN A while adhering to spinal precautions for increase independence with ADLs. Short term goal 2: Pt will stand for 5 minutes with bilateral UE release at [facility] and no LOB for increased independence with sinkside ADLs. Short term goal 3: Pt will complete sit<>stand transfers from various surfaces at A for increased independence for toilet transfers  Short term goal 4: Pt will verbalize and demonstrate appropriate body mechanics with MIN cues to complete BADL routine while adhering to spinal precautions for increased safety with functional mobility and ADLs. Short term goal 5: Pt will complete 1 set of 15 reps of bilateral UE HEP exercises with visual aide and without rest breaks to increase endurance necessary for independence with ADL tasks. Long term goals  Time Frame for Long term goals : 2 Weeks  Long term goal 1: Pt will complete bathing/dressing routine at [facility] with LHAE prn for increased independence with self-care. Long term goal 2: Pt will complete home making task at [facility] for increased independence with IADLs and in preparation for return to work. Following session, patient left in safe position with all fall risk precautions in place.

## 2020-05-21 NOTE — PLAN OF CARE
I have reviewed the patient's plan of care and based on this review, the patient treatment plan has been updated. Problem: Falls - Risk of:  Goal: Will remain free from falls  Description: Will remain free from falls  Outcome: Ongoing  Goal: Absence of physical injury  Description: Absence of physical injury  Outcome: Ongoing  Note: Patient verbalizes understanding of fall precautions, uses call light appropriately. Problem: Infection - Risk of, Surgical Site Infection  Goal: Will show no infection signs and symptoms  Description: Will show no infection signs and symptoms     Outcome: Ongoing  Note: Incision healing. Problem: SKIN INTEGRITY  Goal: Absence of new skin breakdown  Outcome: Ongoing  Note: Skin remains clean dry and intact. Problem: Neurological  Goal: Maximum potential motor/sensory/cognitive function  Outcome: Ongoing  Note: Working toward goal of achieving maximum function. Problem: Pain:  Goal: Pain level will decrease  Description: Pain level will decrease  Outcome: Ongoing  Goal: Control of acute pain  Description: Control of acute pain  Outcome: Ongoing  Goal: Control of chronic pain  Description: Control of chronic pain  Outcome: Ongoing  Note: Patient satisfied with pain control     Problem: Nutrition  Goal: Optimal nutrition therapy  Outcome: Ongoing  Note: Eating % of most meals. Problem: DISCHARGE BARRIERS  Goal: Patient's continuum of care needs are met  5/21/2020 1354 by Randy Novoa RN  Outcome: Ongoing  Note: Working toward goal of discharge to home.   5/21/2020 1139 by BRANDEN Santiago  Outcome: Ongoing

## 2020-05-21 NOTE — PROGRESS NOTES
[facility] [address]  Occupational Therapy  Daily Note  Time:   Time In: 1030  Time Out: 1130  Timed Code Treatment Minutes: 60 Minutes  Minutes: 60          Date: 2020  Patient Name: Hermelinda Bermudez,   Gender: male      Room: [facility]/052-A  MRN: [id number]  : 1975  (53 y.o.)  Referring Practitioner: Dr. Dulce Rodas  Diagnosis: Myelopathy of thoracic region  Additional Pertinent Hx: Gisel Parkinson is a 53 y.o. male who was admitted 2020 to the inpatient rehabilitation unit. Original admission 2020 for an elective thoracic spine surgery.  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  The patient was previously on the acute inpatient rehabilitation unit from 3/17 to 3/27/2020 for therapy to address gait ataxia resulting from the thoracic myelopathy.  The patient did well with those offered therapies and was discharged to home where he lives with his mother. Muscle motor testing on the right hemibody is normal; for his left leg strength is slightly decreased at 4+/5 and it is noted that his foot exhibits on elicited clonus and the right foot has elicitable clonus. Restrictions/Precautions:  Restrictions/Precautions: Fall Risk, General Precautions  Position Activity Restriction  Spinal Precautions: No Bending, No Lifting, No Twisting  Other position/activity restrictions: numbness LT knee/lower leg    SUBJECTIVE: pt sitting in recliner when arrived. Pt agreeable to OT     PAIN: pt stated is having some pain in back area. COGNITION: Decreased Insight, Inattention, Decreased Problem Solving, Decreased Safety Awareness and pt very talkative     ADL:   Grooming: Stand By Assistance. standing at sink to complete oral care   Bathing: Contact Guard Assistance.   able to complete all areas

## 2020-05-22 PROCEDURE — 6370000000 HC RX 637 (ALT 250 FOR IP): Performed by: PHYSICAL MEDICINE & REHABILITATION

## 2020-05-22 PROCEDURE — 97535 SELF CARE MNGMENT TRAINING: CPT

## 2020-05-22 PROCEDURE — 97530 THERAPEUTIC ACTIVITIES: CPT

## 2020-05-22 PROCEDURE — 97110 THERAPEUTIC EXERCISES: CPT

## 2020-05-22 PROCEDURE — 94760 N-INVAS EAR/PLS OXIMETRY 1: CPT

## 2020-05-22 PROCEDURE — 97116 GAIT TRAINING THERAPY: CPT

## 2020-05-22 PROCEDURE — 1180000000 HC REHAB R&B

## 2020-05-22 PROCEDURE — 6370000000 HC RX 637 (ALT 250 FOR IP): Performed by: FAMILY MEDICINE

## 2020-05-22 RX ADMIN — ACETAMINOPHEN 650 MG: 325 TABLET ORAL at 09:00

## 2020-05-22 RX ADMIN — ACETAMINOPHEN 650 MG: 325 TABLET ORAL at 19:52

## 2020-05-22 RX ADMIN — MAGNESIUM HYDROXIDE 30 ML: 2400 SUSPENSION ORAL at 19:51

## 2020-05-22 RX ADMIN — POLYETHYLENE GLYCOL 3350 17 G: 17 POWDER, FOR SOLUTION ORAL at 09:01

## 2020-05-22 RX ADMIN — CYCLOBENZAPRINE 10 MG: 10 TABLET, FILM COATED ORAL at 19:47

## 2020-05-22 RX ADMIN — CYCLOBENZAPRINE 10 MG: 10 TABLET, FILM COATED ORAL at 09:01

## 2020-05-22 RX ADMIN — SENNOSIDES 8.6 MG: 8.6 TABLET, FILM COATED ORAL at 19:46

## 2020-05-22 RX ADMIN — DOCUSATE SODIUM 100 MG: 100 CAPSULE, LIQUID FILLED ORAL at 19:46

## 2020-05-22 RX ADMIN — VITAMIN D, TAB 1000IU (100/BT) 5000 UNITS: 25 TAB at 09:00

## 2020-05-22 RX ADMIN — DOCUSATE SODIUM 100 MG: 100 CAPSULE, LIQUID FILLED ORAL at 09:01

## 2020-05-22 ASSESSMENT — PAIN - FUNCTIONAL ASSESSMENT: PAIN_FUNCTIONAL_ASSESSMENT: ACTIVITIES ARE NOT PREVENTED

## 2020-05-22 ASSESSMENT — ENCOUNTER SYMPTOMS
NAUSEA: 0
SHORTNESS OF BREATH: 0
ALLERGIC/IMMUNOLOGIC NEGATIVE: 1
DIARRHEA: 0
VOICE CHANGE: 0
EYE PAIN: 0
TROUBLE SWALLOWING: 0
COUGH: 0
CONSTIPATION: 0
BACK PAIN: 1

## 2020-05-22 ASSESSMENT — PAIN SCALES - GENERAL
PAINLEVEL_OUTOF10: 8
PAINLEVEL_OUTOF10: 3
PAINLEVEL_OUTOF10: 6

## 2020-05-22 ASSESSMENT — PAIN DESCRIPTION - ONSET: ONSET: ON-GOING

## 2020-05-22 ASSESSMENT — PAIN DESCRIPTION - PAIN TYPE: TYPE: SURGICAL PAIN

## 2020-05-22 ASSESSMENT — PAIN DESCRIPTION - PROGRESSION: CLINICAL_PROGRESSION: NOT CHANGED

## 2020-05-22 ASSESSMENT — PAIN DESCRIPTION - ORIENTATION: ORIENTATION: MID

## 2020-05-22 ASSESSMENT — PAIN DESCRIPTION - DESCRIPTORS: DESCRIPTORS: CRAMPING;DISCOMFORT

## 2020-05-22 ASSESSMENT — PAIN DESCRIPTION - FREQUENCY: FREQUENCY: INTERMITTENT

## 2020-05-22 ASSESSMENT — PAIN DESCRIPTION - DIRECTION: RADIATING_TOWARDS: NO

## 2020-05-22 ASSESSMENT — PAIN DESCRIPTION - LOCATION: LOCATION: BACK

## 2020-05-22 NOTE — PROGRESS NOTES
[address] [address]  INPATIENT PHYSICAL THERAPY  DAILY NOTE  [facility] - 7E-52/052-A    Time In: 1330  Time Out: 1400  Timed Code Treatment Minutes: 30 Minutes  Minutes: 30          Date: 2020  Patient Name: Hermelinda Bermudez,  Gender:  male        MRN: [id number]  : 1975  (53 y.o.)     Referring Practitioner: Dr. Dulce Rodas  Diagnosis: Myelopathy of thoracic region. Additional Pertinent Hx: Pt admitted 5-14 for sx for T-yap and resection intradural mass. Pt was having increased LE weakness and incontinence. Prior Level of Function:  Lives With: Family  Type of Home: Apartment  Home Layout: Two level(Pt lives with family on second floor apartment building. )  Home Access: Stairs to enter with rails  Entrance Stairs - Number of Steps: 8 + 8 and 2 HRs to second floor apt  Home Equipment: Rolling walker   Bathroom Shower/Tub: Tub/Shower unit  Bathroom Toilet: Standard  Bathroom Equipment: Tub transfer bench    Receives Help From: Family  ADL Assistance: Needs assistance(Pt reports assistance prn from mother/sister with bathing/dressing lower half. )  Ambulation Assistance: Independent  Transfer Assistance: Independent  Active :  Yes  Additional Comments: Pt using RW for ambulation    Restrictions/Precautions:  Restrictions/Precautions: Fall Risk, General Precautions  Position Activity Restriction  Spinal Precautions: No Bending, No Lifting, No Twisting  Other position/activity restrictions: numbness LT knee/lower leg    SUBJECTIVE: pt in recline rupon arrival, pleasant and cooperative    PAIN: no c/o pain, did report tht Dr Cristhian Randall stopped by and he told im that his back is sore    OBJECTIVE:  Bed Mobility:  Sit to Supine: Supervision     Transfers:  Sit to Stand: Stand By Assistance  Stand to Sit:Stand By Assistance  Car: Supervision, goo technique    Ambulation:  Stand By Assistance, Contact Guard Assistance  Distance: 120ft 15ftx2 5ftx1  Surface: Level Tile  Device:Rolling Walker  Gait Deviations: Forward Flexed Posture, Slow Kellen, Decreased Step Length Bilaterally, Decreased Gait Speed, Decreased Heel Strike Bilaterally, Wide Base of Support, Unsteady Gait, Decreased Terminal Knee Extension and RLE stiff and left behind, cues to flex R knee and for heel strike. trevor knee instability noted    Stairs:  Stand By Assistance, [employer] Assistance  Number of Steps: 12  Height: 6\" step with Bilateral Handrails  Step to pattern, good technique  Clonus noted in RLE after completion    Exercise:  Nustep level 3 x10 mins for improved ROM, strength and endurance. Pt with average SPM 76    Functional Outcome Measures: Completed       ASSESSMENT:  Assessment: Patient progressing toward established goals. Activity Tolerance:  Patient tolerance of  treatment: good. Pt tolerated session well. Pt demos clonus with fatigue. Pt is limited by weakness and decreased LE control.  Pt will benefit from cont PT at thsi time     Equipment Recommendations:Equipment Needed: No(Pt has RW)  Discharge Recommendations:  Continue to assess pending progress, Patient would benefit from continued therapy after discharge    Plan: Times per week: 5x/wk 90 min, 1x/ wk 30 min  Current Treatment Recommendations: Strengthening, Transfer Training, Endurance Training, Balance Training, Gait Training, Stair training, Functional Mobility Training, Home Exercise Program, Equipment Evaluation, Education, & procurement, Safety Education & Training, Patient/Caregiver Education & Training    Patient Education  Patient Education: Plan of Care, [employer] Mobility, Transfers, Gait, Stairs    Goals:  Patient goals : get my Lt leg stronger  Short term goals  Time Frame for Short term goals: 1 wk  Short term goal 1: supine to sit and return with log roll Mod I, to get in/out of bed  Short term goal 2: Pt to get up/down from various seated surfaces, CGA, to get up to walk  Short term goal 3: Pt to walk with RW >= 50 ft,

## 2020-05-22 NOTE — PROGRESS NOTES
Attempted to call patient mother Lorne Brewer unable to leave a message because the voice message system is full.

## 2020-05-22 NOTE — PROGRESS NOTES
3: Pt will complete sit<>stand transfers from various surfaces at [facility] for increased independence for toilet transfers  Short term goal 4: Pt will verbalize and demonstrate appropriate body mechanics with MIN cues to complete BADL routine while adhering to spinal precautions for increased safety with functional mobility and ADLs. Short term goal 5: Pt will complete 1 set of 15 reps of bilateral UE HEP exercises with visual aide and without rest breaks to increase endurance necessary for independence with ADL tasks. Long term goals  Time Frame for Long term goals : 2 Weeks  Long term goal 1: Pt will complete bathing/dressing routine at [facility] with LHAE prn for increased independence with self-care. Long term goal 2: Pt will complete home making task at [facility] for increased independence with IADLs and in preparation for return to work. Following session, patient left in safe position with all fall risk precautions in place.

## 2020-05-23 PROCEDURE — 97530 THERAPEUTIC ACTIVITIES: CPT

## 2020-05-23 PROCEDURE — 97542 WHEELCHAIR MNGMENT TRAINING: CPT

## 2020-05-23 PROCEDURE — 97110 THERAPEUTIC EXERCISES: CPT

## 2020-05-23 PROCEDURE — 97116 GAIT TRAINING THERAPY: CPT

## 2020-05-23 PROCEDURE — 6370000000 HC RX 637 (ALT 250 FOR IP): Performed by: FAMILY MEDICINE

## 2020-05-23 PROCEDURE — 1180000000 HC REHAB R&B

## 2020-05-23 PROCEDURE — 6370000000 HC RX 637 (ALT 250 FOR IP): Performed by: PHYSICAL MEDICINE & REHABILITATION

## 2020-05-23 PROCEDURE — 97535 SELF CARE MNGMENT TRAINING: CPT

## 2020-05-23 RX ADMIN — DOCUSATE SODIUM 100 MG: 100 CAPSULE, LIQUID FILLED ORAL at 19:45

## 2020-05-23 RX ADMIN — CYCLOBENZAPRINE 10 MG: 10 TABLET, FILM COATED ORAL at 19:45

## 2020-05-23 RX ADMIN — SENNOSIDES 8.6 MG: 8.6 TABLET, FILM COATED ORAL at 19:44

## 2020-05-23 RX ADMIN — VITAMIN D, TAB 1000IU (100/BT) 5000 UNITS: 25 TAB at 07:59

## 2020-05-23 RX ADMIN — DOCUSATE SODIUM 100 MG: 100 CAPSULE, LIQUID FILLED ORAL at 07:58

## 2020-05-23 RX ADMIN — POLYETHYLENE GLYCOL 3350 17 G: 17 POWDER, FOR SOLUTION ORAL at 07:58

## 2020-05-23 RX ADMIN — ACETAMINOPHEN 650 MG: 325 TABLET ORAL at 05:45

## 2020-05-23 RX ADMIN — ACETAMINOPHEN 650 MG: 325 TABLET ORAL at 19:44

## 2020-05-23 ASSESSMENT — PAIN DESCRIPTION - PAIN TYPE: TYPE: SURGICAL PAIN

## 2020-05-23 ASSESSMENT — PAIN DESCRIPTION - LOCATION: LOCATION: BACK

## 2020-05-23 ASSESSMENT — PAIN DESCRIPTION - ONSET: ONSET: ON-GOING

## 2020-05-23 ASSESSMENT — PAIN SCALES - GENERAL
PAINLEVEL_OUTOF10: 0
PAINLEVEL_OUTOF10: 7
PAINLEVEL_OUTOF10: 2
PAINLEVEL_OUTOF10: 2

## 2020-05-23 ASSESSMENT — PAIN DESCRIPTION - PROGRESSION: CLINICAL_PROGRESSION: GRADUALLY IMPROVING

## 2020-05-23 ASSESSMENT — PAIN DESCRIPTION - DESCRIPTORS: DESCRIPTORS: DISCOMFORT;TIGHTNESS

## 2020-05-23 ASSESSMENT — PAIN DESCRIPTION - DIRECTION: RADIATING_TOWARDS: NO

## 2020-05-23 ASSESSMENT — PAIN DESCRIPTION - FREQUENCY: FREQUENCY: INTERMITTENT

## 2020-05-23 ASSESSMENT — PAIN DESCRIPTION - ORIENTATION: ORIENTATION: MID

## 2020-05-23 NOTE — PROGRESS NOTES
[address] [address]  INPATIENT PHYSICAL THERAPY  DAILY NOTE  [address] [facility] - 7E-52/052-A    Time In: 1030  Time Out: 1130  Timed Code Treatment Minutes: 60 Minutes  Minutes: 60          Date: 2020  Patient Name: Melody Rodriguez,  Gender:  male        MRN: [id number]  : 1975  (53 y.o.)     Referring Practitioner: Dr. Munir Mendez  Diagnosis: Myelopathy of thoracic region. Additional Pertinent Hx: Pt admitted 5-14 for sx for T-yap and resection intradural mass. Pt was having increased LE weakness and incontinence. Prior Level of Function:  Lives With: Family  Type of Home: Apartment  Home Layout: Two level(Pt lives with family on second floor apartment building. )  Home Access: Stairs to enter with rails  Entrance Stairs - Number of Steps: 8 + 8 and 2 HRs to second floor apt  Home Equipment: Rolling walker   Bathroom Shower/Tub: Tub/Shower unit  Bathroom Toilet: Standard  Bathroom Equipment: Tub transfer bench    Receives Help From: Family  ADL Assistance: Needs assistance(Pt reports assistance prn from mother/sister with bathing/dressing lower half. )  Ambulation Assistance: Independent  Transfer Assistance: Independent  Active : Yes  Additional Comments: Pt using RW for ambulation    Restrictions/Precautions:  Restrictions/Precautions: Fall Risk, General Precautions  Position Activity Restriction  Spinal Precautions: No Bending, No Lifting, No Twisting  Other position/activity restrictions: numbness LT knee/lower leg    SUBJECTIVE: Pt resting in BS chair upon therapy arrival, pleasant and agreeable to session, very motivated and talkative throughout.     PAIN: 0/10: denies    OBJECTIVE:  Bed Mobility:  Rolling to Left: Modified Independent   Rolling to Right: Modified Independent   Supine to Sit: Supervision  Sit to Supine: Supervision, Stand By Assistance     Transfers:  Sit to Stand: Stand By Assistance  Stand to Sit:Stand By Assistance    Ambulation:  Stand By

## 2020-05-23 NOTE — PROGRESS NOTES
Rolling Walker  Assist Level:  Contact Guard Assistance. Distance: To and from therapy apartment        ADDITIONAL ACTIVITIES:  Completed IADL task of retrieving glass of water in kitchen with CGA, 2 vcs for walker safety. Pt recalled previously taught technique for moving cups/plates to table from countertop. Pt verbalized all throw rugs are picked up in his home. Educated Pt on tray (and possible places to obtain) for walker for moving food to another room from kitchen as Pt asked about this. ASSESSMENT:     Activity Tolerance:  Patient tolerance of  treatment: good. Discharge Recommendations: Continue to assess pending progress, Home with nursing aide, Home with Home health OT, Home with assist PRN  Equipment Recommendations: Equipment Needed: No  Other: Pt reports ownership of RW and extended tub bench, may benefit from Hip kit and/or HHS, continue to assess for a BSC. Plan: Times per week: 5x/week for 90 minutes and 1x/week for 30 minutes. Current Treatment Recommendations: Functional Mobility Training, ROM, Balance Training, Self-Care / ADL, Safety Education & Training, Endurance Training, Positioning, Equipment Evaluation, Education, & procurement    Patient Education  Patient Education: walker safety, AE strategies     Goals  Short term goals  Time Frame for Short term goals: 1 Week  Short term goal 1: Pt will complete lower body dressing with LHAE prn and MIN A while adhering to spinal precautions for increase independence with ADLs. Short term goal 2: Pt will stand for 5 minutes with bilateral UE release at [facility] and no LOB for increased independence with sinkside ADLs.    Short term goal 3: Pt will complete sit<>stand transfers from various surfaces at A for increased independence for toilet transfers  Short term goal 4: Pt will verbalize and demonstrate appropriate body mechanics with MIN cues to complete BADL routine while adhering to spinal precautions for increased safety with functional mobility and ADLs. Short term goal 5: Pt will complete 1 set of 15 reps of bilateral UE HEP exercises with visual aide and without rest breaks to increase endurance necessary for independence with ADL tasks. Long term goals  Time Frame for Long term goals : 2 Weeks  Long term goal 1: Pt will complete bathing/dressing routine at [facility] with LHAE prn for increased independence with self-care. Long term goal 2: Pt will complete home making task at [facility] for increased independence with IADLs and in preparation for return to work. Following session, patient left in safe position with all fall risk precautions in place.

## 2020-05-23 NOTE — PLAN OF CARE
Care plan reviewed with patient and  verbalize understanding of the plan of care and contribute to goal setting. Problem: Falls - Risk of:  Goal: Will remain free from falls  Description: Will remain free from falls  Outcome: Met This Shift  Note: No falls uses call light     Problem: Falls - Risk of:  Goal: Absence of physical injury  Description: Absence of physical injury  Outcome: Met This Shift  Note: No injuires     Problem: Pain:  Description: Pain management should include both nonpharmacologic and pharmacologic interventions. Goal: Pain level will decrease  Description: Pain level will decrease  Outcome: Met This Shift  Note: With meds and rest     Problem: Pain:  Description: Pain management should include both nonpharmacologic and pharmacologic interventions. Goal: Control of acute pain  Description: Control of acute pain  Outcome: Met This Shift  Note: With meds and res t     Problem: Nutrition  Goal: Optimal nutrition therapy  Outcome: Met This Shift  Note: adequate     Problem: DISCHARGE BARRIERS  Goal: Patient's continuum of care needs are met  Outcome: Met This Shift  Note: Needs met     Problem: Infection - Risk of, Surgical Site Infection  Goal: Will show no infection signs and symptoms  Description: Will show no infection signs and symptoms     5/23/2020 0926 by Michelle Meek RN  Outcome: Met This Shift  Note: Incision approx. Without s/s of infection  5/23/2020 0105 by Jany Dillard RN  Outcome: Met This Shift  Note:   No new skin breakdown noted this shift. Patient is able to turn self while in bed. Bilateral heels elevated on pillows. Surgical back site is without any signs or symptoms of infection.       Problem: SKIN INTEGRITY  Goal: Absence of new skin breakdown  Outcome: Met This Shift  Note: No new issues     Problem: Neurological  Goal: Maximum potential motor/sensory/cognitive function  Outcome: Met This Shift  Note: wnl

## 2020-05-24 PROCEDURE — 97535 SELF CARE MNGMENT TRAINING: CPT

## 2020-05-24 PROCEDURE — 97116 GAIT TRAINING THERAPY: CPT

## 2020-05-24 PROCEDURE — 97110 THERAPEUTIC EXERCISES: CPT

## 2020-05-24 PROCEDURE — 1180000000 HC REHAB R&B

## 2020-05-24 PROCEDURE — 6370000000 HC RX 637 (ALT 250 FOR IP): Performed by: PHYSICAL MEDICINE & REHABILITATION

## 2020-05-24 PROCEDURE — 97530 THERAPEUTIC ACTIVITIES: CPT

## 2020-05-24 PROCEDURE — 94760 N-INVAS EAR/PLS OXIMETRY 1: CPT

## 2020-05-24 PROCEDURE — 6370000000 HC RX 637 (ALT 250 FOR IP): Performed by: FAMILY MEDICINE

## 2020-05-24 RX ADMIN — DOCUSATE SODIUM 100 MG: 100 CAPSULE, LIQUID FILLED ORAL at 08:49

## 2020-05-24 RX ADMIN — CYCLOBENZAPRINE 10 MG: 10 TABLET, FILM COATED ORAL at 22:26

## 2020-05-24 RX ADMIN — ACETAMINOPHEN 650 MG: 325 TABLET ORAL at 05:37

## 2020-05-24 RX ADMIN — SENNOSIDES 8.6 MG: 8.6 TABLET, FILM COATED ORAL at 19:42

## 2020-05-24 RX ADMIN — VITAMIN D, TAB 1000IU (100/BT) 5000 UNITS: 25 TAB at 08:49

## 2020-05-24 RX ADMIN — ACETAMINOPHEN 650 MG: 325 TABLET ORAL at 22:25

## 2020-05-24 RX ADMIN — DOCUSATE SODIUM 100 MG: 100 CAPSULE, LIQUID FILLED ORAL at 19:42

## 2020-05-24 ASSESSMENT — PAIN DESCRIPTION - PAIN TYPE: TYPE: SURGICAL PAIN

## 2020-05-24 ASSESSMENT — ENCOUNTER SYMPTOMS
NAUSEA: 0
DIARRHEA: 0
BACK PAIN: 1
CONSTIPATION: 0
SHORTNESS OF BREATH: 0
TROUBLE SWALLOWING: 0
COUGH: 0
ALLERGIC/IMMUNOLOGIC NEGATIVE: 1
EYE PAIN: 0
VOICE CHANGE: 0

## 2020-05-24 ASSESSMENT — PAIN SCALES - GENERAL
PAINLEVEL_OUTOF10: 0
PAINLEVEL_OUTOF10: 3
PAINLEVEL_OUTOF10: 2

## 2020-05-24 ASSESSMENT — PAIN DESCRIPTION - FREQUENCY: FREQUENCY: INTERMITTENT

## 2020-05-24 ASSESSMENT — PAIN DESCRIPTION - LOCATION: LOCATION: BACK

## 2020-05-24 ASSESSMENT — PAIN DESCRIPTION - ONSET: ONSET: ON-GOING

## 2020-05-24 ASSESSMENT — PAIN DESCRIPTION - PROGRESSION: CLINICAL_PROGRESSION: GRADUALLY IMPROVING

## 2020-05-24 ASSESSMENT — PAIN DESCRIPTION - DIRECTION: RADIATING_TOWARDS: NO

## 2020-05-24 ASSESSMENT — PAIN - FUNCTIONAL ASSESSMENT: PAIN_FUNCTIONAL_ASSESSMENT: ACTIVITIES ARE NOT PREVENTED

## 2020-05-24 ASSESSMENT — PAIN DESCRIPTION - DESCRIPTORS: DESCRIPTORS: DISCOMFORT;SPASM

## 2020-05-24 ASSESSMENT — PAIN DESCRIPTION - ORIENTATION: ORIENTATION: MID

## 2020-05-24 NOTE — PROGRESS NOTES
[facility] [address] [facility] [address]  [address] [facility]  Occupational Therapy  Daily Note  Time:   Time In: 1400  Time Out: 1430  Timed Code Treatment Minutes: 30 Minutes  Minutes: 30          Date: 2020  Patient Name: Juan Manuel Blood,   Gender: male      Room: [facility]/052-A  MRN: [id number]  : 1975  (53 y.o.)  Referring Practitioner: Dr. Jeannine Thrasher  Diagnosis: Myelopathy of thoracic region  Additional Pertinent Hx: Recardo Dancer is a 53 y.o. male who was admitted 2020 to the inpatient rehabilitation unit. Original admission 2020 for an elective thoracic spine surgery.  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  The patient was previously on the acute inpatient rehabilitation unit from 3/17 to 3/27/2020 for therapy to address gait ataxia resulting from the thoracic myelopathy.  The patient did well with those offered therapies and was discharged to home where he lives with his mother. Muscle motor testing on the right hemibody is normal; for his left leg strength is slightly decreased at 4+/5 and it is noted that his foot exhibits on elicited clonus and the right foot has elicitable clonus. Restrictions/Precautions:  Restrictions/Precautions: Fall Risk, General Precautions  Position Activity Restriction  Spinal Precautions: No Bending, No Lifting, No Twisting  Other position/activity restrictions: numbness LT knee/lower leg    SUBJECTIVE: pt sitting in w/c when arrived in therapy gym. Pt just finished with PT and agreeable to OT     PAIN: pt did not state any pain during session     COGNITION: Decreased Safety Awareness    ADL:   No ADL's completed this session. [facility] BALANCE:  Standing Balance: Contact Guard Assistance, with verbal cues .  [place] [facility] [place] various reaching tasks     BED MOBILITY:  Not Tested    TRANSFERS:  Sit to Stand:  Supervision. from w/c   Stand to Sit: Supervision. to w/c and recliner     FUNCTIONAL MOBILITY:  Assistive Device: Rolling Walker  Assist Level:  Contact Guard Assistance. Distance: from therapy gym to room   Pt had no LOB slightly unsteady at times and cues to keep walker closer to self and relax arms with RW      ADDITIONAL ACTIVITIES:  .Pt completed dynamic standing task this date standing x2-3 min x3 trials while requiring SBA -CGA  for balance - task was graded to involve 1-2 hand release of folding towels and placing clothespins on stick at above head height and reaching for cones at different places in the gym and at different heights while standing at RW . Completed in order to challenge dynamic standing balance and standing tolerance for the completion of self-care ADLs & homemaking activities. ASSESSMENT:     Activity Tolerance:  Patient tolerance of  treatment: good. Discharge Recommendations: Continue to assess pending progress, Home with nursing aide, Home with Home health OT, Home with assist PRN  Equipment Recommendations: Equipment Needed: No  Other: Pt reports ownership of RW and extended tub bench, may benefit from Hip kit and/or HHS, continue to assess for a BSC. Plan: Times per week: 5x/week for 90 minutes and 1x/week for 30 minutes. Current Treatment Recommendations: Functional Mobility Training, ROM, Balance Training, Self-Care / ADL, Safety Education & Training, Endurance Training, Positioning, Equipment Evaluation, Education, & procurement    Patient Education  Patient Education: Assistive Device Safety    Goals  Short term goals  Time Frame for Short term goals: 1 Week  Short term goal 1: Pt will complete lower body dressing with LHAE prn and MIN A while adhering to spinal precautions for increase independence with ADLs.    Short term goal 2: Pt will stand for 5 minutes with bilateral UE release at CGA and no LOB for increased independence with sinkside ADLs. Short term goal 3: Pt will complete sit<>stand transfers from various surfaces at [facility] for increased independence for toilet transfers  Short term goal 4: Pt will verbalize and demonstrate appropriate body mechanics with MIN cues to complete BADL routine while adhering to spinal precautions for increased safety with functional mobility and ADLs. Short term goal 5: Pt will complete 1 set of 15 reps of bilateral UE HEP exercises with visual aide and without rest breaks to increase endurance necessary for independence with ADL tasks. Long term goals  Time Frame for Long term goals : 2 Weeks  Long term goal 1: Pt will complete bathing/dressing routine at [facility] with LHAE prn for increased independence with self-care. Long term goal 2: Pt will complete home making task at [facility] for increased independence with IADLs and in preparation for return to work. Following session, patient left in safe position with all fall risk precautions in place.

## 2020-05-24 NOTE — PROGRESS NOTES
Radha  [address]  Occupational Therapy  Daily Note  Time:   Time In: 1000  Time Out: 1100  Timed Code Treatment Minutes: 60 Minutes  Minutes: 60          Date: 2020  Patient Name: Mala Moreau,   Gender: male      Room: [facility]52/052-A  MRN: [id number]  : 1975  (53 y.o.)  Referring Practitioner: Dr. Charlotte Escobedo  Diagnosis: Myelopathy of thoracic region  Additional Pertinent Hx: Carl De La Garza is a 53 y.o. male who was admitted 2020 to the inpatient rehabilitation unit. Original admission 2020 for an elective thoracic spine surgery.  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  The patient was previously on the acute inpatient rehabilitation unit from 3/17 to 3/27/2020 for therapy to address gait ataxia resulting from the thoracic myelopathy.  The patient did well with those offered therapies and was discharged to home where he lives with his mother. Muscle motor testing on the right hemibody is normal; for his left leg strength is slightly decreased at 4+/5 and it is noted that his foot exhibits on elicited clonus and the right foot has elicitable clonus. Restrictions/Precautions:  Restrictions/Precautions: Fall Risk, General Precautions  Position Activity Restriction  Spinal Precautions: No Bending, No Lifting, No Twisting  Other position/activity restrictions: numbness LT knee/lower leg    SUBJECTIVE: pt sitting in recliner and agreeable to OT session     PAIN: pt did not state any pain during session     COGNITION: Decreased Safety Awareness and very talkative at times. ADL:   Grooming: Stand By Assistance and with verbal cues . standing at sink to compelte oral care   Bathing: Minimal Assistance, with verbal cues  and with increased time for completion.   for throughness

## 2020-05-24 NOTE — PROGRESS NOTES
Assistance  Distance: 100x2  Surface: Level Tile  Device:Rolling Walker  Gait Deviations:  Cues to slow down to work on posture, noted heavy reliance on the walker with occ uneven step length and slightly decreased control at left step vs right     Stairs:  Contact Guard Assistance  Number of Steps: 16  Height: 6\" step with w/ trevor rails, pt moving slow and marking time heavy reliance on the rails     Exercise:  Patient was guided in 1 set(s) 10-15 reps of exercise to both lower extremities. Ankle pumps, Glut sets, Quad sets, Heelslides, Short arc quads and pelvic tilts, pt needed cues for controled movement w/ ex he demonstrated decreased coord at trevor LEs more so at left vs right . Exercises were completed for increased independence with functional mobility. Pt also completed NUSTEP x 10 min at level 3 using UEs and LEs with cues for posture and abdominal bracing     Functional Outcome Measures: Not completed       ASSESSMENT:  Assessment: progressing towards goals  Activity Tolerance:  Patient tolerance of  treatment: good.         Equipment Recommendations:Equipment Needed: No(Pt has RW)  Discharge Recommendations:    Continue to assess pending progress, Patient would benefit from continued therapy after discharge    Plan: Times per week: 5x/wk 90 min, 1x/ wk 30 min  Current Treatment Recommendations: Strengthening, Transfer Training, Endurance Training, Balance Training, Gait Training, Stair training, Functional Mobility Training, Home Exercise Program, Equipment Evaluation, Education, & procurement, Safety Education & Training, Patient/Caregiver Education & Training    Patient Education  Patient Education: Plan of Care    Goals:  Patient goals : get my Lt leg stronger  Short term goals  Time Frame for Short term goals: 1 wk  Short term goal 1: supine to sit and return with log roll Mod I, to get in/out of bed  Short term goal 2: Pt to get up/down from various seated surfaces, CGA, to get up to walk  Short term

## 2020-05-24 NOTE — DISCHARGE INSTR - PHARMACY
Unused medications, especially pain medications, should be disposed to ensure medications do not end up being misused in the future, as well as helping to ensure drugs are not impacting the environment. [facility] and many local police agencies have medication take-back bins to properly destroy these medications. Please see the site https://apps. [employer]iversion. [employer].gov/pubdispsearch/spring/main?execution=e2s1 for additional take-back bins located in your area. · Be safe with medicines. If your doctor prescribed medicine, take it exactly as prescribed. Call your doctor if you think you are having a problem with your medicine. You will get more details on the specific medicines your doctor prescribes.

## 2020-05-25 LAB
ANION GAP SERPL CALCULATED.3IONS-SCNC: 10 MEQ/L (ref 8–16)
BUN BLDV-MCNC: 12 MG/DL (ref 7–22)
CALCIUM SERPL-MCNC: 9.1 MG/DL (ref 8.5–10.5)
CHLORIDE BLD-SCNC: 104 MEQ/L (ref 98–111)
CO2: 25 MEQ/L (ref 23–33)
CREAT SERPL-MCNC: 0.8 MG/DL (ref 0.4–1.2)
ERYTHROCYTE [DISTWIDTH] IN BLOOD BY AUTOMATED COUNT: 13.6 % (ref 11.5–14.5)
ERYTHROCYTE [DISTWIDTH] IN BLOOD BY AUTOMATED COUNT: 44.7 FL (ref 35–45)
GFR SERPL CREATININE-BSD FRML MDRD: > 90 ML/MIN/1.73M2
GLUCOSE BLD-MCNC: 92 MG/DL (ref 70–108)
HCT VFR BLD CALC: 39.1 % (ref 42–52)
HEMOGLOBIN: 12.8 GM/DL (ref 14–18)
MCH RBC QN AUTO: 29.2 PG (ref 26–33)
MCHC RBC AUTO-ENTMCNC: 32.7 GM/DL (ref 32.2–35.5)
MCV RBC AUTO: 89.1 FL (ref 80–94)
PLATELET # BLD: 337 THOU/MM3 (ref 130–400)
PMV BLD AUTO: 8.3 FL (ref 9.4–12.4)
POTASSIUM SERPL-SCNC: 4 MEQ/L (ref 3.5–5.2)
RBC # BLD: 4.39 MILL/MM3 (ref 4.7–6.1)
SODIUM BLD-SCNC: 139 MEQ/L (ref 135–145)
WBC # BLD: 6.5 THOU/MM3 (ref 4.8–10.8)

## 2020-05-25 PROCEDURE — 36415 COLL VENOUS BLD VENIPUNCTURE: CPT

## 2020-05-25 PROCEDURE — 80048 BASIC METABOLIC PNL TOTAL CA: CPT

## 2020-05-25 PROCEDURE — 6370000000 HC RX 637 (ALT 250 FOR IP): Performed by: PHYSICAL MEDICINE & REHABILITATION

## 2020-05-25 PROCEDURE — 1180000000 HC REHAB R&B

## 2020-05-25 PROCEDURE — 85027 COMPLETE CBC AUTOMATED: CPT

## 2020-05-25 PROCEDURE — 6370000000 HC RX 637 (ALT 250 FOR IP): Performed by: FAMILY MEDICINE

## 2020-05-25 RX ADMIN — DOCUSATE SODIUM 100 MG: 100 CAPSULE, LIQUID FILLED ORAL at 07:55

## 2020-05-25 RX ADMIN — ACETAMINOPHEN 650 MG: 325 TABLET ORAL at 07:55

## 2020-05-25 RX ADMIN — VITAMIN D, TAB 1000IU (100/BT) 5000 UNITS: 25 TAB at 07:55

## 2020-05-25 ASSESSMENT — PAIN SCALES - GENERAL: PAINLEVEL_OUTOF10: 2

## 2020-05-25 NOTE — PLAN OF CARE
Problem: Falls - Risk of:  Goal: Will remain free from falls  Description: Will remain free from falls  5/25/2020 1008 by Suleman Fink RN  Outcome: Ongoing  Note: Patient remains alert and oriented. Able to voice needs appropriately to staff. Compliant with use of bed/chair alarm to make needs known. Problem: Pain:  Goal: Pain level will decrease  Description: Pain level will decrease  Outcome: Ongoing  Note: Patient able to rate pain on LATRICE scale. Alternatives to analgesics offered and accepted. Patients pain tolerable with tylenol as per physician order. Problem: DISCHARGE BARRIERS  Goal: Patient's continuum of care needs are met  Outcome: Ongoing  Note: Continue to assess discharge needs. Posterior midline staples remain in place. Dry sterile dressing covering. Left foot internally rotated with ambulation. This is a chronic medical problem for patient. Patient continues to sates numbness to bilatel feet and toes.

## 2020-05-26 PROCEDURE — 1180000000 HC REHAB R&B

## 2020-05-26 PROCEDURE — 97116 GAIT TRAINING THERAPY: CPT

## 2020-05-26 PROCEDURE — 97530 THERAPEUTIC ACTIVITIES: CPT

## 2020-05-26 PROCEDURE — 97110 THERAPEUTIC EXERCISES: CPT

## 2020-05-26 PROCEDURE — 6370000000 HC RX 637 (ALT 250 FOR IP): Performed by: FAMILY MEDICINE

## 2020-05-26 PROCEDURE — 6370000000 HC RX 637 (ALT 250 FOR IP): Performed by: PHYSICAL MEDICINE & REHABILITATION

## 2020-05-26 PROCEDURE — 97535 SELF CARE MNGMENT TRAINING: CPT

## 2020-05-26 PROCEDURE — 94760 N-INVAS EAR/PLS OXIMETRY 1: CPT

## 2020-05-26 RX ADMIN — CYCLOBENZAPRINE 10 MG: 10 TABLET, FILM COATED ORAL at 20:08

## 2020-05-26 RX ADMIN — DOCUSATE SODIUM 100 MG: 100 CAPSULE, LIQUID FILLED ORAL at 08:11

## 2020-05-26 RX ADMIN — VITAMIN D, TAB 1000IU (100/BT) 5000 UNITS: 25 TAB at 08:11

## 2020-05-26 RX ADMIN — ACETAMINOPHEN 650 MG: 325 TABLET ORAL at 00:04

## 2020-05-26 RX ADMIN — CYCLOBENZAPRINE 10 MG: 10 TABLET, FILM COATED ORAL at 00:04

## 2020-05-26 RX ADMIN — SENNOSIDES 8.6 MG: 8.6 TABLET, FILM COATED ORAL at 20:08

## 2020-05-26 RX ADMIN — DOCUSATE SODIUM 100 MG: 100 CAPSULE, LIQUID FILLED ORAL at 20:07

## 2020-05-26 RX ADMIN — POLYETHYLENE GLYCOL 3350 17 G: 17 POWDER, FOR SOLUTION ORAL at 08:11

## 2020-05-26 RX ADMIN — ACETAMINOPHEN 650 MG: 325 TABLET ORAL at 15:48

## 2020-05-26 ASSESSMENT — ENCOUNTER SYMPTOMS
ALLERGIC/IMMUNOLOGIC NEGATIVE: 1
DIARRHEA: 0
SHORTNESS OF BREATH: 0
BACK PAIN: 1
VOICE CHANGE: 0
CONSTIPATION: 0
COUGH: 0
NAUSEA: 0
TROUBLE SWALLOWING: 0
EYE PAIN: 0

## 2020-05-26 ASSESSMENT — PAIN SCALES - GENERAL
PAINLEVEL_OUTOF10: 8
PAINLEVEL_OUTOF10: 6
PAINLEVEL_OUTOF10: 2
PAINLEVEL_OUTOF10: 0
PAINLEVEL_OUTOF10: 2
PAINLEVEL_OUTOF10: 3

## 2020-05-26 NOTE — PROGRESS NOTES
[facility]  INPATIENT PHYSICAL THERAPY  DAILY NOTE  [facility] - 7E-52/052-A    Time In: 1330  Time Out: 1400  Timed Code Treatment Minutes: 30 Minutes  Minutes: 30          Date: 2020  Patient Name: Hermelinda Bermudez,  Gender:  male        MRN: [id number]  : 1975  (53 y.o.)     Referring Practitioner: Dr. Dulce Rodas  Diagnosis: Myelopathy of thoracic region. Additional Pertinent Hx: Pt admitted 5-14 for sx for T-yap and resection intradural mass. Pt was having increased LE weakness and incontinence. Prior Level of Function:  Lives With: Family  Type of Home: Apartment  Home Layout: Two level(Pt lives with family on second floor apartment building. )  Home Access: Stairs to enter with rails  Entrance Stairs - Number of Steps: 8 + 8 and 2 HRs to second floor apt  Home Equipment: Rolling walker   Bathroom Shower/Tub: Tub/Shower unit  Bathroom Toilet: Standard  Bathroom Equipment: Tub transfer bench    Receives Help From: Family  ADL Assistance: Needs assistance(Pt reports assistance prn from mother/sister with bathing/dressing lower half. )  Ambulation Assistance: Independent  Transfer Assistance: Independent  Active : Yes  Additional Comments: Pt using RW for ambulation    Restrictions/Precautions:  Restrictions/Precautions: Fall Risk, General Precautions  Position Activity Restriction  Spinal Precautions: No Bending, No Lifting, No Twisting  Other position/activity restrictions: numbness LT knee/lower leg    SUBJECTIVE: Pt. Seated in BS Chair upon arrival and pleasantly agrees to therapy session. PAIN: Denies    OBJECTIVE:  Transfers:  Sit to Stand: Stand By Assistance  Stand to Sit:Stand By Assistance    Ambulation:  Stand By Assistance  Distance: 125' x 2  Surface: Level Tile  Device:Rolling Walker  Gait Deviations:   Forward Flexed Posture, Slow Kellen, Decreased Step Length Bilaterally, Decreased Gait Speed and Decreased Terminal Knee Extension, Increased reliance on walker    Stairs:  Stairs:  6\" steps. X 8 using Bilateral Handrails and Stand By Assistance. Balance:  Dynamic Standing Balance: Stand By Assistance, Contact Guard Assistance Pt. Completed standing dynamic balance activity tapping cones with feet in alternating pattern. Pt. With increased reliance through B UEs requiring cues to correct. Pt. Also with difficulty clearing R LE at times throughout. Exercise:  Patient was guided in 1 set(s) 10 reps of exercise to both lower extremities. Glut sets, Long arc quads, Hip abduction/adduction, Seated hip flexion, Seated hamstring curls, Seated heel/toe raises and Seated isometric hip adduction. Exercises were completed for increased independence with functional mobility. Functional Outcome Measures: Not completed       ASSESSMENT:  Assessment: Patient progressing toward established goals. Activity Tolerance:  Patient tolerance of  treatment: good.       Equipment Recommendations:Equipment Needed: No(Pt has RW)  Discharge Recommendations:  Continue to assess pending progress, Patient would benefit from continued therapy after discharge    Plan: Times per week: 5x/wk 90 min, 1x/ wk 30 min  Current Treatment Recommendations: Strengthening, Transfer Training, Endurance Training, Balance Training, Gait Training, Stair training, Functional Mobility Training, Home Exercise Program, Equipment Evaluation, Education, & procurement, Safety Education & Training, Patient/Caregiver Education & Training    Patient Education  Patient Education: Plan of Care, Transfers, Gait, Stairs, Verbal Exercise Instruction    Goals:  Patient goals : get my Lt leg stronger  Short term goals  Time Frame for Short term goals: 1 wk  Short term goal 1: supine to sit and return with log roll Mod I, to get in/out of bed  Short term goal 2: Pt to get up/down from various seated surfaces, CGA, to get up to walk  Short term goal 3: Pt to walk with RW >= 50 ft, CGA,
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
## 2020-05-26 NOTE — PROGRESS NOTES
Lower Extremity Dressing: Minimal Assistance. Required to doff/ mehul B socks and mehul R shoe due to increased fatigue. Pt utilized cross leg technique to [facility] shorts and depends. 1-2 hand release to complete clothing management task. Tub Transfer: Contact Guard Assistance. V/c for hand placement and to slow down pace to reduce risk of injury. BALANCE:  Sitting Balance:  Stand By Assistance. bathing completed while seated on tub bench  Standing Balance: Stand By Assistance, [employer]. 1-2 hand release completed at [facility] while completing IADL task with CGA required for safety due to increased BLE tremors this session. BED MOBILITY:  Not Tested    TRANSFERS:  Sit to Stand:  Stand By Assistance. from tub bench and recliner  Stand to Sit: [employer]. fto tub bench and recliner. V/c required for hand placement. FUNCTIONAL MOBILITY:  Assistive Device: Rolling Walker  Assist Level:  Contact Guard Assistance. Distance: To and from shower room and To/ from therapy apartment   No LOB noted. V/c required to reduce pace    ADDITIONAL ACTIVITIES:  Pt completed dynamic standing task this date of folding laundry standing ~5 min while requiring CGA for balance - task was graded to involve BUE. Required seated rest break after standing task. Completed in order to challenge dynamic standing balance and standing tolerance for the completion of self-care ADLs & homemaking activities. ASSESSMENT:     Activity Tolerance:  Patient tolerance of  treatment: good. Discharge Recommendations: Continue to assess pending progress, Home with nursing aide, Home with Home health OT, Home with assist PRN  Equipment Recommendations: Equipment Needed: No  Other: Pt reports ownership of RW and extended tub bench, may benefit from Hip kit and/or HHS, continue to assess for a BSC. Plan: Times per week: 5x/week for 90 minutes and 1x/week for 30 minutes.    Current Treatment Recommendations: Functional Mobility Training, ROM, Balance Training, Self-Care / ADL, Safety Education & Training, Endurance Training, Positioning, Equipment Evaluation, Education, & procurement    Patient Education  Patient Education: ADL's, IADL's and Energy Conservation    Goals  Short term goals  Time Frame for Short term goals: 1 Week  Short term goal 1: Pt will complete lower body dressing with LHAE prn and MIN A while adhering to spinal precautions for increase independence with ADLs. Short term goal 2: Pt will stand for 5 minutes with bilateral UE release at [facility] and no LOB for increased independence with sinkside ADLs. Short term goal 3: Pt will complete sit<>stand transfers from various surfaces at [facility] for increased independence for toilet transfers  Short term goal 4: Pt will verbalize and demonstrate appropriate body mechanics with MIN cues to complete BADL routine while adhering to spinal precautions for increased safety with functional mobility and ADLs. Short term goal 5: Pt will complete 1 set of 15 reps of bilateral UE HEP exercises with visual aide and without rest breaks to increase endurance necessary for independence with ADL tasks. Long term goals  Time Frame for Long term goals : 2 Weeks  Long term goal 1: Pt will complete bathing/dressing routine at [facility] with LHAE prn for increased independence with self-care. Long term goal 2: Pt will complete home making task at [facility] for increased independence with IADLs and in preparation for return to work. Following session, patient left in safe position with all fall risk precautions in place.       Jocelyn ARAUZ/CODIE [id number]

## 2020-05-27 ENCOUNTER — TELEPHONE (OUTPATIENT)
Dept: NEUROSURGERY | Age: 45
End: 2020-05-27

## 2020-05-27 PROCEDURE — 6370000000 HC RX 637 (ALT 250 FOR IP): Performed by: PHYSICAL MEDICINE & REHABILITATION

## 2020-05-27 PROCEDURE — 97530 THERAPEUTIC ACTIVITIES: CPT

## 2020-05-27 PROCEDURE — 97535 SELF CARE MNGMENT TRAINING: CPT

## 2020-05-27 PROCEDURE — 6370000000 HC RX 637 (ALT 250 FOR IP): Performed by: FAMILY MEDICINE

## 2020-05-27 PROCEDURE — 1180000000 HC REHAB R&B

## 2020-05-27 PROCEDURE — 97116 GAIT TRAINING THERAPY: CPT

## 2020-05-27 PROCEDURE — 97110 THERAPEUTIC EXERCISES: CPT

## 2020-05-27 RX ADMIN — POLYETHYLENE GLYCOL 3350 17 G: 17 POWDER, FOR SOLUTION ORAL at 08:46

## 2020-05-27 RX ADMIN — DOCUSATE SODIUM 100 MG: 100 CAPSULE, LIQUID FILLED ORAL at 08:46

## 2020-05-27 RX ADMIN — SENNOSIDES 8.6 MG: 8.6 TABLET, FILM COATED ORAL at 19:46

## 2020-05-27 RX ADMIN — VITAMIN D, TAB 1000IU (100/BT) 5000 UNITS: 25 TAB at 08:45

## 2020-05-27 RX ADMIN — ACETAMINOPHEN 650 MG: 325 TABLET ORAL at 08:45

## 2020-05-27 RX ADMIN — DOCUSATE SODIUM 100 MG: 100 CAPSULE, LIQUID FILLED ORAL at 19:46

## 2020-05-27 ASSESSMENT — PAIN SCALES - GENERAL
PAINLEVEL_OUTOF10: 0
PAINLEVEL_OUTOF10: 3
PAINLEVEL_OUTOF10: 0
PAINLEVEL_OUTOF10: 3

## 2020-05-27 ASSESSMENT — PAIN DESCRIPTION - LOCATION: LOCATION: BACK

## 2020-05-27 ASSESSMENT — ENCOUNTER SYMPTOMS
CONSTIPATION: 0
EYE PAIN: 0
ALLERGIC/IMMUNOLOGIC NEGATIVE: 1
COUGH: 0
NAUSEA: 0
BACK PAIN: 1
TROUBLE SWALLOWING: 0
SHORTNESS OF BREATH: 0
DIARRHEA: 0
VOICE CHANGE: 0

## 2020-05-27 ASSESSMENT — PAIN - FUNCTIONAL ASSESSMENT: PAIN_FUNCTIONAL_ASSESSMENT: ACTIVITIES ARE NOT PREVENTED

## 2020-05-27 ASSESSMENT — PAIN DESCRIPTION - PAIN TYPE: TYPE: ACUTE PAIN

## 2020-05-27 ASSESSMENT — PAIN DESCRIPTION - FREQUENCY: FREQUENCY: INTERMITTENT

## 2020-05-27 NOTE — PROGRESS NOTES
participation in recreational therapy activities and participates as able. Pt came to the dining room to plant flowers for [employer] to take home with him at discharge-he came up with design and RT used hot glue gun to place pom poms on cup -he placed the dirt and the flowers in cup and watered it-pt very social-sit to stand from standard chair with CGA-ambulated back to room with RW and CGA-comfort measures given in chair  -Pt on his phone most of his free time-he has word search puzzles in his room and likes tv-looking forward to going home this Friday-affect bright and social-glad to get his staples out yesterday and states he does not have much pain from it-pleasant      NUTRITION  Weight: 212 lb 12.8 oz (96.5 kg) / Body mass index is 30.53 kg/m². Current diet: DIET GENERAL;  Please see nutrition note for details. NURSING  Continent of Bowel and Bladder: Yes  Pain is Managed:  Yes  Signs and Symptoms of Infection:  No  Signs and Symptoms of Skin Breakdown:  No  Injury and Fall Free during Inpatient Rehabilitation Admission: Yes    Consultations/Labs/X-rays: Neurosurgery, Critical Care, Physical Medicine    Family Education: Need to make contact with family to initiate education  Fall Risk:  Falling star program initiated  Is the patient appropriate for a stay in the functional apartment? no    Discharge Plan   Estimated Discharge Date: 5/29   Destination: outpatient therapies  Services at Discharge:  Outpatient Physical Therapy and Occupational Therapy 2x week  Is patient appropriate for an outpatient driving evaluation? no  Equipment at Discharge: None  Factors facilitating achievement of predicted outcomes: Family support, Motivated, Cooperative, Pleasant, Sense of humor, Good insight into deficits and Knowledge about rehab  Barriers to the achievement of predicted outcomes: Pain, Cognitive deficit and Long standing deficits    Team Members Present at Conference:  Willie Damon

## 2020-05-27 NOTE — PROGRESS NOTES
[address] [place] [address]  Recreational Therapy  Daily Note  [address]    Time Spent with Patient: 15 minutes    Date:  5/27/2020       Patient Name: Riky Corral      MRN: [id number]      YOB: 1975 (53 y.o.)       Gender: male  Diagnosis: Myelopathy of thoracic region  Referring Practitioner: Dr. Alberto Armstrong    RESTRICTIONS/PRECAUTIONS:  Restrictions/Precautions: Fall Risk, General Precautions  Vision: Within Functional Limits  Hearing: Within functional limits    PAIN: 0-no c/o pain     SUBJECTIVE:  I am going home friday    OBJECTIVE:  Pt on his phone most of his free time-he has word search puzzles in his room and likes tv-looking forward to going home this Friday-affect bright and social-glad to get his staples out yesterday and states he does not have much pain from it-pleasant          Patient Education  New Education Provided: Importance of Leisure,     Electronically signed by: TALIA Taylor  Date: 5/27/2020
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
## 2020-05-27 NOTE — PROGRESS NOTES
increased independence with sinkside ADLs. MET   Short term goal 3: Pt will complete sit<>stand transfers from various surfaces at SBA for increased independence for toilet transfers MET AND REVISE   Short term goal 4: Pt will verbalize and demonstrate appropriate body mechanics with MIN cues to complete BADL routine while adhering to spinal precautions for increased safety with functional mobility and ADLs. MET AND REVISE   Short term goal 5: Pt will complete 1 set of 15 reps of bilateral UE HEP exercises with visual aide and without rest breaks to increase endurance necessary for independence with ADL tasks. MET AND CONTINUE  Long term goals  Time Frame for Long term goals : 2 Weeks  Long term goal 1: Pt will complete bathing/dressing routine at [facility] with LHAE prn for increased independence with self-care. NOT MET AND CONTINUE  Long term goal 2: Pt will complete home making task at [facility] for increased independence with IADLs and in preparation for return to work. NOT MET AND CONTINUE     Revised Short-Term Goals  Short term goals  Time Frame for Short term goals: 1 Week  Short term goal 1: Pt will complete lower body dressing with LHAE prn and S while adhering to spinal precautions for increase independence with ADLs. Short term goal 2: Pt will stand for 5 minutes with bilateral UE release at S and no LOB for increased independence with sinkside ADLs. Short term goal 3: Pt will complete sit<>stand transfers from various surfaces at S for increased independence for toilet transfers  Short term goal 4: Pt will verbalize and demonstrate appropriate body mechanics without cues to complete BADL routine while adhering to spinal precautions for increased safety with functional mobility and ADLs. Short term goal 5: Pt will complete 1 set of 15 reps of bilateral UE HEP exercises with visual aide and without rest breaks to increase endurance necessary for independence with ADL tasks.     Long term goals  Time Frame for

## 2020-05-27 NOTE — PROGRESS NOTES
Nutrition Assessment    Type and Reason for Visit: Reassess(PO Monitor)    Nutrition Recommendations:   *Recommend a Multivitamin w/minerals daily to aid in post-op healing. *Continue current diet. Nutrition Assessment: Pt improving from a nutritional standpoint AEB pt report of good appetite and intake consuming % of meals and is getting good protein sources in. Remains at risk for further nutritional compromise r/t increased nutrient needs for post-op healing and underlying medical conditions (hx of diet controlled DB, osteopenia, Vitamin D deficiency, & some development delay). Nutrition recommendations/interventions as per above.     Malnutrition Assessment:  · Malnutrition Status: No malnutrition  · Context: Acute illness or injury  · Findings of the 6 clinical characteristics of malnutrition (Minimum of 2 out of 6 clinical characteristics is required to make the diagnosis of moderate or severe Protein Calorie Malnutrition based on AND/ASPEN Guidelines):  1. Energy Intake-Greater than 75% of estimated energy requirement, Greater than or equal to 7 days    2. Weight Loss-No significant weight loss,    3. Fat Loss-No significant subcutaneous fat loss,    4. Muscle Loss-No significant muscle mass loss,    5. Fluid Accumulation-Mild fluid accumulation,    6.  Strength-Not measured    Nutrition Risk Level:  Moderate    Nutrient Needs:  · Estimated Daily Total Kcal: ~1996 kcals ( 20 kcals/kg on admit weight of 99.8 kg)  · Estimated Daily Protein (g): ~105 grams protein ( 1.4 grams protein/kg on IBW of 75.5 kg)    Nutrition Diagnosis:   · Problem: Increased nutrient needs  · Etiology: related to Increased demand for energy/nutrients     Signs and symptoms:  as evidenced by Presence of wounds(s/p OR 5/14)    Objective Information:  · Nutrition-Focused Physical Findings: pt seen; reports good appetite and intake of meals consuming lean protein sources with meals; pt denies N/V; last BM x1 on 5/25;

## 2020-05-27 NOTE — PROGRESS NOTES
[facility]  INPATIENT PHYSICAL THERAPY  DAILY NOTE  [address] [facility] - 7E-52/[id number]    Time In: 1330  Time Out: 1400  Timed Code Treatment Minutes: 30 Minutes  Minutes: 30          Date: 2020  Patient Name: Dakota Hutchinson,  Gender:  male        MRN: [id number]  : 1975  (53 y.o.)     Referring Practitioner: Dr. Galen Eubanks  Diagnosis: Myelopathy of thoracic region. Additional Pertinent Hx: Pt admitted 5-14 for sx for T-yap and resection intradural mass. Pt was having increased LE weakness and incontinence. Prior Level of Function:  Lives With: Family  Type of Home: Apartment  Home Layout: Two level(Pt lives with family on second floor apartment building. )  Home Access: Stairs to enter with rails  Entrance Stairs - Number of Steps: 8 + 8 and 2 HRs to second floor apt  Home Equipment: Rolling walker   Bathroom Shower/Tub: Tub/Shower unit  Bathroom Toilet: Standard  Bathroom Equipment: Tub transfer bench    Receives Help From: Family  ADL Assistance: Needs assistance(Pt reports assistance prn from mother/sister with bathing/dressing lower half. )  Ambulation Assistance: Independent  Transfer Assistance: Independent  Active : Yes  Additional Comments: Pt using RW for ambulation    Restrictions/Precautions:  Restrictions/Precautions: Fall Risk, General Precautions  Position Activity Restriction  Spinal Precautions: No Bending, No Lifting, No Twisting  Other position/activity restrictions: numbness LT knee/lower leg    SUBJECTIVE: Pt. Seated in BS chair upon arrival and pleasantly agrees to therapy session. PAIN: None indicated    OBJECTIVE:      Transfers:  Sit to Stand: Stand By Assistance, Contact Guard Assistance  Stand to Sit:Stand By Assistance, [employer] Assistance    Ambulation:  Stand By Assistance, Contact Guard Assistance  Distance: 125' x 2  Surface: Level Tile  Device:Rolling Walker  Gait Deviations:   Forward Flexed Posture, Slow Kellen,

## 2020-05-27 NOTE — PROGRESS NOTES
Mobility Training, ROM, Balance Training, Self-Care / ADL, Safety Education & Training, Endurance Training, Positioning, Equipment Evaluation, Education, & procurement    Patient Education  Patient Education: ADL's and Energy Conservation    Goals  Short term goals  Time Frame for Short term goals: 1 Week  Short term goal 1: Pt will complete lower body dressing with LHAE prn and S while adhering to spinal precautions for increase independence with ADLs. Short term goal 2: Pt will stand for 5 minutes with bilateral UE release at S and no LOB for increased independence with sinkside ADLs. Short term goal 3: Pt will complete sit<>stand transfers from various surfaces at S for increased independence for toilet transfers  Short term goal 4: Pt will verbalize and demonstrate appropriate body mechanics without cues to complete BADL routine while adhering to spinal precautions for increased safety with functional mobility and ADLs. Short term goal 5: Pt will complete 1 set of 15 reps of bilateral UE HEP exercises with visual aide and without rest breaks to increase endurance necessary for independence with ADL tasks. Long term goals  Time Frame for Long term goals : 2 Weeks  Long term goal 1: Pt will complete bathing/dressing routine at [facility] with LHAE prn for increased independence with self-care. Long term goal 2: Pt will complete home making task at [facility] for increased independence with IADLs and in preparation for return to work. Following session, patient left in safe position with all fall risk precautions in place.     Lesley ARAUZ/CODIE [id number]

## 2020-05-28 LAB
ERYTHROCYTE [DISTWIDTH] IN BLOOD BY AUTOMATED COUNT: 13.6 % (ref 11.5–14.5)
ERYTHROCYTE [DISTWIDTH] IN BLOOD BY AUTOMATED COUNT: 44.2 FL (ref 35–45)
HCT VFR BLD CALC: 41.2 % (ref 42–52)
HEMOGLOBIN: 13.1 GM/DL (ref 14–18)
MCH RBC QN AUTO: 28.5 PG (ref 26–33)
MCHC RBC AUTO-ENTMCNC: 31.8 GM/DL (ref 32.2–35.5)
MCV RBC AUTO: 89.6 FL (ref 80–94)
PLATELET # BLD: 352 THOU/MM3 (ref 130–400)
PMV BLD AUTO: 8 FL (ref 9.4–12.4)
RBC # BLD: 4.6 MILL/MM3 (ref 4.7–6.1)
VITAMIN D 25-HYDROXY: 22 NG/ML (ref 30–100)
WBC # BLD: 7.3 THOU/MM3 (ref 4.8–10.8)

## 2020-05-28 PROCEDURE — 97530 THERAPEUTIC ACTIVITIES: CPT

## 2020-05-28 PROCEDURE — 1180000000 HC REHAB R&B

## 2020-05-28 PROCEDURE — 36415 COLL VENOUS BLD VENIPUNCTURE: CPT

## 2020-05-28 PROCEDURE — 94760 N-INVAS EAR/PLS OXIMETRY 1: CPT

## 2020-05-28 PROCEDURE — 97535 SELF CARE MNGMENT TRAINING: CPT

## 2020-05-28 PROCEDURE — 82306 VITAMIN D 25 HYDROXY: CPT

## 2020-05-28 PROCEDURE — 97116 GAIT TRAINING THERAPY: CPT

## 2020-05-28 PROCEDURE — 85027 COMPLETE CBC AUTOMATED: CPT

## 2020-05-28 PROCEDURE — 97110 THERAPEUTIC EXERCISES: CPT

## 2020-05-28 PROCEDURE — 6370000000 HC RX 637 (ALT 250 FOR IP): Performed by: FAMILY MEDICINE

## 2020-05-28 PROCEDURE — 6370000000 HC RX 637 (ALT 250 FOR IP): Performed by: PHYSICAL MEDICINE & REHABILITATION

## 2020-05-28 PROCEDURE — 97112 NEUROMUSCULAR REEDUCATION: CPT

## 2020-05-28 RX ORDER — CYCLOBENZAPRINE HCL 10 MG
10 TABLET ORAL 3 TIMES DAILY PRN
Qty: 30 TABLET | Refills: 0 | Status: SHIPPED | OUTPATIENT
Start: 2020-05-28 | End: 2020-06-12

## 2020-05-28 RX ADMIN — SENNOSIDES 8.6 MG: 8.6 TABLET, FILM COATED ORAL at 19:54

## 2020-05-28 RX ADMIN — CYCLOBENZAPRINE 10 MG: 10 TABLET, FILM COATED ORAL at 19:55

## 2020-05-28 RX ADMIN — DOCUSATE SODIUM 100 MG: 100 CAPSULE, LIQUID FILLED ORAL at 19:54

## 2020-05-28 RX ADMIN — ACETAMINOPHEN 650 MG: 325 TABLET ORAL at 06:03

## 2020-05-28 RX ADMIN — POLYETHYLENE GLYCOL 3350 17 G: 17 POWDER, FOR SOLUTION ORAL at 07:58

## 2020-05-28 RX ADMIN — VITAMIN D, TAB 1000IU (100/BT) 5000 UNITS: 25 TAB at 07:58

## 2020-05-28 RX ADMIN — ACETAMINOPHEN 650 MG: 325 TABLET ORAL at 15:45

## 2020-05-28 RX ADMIN — DOCUSATE SODIUM 100 MG: 100 CAPSULE, LIQUID FILLED ORAL at 07:58

## 2020-05-28 ASSESSMENT — PAIN SCALES - GENERAL
PAINLEVEL_OUTOF10: 9
PAINLEVEL_OUTOF10: 0
PAINLEVEL_OUTOF10: 2
PAINLEVEL_OUTOF10: 4

## 2020-05-28 ASSESSMENT — ENCOUNTER SYMPTOMS
NAUSEA: 0
COUGH: 0
TROUBLE SWALLOWING: 0
BACK PAIN: 1
EYE PAIN: 0
CONSTIPATION: 0
DIARRHEA: 0
SHORTNESS OF BREATH: 0
VOICE CHANGE: 0
ALLERGIC/IMMUNOLOGIC NEGATIVE: 1

## 2020-05-28 ASSESSMENT — PAIN DESCRIPTION - LOCATION: LOCATION: BACK

## 2020-05-28 ASSESSMENT — PAIN - FUNCTIONAL ASSESSMENT: PAIN_FUNCTIONAL_ASSESSMENT: ACTIVITIES ARE NOT PREVENTED

## 2020-05-28 ASSESSMENT — PAIN DESCRIPTION - FREQUENCY: FREQUENCY: INTERMITTENT

## 2020-05-28 ASSESSMENT — PAIN DESCRIPTION - PAIN TYPE: TYPE: ACUTE PAIN

## 2020-05-28 NOTE — PROGRESS NOTES
Negative for constipation, diarrhea and nausea. Endocrine: Negative for cold intolerance. Genitourinary: Negative for frequency and urgency. Musculoskeletal: Positive for back pain and gait problem. Skin: Negative for pallor. Allergic/Immunologic: Negative. Neurological: Positive for numbness. Negative for dizziness, tremors, seizures, weakness and headaches. Psychiatric/Behavioral: Positive for decreased concentration. Negative for confusion and dysphoric mood. All other systems reviewed and are negative. Objective:  /82   Pulse 91   Temp 98 °F (36.7 °C) (Oral)   Resp 18   Ht 5' 10\" (1.778 m)   Wt 212 lb 12.8 oz (96.5 kg)   SpO2 95%   BMI 30.53 kg/m²   CURRENT VITALS:  height is 5' 10\" (1.778 m) and weight is 212 lb 12.8 oz (96.5 kg). His oral temperature is 98 °F (36.7 °C). His blood pressure is 130/82 and his pulse is 91. His respiration is 18 and oxygen saturation is 95%. Body mass index is 30.53 kg/m².   Temperature Range (24h):Temp: 98 °F (36.7 °C) Temp  Av.6 °F (36.4 °C)  Min: 97.2 °F (36.2 °C)  Max: 98 °F (36.7 °C)  BP Range (77U): Systolic (75OYA), MCQ:285 , Min:120 , XJO:432     Diastolic (48XBK), DHN:34, Min:82, Max:82    Pulse Range (24h): Pulse  Av.5  Min: 91  Max: 94  Respiration Range (24h): Resp  Av  Min: 18  Max: 18  Current Pulse Ox (24h):  SpO2: 95 %  Pulse Ox Range (24h):  SpO2  Av.7 %  Min: 94 %  Max: 95 %  Oxygen Amount and Delivery: O2 Flow Rate (L/min): 0 L/min    awake  Orientation:   person, place, time, situation  Mood: within normal limits  Affect: calm  General appearance:  in no acute distress, up in chair     Memory:   grossly normal  Attention/Concentration: normal  Language:  normal    ROM:  abnormal - thoracic spine surgery  Motor Exam:  Motor exam is 5 out of 5 right upper and right lower extremities     Manual Muscle Testing:     Hip Flexion  Hip Extension  Hip Abduction  Hip Adduction    Left  5         Right

## 2020-05-28 NOTE — PROGRESS NOTES
[address] [address]  INPATIENT PHYSICAL THERAPY  DAILY NOTE  [address] [facility] - [id number]-52/052-A    Time In: 1130  Time Out: 1230  Timed Code Treatment Minutes: 60 Minutes  Minutes: 60          Date: 2020  Patient Name: Ana Zhou,  Gender:  male        MRN: [id number]  : 1975  (53 y.o.)     Referring Practitioner: Dr. Blanche Bustos  Diagnosis: Myelopathy of thoracic region. Additional Pertinent Hx: Pt admitted 5-14 for sx for T-yap and resection intradural mass. Pt was having increased LE weakness and incontinence. Prior Level of Function:  Lives With: Family  Type of Home: Apartment  Home Layout: Two level(Pt lives with family on second floor apartment building. )  Home Access: Stairs to enter with rails  Entrance Stairs - Number of Steps: 8 + 8 and 2 HRs to second floor apt  Home Equipment: Rolling walker   Bathroom Shower/Tub: Tub/Shower unit  Bathroom Toilet: Standard  Bathroom Equipment: Tub transfer bench    Receives Help From: Family  ADL Assistance: Needs assistance(Pt reports assistance prn from mother/sister with bathing/dressing lower half. )  Ambulation Assistance: Independent  Transfer Assistance: Independent  Active : Yes  Additional Comments: Pt using RW for ambulation    Restrictions/Precautions:  Restrictions/Precautions: Fall Risk, General Precautions  Position Activity Restriction  Spinal Precautions: No Bending, No Lifting, No Twisting  Other position/activity restrictions: numbness LT knee/lower leg    SUBJECTIVE: Pt. Seated in BS chair upon arrival and pleasantly agrees to therapy session.      PAIN: Denies    OBJECTIVE:  Bed Mobility:  Rolling to Left: Modified Independent   Rolling to Right: Modified Independent   Supine to Sit: Modified Independent  Sit to Supine: Modified Independent     Transfers:  Sit to Stand: Modified Independent  Stand to Sit:Modified Independent  Stand Pivot:Modified Independent  Car:Modified Independent    Ambulation:  Modified Independent  Distance: 150' x 2, 75' x 2, multiple shorter distances, up/down 10' ramp  Surface: Level Tile  Device:Rolling Walker  Gait Deviations: Forward Flexed Posture, Decreased Step Length on Left, Decreased Heel Strike Bilaterally and Decreased Terminal Knee Extension    Stairs:  Stairs:  6\" steps. X 16 using Bilateral Handrails and Modified Independent, with increased time for completion. Platform:  6\" platform X 1 using Rolling Walker and Modified Independent. Balance:  Dynamic Standing Balance: Stand By Assistance  Pt. Stood at [facility] while tossing ball back and forth with therapist. Pt. Slightly unsteady with moderate trunk sway. Pt. With decreased B TKE and slightly forward flexed trunk. Pt. Picked object up from floor using long handled reacher with mod I. Exercise:  Provided and completed HEP consisting of seated and supine B LE and core strengthening exercises. Functional Outcome Measures: Not completed       ASSESSMENT:  Assessment: Patient progressing toward established goals. Activity Tolerance:  Patient tolerance of  treatment: good.       Equipment Recommendations:Equipment Needed: No(Pt has RW)  Discharge Recommendations:  Continue to assess pending progress, Patient would benefit from continued therapy after discharge    Plan: Times per week: 5x/wk 90 min, 1x/ wk 30 min  Current Treatment Recommendations: Strengthening, Transfer Training, Endurance Training, Balance Training, Gait Training, Stair training, Functional Mobility Training, Home Exercise Program, Equipment Evaluation, Education, & procurement, Safety Education & Training, Patient/Caregiver Education & Training    Patient Education  Patient Education: Plan of Care, Home Exercise Program, Reviewed Prior Education    Goals:  Patient goals : get my Lt leg stronger  Short term goals  Time Frame for Short term goals: 1 wk  Short term goal 1: supine to sit and return with log roll Mod I, to get in/out of bed  Short term goal 2: Pt to get up/down from various seated surfaces, CGA, to get up to walk  Short term goal 3: Pt to walk with RW >= 50 ft, CGA, showing no recurvatum LLE, controlled swing into stance phase, to progress to home mobility  Short term goal 4: Pt to ascend/descend 4 steps with trevor rails, cGA, to progress to home entry. Short term goal 5: Pt to get in/out of car, Mod I, for transportation needs. Long term goals  Time Frame for Long term goals : 3 wks  Long term goal 1: Pt to get up/down from various seated surfaces, Mod I, to get up to walk  Long term goal 2: Pt to walk with RW >= 150 ft, Mod I, showing no recurvatum LLE, for home and community mobility  Long term goal 3: Pt to ascend/descend 8+8 steps with trevor rails, mod I, for home entry  Long term goal 4: Pt to perform TUG <= 20 seconds indicating improved balance    Following session, patient left in safe position with all fall risk precautions in place.

## 2020-05-28 NOTE — PROGRESS NOTES
PORTABLE PATIENT PROFILE  Tramaine Waller  [id number]    MEDICAL DIAGNOSIS/CONDITION:   Patient Active Problem List   Diagnosis    Leg weakness, bilateral    Ataxia    Syrinx of spinal cord ([address]    Post-operative state    Myelopathy of thoracic region       INSURANCE INFORMATION:  Payor: Alma Guzman /  /  /     ADVANCED DIRECTIVES:   Advance Directives (For Healthcare)  Pre-existing DNR Comfort Care/DNR Arrest/DNI Order: No  Healthcare Directive: No, patient does not have an advance directive for healthcare treatment  Full Code     EMERGENCY CONTACT:       RISK FACTORS:   Social History     Tobacco Use    Smoking status: Never Smoker    Smokeless tobacco: Never Used   Substance Use Topics    Alcohol use: No       ALLERGIES:  Allergies   Allergen Reactions    Norco [Hydrocodone-Acetaminophen] Nausea And Vomiting       FUNCTIONAL STATUS:                                EQUIPMENT AND DEVICES:  Assistive Devices  Assistive Devices: Walker, Transfer belt    SWALLOWING:   Difficulty Chewing or Swallowing Food: No    VISION AND HEARING:   Sensory Problems  Visual impairment: None    PHYSICIANS INVOLVED WITH CARE:    Agueda Nieto MD  No ref. provider found  ISHAAN Christiansen - CNP  Agueda Nieto MD        There is no immunization history on file for this patient.       Portable Patient Profile was provided to patient and/or family on 5/28/2020

## 2020-05-28 NOTE — PLAN OF CARE
Problem: Falls - Risk of:  Goal: Will remain free from falls  Description: Will remain free from falls  Outcome: Ongoing  Note: No falls noted. Patient uses call light appropriately to alert staff to needs. Wears non slip slippers and gait belt when ambulating. Problem: Falls - Risk of:  Goal: Absence of physical injury  Description: Absence of physical injury  Outcome: Ongoing     Problem: Pain:  Goal: Pain level will decrease  Description: Pain level will decrease  Outcome: Ongoing  Note: Denies pain. Problem: Pain:  Goal: Control of acute pain  Description: Control of acute pain  Outcome: Ongoing     Problem: IP MOBILITY  Goal: LTG - patient will demonstrate safe mobility requirements  Outcome: Ongoing     Problem: Nutrition  Goal: Optimal nutrition therapy  Outcome: Ongoing     Problem: IP DRESSINGS LOWER EXTREMITIES  Goal: LTG - patient will dress lower body with or without assistive device  Outcome: Ongoing     Problem: DISCHARGE BARRIERS  Goal: Patient's continuum of care needs are met  Outcome: Ongoing  Note: Patient to be discharged on Friday 5/29 to home with Brenda Mohs outpatient pt and ot. Problem: Infection - Risk of, Surgical Site Infection  Goal: Will show no infection signs and symptoms  Description: Will show no infection signs and symptoms     Outcome: Ongoing  Note: Surgical incision healing well, open to air. No s/s of infection noted     Problem: SKIN INTEGRITY  Goal: Absence of new skin breakdown  Outcome: Ongoing  Note: No new skin breakdown noted. Patient repositions self frequently to prevent skin breakdown. Problem: Neurological  Goal: Maximum potential motor/sensory/cognitive function  Outcome: Ongoing  Note: Patient alert and oriented. Participates in careplanning and education. Reviewed care plan with patient. Patient verbalizes understanding of care plan and treatment goals.

## 2020-05-28 NOTE — PROGRESS NOTES
[address]  Occupational Therapy  Daily Note  Time:   Time In: 830  Time Out: 930  Timed Code Treatment Minutes: 30 Minutes  Minutes: 60          Date: 2020  Patient Name: Juan Manuel Blood,   Gender: male      Room: [facility]/052-A  MRN: [id number]  : 1975  (53 y.o.)  Referring Practitioner: Dr. Jeannine Thrasher  Diagnosis: Myelopathy of thoracic region  Additional Pertinent Hx: Recardo Dancer is a 53 y.o. male who was admitted 2020 to the inpatient rehabilitation unit. Original admission 2020 for an elective thoracic spine surgery.  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  The patient was previously on the acute inpatient rehabilitation unit from 3/17 to 3/27/2020 for therapy to address gait ataxia resulting from the thoracic myelopathy.  The patient did well with those offered therapies and was discharged to home where he lives with his mother. Muscle motor testing on the right hemibody is normal; for his left leg strength is slightly decreased at 4+/5 and it is noted that his foot exhibits on elicited clonus and the right foot has elicitable clonus. Restrictions/Precautions:  Restrictions/Precautions: Fall Risk, General Precautions  Position Activity Restriction  Spinal Precautions: No Bending, No Lifting, No Twisting  Other position/activity restrictions: numbness LT knee/lower leg    SUBJECTIVE: Upon arrival patient finishing breakfast while seated in recliner. Patient agreeable to OT session. PAIN: 0/10:     COGNITION: Decreased Safety Awareness    ADL:   Feeding: Modified Independent. Grooming: Supervision. Oral care completed  with patient able to tolerate x4 minutes and 45 seconds standing at sink side. Bathing: Supervision. Seated on tub bench.  BUE release completed with use of grab bars to wash pericare and buttocks. x1 v/c required for thoroughness. Upper Extremity Dressing: Supervision and with set-up. doffed/ donned hospital shirt while seated on tub bench. Lower Extremity Dressing: Supervision and with set-up. Doffed/ donned hospital shorts, depends, and B socks while seated on tub bench. Patient utilized crossed leg technique to thread garments over BLEs. BUE release to complete clothing management task. Donned B shoes while seated in recliner. Patient able bend knee with the footrest up to reach B feet with good success. Toileting: Supervision. BUE release to complete clothing management. Toilet Transfer: Supervision. RTS with use of grab bars. Tub Transfer: Stand By Assistance. x1 v/c required for foot placement. Cesar Tristan BALANCE:  Sitting Balance:  Supervision. Completed bathing and dressing while seated on tub bench/recliner. Standing Balance: Stand By Assistance, [employer]. Grooming/ laundry completed while standing with no LOB noted. CGA for safety when patient completing BUE release due to patient reporting his legs are really shaky. BED MOBILITY:  Not Tested    TRANSFERS:  Sit to Stand:  Stand By Assistance. from recliner, RTS, and tub bench  Stand to Sit: Stand By Assistance. to recliner, RTS and tub bench    FUNCTIONAL MOBILITY:  Assistive Device: Rolling Walker  Assist Level:  Stand By Assistance and [employer]. Distance: To and from shower room  No LOB noted. V/c to slow down pace. CGA required towards end of session due to increased fatigue and patient stating his legs feel really shaky today. ADDITIONAL ACTIVITIES:  Pt completed dynamic standing task this date standing x4 min while requiring RW for balance - task was graded to involve BUE. Completed in order to challenge dynamic standing balance and standing tolerance for the completion of self-care ADLs & homemaking activities.     Patient completed IADL homemaking task this date of  folding clean clothes and putting them away in dresser - task was graded to challenge patients safety and endurance while reaching in various planes. Patient was able to complete BUE release from RW to retrieve/ fold all items with CGA- SBA for balance and x1 VCs for safety during the retrieval and transportation of items. Provided patient education of draping clothing over RW for transportation with good success noted. Patient tolerated a standing endurance of 7 minutes and 32 seconds. Lengthy rest break required due to increased fatigue. ASSESSMENT:     Activity Tolerance:  Patient tolerance of  treatment: good. Discharge Recommendations: Continue to assess pending progress, Home with nursing aide, Home with Home health OT, Home with assist PRN  Equipment Recommendations: Equipment Needed: No  Other: Pt reports ownership of RW and extended tub bench, denies need for LHAE or BSC  Plan: Times per week: 5x/week for 90 minutes and 1x/week for 30 minutes. Current Treatment Recommendations: Functional Mobility Training, ROM, Balance Training, Self-Care / ADL, Safety Education & Training, Endurance Training, Positioning, Equipment Evaluation, Education, & procurement    Patient Education  Patient Education: ADL's, IADL's and Energy Conservation    Goals  Short term goals  Time Frame for Short term goals: 1 Week  Short term goal 1: Pt will complete lower body dressing with LHAE prn and S while adhering to spinal precautions for increase independence with ADLs. Short term goal 2: Pt will stand for 5 minutes with bilateral UE release at S and no LOB for increased independence with sinkside ADLs.    Short term goal 3: Pt will complete sit<>stand transfers from various surfaces at S for increased independence for toilet transfers  Short term goal 4: Pt will verbalize and demonstrate appropriate body mechanics without cues to complete BADL routine while adhering to spinal

## 2020-05-29 ENCOUNTER — TELEPHONE (OUTPATIENT)
Dept: FAMILY MEDICINE CLINIC | Age: 45
End: 2020-05-29

## 2020-05-29 VITALS
HEART RATE: 119 BPM | TEMPERATURE: 97.3 F | HEIGHT: 70 IN | DIASTOLIC BLOOD PRESSURE: 88 MMHG | OXYGEN SATURATION: 96 % | WEIGHT: 212.8 LBS | RESPIRATION RATE: 16 BRPM | SYSTOLIC BLOOD PRESSURE: 137 MMHG | BODY MASS INDEX: 30.46 KG/M2

## 2020-05-29 PROCEDURE — 6370000000 HC RX 637 (ALT 250 FOR IP): Performed by: PHYSICAL MEDICINE & REHABILITATION

## 2020-05-29 PROCEDURE — 97116 GAIT TRAINING THERAPY: CPT

## 2020-05-29 PROCEDURE — 94760 N-INVAS EAR/PLS OXIMETRY 1: CPT

## 2020-05-29 PROCEDURE — 97535 SELF CARE MNGMENT TRAINING: CPT

## 2020-05-29 PROCEDURE — 97110 THERAPEUTIC EXERCISES: CPT

## 2020-05-29 PROCEDURE — 6370000000 HC RX 637 (ALT 250 FOR IP): Performed by: FAMILY MEDICINE

## 2020-05-29 RX ADMIN — POLYETHYLENE GLYCOL 3350 17 G: 17 POWDER, FOR SOLUTION ORAL at 08:09

## 2020-05-29 RX ADMIN — DOCUSATE SODIUM 100 MG: 100 CAPSULE, LIQUID FILLED ORAL at 08:09

## 2020-05-29 RX ADMIN — VITAMIN D, TAB 1000IU (100/BT) 5000 UNITS: 25 TAB at 08:09

## 2020-05-29 ASSESSMENT — PAIN SCALES - GENERAL: PAINLEVEL_OUTOF10: 0

## 2020-05-29 ASSESSMENT — PAIN - FUNCTIONAL ASSESSMENT: PAIN_FUNCTIONAL_ASSESSMENT: ACTIVITIES ARE NOT PREVENTED

## 2020-05-29 NOTE — PROGRESS NOTES
complete clothing management. Toilet Transfer: Supervision. RTS with use of R grab bar. Neftalizeus Josephbetty BALANCE:  Sitting Balance:  Supervision. Standing Balance: Stand By Assistance, [employer]. toward end of session patient reported his legs feeling really shaky. BED MOBILITY:  Not Tested    TRANSFERS:  Sit to Stand:  Supervision. from recliner, chair with arms and RTS  Stand to Sit: Supervision. to recliner, chair with arms, and RTS. FUNCTIONAL MOBILITY:  Assistive Device: Rolling Walker  Assist Level:  Stand By Assistance. Distance: To and from therapy apartment  V/c required to slow down pace to reduce risk of falls. No LOB noted. ADDITIONAL ACTIVITIES:  Patient completed IADL homemaking task this date of making oatmeal and washing dishes - task was graded to challenge patients kitchen safety. Patient was able to retrieve all items from kitchen cabinets  with RW and x1VCs for safety of RW placement during the retrieval and transportation of items. Patient required S throughout task with a standing endurance of x 7 minutes and 20 seconds x1 trial and x3 minutes and 30 seconds x1 trial. Patient demonstrated good ability to safely transport items with use of kitchen counter as support. Patient simulated HEP with handout given to patient with patient able to successfully perform exercises. ASSESSMENT:  Activity Tolerance:  Patient tolerance of  treatment: good. Assessment:  Patient made good progress towards goals. Patient has spinal precautions and cognitive barrier and is unsteady at times with RW. Patient met 5/5 STG and 0/2 LTG.   Discharge Recommendations: Outpatient OT  Equipment Recommendations: Equipment Needed: No  Other: Pt reports ownership of RW and extended tub bench, denies need for [facility] or [facility]  Plan: Outpatient OT   Current Treatment Recommendations: Functional Mobility Training, ROM, Balance Training, Self-Care / ADL, Safety Education & Training, Endurance

## 2020-05-29 NOTE — PROGRESS NOTES
[address] [address]  INPATIENT PHYSICAL THERAPY  DISCHARGE NOTE  [facility] - 7E-52/052-A    Time In: 1100  Time Out: 1130  Timed Code Treatment Minutes: 30 Minutes  Minutes: 30          Date: 2020  Patient Name: Tramaine Waller,  Gender:  male        MRN: [id number]  : 1975  (53 y.o.)     Referring Practitioner: Dr. Pugh Prudent  Diagnosis: Myelopathy of thoracic region. Additional Pertinent Hx: Pt admitted 5-14 for sx for T-yap and resection intradural mass. Pt was having increased LE weakness and incontinence. Prior Level of Function:  Lives With: Family  Type of Home: Apartment  Home Layout: Two level(Pt lives with family on second floor apartment building. )  Home Access: Stairs to enter with rails  Entrance Stairs - Number of Steps: 8 + 8 and 2 HRs to second floor apt  Home Equipment: Rolling walker   Bathroom Shower/Tub: Tub/Shower unit  Bathroom Toilet: Standard  Bathroom Equipment: Tub transfer bench    Receives Help From: Family  ADL Assistance: Needs assistance(Pt reports assistance prn from mother/sister with bathing/dressing lower half. )  Ambulation Assistance: Independent  Transfer Assistance: Independent  Active : Yes  Additional Comments: Pt using RW for ambulation    Restrictions/Precautions:  Restrictions/Precautions: Fall Risk, General Precautions  Position Activity Restriction  Spinal Precautions: No Bending, No Lifting, No Twisting  Other position/activity restrictions: numbness LT knee/lower leg    SUBJECTIVE: Patient in recliner upon arrival, agreed and cooperative for therapy. Reports being excited to go home today. PAIN: No pain reported. OBJECTIVE:    Transfers:  Sit to Stand: Modified Independent  Stand to Sit:Modified Independent    Ambulation:  Modified Independent  Distance: 120 ft. x2   Surface: Level Tile  Device:Rolling Walker  Gait Deviations:   Forward Flexed Posture, Decreased Step Length on Left, Decreased Heel

## 2020-05-29 NOTE — TELEPHONE ENCOUNTER
Amy 45 Transitions Initial Follow Up Call    Outreach made within 2 business days of discharge: Yes    Patient: Dakota Hutchinson Patient : 1975   MRN: [id number]  Reason for Admission: There are no discharge diagnoses documented for the most recent discharge.   Discharge Date: 20       Scheuled appointment with PCP within 7-14 days    Follow Up  Future Appointments   Date Time Provider Harley Bowman   2020  3:00 PM DARREN Marks   2020  4:15 PM Shelby Osborn, PT STRCLAUDIO ANTONYP PT EMANI GRANADOS IIMANAV CAMP   2020  9:30 AM ISHAAN Mercado - CNP SRPX [facility] KATHLEENP - Caroline Montiel CMA ([facility])

## 2020-05-29 NOTE — PLAN OF CARE
Care plan reviewed with patient and  verbalize understanding of the plan of care and contribute to goal setting. Problem: DISCHARGE BARRIERS  Goal: Patient's continuum of care needs are met  5/29/2020 0903 by Abril Lopez RN  Outcome: Completed  5/29/2020 0824 by BRANDEN Huerta  Outcome: Ongoing     Problem: Nutrition  Goal: Optimal nutrition therapy  Outcome: Completed     Problem: Pain:  Description: Pain management should include both nonpharmacologic and pharmacologic interventions. Goal: Control of acute pain  Description: Control of acute pain  Outcome: Completed     Problem: Falls - Risk of:  Goal: Absence of physical injury  Description: Absence of physical injury  Outcome: Completed     Problem: Pain:  Description: Pain management should include both nonpharmacologic and pharmacologic interventions.   Goal: Pain level will decrease  Description: Pain level will decrease  Outcome: Completed     Problem: SKIN INTEGRITY  Goal: Absence of new skin breakdown  Outcome: Completed     Problem: Neurological  Goal: Maximum potential motor/sensory/cognitive function  Outcome: Completed

## 2020-05-29 NOTE — PROGRESS NOTES
Pt. Awake and up in recliner at [facility] 51. Voices eager to go home. Back incision approx. And healed. Denies any new concerns.  Voices slept well

## 2020-06-02 ENCOUNTER — HOSPITAL ENCOUNTER (OUTPATIENT)
Dept: OCCUPATIONAL THERAPY | Age: 45
Setting detail: THERAPIES SERIES
Discharge: HOME | End: 2020-06-02
Payer: COMMERCIAL

## 2020-06-02 ENCOUNTER — HOSPITAL ENCOUNTER (OUTPATIENT)
Dept: PHYSICAL THERAPY | Age: 45
Setting detail: THERAPIES SERIES
Discharge: HOME | End: 2020-06-02
Payer: COMMERCIAL

## 2020-06-02 PROCEDURE — 97165 OT EVAL LOW COMPLEX 30 MIN: CPT

## 2020-06-02 PROCEDURE — 97162 PT EVAL MOD COMPLEX 30 MIN: CPT

## 2020-06-02 ASSESSMENT — PAIN DESCRIPTION - PAIN TYPE: TYPE: SURGICAL PAIN

## 2020-06-02 ASSESSMENT — PAIN DESCRIPTION - FREQUENCY: FREQUENCY: CONTINUOUS

## 2020-06-02 ASSESSMENT — PAIN SCALES - GENERAL
PAINLEVEL_OUTOF10: 4
PAINLEVEL_OUTOF10: 4

## 2020-06-02 ASSESSMENT — PAIN DESCRIPTION - DESCRIPTORS: DESCRIPTORS: ACHING;THROBBING

## 2020-06-02 ASSESSMENT — 9 HOLE PEG TEST
TESTTIME_SECONDS: 18
TESTTIME_SECONDS: 19

## 2020-06-02 ASSESSMENT — PAIN DESCRIPTION - LOCATION
LOCATION: BACK
LOCATION: BACK
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
## 2020-06-02 NOTE — FLOWSHEET NOTE
8. TURNING 360 DEGREES 0 - Discontinuous steps and 0 - Unsteady (grabs,staggers)   9. SITTING DOWN 1 - Uses arms or not a smooth motion   BALANCE SCORE 4/16       GAIT SECTION Patient stands with therapist, walks across room (+/- aids), fist at usual pace, then at rapid pace. 1.  INDICATION OF GAIT (Immediately after told to \"go\" 0 - Any hesitancy or multiple attempts   2. STEP LENGTH AND HEIGHT 1 - Step through Left   3. FOOT CLEARANCE 0 - Foot drop   4. STEP SYMMETRY 0 - Right and left step length not equal   5. STEP CONTINUITY 0 - Stopping or discontinuity between steps   6. PATH 1 - Mild/moderate deviation or uses walking aid   7. TRUNK 0 - Marked sway or uses walking aid   8. WALKING TIME 0 - Heels apart   GAIT SCORE 2/12   TOTAL SCORE 6/28   Risk Indicators:  Less than/equal to 18 = high risk  19-23 Moderate risk  Greater than/equal to 24 = low risk                              Observation/Palpation  Observation: Forward flexed posture  Scar: Well healed incision scar from surgery 4 weeks ago      RLE General PROM: Hamstring 0-70 deg tight, noted increased spasticity/tone present. Able to cross leg into lap for ADLs          LLE General PROM: Hamstring 0-70 deg tight, noted increased spasticity/tone present.  Able to cross leg into lap for ADLs        Strength RLE: Exception  Comment: LE strength tests are normal; he struggles with endurance     Strength LLE: Exception  Comment: LE strength tests are normal; he struggles with endurance             Other: sit- 30 sec: 2x with increase in tone and general instability           Overall Sensation Status: Impaired  Additional Comments: loss of light touch sensation throughout left LE with mild sensation in left medial gastroc                            Transfers  Sit to Stand: Contact guard assistance(No UE on regular chair)  Stand to sit: Contact guard assistance(No UE on regular chair)  Stand Pivot Transfers: Contact guard assistance(with no transfers, and core strengthening on plinth (leg raises, clamshells, reverse clam, bridges) Hip and leg flexibiltiy (hamstring, hip flexors, gastroc, piriformis). Monitor lower lumbar pain, endplate fx L5 avoid aggressive trunk extension/flexion  Current Treatment Recommendations: Strengthening, ROM, Balance Training, Functional Mobility Training, Manual Therapy - Soft Tissue Mobilization, Manual Therapy - Joint Manipulation, Gait Training, Stair training, Home Exercise Program, Modalities, Aquatics, Patient/Caregiver Education & Training, Neuromuscular Re-education, Pain Management    History: Personal factors or comorbidities that impact plan of care -  Moderate Complexity: 1-2 personal factors or comorbidities. See history section above for details. Examination: Body structures and functions, activity limitations, participation restrictions; using standardized tests and measures - Moderate Complexity: 3 or morebody structures and functional, activity limitations and/or participation restrictions. See restrictions and objective section above for details. Clinical Presentation: Moderate - Evolving with Changing Characteristics: loss of balance nad altered gait mechanics since surgery; increase fall risk    Decision Making: Moderate Complexity due to decline in balance and gait further complicated by loss of sensation and increase in fall risk. Decision making was based on patient assessment and decision making process in determining plan of care and establishing reasonable expectations for measurable functional outcomes. Evaluation Complexity: Based on the findings of patient history, examination, clinical presentation, and decision making during this evaluation, the evaluation of Nada Patch  is of medium complexity.     Goals:  Patient goals : Improve walking; transfers and balance     Short term goals  Time Frame for Short term goals: 4 weeks   Short term goal 1: Dharmesh Blanton will demonstrate safe and

## 2020-06-03 NOTE — TELEPHONE ENCOUNTER
Amy 45 Transitions Initial Follow Up Call    Outreach made within 2 business days of discharge: Yes    Patient: Milagros Angeles Patient : 1975   MRN: [id number]  Reason for Admission: There are no discharge diagnoses documented for the most recent discharge.   Discharge Date: 20       Scheduled appointment with PCP within 7-14 days    Follow Up  Future Appointments   Date Time Provider Harley Bowman   2020  9:30 AM ISHAAN Freeman - CNP SRPX [facility] SHAMIR - Carissa Chin CMA ([address] [place] [address])

## 2020-06-04 ENCOUNTER — HOSPITAL ENCOUNTER (OUTPATIENT)
Dept: PHYSICAL THERAPY | Age: 45
Setting detail: THERAPIES SERIES
Discharge: HOME | End: 2020-06-04
Payer: COMMERCIAL

## 2020-06-04 PROCEDURE — 97110 THERAPEUTIC EXERCISES: CPT

## 2020-06-04 ASSESSMENT — PAIN DESCRIPTION - PAIN TYPE: TYPE: SURGICAL PAIN

## 2020-06-04 ASSESSMENT — PAIN DESCRIPTION - LOCATION: LOCATION: BACK

## 2020-06-04 ASSESSMENT — PAIN SCALES - GENERAL: PAINLEVEL_OUTOF10: 4

## 2020-06-04 NOTE — PROGRESS NOTES
training, Home Exercise Program, Modalities, Aquatics, Patient/Caregiver Education & Training, Neuromuscular Re-education, Pain Management  Plan Comment: cont per PT POC    Goals:  Patient goals : Improve walking; transfers and balance     Short term goals  Time Frame for Short term goals: 4 weeks   Short term goal 1: Adelina Cervantes will demonstrate safe and stable balance with sit-stand tranfer on the first attempt. Short term goal 2: Adelina Cervantes will improve his Tinetti score to 15/28 indicating moderate fall risk. Short term goal 3: Adelina Cervantes will be able to stand safely and reach overhead into a counter to grab or put dishes etc away without LOB. Short term goal 4: Patient will be IND with HEP in order to meet long term goals. GOAL MET     Long term goals  Time Frame for Long term goals : 8 weeks   Long term goal 1: Rian's Tinetti score will improve to >20 indicating little to no fall risk. Long term goal 2: Adelina Cervantes will transition from walking with [facility] to walking with [facility] while in his home or other familiar areas. Long term goal 3: Discharge with independent.     Berhane Plummer, PTA

## 2020-06-05 ENCOUNTER — OFFICE VISIT (OUTPATIENT)
Dept: FAMILY MEDICINE CLINIC | Age: 45
End: 2020-06-05
Payer: COMMERCIAL

## 2020-06-05 VITALS
DIASTOLIC BLOOD PRESSURE: 88 MMHG | HEART RATE: 118 BPM | BODY MASS INDEX: 30.99 KG/M2 | OXYGEN SATURATION: 98 % | TEMPERATURE: 97.7 F | SYSTOLIC BLOOD PRESSURE: 128 MMHG | WEIGHT: 216 LBS

## 2020-06-05 PROBLEM — Z91.81: Status: ACTIVE | Noted: 2020-06-05

## 2020-06-05 PROBLEM — J45.20 MILD INTERMITTENT ASTHMA WITHOUT COMPLICATION: Status: ACTIVE | Noted: 2020-06-05

## 2020-06-05 PROCEDURE — 0583F TRANSFER CARE CHECKLIST USED: CPT | Performed by: NURSE PRACTITIONER

## 2020-06-05 PROCEDURE — 1111F DSCHRG MED/CURRENT MED MERGE: CPT | Performed by: NURSE PRACTITIONER

## 2020-06-05 PROCEDURE — 99203 OFFICE O/P NEW LOW 30 MIN: CPT | Performed by: NURSE PRACTITIONER

## 2020-06-05 ASSESSMENT — ENCOUNTER SYMPTOMS
DIARRHEA: 0
CHEST TIGHTNESS: 0
EYE PAIN: 0
BACK PAIN: 1
EYE ITCHING: 0
CONSTIPATION: 1
ALLERGIC/IMMUNOLOGIC NEGATIVE: 1
VOMITING: 0
EYE REDNESS: 0
NAUSEA: 0
SHORTNESS OF BREATH: 0
COUGH: 0
WHEEZING: 0

## 2020-06-05 ASSESSMENT — PATIENT HEALTH QUESTIONNAIRE - PHQ9
SUM OF ALL RESPONSES TO PHQ9 QUESTIONS 1 & 2: 0
2. FEELING DOWN, DEPRESSED OR HOPELESS: 0
SUM OF ALL RESPONSES TO PHQ QUESTIONS 1-9: 0
1. LITTLE INTEREST OR PLEASURE IN DOING THINGS: 0
SUM OF ALL RESPONSES TO PHQ QUESTIONS 1-9: 0

## 2020-06-05 NOTE — PATIENT INSTRUCTIONS
sexually transmitted infections (STIs). Ask whether you should have tests for STIs. You may be at risk if you have sex with more than one person, especially if you do not wear a condom. · Testicular cancer exam. Ask your doctor whether you should check your testicles regularly. · Prostate exam. Talk to your doctor about whether you should have a blood test (called a PSA test) for prostate cancer. Experts differ on whether and when men should have this test. Some experts suggest it if you are older than 39 and are -American or have a father or brother who got prostate cancer when he was younger than 72. When should you call for help? Watch closely for changes in your health, and be sure to contact your doctor if you have any problems or symptoms that concern you. Where can you learn more? Go to https://[email or web address] [email or web address].[email or web address] org and sign in to your 6Wunderkinder account. Enter P072 in the Coupsta box to learn more about \"Well Visit, Ages 25 to 48: Care Instructions. \"     If you do not have an account, please click on the \"Sign Up Now\" link. Current as of: August 22, 2019               Content Version: 12.5  © 3124-9612 Healthwise, Incorporated. Care instructions adapted under license by [facility] ([facility]). If you have questions about a medical condition or this instruction, always ask your healthcare professional. [address] any warranty or liability for your use of this information. Patient Education        Asthma: Your Action Plan  Sample Action Plan     Controller medicine action plan  Fill in the blank spaces and boxes that apply for all sections. · Name of your controller medicine:  ? ____________________________________________  · How much of this medicine do you take? ? ____________________________________________  · How often do you take this medicine?   ? ____________________________________________  · Other ____________________. RED ZONE: Danger! Red zone symptoms   · You are very short of breath. · You can't do your usual activities. · Quick-relief medicine doesn't help. Or your symptoms don't get better after 24 hours in the yellow zone. Red zone peak flow (if you use a peak flow meter)  · Less than _______ (less than 50% of your personal best)  Red zone actions (Check the boxes and fill in the blank spaces that apply.)  [ ] Take _____ puff(s) of quick-relief medicine called ____________________________. Repeat ______ times. [ ] Begin or increase treatment with corticosteroid pills. Take ________ mg now. [ ] Call your doctor at this number: _________________. If you can't contact your doctor, go to the emergency department. [id number] or ___________________. [ ] Other numbers you might call are: ___________________________________. When should you call for help? [id number] anytime you think you may need emergency care. For example, call if:  · You have severe trouble breathing. Call your doctor now or seek immediate medical care if:  · You are in the red zone of your asthma action plan. · You've used your quick-relief medicine but are still having trouble breathing. · You cough up blood. · You have new or worse trouble breathing. · You cough up dark brown or bloody mucus (sputum). Watch closely for changes in your health, and be sure to contact your doctor if:  · You need to use quick-relief medicine more than 2 days each week (unless it's just for exercise). · Your coughing and wheezing get worse. Follow-up care is a key part of your treatment and safety. Be sure to make and go to all appointments, and call your doctor if you are having problems. It's also a good idea to know your test results and keep a list of the medicines you take. Where can you learn more? Go to https://chtamika.Tabacus [email or web address] org and sign in to your Anzode account.  Enter [phone] in the J C Lads box to learn an albuterol inhaler. Some people need to use quick-relief medicine before they exercise. ? Take your controller medicine every day, not just when you have symptoms. Controller medicine is usually an inhaled corticosteroid. The goal is to prevent problems before they occur. Do not use your controller medicine to try to treat an attack that has already started. It does not work fast enough to help. ? If your doctor prescribed corticosteroid pills to use during an attack, take them as directed. They may take hours to work, but they may shorten the attack and help you breathe better. ? Keep your quick-relief medicine with you at all times. · Talk to your doctor before using other medicines. Some medicines, such as aspirin, can cause asthma attacks in some people. · Check yourself for asthma symptoms to know which step to follow in your action plan. Watch for things like being short of breath, having chest tightness, coughing, and wheezing. Also notice if symptoms wake you up at night or if you get tired quickly when you exercise. · If you have a peak flow meter, use it to check how well you are breathing. This can help you predict when an asthma attack is going to occur. Then you can take medicine to prevent the asthma attack or make it less severe. · See your doctor regularly. These visits will help you learn more about asthma and what you can do to control it. Your doctor will monitor your treatment to make sure the medicine is helping you. · Keep track of your asthma attacks and your treatment. After you have had an attack, write down what triggered it, what helped end it, and any concerns you have about your asthma action plan. Take your diary when you see your doctor. You can then review your asthma action plan and decide if it is working. · Do not smoke or allow others to smoke around you. Avoid smoky places. Smoking makes asthma worse.  If you need help quitting, talk to your doctor about stop-smoking exercise. Walking is a good choice. Bit by bit, increase the amount you walk every day. Try for at least 30 minutes on most days of the week. ? Check your blood sugar as often as your doctor recommends. · Do not smoke. Smoking raises your risk of many health problems, including kidney damage. If you need help quitting, talk to your doctor about stop-smoking programs and medicines. These can increase your chances of quitting for good. · Do not take ibuprofen, naproxen, acetaminophen (Tylenol), or similar medicines, unless your doctor tells you to. These medicines may make kidney problems worse. · Check with your doctor before you take any herbal supplements or over-the-counter medicines. When should you call for help? Watch closely for changes in your health, and be sure to contact your doctor if:  · You do not get better as expected. Where can you learn more? Go to [email or web address] [email or web address].[email or web address] org and sign in to your Arbovax account. Enter J203 in the Munchery box to learn more about \"Proteinuria: Care Instructions. \"     If you do not have an account, please click on the \"Sign Up Now\" link. Current as of: August 12, 2019               Content Version: 12.5  © 5442-8523 Healthwise, Incorporated. Care instructions adapted under license by [facility] ([facility]). If you have questions about a medical condition or this instruction, always ask your healthcare professional. [place] [address] any warranty or liability for your use of this information. Patient Education        Learning About High-Protein Foods  What foods are high in protein? The foods you eat contain nutrients, such as vitamins and minerals. Protein is a nutrient. Your body needs the right amount to stay healthy and work as it should. You can use the list below to help you make choices about which foods to eat. Here are some examples of foods that are high in protein.   Dairy and dairy and call your doctor if you are having problems. It's also a good idea to know your test results and keep a list of the medicines you take. How can you care for yourself at home? · Eat three meals a day, plus snacks in between and at bedtime. · Include favorite foods in your meals. This will help make meals more pleasant. · Drink your beverage at the end of the meal, because drinking before or during the meal can fill you up. · Eat high-protein foods, such as:  ? Meat, fish, and poultry. ? Milk and milk products. Add powdered milk to other foods (such as pudding or soups) to boost the protein. ? Eggs. ? Cooked dried beans and peas. ? Peanut butter, nuts, and seeds. ? Tofu.  ? Cheeses. ? Protein bars. · Eat high-calorie foods, such as:  ? Butter, honey, and brown sugar, added to foods to make them taste better. ? Oils, sauces, and gravies. ? Peanut butter. ? Whole milk, yogurt, mayonnaise, and sour cream.  ? Granola cereal with fruit and granola bars. ? Muffins, pancakes, waffles, and other breads. ? Milkshakes, puddings, and custard. · Try a liquid meal replacement, such as Ensure, Boost, or instant breakfast drinks, if you have trouble eating solid foods. They will give you both calories and protein. Soups and smoothies also are good sources of nutrition. · Keep snacks around that are easy to eat, such as pudding, energy bars, ice cream, and flavored ice pops. · If you can, take a walk before you eat, to make you hungrier. · Drink plenty of fluids. If you have kidney, heart, or liver disease and have to limit fluids, talk with your doctor before you increase the amount of fluids you drink. · Try to avoid eating foods that don't give you much nutrition, such as potato chips, candy bars, and soft drinks. Where can you learn more? Go to https://rosemary.healthMontalvo [email or web address] org and sign in to your doForms account.  Enter U926 in the ClearCare box to learn more about \"High-Calorie

## 2020-06-09 ENCOUNTER — HOSPITAL ENCOUNTER (OUTPATIENT)
Dept: PHYSICAL THERAPY | Age: 45
Setting detail: THERAPIES SERIES
Discharge: HOME | End: 2020-06-09
Payer: COMMERCIAL

## 2020-06-09 PROCEDURE — 97116 GAIT TRAINING THERAPY: CPT

## 2020-06-09 PROCEDURE — 97110 THERAPEUTIC EXERCISES: CPT

## 2020-06-09 NOTE — PROGRESS NOTES
LE slower during swing phase of gait. Exercise 8: sit<>stand from mat table x10 close SBA  Exercise 9: sitting LAQ X10, seated marching x 10 ea  Exercise 10: posterior shoulder rolls X10, corner stretch held 15 sec. X3  Exercise 11: Ambulated through clinic arouind objects with RW and SBA         Activity Tolerance:  Activity Tolerance: Patient Tolerated treatment well    Assessment: Body structures, Functions, Activity limitations: Decreased functional mobility , Decreased ADL status, Decreased ROM, Decreased strength, Decreased balance, Increased pain, Decreased sensation, Decreased high-level IADLs, Decreased posture, Decreased endurance  Prognosis: Excellent  Discharge Recommendations: Continue to assess pending progress    Patient Education:  PT Education: Plan of Care, PT Role, Goals  Patient Education: cont with HEP previously given, posture                      Plan:  Times per week: 2x per week  Plan weeks: 6 weels   Specific instructions for Next Treatment: NuStep warm up, scapular/thoracic strengthening, thoracic extension with ball, pec stretch in corner, standing balance activities, gait training, sit <> stand transfers, and core strengthening on plinth (leg raises, clamshells, reverse clam, bridges) Hip and leg flexibiltiy (hamstring, hip flexors, gastroc, piriformis).  Monitor lower lumbar pain, endplate fx L5 avoid aggressive trunk extension/flexion  Current Treatment Recommendations: Strengthening, ROM, Balance Training, Functional Mobility Training, Manual Therapy - Soft Tissue Mobilization, Manual Therapy - Joint Manipulation, Gait Training, Stair training, Home Exercise Program, Modalities, Aquatics, Patient/Caregiver Education & Training, Neuromuscular Re-education, Pain Management    Goals:  Patient goals : Improve walking; transfers and balance     Short term goals  Time Frame for Short term goals: 4 weeks   Short term goal 1: Paul Fontenot will demonstrate safe and stable balance with sit-stand

## 2020-06-12 ENCOUNTER — HOSPITAL ENCOUNTER (OUTPATIENT)
Dept: PHYSICAL THERAPY | Age: 45
Setting detail: THERAPIES SERIES
Discharge: HOME | End: 2020-06-12
Payer: COMMERCIAL

## 2020-06-12 PROCEDURE — 97110 THERAPEUTIC EXERCISES: CPT

## 2020-06-12 NOTE — PROGRESS NOTES
20sec. X2 CGA, 1 UE support,  Exercise 7: gait training in // bars X4, side stepping X4 laps, cues for upright posture with head looking forward, L LE slower during swing phase of gait. Exercise 8: sit<>stand from low chair x10 close SBA  Exercise 9: sitting LAQ X15, seated marching x 15 ea  Exercise 10: posterior shoulder rolls X15, corner stretch held 15 sec. X3  Exercise 11: Ambulated through clinic arouind objects with RW and SBA  Exercise 13: step ups 4in. forward X10 B UE support         Activity Tolerance:  Activity Tolerance: Patient Tolerated treatment well    Assessment: Body structures, Functions, Activity limitations: Decreased functional mobility , Decreased ADL status, Decreased ROM, Decreased strength, Decreased balance, Increased pain, Decreased sensation, Decreased high-level IADLs, Decreased posture, Decreased endurance  Assessment: Pt stated he felt good after today's session with no increase in pain. Pt contineus to strengthening B LE. Added step ups this session. Prognosis: Excellent  Discharge Recommendations: Continue to assess pending progress    Patient Education:  PT Education: Plan of Care, PT Role, Goals  Patient Education: cont with HEP previously given, posture                      Plan:  Times per week: 2x per week  Plan weeks: 6 weels   Specific instructions for Next Treatment: NuStep warm up, scapular/thoracic strengthening, thoracic extension with ball, pec stretch in corner, standing balance activities, gait training, sit <> stand transfers, and core strengthening on plinth (leg raises, clamshells, reverse clam, bridges) Hip and leg flexibiltiy (hamstring, hip flexors, gastroc, piriformis).  Monitor lower lumbar pain, endplate fx L5 avoid aggressive trunk extension/flexion  Current Treatment Recommendations: Strengthening, ROM, Balance Training, Functional Mobility Training, Manual Therapy - Soft Tissue Mobilization, Manual Therapy - Joint Manipulation, Gait Training, Stair

## 2020-06-13 PROBLEM — Z98.890 POST-OPERATIVE STATE: Status: RESOLVED | Noted: 2020-05-14 | Resolved: 2020-06-13

## 2020-06-16 ENCOUNTER — HOSPITAL ENCOUNTER (OUTPATIENT)
Dept: PHYSICAL THERAPY | Age: 45
Setting detail: THERAPIES SERIES
Discharge: HOME | End: 2020-06-16
Payer: COMMERCIAL

## 2020-06-16 PROCEDURE — 97530 THERAPEUTIC ACTIVITIES: CPT

## 2020-06-16 PROCEDURE — 97112 NEUROMUSCULAR REEDUCATION: CPT

## 2020-06-16 PROCEDURE — 97110 THERAPEUTIC EXERCISES: CPT

## 2020-06-17 ENCOUNTER — OFFICE VISIT (OUTPATIENT)
Dept: PHYSICAL MEDICINE AND REHAB | Age: 45
End: 2020-06-17
Payer: COMMERCIAL

## 2020-06-17 VITALS
HEIGHT: 70 IN | BODY MASS INDEX: 31.81 KG/M2 | TEMPERATURE: 97 F | HEART RATE: 100 BPM | WEIGHT: 222.2 LBS | DIASTOLIC BLOOD PRESSURE: 90 MMHG | SYSTOLIC BLOOD PRESSURE: 150 MMHG

## 2020-06-17 PROCEDURE — 99213 OFFICE O/P EST LOW 20 MIN: CPT | Performed by: PHYSICAL MEDICINE & REHABILITATION

## 2020-06-17 RX ORDER — CYCLOBENZAPRINE HCL 5 MG
10 TABLET ORAL DAILY
COMMUNITY
End: 2020-11-19

## 2020-06-19 ENCOUNTER — HOSPITAL ENCOUNTER (OUTPATIENT)
Dept: PHYSICAL THERAPY | Age: 45
Setting detail: THERAPIES SERIES
Discharge: HOME | End: 2020-06-19
Payer: COMMERCIAL

## 2020-06-19 PROCEDURE — 97110 THERAPEUTIC EXERCISES: CPT

## 2020-06-23 ENCOUNTER — HOSPITAL ENCOUNTER (OUTPATIENT)
Dept: PHYSICAL THERAPY | Age: 45
Setting detail: THERAPIES SERIES
Discharge: HOME | End: 2020-06-23
Payer: COMMERCIAL

## 2020-06-23 PROCEDURE — 97110 THERAPEUTIC EXERCISES: CPT

## 2020-06-23 NOTE — PROGRESS NOTES
walking; transfers and balance     Short term goals  Time Frame for Short term goals: 4 weeks   Short term goal 1: Stas Julio will demonstrate safe and stable balance with sit-stand tranfer on the first attempt. Short term goal 2: Stas Julio will improve his Tinetti score to 15/28 indicating moderate fall risk. Short term goal 3: Stas Julio will be able to stand safely and reach overhead into a counter to grab or put dishes etc away without LOB. Short term goal 4: Patient will be IND with HEP in order to meet long term goals. GOAL MET     Long term goals  Time Frame for Long term goals : 8 weeks   Long term goal 1: Rian's Tinetti score will improve to >20 indicating little to no fall risk. Long term goal 2: Stas Julio will transition from walking with [facility] to walking with [facility] while in his home or other familiar areas. Long term goal 3: Discharge with independent.     Frida Faye, [id number]

## 2020-06-26 ENCOUNTER — TELEPHONE (OUTPATIENT)
Dept: NEUROSURGERY | Age: 45
End: 2020-06-26

## 2020-06-26 ENCOUNTER — HOSPITAL ENCOUNTER (OUTPATIENT)
Dept: PHYSICAL THERAPY | Age: 45
Setting detail: THERAPIES SERIES
Discharge: HOME | End: 2020-06-26
Payer: COMMERCIAL

## 2020-06-26 PROCEDURE — 97112 NEUROMUSCULAR REEDUCATION: CPT

## 2020-06-26 PROCEDURE — 97530 THERAPEUTIC ACTIVITIES: CPT

## 2020-06-26 PROCEDURE — 97110 THERAPEUTIC EXERCISES: CPT

## 2020-06-26 NOTE — PROGRESS NOTES
UE support,  Exercise 6: // bars: forward and lateral step over green cones: 2x each   Exercise 8: sit<>stand from low chair x10 with UE support on B quads (added airex pad to help with sit-stand)   Exercise 9: sitting LAQ X15, seated marching x 15 ea  Exercise 10: posterior shoulder rolls X15, horizontal abduction palms up x 10, corner stretch held 15 sec. X3  Exercise 11: Ambulated through clinic toe tap on cones alternating feet  Exercise 12: gait trainin ft with [facility]  Exercise 13: step ups 4in. forward X15 B UE support  Exercise 16: BOSU: alternating lunges: 10x bilaterally cues for technique; added lateral lunges 10x each   Exercise 17: Rocker board: anterior/posterior increased instability with muscle spasms and lateral 10x each (no spasms with lateral), balance for 20 sec.  with 1 UE support         Activity Tolerance:  Activity Tolerance: Patient Tolerated treatment well    Assessment:  Assessment: Progressed gait training to [facility]; tends to get going too quickly which increases the tone in his left LE. Advised to slow down and focus on controlled movements. Prognosis: Excellent       Patient Education:  PT Education: Plan of Care, PT Role, Goals  Patient Education: HEP, gait, posture                      Plan:  Times per week: 2x per week  Plan weeks: 6 weels   Specific instructions for Next Treatment: NuStep warm up, scapular/thoracic strengthening, thoracic extension with ball, pec stretch in corner, standing balance activities, gait training, sit <> stand transfers, and core strengthening on plinth (leg raises, clamshells, reverse clam, bridges) Hip and leg flexibiltiy (hamstring, hip flexors, gastroc, piriformis).  Monitor lower lumbar pain, endplate fx L5 avoid aggressive trunk extension/flexion  Current Treatment Recommendations: Strengthening, ROM, Balance Training, Functional Mobility Training, Manual Therapy - Soft Tissue Mobilization, Manual Therapy - Joint Manipulation, Gait Training, Stair training, Home Exercise Program, Modalities, Aquatics, Patient/Caregiver Education & Training, Neuromuscular Re-education, Pain Management  Plan Comment: cont per PT POC    Goals:  Patient goals : Improve walking; transfers and balance     Short term goals  Time Frame for Short term goals: 4 weeks   Short term goal 1: Adina Richard will demonstrate safe and stable balance with sit-stand tranfer on the first attempt. Short term goal 2: Adina Richard will improve his Tinetti score to 15/28 indicating moderate fall risk. Short term goal 3: Adina Richard will be able to stand safely and reach overhead into a counter to grab or put dishes etc away without LOB. Short term goal 4: Patient will be IND with HEP in order to meet long term goals. GOAL MET     Long term goals  Time Frame for Long term goals : 8 weeks   Long term goal 1: Rian's Tinetti score will improve to >20 indicating little to no fall risk. Long term goal 2: Adina Richard will transition from walking with [facility] to walking with [facility] while in his home or other familiar areas. Long term goal 3: Discharge with independent.     Chely Jacobs, PT

## 2020-06-30 ENCOUNTER — HOSPITAL ENCOUNTER (OUTPATIENT)
Dept: PHYSICAL THERAPY | Age: 45
Setting detail: THERAPIES SERIES
Discharge: HOME | End: 2020-06-30
Payer: COMMERCIAL

## 2020-06-30 ENCOUNTER — APPOINTMENT (OUTPATIENT)
Dept: PHYSICAL THERAPY | Age: 45
End: 2020-06-30
Payer: COMMERCIAL

## 2020-06-30 ENCOUNTER — TELEPHONE (OUTPATIENT)
Dept: FAMILY MEDICINE CLINIC | Age: 45
End: 2020-06-30

## 2020-06-30 PROCEDURE — 97110 THERAPEUTIC EXERCISES: CPT
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
## 2020-06-30 NOTE — TELEPHONE ENCOUNTER
The patient came into the office and said that he needed a refill on his vitamin d sent to [facility] [address] in [place], [place].

## 2020-07-01 ENCOUNTER — OFFICE VISIT (OUTPATIENT)
Dept: NEUROSURGERY | Age: 45
End: 2020-07-01

## 2020-07-01 VITALS
HEIGHT: 70 IN | SYSTOLIC BLOOD PRESSURE: 143 MMHG | DIASTOLIC BLOOD PRESSURE: 88 MMHG | WEIGHT: 229.6 LBS | HEART RATE: 93 BPM | TEMPERATURE: 98.6 F | BODY MASS INDEX: 32.87 KG/M2

## 2020-07-01 PROCEDURE — 99024 POSTOP FOLLOW-UP VISIT: CPT | Performed by: PHYSICIAN ASSISTANT

## 2020-07-01 NOTE — PROGRESS NOTES
[address] [address]  Dept: [phone]  Dept Fax: [phone]  Loc: [facility] Follow Visit  Visit Date: 7/1/2020      Jose Perez  is a 53 y.o. male who is returning to the office today for a post-op follow-up visit. He had surgery on May 14, 2020 with Dr. Harman Zavala to undergo resection of arachnoid cyst, without complication. Is his initial follow-up appointment after discharge from the hospital where he was seen and examined by Dr. Harman Zavala for a postoperative day for evaluation following thoracic 8 and 9 laminectomy and surgical resection of intradural intramedullary spinal lesion/arachnoid cyst.  He was doing well at that time was very happy with the outcome of the procedure. Surgical pathology reviewed demonstrated fibrous tissue mixed with meningioma Feely all cells and patchy calcifications suggestive of cyst lining with no evidence of cytologic atypia or malignancy. He arrives today with a well-healed thoracic incision. The assistance of a rolling four-point walker and is enjoying physical therapy with 2 more scheduled physical therapy appointments upcoming. He does not have any complaints of incisional site pain, back pain or lower extremity discomfort. He demonstrates excellent strength for lower extremity groups today on exam with lingering chronic pinprick deficit involving the left distal lower extremity. Is related improvements for strength and endurance with physical therapy and is encouraged to keep and maintain those appointments. We have recommended a follow-up and are processing a referral to Dr. Deepika jaime for a vascular evaluation. We have agreed to follow-up with him in approximately 4 months with a new MRI of the thoracic spine to be imaged with and without contrast along with CIS enhancement for review.   He is encouraged in the interim to contact her office with any additional questions or concerns related to his surgery, his subsequent follow-up recovery in care, or should he experience any significant changes in his general health.     · Patient was evaluated today and is doing well overall. · No new complaints were voiced. · Patient  lives with their family  · Wound: wound healing as expected  · Follow-up Studies: No orders of the defined types were placed in this encounter. ·      Assessment/Plan:  · Status Post thoracic laminectomy for arachnoid cyst excision. · Doing well overall  · Encouraged gradual increase in physical and mental activity. · Fall precaution and home safety education provided to patient. · Follow-up: We have recommended a follow-up and are processing a referral to Dr. Arash jaime for a vascular evaluation. We have agreed to follow-up with him in approximately 4 months with a new MRI of the thoracic spine to be imaged with and without contrast along with CIS enhancement for review. He is encouraged in the interim to contact her office with any additional questions or concerns related to his surgery, his subsequent follow-up recovery in care, or should he experience any significant changes in his general health.       Electronically signed by Marquis Annmarie PA-C on 7/1/20 at 11:39 AM ANN

## 2020-07-02 ENCOUNTER — HOSPITAL ENCOUNTER (OUTPATIENT)
Dept: PHYSICAL THERAPY | Age: 45
Setting detail: THERAPIES SERIES
Discharge: HOME | End: 2020-07-02
Payer: COMMERCIAL

## 2020-07-02 PROCEDURE — 97110 THERAPEUTIC EXERCISES: CPT
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
## 2020-07-03 ENCOUNTER — APPOINTMENT (OUTPATIENT)
Dept: PHYSICAL THERAPY | Age: 45
End: 2020-07-03
Payer: COMMERCIAL

## 2020-07-08 ENCOUNTER — HOSPITAL ENCOUNTER (OUTPATIENT)
Dept: PHYSICAL THERAPY | Age: 45
Setting detail: THERAPIES SERIES
Discharge: HOME | End: 2020-07-08
Payer: COMMERCIAL

## 2020-07-08 PROCEDURE — 97530 THERAPEUTIC ACTIVITIES: CPT

## 2020-07-08 PROCEDURE — 97112 NEUROMUSCULAR REEDUCATION: CPT
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
## 2020-07-14 ASSESSMENT — ENCOUNTER SYMPTOMS
EYES NEGATIVE: 1
ALLERGIC/IMMUNOLOGIC NEGATIVE: 1
RESPIRATORY NEGATIVE: 1
GASTROINTESTINAL NEGATIVE: 1

## 2020-07-14 NOTE — PROGRESS NOTES
Physical Medicine & Rehabilitation   Outpatient progress note    Chief Complaint:   Chief Complaint   Patient presents with    Follow-up     [facility] F[facility]   History of a cystic spinal lesion at T-8 - T-9 with a Syrinx. Subjective: Donald Lora is a 53 y.o. male who returns to the office today for further follow up. He was in the inpatient rehab unit from 03/17/20 until his discharge home with his mom and sister on 03/27/20. He had spinal surgery performed by Dr. Boy Christensen on 05/14/20. He has had no falls since his most recent discharge. He is currently receiving outpatient P.T. twice a week. He reported that his Left thigh pain was at a 5 and his Left leg pain was at a 9. These pain ratings are with respect to a pain scale of 1-10 with 1 being no pain and 10 being intolerable pain. He has numbness in both feet. He is sleeping between 4-5 hours a night. Review of Systems:  Review of Systems   Constitutional: Negative. HENT: Negative. Eyes: Negative. Respiratory: Negative. Cardiovascular: Negative. Gastrointestinal: Negative. Endocrine: Negative. Genitourinary: Negative. Musculoskeletal: Positive for arthralgias and myalgias. Skin: Negative. Allergic/Immunologic: Negative. Neurological: Positive for weakness. Hematological: Negative. Psychiatric/Behavioral: Negative. All other systems reviewed and are negative. Physical Exam:  BP (!) 150/90 (Site: Right Upper Arm)   Pulse 100   Temp 97 °F (36.1 °C) (Temporal)   Ht 5' 10\" (1.778 m)   Wt 222 lb 3.2 oz (100.8 kg)   BMI 31.88 kg/m²     Physical Exam  Vitals signs and nursing note reviewed. Constitutional:       Appearance: Normal appearance. He is obese. HENT:      Head: Normocephalic and atraumatic. Right Ear: External ear normal.      Left Ear: External ear normal.      Nose: Nose normal.      Mouth/Throat:      Mouth: Mucous membranes are moist.      Pharynx: Oropharynx is clear.    Eyes: Extraocular Movements: Extraocular movements intact. Conjunctiva/sclera: Conjunctivae normal.      Pupils: Pupils are equal, round, and reactive to light. Cardiovascular:      Rate and Rhythm: Regular rhythm. Tachycardia present. Pulses: Normal pulses. Heart sounds: Normal heart sounds. Pulmonary:      Effort: Pulmonary effort is normal.      Breath sounds: Normal breath sounds. Abdominal:      General: Abdomen is flat. Bowel sounds are normal.      Palpations: Abdomen is soft. Skin:     General: Skin is warm and dry. Neurological:      General: No focal deficit present. Mental Status: He is alert and oriented to person, place, and time. Mental status is at baseline. Psychiatric:         Mood and Affect: Mood normal.         Behavior: Behavior normal.         Thought Content: Thought content normal.         Judgment: Judgment normal.       Right Ankle Exam     Muscle Strength   Dorsiflexion:  4/5  Plantar flexion:  4/5      Left Ankle Exam     Muscle Strength   Dorsiflexion:  4/5   Plantar flexion:  4/5       Right Knee Exam     Comments:  Right leg strength was 4/5. Left Knee Exam     Comments:  Left leg strength was 4/5. Right Hip Exam     Muscle Strength   Abduction: 4/5   Adduction: 4/5   Flexion: 4/5       Left Hip Exam     Muscle Strength   Abduction: 4/5   Adduction: 4/5   Flexion: 4/5       Back Exam     Comments:  Gait: Patient used a front-wheeled walker. His base of support was narrow and his shay was W.N.L. Right Hand Exam   Right hand exam is normal.      Left Hand Exam   Left hand exam is normal.      Right Elbow Exam   Right elbow exam is normal.      Left Elbow Exam   Left elbow exam is normal.      Right Shoulder Exam   Right shoulder exam is normal.      Left Shoulder Exam   Left shoulder exam is normal.            Impression:  The primary encounter diagnosis was Syrinx of spinal cord ([facility] [address]  Diagnoses of Leg weakness, bilateral and Myelopathy of

## 2020-07-15 ENCOUNTER — OFFICE VISIT (OUTPATIENT)
Dept: PHYSICAL MEDICINE AND REHAB | Age: 45
End: 2020-07-15
Payer: COMMERCIAL

## 2020-07-15 VITALS
TEMPERATURE: 96.8 F | DIASTOLIC BLOOD PRESSURE: 70 MMHG | SYSTOLIC BLOOD PRESSURE: 126 MMHG | BODY MASS INDEX: 32.93 KG/M2 | HEART RATE: 84 BPM | WEIGHT: 230 LBS | HEIGHT: 70 IN

## 2020-07-15 PROCEDURE — 99214 OFFICE O/P EST MOD 30 MIN: CPT | Performed by: PHYSICAL MEDICINE & REHABILITATION

## 2020-07-15 RX ORDER — GABAPENTIN 300 MG/1
300 CAPSULE ORAL 3 TIMES DAILY
Qty: 90 CAPSULE | Refills: 0 | Status: SHIPPED | OUTPATIENT
Start: 2020-07-15 | End: 2020-09-15 | Stop reason: ALTCHOICE

## 2020-08-03 NOTE — DISCHARGE SUMMARY
[facility]    Patient Name: Jayne Ferreira        CSN: [id number]   YOB: 1975  Gender: male  Referring Practitioner: Dr. Sylvester Arboleda  Diagnosis: Myelopathy of thoracic region (M47.14)    Patient is discharged from Physical Therapy services at this time. See last note for details related to results of therapy and goal achievement. Reason for discharge: Insurance Issues. Insurance would not allow beyond the initial 20.       Bennye Bread \"Mable\Mckayla Massey, PATTIET  [id number]

## 2020-08-10 PROBLEM — M62.838 SPASM OF MUSCLE: Status: ACTIVE | Noted: 2020-08-10

## 2020-08-10 NOTE — PROGRESS NOTES
Physical Medicine & Rehabilitation   Outpatient progress note    Chief Complaint:   Chief Complaint   Patient presents with    Follow-up     4 week follow up   History of a cystic spinal lesion at T-8 - T-9 with a Syrinx. Subjective: Ronaldo Li is a 53 y.o. male who returns to the office today for further follow up. He was in the inpatient rehab. unit from 03/17/20 until his discharge home with his mom and sister on 03/27/20. He had spinal surgery performed by Dr. Aziza Larson on 05/14/20. He has had no falls since his most recent discharge. He is currently receiving outpatient P.T. twice a week. He has had at least 11 outpatient P.T sessions as of 07/08/20. He is to have additional outpatient P.T sessions once approved by his health insurance.     He reported that his Left thigh pain was at a 5 and his Left leg pain was at a 9. These pain ratings are with respect to a pain scale of 1-10 with 1 being no pain and 10 being intolerable pain. He has numbness in both feet. He has also had an increase in muscle spasms affecting his Left lower limb. He is sleeping between 4-5 hours a night. Review of Systems:  Constitutional: Negative. HENT: Negative. Eyes: Negative. Respiratory: Negative. Cardiovascular: Negative. Gastrointestinal: Negative. Endocrine: Negative. Genitourinary: Negative. Musculoskeletal: Positive for arthralgias and myalgias. Skin: Negative. Allergic/Immunologic: Negative. Neurological: Positive for weakness. Hematological: Negative. Psychiatric/Behavioral: Negative. All other systems reviewed and are negative.       Physical Exam:  /70 (Site: Left Upper Arm, Position: Sitting, Cuff Size: Medium Adult)   Pulse 84   Temp 96.8 °F (36 °C) (Temporal)   Ht 5' 10\" (1.778 m)   Wt 230 lb (104.3 kg)   BMI 33.00 kg/m²   Vitals signs and nursing note reviewed. Constitutional:       Appearance: Normal appearance. He is obese.    HENT:      Head: Normocephalic and atraumatic. Right Ear: External ear normal.      Left Ear: External ear normal.      Nose: Nose normal.      Mouth/Throat:      Mouth: Mucous membranes are moist.      Pharynx: Oropharynx is clear. Eyes:      Extraocular Movements: Extraocular movements intact. Conjunctiva/sclera: Conjunctivae normal.      Pupils: Pupils are equal, round, and reactive to light. Cardiovascular:      Rate and Rhythm: Regular rate and rhythm. Pulses: Normal pulses. Heart sounds: Normal heart sounds. Pulmonary:      Effort: Pulmonary effort is normal.      Breath sounds: Normal breath sounds. Abdominal:      General: Abdomen is flat. Bowel sounds are normal.      Palpations: Abdomen is soft. Skin:     General: Skin is warm and dry. Neurological:      General: No focal deficit present. Mental Status: He is alert and oriented to person, place, and time. Mental status is at baseline. He was noted to have Left lower limb numbness throughout. Psychiatric:         Mood and Affect: Mood normal.         Behavior: Behavior normal.         Thought Content: Thought content normal.         Judgment: Judgment normal.      Right Ankle Exam      Muscle Strength   Dorsiflexion:  4/5  Plantar flexion:  4/5     5 beats of clonus noted at the Right ankle with forced dorsiflexion.     Left Ankle Exam      Muscle Strength   Dorsiflexion:  4-/5   Plantar flexion:  4/5     Patient was noted to have sustained clonus involving the Left ankle with forced dorsiflexion.      Right Knee Exam      Comments:  Right leg strength was 4/5.        Left Knee Exam      Comments:  Left leg strength was 4/5.        Right Hip Exam      Muscle Strength   Abduction: 4/5   Adduction: 4/5   Flexion: 4/5         Left Hip Exam      Muscle Strength   Abduction: 4/5   Adduction: 4/5   Flexion: 4/5     Back Exam      Comments:  Gait: Patient was seated in a manual wheelchair.  His gait was not formally evaluated due to his significant muscle spasms affecting his Left lower limb.        Right Hand Exam   Right hand exam is normal.        Left Hand Exam   Left hand exam is normal.        Right Elbow Exam   Right elbow exam is normal.        Left Elbow Exam   Left elbow exam is normal.        Right Shoulder Exam   Right shoulder exam is normal.        Left Shoulder Exam   Left shoulder exam is normal.      Impression:  The primary encounter diagnosis was Syrinx of spinal cord ([address] Diagnoses of Leg weakness, bilateral, Ataxia, and Spasm of muscle were also pertinent to this visit. Plan:  · Attempted to contact his treating Neurosurgeon but messages left with this physician were not returned. · Patient scheduled to have a spinal MRI in October of 2020. · To restart outpatient P.T. once authorized by his health insurance. · Started the patient on Neurontin 300 mg to be taken 3 times daily. Return in about 4 weeks (around 8/12/2020) for reevaluation. .     It was my pleasure to evaluate Mely Arroyo today. Please call with any concerns or questions. 35 minutes was spent with this patient on counseling and with respect to coordinating his future medical care. All of his questions were answered.     Ying Villegas MD

## 2020-08-12 ENCOUNTER — OFFICE VISIT (OUTPATIENT)
Dept: PHYSICAL MEDICINE AND REHAB | Age: 45
End: 2020-08-12
Payer: COMMERCIAL

## 2020-08-12 ENCOUNTER — HOSPITAL ENCOUNTER (EMERGENCY)
Age: 45
Discharge: HOME | End: 2020-08-12
Payer: COMMERCIAL

## 2020-08-12 ENCOUNTER — APPOINTMENT (OUTPATIENT)
Dept: CT IMAGING | Age: 45
End: 2020-08-12

## 2020-08-12 VITALS
HEART RATE: 92 BPM | SYSTOLIC BLOOD PRESSURE: 116 MMHG | WEIGHT: 237 LBS | HEIGHT: 70 IN | OXYGEN SATURATION: 95 % | BODY MASS INDEX: 33.93 KG/M2 | DIASTOLIC BLOOD PRESSURE: 75 MMHG | RESPIRATION RATE: 20 BRPM | TEMPERATURE: 98.2 F

## 2020-08-12 VITALS
TEMPERATURE: 97.6 F | SYSTOLIC BLOOD PRESSURE: 140 MMHG | DIASTOLIC BLOOD PRESSURE: 84 MMHG | HEIGHT: 70 IN | BODY MASS INDEX: 33.79 KG/M2 | WEIGHT: 236 LBS

## 2020-08-12 LAB
ALBUMIN SERPL-MCNC: 4.8 G/DL (ref 3.5–5.1)
ALP BLD-CCNC: 95 U/L (ref 38–126)
ALT SERPL-CCNC: 61 U/L (ref 11–66)
ANION GAP SERPL CALCULATED.3IONS-SCNC: 15 MEQ/L (ref 8–16)
AST SERPL-CCNC: 35 U/L (ref 5–40)
BASOPHILS # BLD: 0.3 %
BASOPHILS ABSOLUTE: 0 THOU/MM3 (ref 0–0.1)
BILIRUB SERPL-MCNC: 0.3 MG/DL (ref 0.3–1.2)
BILIRUBIN URINE: NEGATIVE
BLOOD, URINE: NEGATIVE
BUN BLDV-MCNC: 20 MG/DL (ref 7–22)
CALCIUM SERPL-MCNC: 9.7 MG/DL (ref 8.5–10.5)
CHARACTER, URINE: CLEAR
CHLORIDE BLD-SCNC: 104 MEQ/L (ref 98–111)
CO2: 25 MEQ/L (ref 23–33)
COLOR: YELLOW
CREAT SERPL-MCNC: 0.8 MG/DL (ref 0.4–1.2)
EKG ATRIAL RATE: 122 BPM
EKG P AXIS: 34 DEGREES
EKG P-R INTERVAL: 114 MS
EKG Q-T INTERVAL: 310 MS
EKG QRS DURATION: 88 MS
EKG QTC CALCULATION (BAZETT): 441 MS
EKG R AXIS: 53 DEGREES
EKG T AXIS: -10 DEGREES
EKG VENTRICULAR RATE: 122 BPM
EOSINOPHIL # BLD: 1.9 %
EOSINOPHILS ABSOLUTE: 0.2 THOU/MM3 (ref 0–0.4)
ERYTHROCYTE [DISTWIDTH] IN BLOOD BY AUTOMATED COUNT: 12.8 % (ref 11.5–14.5)
ERYTHROCYTE [DISTWIDTH] IN BLOOD BY AUTOMATED COUNT: 41.1 FL (ref 35–45)
GFR SERPL CREATININE-BSD FRML MDRD: > 90 ML/MIN/1.73M2
GLUCOSE BLD-MCNC: 96 MG/DL (ref 70–108)
GLUCOSE URINE: NEGATIVE MG/DL
HCT VFR BLD CALC: 48.2 % (ref 42–52)
HEMOGLOBIN: 16 GM/DL (ref 14–18)
IMMATURE GRANS (ABS): 0.03 THOU/MM3 (ref 0–0.07)
IMMATURE GRANULOCYTES: 0.3 %
KETONES, URINE: NEGATIVE
LACTIC ACID: 1.8 MMOL/L (ref 0.5–2.2)
LEUKOCYTE ESTERASE, URINE: NEGATIVE
LIPASE: 38.2 U/L (ref 5.6–51.3)
LYMPHOCYTES # BLD: 18.8 %
LYMPHOCYTES ABSOLUTE: 1.7 THOU/MM3 (ref 1–4.8)
MCH RBC QN AUTO: 29 PG (ref 26–33)
MCHC RBC AUTO-ENTMCNC: 33.2 GM/DL (ref 32.2–35.5)
MCV RBC AUTO: 87.5 FL (ref 80–94)
MONOCYTES # BLD: 6.5 %
MONOCYTES ABSOLUTE: 0.6 THOU/MM3 (ref 0.4–1.3)
NITRITE, URINE: NEGATIVE
NUCLEATED RED BLOOD CELLS: 0 /100 WBC
OSMOLALITY CALCULATION: 289.3 MOSMOL/KG (ref 275–300)
PH UA: 5.5 (ref 5–9)
PLATELET # BLD: 336 THOU/MM3 (ref 130–400)
PMV BLD AUTO: 8.5 FL (ref 9.4–12.4)
POTASSIUM REFLEX MAGNESIUM: 4.1 MEQ/L (ref 3.5–5.2)
PROTEIN UA: NEGATIVE
RBC # BLD: 5.51 MILL/MM3 (ref 4.7–6.1)
SEG NEUTROPHILS: 72.2 %
SEGMENTED NEUTROPHILS ABSOLUTE COUNT: 6.5 THOU/MM3 (ref 1.8–7.7)
SODIUM BLD-SCNC: 144 MEQ/L (ref 135–145)
SPECIFIC GRAVITY, URINE: 1.03 (ref 1–1.03)
TOTAL PROTEIN: 7.7 G/DL (ref 6.1–8)
UROBILINOGEN, URINE: 0.2 EU/DL (ref 0–1)
WBC # BLD: 9 THOU/MM3 (ref 4.8–10.8)

## 2020-08-12 PROCEDURE — 81003 URINALYSIS AUTO W/O SCOPE: CPT

## 2020-08-12 PROCEDURE — 6360000002 HC RX W HCPCS: Performed by: EMERGENCY MEDICINE

## 2020-08-12 PROCEDURE — 93010 ELECTROCARDIOGRAM REPORT: CPT | Performed by: INTERNAL MEDICINE

## 2020-08-12 PROCEDURE — 83690 ASSAY OF LIPASE: CPT

## 2020-08-12 PROCEDURE — 80053 COMPREHEN METABOLIC PANEL: CPT

## 2020-08-12 PROCEDURE — 96374 THER/PROPH/DIAG INJ IV PUSH: CPT

## 2020-08-12 PROCEDURE — 99285 EMERGENCY DEPT VISIT HI MDM: CPT

## 2020-08-12 PROCEDURE — 99215 OFFICE O/P EST HI 40 MIN: CPT | Performed by: PHYSICAL MEDICINE & REHABILITATION

## 2020-08-12 PROCEDURE — 51798 US URINE CAPACITY MEASURE: CPT

## 2020-08-12 PROCEDURE — 85025 COMPLETE CBC W/AUTO DIFF WBC: CPT

## 2020-08-12 PROCEDURE — 2580000003 HC RX 258: Performed by: EMERGENCY MEDICINE

## 2020-08-12 PROCEDURE — 36415 COLL VENOUS BLD VENIPUNCTURE: CPT

## 2020-08-12 PROCEDURE — 74176 CT ABD & PELVIS W/O CONTRAST: CPT

## 2020-08-12 PROCEDURE — 83605 ASSAY OF LACTIC ACID: CPT

## 2020-08-12 PROCEDURE — 87040 BLOOD CULTURE FOR BACTERIA: CPT

## 2020-08-12 PROCEDURE — 93005 ELECTROCARDIOGRAM TRACING: CPT | Performed by: EMERGENCY MEDICINE

## 2020-08-12 PROCEDURE — 99284 EMERGENCY DEPT VISIT MOD MDM: CPT

## 2020-08-12 RX ORDER — FENTANYL CITRATE 50 UG/ML
25 INJECTION, SOLUTION INTRAMUSCULAR; INTRAVENOUS ONCE
Status: COMPLETED | OUTPATIENT
Start: 2020-08-12 | End: 2020-08-12

## 2020-08-12 RX ORDER — 0.9 % SODIUM CHLORIDE 0.9 %
30 INTRAVENOUS SOLUTION INTRAVENOUS ONCE
Status: COMPLETED | OUTPATIENT
Start: 2020-08-12 | End: 2020-08-12

## 2020-08-12 RX ORDER — IBUPROFEN 600 MG/1
600 TABLET ORAL 3 TIMES DAILY PRN
Qty: 30 TABLET | Refills: 0 | Status: SHIPPED | OUTPATIENT
Start: 2020-08-12

## 2020-08-12 RX ADMIN — FENTANYL CITRATE 25 MCG: 50 INJECTION INTRAMUSCULAR; INTRAVENOUS at 17:36

## 2020-08-12 RX ADMIN — SODIUM CHLORIDE 1000 ML: 9 INJECTION, SOLUTION INTRAVENOUS at 17:36

## 2020-08-12 ASSESSMENT — PAIN DESCRIPTION - LOCATION: LOCATION: BACK

## 2020-08-12 ASSESSMENT — ENCOUNTER SYMPTOMS
BACK PAIN: 1
VOMITING: 0
COLOR CHANGE: 0
ABDOMINAL PAIN: 1
SHORTNESS OF BREATH: 0
NAUSEA: 0
COUGH: 0
ABDOMINAL DISTENTION: 1

## 2020-08-12 ASSESSMENT — PAIN DESCRIPTION - PAIN TYPE: TYPE: ACUTE PAIN

## 2020-08-12 ASSESSMENT — PAIN SCALES - GENERAL
PAINLEVEL_OUTOF10: 8
PAINLEVEL_OUTOF10: 4
PAINLEVEL_OUTOF10: 8

## 2020-08-12 ASSESSMENT — PAIN DESCRIPTION - ORIENTATION: ORIENTATION: RIGHT

## 2020-08-12 NOTE — ED PROVIDER NOTES
[facility]  EMERGENCY DEPARTMENT ENCOUNTER          CHIEF COMPLAINT     No chief complaint on file. Nurses Notes reviewed and I agree except as noted in the HPI. HISTORY OF PRESENT ILLNESS    Chloe Reddy is a 53 y.o. male who presents to the Emergency Department for the evaluation of right flank pain and tenderness. Patient states that he had surgery on his thoracic spine 5/14/2020. Dr. Hammad Meza. 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 Patient has a history of paresthesias of the left lower extremity. Patient also had neurogenic bladder prior to his surgery. Patient states he has tenderness and feels swollen in the right flank area. He is concerned that maybe has swelling from his surgery. Patient does have difficulties voiding. He states initially this improved after his surgery but has become worse. He states that he feels like he has to go but only goes a small amount each time he voids. Patient has a history of developmental delay and does not provide much other information. Patient states his pain is about a 6 on a 10 scale in his right flank. He also is tender in the lower abdomen. The HPI was provided by the patient. REVIEW OF SYSTEMS     Review of Systems   Constitutional: Negative for fatigue and fever. Respiratory: Negative for cough and shortness of breath. Cardiovascular: Negative for chest pain. Gastrointestinal: Positive for abdominal distention and abdominal pain. Negative for nausea and vomiting. Genitourinary: Positive for decreased urine volume, difficulty urinating and flank pain. Negative for penile pain, penile swelling and scrotal swelling. Musculoskeletal: Positive for back pain (right flank). Negative for neck pain and neck stiffness. Skin: Negative for color change. Psychiatric/Behavioral: Negative for behavioral problems. PAST MEDICAL HISTORY    has a past medical history of Asthma, Ataxic gait, Developmental delay, Diet-controlled diabetes mellitus ([facility] [address] History of syrinx of spinal cord ([facility] [address] Myelopathy of thoracic region, Osteopenia determined by x-ray, and Vitamin D deficiency. SURGICAL HISTORY      has a past surgical history that includes craniotomy (N/A, 2020). CURRENT MEDICATIONS       Discharge Medication List as of 2020  7:49 PM      CONTINUE these medications which have NOT CHANGED    Details   gabapentin (NEURONTIN) 300 MG capsule Take 1 capsule by mouth 3 times daily for 30 days. , Disp-90 capsule,R-0Normal      vitamin D-3 (CHOLECALCIFEROL) 125 MCG (5000 UT) TABS Take 1 tablet by mouth daily, Disp-90 tablet, R-1Normal      cyclobenzaprine (FLEXERIL) 5 MG tablet Take 5 mg by mouth 3 times daily as neededHistorical Med      acetaminophen (TYLENOL) 500 MG tablet Take 500 mg by mouth every 6 hours as needed for PainHistorical Med             ALLERGIES     is allergic to norco [hydrocodone-acetaminophen]. FAMILY HISTORY     He indicated that the status of his mother is unknown. He indicated that his father is . He indicated that his sister is alive. He indicated that the status of his neg hx is unknown.   family history includes Arthritis in his mother; Diabetes in his mother; Heart Disease in his father. SOCIAL HISTORY      reports that he has never smoked. He has never used smokeless tobacco. He reports that he does not drink alcohol or use drugs. PHYSICAL EXAM     INITIAL VITALS:  height is 5' 10\" (1.778 m) and weight is 237 lb (107.5 kg). His oral temperature is 98.2 °F (36.8 °C). His blood pressure is 116/75 and his pulse is 92. His respiration is 20 and oxygen saturation is 95%. Physical Exam  Constitutional:       Appearance: He is obese. He is not ill-appearing. HENT:      Head: Normocephalic.       Nose: Nose normal.      Mouth/Throat:      Mouth: Mucous membranes are moist.   Cardiovascular:      Rate and Rhythm: Regular rhythm. Tachycardia present. Pulses: Normal pulses. Heart sounds: Normal heart sounds. Pulmonary:      Effort: Pulmonary effort is normal.   Abdominal:      General: Bowel sounds are normal. There is distension (bladder). Tenderness: There is abdominal tenderness in the suprapubic area. There is right CVA tenderness. There is no left CVA tenderness. Skin:     General: Skin is warm and dry. Capillary Refill: Capillary refill takes less than 2 seconds. Neurological:      General: No focal deficit present. Mental Status: He is alert. Psychiatric:         Mood and Affect: Mood normal.         Behavior: Behavior normal.          DIFFERENTIAL DIAGNOSIS:   UTI, pyelonephritis, urinary retention, acute kidney injury, kidney stone, muscle strain,    DIAGNOSTIC RESULTS     EKG: All EKG's are interpreted by the Emergency Department Physician who either signs or Co-signs this chart in the absence of a cardiologist.    Sinus tachycardia ventricular rate 122 bpm.  MO interval 114, QRS duration 88, corrected  ms. RADIOLOGY: non-plainfilm images(s) such as CT, Ultrasound and MRI are read by the radiologist.    CT ABDOMEN PELVIS WO CONTRAST Additional Contrast? None   Final Result   Moderate stool throughout the colon. Correlate for constipation. **This report has been created using voice recognition software. It may contain minor errors which are inherent in voice recognition technology. **      Final report electronically signed by Dr. Ayleen Hansen on 8/12/2020 6:27 PM          LABS:     Labs Reviewed   CBC WITH AUTO DIFFERENTIAL - Abnormal; Notable for the following components:       Result Value    MPV 8.5 (*)     All other components within normal limits   CULTURE, BLOOD 1   CULTURE, BLOOD 2   COMPREHENSIVE METABOLIC PANEL W/ REFLEX TO MG FOR LOW K   LIPASE   URINE RT REFLEX TO CULTURE   LACTIC ACID, PLASMA   ANION GAP   GLOMERULAR FILTRATION RATE, ESTIMATED   OSMOLALITY       EMERGENCY DEPARTMENT COURSE:   Vitals:    Vitals:    08/12/20 1619 08/12/20 1738 08/12/20 1840 08/12/20 1923   BP: (!) 149/100 (!) 143/97 112/73 116/75   Pulse: 130 112 105 92   Resp: 17 15 15 20   Temp: 98.2 °F (36.8 °C)      TempSrc: Oral      SpO2: 97% 93% 94% 95%   Weight: 237 lb (107.5 kg)      Height: 5' 10\" (1.778 m)          4:48 PM EDT: The patient was seen and evaluated. She has noted tachycardia ventricular rate 125 bpm.  She has tenderness right flank area. Suprapubic area felt distended. Bladder scan performed showing 140 mL urine retained. Appropriate labs and imaging are ordered. Patient's lab results are surprisingly within normal limits. His white blood cell count 9.0.  H&H stable. Potassium 4.1.  BUN 20, creatinine 0.8. Lactic acid 1.8. Liver enzymes essentially normal.    CT abdomen pelvis shows moderate stool throughout the colon. Correlate for constipation. No other acute findings. Urinalysis is negative for blood or acute infection. MDM:  Patient's labs and CT abdomen pelvis reassuring. Patient was given a bolus of IV fluids and heart rate returned to normal.  Patient was given Toradol and had relief of his pain. Patient will be discharged home. He will take ibuprofen as instructed for pain relief. Tylenol for additional pain control as needed. Follow-up primary care provider next week for reevaluation. Patient also has an appointment to see his neurosurgeon, Lior Jimenez next week and will continue with scheduled appointment. He is advised to return to the emergency department for worsening pain, temperature 100.5 or greater, blood in his stool, weakness in his other extremity or inability to void, any new concerns. CRITICAL CARE:   None    CONSULTS:  None    PROCEDURES:  None    FINAL IMPRESSION      1. Acute right flank pain    2.  Tachycardia DISPOSITION/PLAN   Discharge    PATIENT REFERRED TO:  ISHAAN Davison - CNP  [address] [facility],[place] [address]  [phone]    Schedule an appointment as soon as possible for a visit         DISCHARGE MEDICATIONS:  Discharge Medication List as of 8/12/2020  7:49 PM      START taking these medications    Details   ibuprofen (ADVIL;MOTRIN) 600 MG tablet Take 1 tablet by mouth 3 times daily as needed for Pain, Disp-30 tablet,R-0Print             (Please note that portions of this note were completed with a voice recognition program.  Efforts were made to edit the dictations but occasionally words are mis-transcribed.)    The patient was given an opportunity to see the Emergency Attending. The patient voiced understanding that I was a Mid-LevelProvider and was in agreement with being seen independently by myself.           ISHAAN Wheeler CNP  08/12/20 8166

## 2020-08-12 NOTE — ED NOTES
Reassessment of the patients No chief complaint on file. is unchanged, the patients pain reassessment is a 8/10, pt medicated with fentanyl. Side rails up times 2, call light in reach, will continue to monitor.        Tonya Corral RN  08/12/20 8412

## 2020-08-12 NOTE — ED TRIAGE NOTES
Pt presents to ER with swelling on his right mid back. Pt had back surgery on May 14th. Pt has a follow up appt scheduled for October but is concerned and doesn't want to wait that long.

## 2020-08-18 ENCOUNTER — HOSPITAL ENCOUNTER (OUTPATIENT)
Dept: MRI IMAGING | Age: 45
Discharge: HOME | End: 2020-08-18

## 2020-08-18 LAB
BLOOD CULTURE, ROUTINE: NORMAL
BLOOD CULTURE, ROUTINE: NORMAL

## 2020-08-18 PROCEDURE — 72157 MRI CHEST SPINE W/O & W/DYE: CPT

## 2020-08-18 PROCEDURE — A9579 GAD-BASE MR CONTRAST NOS,1ML: HCPCS | Performed by: PHYSICIAN ASSISTANT

## 2020-08-18 PROCEDURE — 72158 MRI LUMBAR SPINE W/O & W/DYE: CPT

## 2020-08-18 PROCEDURE — 6360000004 HC RX CONTRAST MEDICATION: Performed by: PHYSICIAN ASSISTANT

## 2020-08-18 RX ADMIN — GADOTERIDOL 20 ML: 279.3 INJECTION, SOLUTION INTRAVENOUS at 09:51

## 2020-08-23 PROBLEM — N39.9 URINARY PROBLEM IN MALE: Status: ACTIVE | Noted: 2020-08-23

## 2020-08-23 PROBLEM — R29.898 WEAKNESS OF LEFT LOWER EXTREMITY: Status: ACTIVE | Noted: 2020-08-23

## 2020-08-23 ASSESSMENT — ENCOUNTER SYMPTOMS
RESPIRATORY NEGATIVE: 1
BACK PAIN: 1
ALLERGIC/IMMUNOLOGIC NEGATIVE: 1
ABDOMINAL DISTENTION: 1
EYES NEGATIVE: 1

## 2020-08-23 NOTE — PROGRESS NOTES
Physical Medicine & Rehabilitation   Outpatient progress note    Chief Complaint:   Chief Complaint   Patient presents with    Follow-up     re-evaluation    History of a cystic spinal lesion at T-8 - T-9 with a Syrinx. Subjective: Donald Lora is a 53 y.o. male who returns to the office today for further follow up. He was in the inpatient rehab. unit from 03/17/20 until his discharge home with his mom and sister on 03/27/20. He had spinal surgery performed by Dr. Boy Christensen on 05/14/20. He has had no falls since his most recent discharge. He is currently receiving outpatient P.T. twice a week. He has had at least 11 outpatient P.T sessions as of 07/08/20. He is to have additional outpatient P.T sessions once approved by his health insurance.     He reported that his thoracic spine pain was at a 9 along with his Left thigh pain was at a 5 and his Left leg pain was at a 9. These pain ratings are with respect to a pain scale of 1-10 with 1 being no pain and 10 being intolerable pain. He has numbness in both feet. He also continues to have an increase in muscle spasms affecting his Left lower limb. He is sleeping between 4-5 hours a night.     He also noted that the Right-side of his back and abdomen are swollen. He began noticing this on 08/09/20. He has also been having problems with passing urine for the past week. Review of Systems:  Review of Systems   Constitutional: Positive for activity change. HENT: Negative. Eyes: Negative. Respiratory: Negative. Cardiovascular: Negative. Gastrointestinal: Positive for abdominal distention. Endocrine: Negative. Genitourinary: Positive for difficulty urinating. Musculoskeletal: Positive for back pain, gait problem and myalgias. Skin: Negative. Allergic/Immunologic: Negative. Neurological: Positive for weakness. Hematological: Negative. Psychiatric/Behavioral: Negative. All other systems reviewed and are negative.       Physical

## 2020-08-25 ENCOUNTER — VIRTUAL VISIT (OUTPATIENT)
Dept: NEUROLOGY | Age: 45
End: 2020-08-25

## 2020-09-09 ENCOUNTER — OFFICE VISIT (OUTPATIENT)
Dept: NEUROSURGERY | Age: 45
End: 2020-09-09
Payer: COMMERCIAL

## 2020-09-09 ENCOUNTER — HOSPITAL ENCOUNTER (OUTPATIENT)
Age: 45
Discharge: HOME | End: 2020-09-09
Payer: OTHER GOVERNMENT

## 2020-09-09 VITALS
HEIGHT: 70 IN | HEART RATE: 114 BPM | TEMPERATURE: 97.8 F | SYSTOLIC BLOOD PRESSURE: 148 MMHG | DIASTOLIC BLOOD PRESSURE: 93 MMHG | WEIGHT: 236.6 LBS | BODY MASS INDEX: 33.87 KG/M2

## 2020-09-09 PROCEDURE — U0003 INFECTIOUS AGENT DETECTION BY NUCLEIC ACID (DNA OR RNA); SEVERE ACUTE RESPIRATORY SYNDROME CORONAVIRUS 2 (SARS-COV-2) (CORONAVIRUS DISEASE [COVID-19]), AMPLIFIED PROBE TECHNIQUE, MAKING USE OF HIGH THROUGHPUT TECHNOLOGIES AS DESCRIBED BY CMS-2020-01-R: HCPCS

## 2020-09-09 PROCEDURE — 99214 OFFICE O/P EST MOD 30 MIN: CPT | Performed by: PHYSICIAN ASSISTANT

## 2020-09-09 NOTE — PROGRESS NOTES
[address] [place] [address]  Dept: [phone]  Dept Fax: [phone]  Loc: [facility] Follow Visit  Visit Date: 9/9/2020      Scar Coffman  is a 53 y.o. male who is returning to the office today for a post-op follow-up visit. He had surgery on  May 14, 2020 with Dr. Hector Hernandez to undergo resection of arachnoid cyst, without complication. He was last seen and evaluated in the office setting on 7/1/2020. That visit was his initial follow-up appointment after discharge from the hospital.  He was ambulating with assistance of a rolling four-point walker at the time of that visit with scheduled physical therapy appointments. He did not present with any significant complaints stating that incisional site pain and back and lower extremity discomfort are vastly improved. He was intact for strength for lower extremity groups with some lingering chronic pinprick deficit involving the left distal lower extremity noted. An appointment with process for referral to Dr. Faviola jaime for a vascular evaluation. That appointment was not maintained and therefore a virtual video visit will try to be orchestrated with endovascular today. He presents today with a new MRI of the lumbar and thoracic spine along with CIS enhancement for review. Those images show improved syrinx in the thoracic spinal cord at T9 level with some abnormal signal still present within the cord and no residual mass-effect on the cord is noted. Mild severity for right foraminal stenosis at lumbar 5 sacral 1 is demonstrated on the lumbar spine MRI along with a diffusely bulging disc at that level. He arrives for today's visit accompanied by his wife with complaints of lingering low back pain today rated at a 6 out of 10.   He describes the pain is low back with radiation of the legs bilaterally with numbness continuing for left lower extremity in its

## 2020-09-11 LAB — SARS-COV-2: NOT DETECTED

## 2020-09-16 ENCOUNTER — HOSPITAL ENCOUNTER (OUTPATIENT)
Dept: INTERVENTIONAL RADIOLOGY/VASCULAR | Age: 45
Discharge: HOME | End: 2020-09-18

## 2020-09-16 ENCOUNTER — ANESTHESIA (OUTPATIENT)
Dept: INTERVENTIONAL RADIOLOGY/VASCULAR | Age: 45
End: 2020-09-16

## 2020-09-16 ENCOUNTER — ANESTHESIA EVENT (OUTPATIENT)
Dept: INTERVENTIONAL RADIOLOGY/VASCULAR | Age: 45
End: 2020-09-16

## 2020-09-16 VITALS
DIASTOLIC BLOOD PRESSURE: 87 MMHG | RESPIRATION RATE: 17 BRPM | BODY MASS INDEX: 34.14 KG/M2 | TEMPERATURE: 97.9 F | SYSTOLIC BLOOD PRESSURE: 125 MMHG | HEART RATE: 97 BPM | OXYGEN SATURATION: 96 % | WEIGHT: 238.5 LBS | HEIGHT: 70 IN

## 2020-09-16 VITALS — OXYGEN SATURATION: 96 % | DIASTOLIC BLOOD PRESSURE: 73 MMHG | TEMPERATURE: 98 F | SYSTOLIC BLOOD PRESSURE: 140 MMHG

## 2020-09-16 LAB
GFR NON-AFRICAN AMERICAN: >60 ML/MIN
GFR SERPL CREATININE-BSD FRML MDRD: >60 ML/MIN
GFR SERPL CREATININE-BSD FRML MDRD: NORMAL ML/MIN/{1.73_M2}
GLUCOSE BLD-MCNC: 109 MG/DL (ref 74–100)
GLUCOSE BLD-MCNC: 157 MG/DL (ref 75–110)
POC CHLORIDE: 104 MMOL/L (ref 98–107)
POC CREATININE: 0.87 MG/DL (ref 0.51–1.19)
POC HEMATOCRIT: 49 % (ref 41–53)
POC HEMOGLOBIN: 16.5 G/DL (ref 13.5–17.5)
POC IONIZED CALCIUM: 1.18 MMOL/L (ref 1.15–1.33)
POC LACTIC ACID: 1.45 MMOL/L (ref 0.56–1.39)
POC POTASSIUM: 4.2 MMOL/L (ref 3.5–4.5)
POC POTASSIUM: 6.8 MMOL/L (ref 3.5–4.5)
POC SODIUM: 137 MMOL/L (ref 138–146)
SARS-COV-2, RAPID: NOT DETECTED
SARS-COV-2: NORMAL
SARS-COV-2: NORMAL
SOURCE: NORMAL

## 2020-09-16 PROCEDURE — 2500000003 HC RX 250 WO HCPCS: Performed by: NURSE ANESTHETIST, CERTIFIED REGISTERED

## 2020-09-16 PROCEDURE — 2709999900 HC NON-CHARGEABLE SUPPLY

## 2020-09-16 PROCEDURE — 2580000003 HC RX 258: Performed by: ANESTHESIOLOGY

## 2020-09-16 PROCEDURE — 75710 ARTERY X-RAYS ARM/LEG: CPT | Performed by: PSYCHIATRY & NEUROLOGY

## 2020-09-16 PROCEDURE — 84295 ASSAY OF SERUM SODIUM: CPT

## 2020-09-16 PROCEDURE — U0002 COVID-19 LAB TEST NON-CDC: HCPCS

## 2020-09-16 PROCEDURE — 85014 HEMATOCRIT: CPT

## 2020-09-16 PROCEDURE — 36246 INS CATH ABD/L-EXT ART 2ND: CPT | Performed by: PSYCHIATRY & NEUROLOGY

## 2020-09-16 PROCEDURE — 36226 PLACE CATH VERTEBRAL ART: CPT | Performed by: PSYCHIATRY & NEUROLOGY

## 2020-09-16 PROCEDURE — 36218 PLACE CATHETER IN ARTERY: CPT | Performed by: PSYCHIATRY & NEUROLOGY

## 2020-09-16 PROCEDURE — 82435 ASSAY OF BLOOD CHLORIDE: CPT

## 2020-09-16 PROCEDURE — 7100000010 HC PHASE II RECOVERY - FIRST 15 MIN

## 2020-09-16 PROCEDURE — 36245 INS CATH ABD/L-EXT ART 1ST: CPT | Performed by: PSYCHIATRY & NEUROLOGY

## 2020-09-16 PROCEDURE — 3700000001 HC ADD 15 MINUTES (ANESTHESIA)

## 2020-09-16 PROCEDURE — C1887 CATHETER, GUIDING: HCPCS

## 2020-09-16 PROCEDURE — 6360000002 HC RX W HCPCS: Performed by: NURSE ANESTHETIST, CERTIFIED REGISTERED

## 2020-09-16 PROCEDURE — 36224 PLACE CATH CAROTD ART: CPT | Performed by: PSYCHIATRY & NEUROLOGY

## 2020-09-16 PROCEDURE — 82330 ASSAY OF CALCIUM: CPT

## 2020-09-16 PROCEDURE — 2720000010 HC SURG SUPPLY STERILE

## 2020-09-16 PROCEDURE — 7100000011 HC PHASE II RECOVERY - ADDTL 15 MIN

## 2020-09-16 PROCEDURE — C1769 GUIDE WIRE: HCPCS

## 2020-09-16 PROCEDURE — C1760 CLOSURE DEV, VASC: HCPCS

## 2020-09-16 PROCEDURE — 75705 ARTERY X-RAYS SPINE: CPT | Performed by: PSYCHIATRY & NEUROLOGY

## 2020-09-16 PROCEDURE — 83605 ASSAY OF LACTIC ACID: CPT

## 2020-09-16 PROCEDURE — 7100000000 HC PACU RECOVERY - FIRST 15 MIN

## 2020-09-16 PROCEDURE — 36227 PLACE CATH XTRNL CAROTID: CPT | Performed by: PSYCHIATRY & NEUROLOGY

## 2020-09-16 PROCEDURE — 36215 PLACE CATHETER IN ARTERY: CPT | Performed by: PSYCHIATRY & NEUROLOGY

## 2020-09-16 PROCEDURE — C1894 INTRO/SHEATH, NON-LASER: HCPCS

## 2020-09-16 PROCEDURE — 99152 MOD SED SAME PHYS/QHP 5/>YRS: CPT | Performed by: PSYCHIATRY & NEUROLOGY

## 2020-09-16 PROCEDURE — 82565 ASSAY OF CREATININE: CPT

## 2020-09-16 PROCEDURE — 36217 PLACE CATHETER IN ARTERY: CPT | Performed by: PSYCHIATRY & NEUROLOGY

## 2020-09-16 PROCEDURE — 84132 ASSAY OF SERUM POTASSIUM: CPT

## 2020-09-16 PROCEDURE — 82947 ASSAY GLUCOSE BLOOD QUANT: CPT

## 2020-09-16 PROCEDURE — 3700000000 HC ANESTHESIA ATTENDED CARE

## 2020-09-16 PROCEDURE — 75774 ARTERY X-RAY EACH VESSEL: CPT | Performed by: PSYCHIATRY & NEUROLOGY

## 2020-09-16 PROCEDURE — 75726 ARTERY X-RAYS ABDOMEN: CPT | Performed by: PSYCHIATRY & NEUROLOGY

## 2020-09-16 PROCEDURE — 99202 OFFICE O/P NEW SF 15 MIN: CPT | Performed by: PSYCHIATRY & NEUROLOGY

## 2020-09-16 PROCEDURE — 7100000001 HC PACU RECOVERY - ADDTL 15 MIN

## 2020-09-16 RX ORDER — LIDOCAINE HYDROCHLORIDE 10 MG/ML
INJECTION, SOLUTION EPIDURAL; INFILTRATION; INTRACAUDAL; PERINEURAL PRN
Status: DISCONTINUED | OUTPATIENT
Start: 2020-09-16 | End: 2020-09-16 | Stop reason: SDUPTHER

## 2020-09-16 RX ORDER — HEPARIN SODIUM 1000 [USP'U]/ML
INJECTION, SOLUTION INTRAVENOUS; SUBCUTANEOUS PRN
Status: DISCONTINUED | OUTPATIENT
Start: 2020-09-16 | End: 2020-09-16 | Stop reason: SDUPTHER

## 2020-09-16 RX ORDER — SODIUM CHLORIDE, SODIUM LACTATE, POTASSIUM CHLORIDE, CALCIUM CHLORIDE 600; 310; 30; 20 MG/100ML; MG/100ML; MG/100ML; MG/100ML
INJECTION, SOLUTION INTRAVENOUS CONTINUOUS
Status: DISCONTINUED | OUTPATIENT
Start: 2020-09-16 | End: 2020-09-19 | Stop reason: HOSPADM

## 2020-09-16 RX ORDER — NEOSTIGMINE METHYLSULFATE 5 MG/5 ML
SYRINGE (ML) INTRAVENOUS PRN
Status: DISCONTINUED | OUTPATIENT
Start: 2020-09-16 | End: 2020-09-16 | Stop reason: SDUPTHER

## 2020-09-16 RX ORDER — ROCURONIUM BROMIDE 10 MG/ML
INJECTION, SOLUTION INTRAVENOUS PRN
Status: DISCONTINUED | OUTPATIENT
Start: 2020-09-16 | End: 2020-09-16 | Stop reason: SDUPTHER

## 2020-09-16 RX ORDER — EPHEDRINE SULFATE/0.9% NACL/PF 50 MG/5 ML
SYRINGE (ML) INTRAVENOUS PRN
Status: DISCONTINUED | OUTPATIENT
Start: 2020-09-16 | End: 2020-09-16 | Stop reason: SDUPTHER

## 2020-09-16 RX ORDER — PROPOFOL 10 MG/ML
INJECTION, EMULSION INTRAVENOUS PRN
Status: DISCONTINUED | OUTPATIENT
Start: 2020-09-16 | End: 2020-09-16 | Stop reason: SDUPTHER

## 2020-09-16 RX ORDER — LIDOCAINE HYDROCHLORIDE 10 MG/ML
INJECTION, SOLUTION INFILTRATION; PERINEURAL
Status: COMPLETED
Start: 2020-09-16 | End: 2020-09-16

## 2020-09-16 RX ORDER — CEFAZOLIN SODIUM 2 G/50ML
SOLUTION INTRAVENOUS PRN
Status: DISCONTINUED | OUTPATIENT
Start: 2020-09-16 | End: 2020-09-16 | Stop reason: SDUPTHER

## 2020-09-16 RX ORDER — FENTANYL CITRATE 50 UG/ML
INJECTION, SOLUTION INTRAMUSCULAR; INTRAVENOUS PRN
Status: DISCONTINUED | OUTPATIENT
Start: 2020-09-16 | End: 2020-09-16 | Stop reason: SDUPTHER

## 2020-09-16 RX ORDER — ONDANSETRON 2 MG/ML
INJECTION INTRAMUSCULAR; INTRAVENOUS PRN
Status: DISCONTINUED | OUTPATIENT
Start: 2020-09-16 | End: 2020-09-16 | Stop reason: SDUPTHER

## 2020-09-16 RX ORDER — EPHEDRINE SULFATE 50 MG/ML
INJECTION, SOLUTION INTRAVENOUS PRN
Status: DISCONTINUED | OUTPATIENT
Start: 2020-09-16 | End: 2020-09-16

## 2020-09-16 RX ORDER — ACETAMINOPHEN 325 MG/1
650 TABLET ORAL EVERY 4 HOURS PRN
Status: DISCONTINUED | OUTPATIENT
Start: 2020-09-16 | End: 2020-09-19 | Stop reason: HOSPADM

## 2020-09-16 RX ORDER — GLYCOPYRROLATE 1 MG/5 ML
SYRINGE (ML) INTRAVENOUS PRN
Status: DISCONTINUED | OUTPATIENT
Start: 2020-09-16 | End: 2020-09-16 | Stop reason: SDUPTHER

## 2020-09-16 RX ORDER — DEXAMETHASONE SODIUM PHOSPHATE 4 MG/ML
INJECTION, SOLUTION INTRA-ARTICULAR; INTRALESIONAL; INTRAMUSCULAR; INTRAVENOUS; SOFT TISSUE PRN
Status: DISCONTINUED | OUTPATIENT
Start: 2020-09-16 | End: 2020-09-16 | Stop reason: SDUPTHER

## 2020-09-16 RX ADMIN — PROPOFOL 200 MG: 10 INJECTION, EMULSION INTRAVENOUS at 14:51

## 2020-09-16 RX ADMIN — ROCURONIUM BROMIDE 50 MG: 10 INJECTION INTRAVENOUS at 14:51

## 2020-09-16 RX ADMIN — FENTANYL CITRATE 100 MCG: 50 INJECTION INTRAMUSCULAR; INTRAVENOUS at 14:51

## 2020-09-16 RX ADMIN — PROPOFOL 200 MG: 10 INJECTION, EMULSION INTRAVENOUS at 14:37

## 2020-09-16 RX ADMIN — Medication 10 MG: at 15:29

## 2020-09-16 RX ADMIN — SODIUM CHLORIDE, POTASSIUM CHLORIDE, SODIUM LACTATE AND CALCIUM CHLORIDE: 600; 310; 30; 20 INJECTION, SOLUTION INTRAVENOUS at 15:29

## 2020-09-16 RX ADMIN — ONDANSETRON 4 MG: 2 INJECTION, SOLUTION INTRAMUSCULAR; INTRAVENOUS at 17:33

## 2020-09-16 RX ADMIN — LIDOCAINE HYDROCHLORIDE 50 MG: 10 INJECTION, SOLUTION EPIDURAL; INFILTRATION; INTRACAUDAL; PERINEURAL at 14:51

## 2020-09-16 RX ADMIN — CEFAZOLIN SODIUM 2 G: 2 SOLUTION INTRAVENOUS at 15:00

## 2020-09-16 RX ADMIN — PHENYLEPHRINE HYDROCHLORIDE 100 MCG: 10 INJECTION INTRAVENOUS at 15:08

## 2020-09-16 RX ADMIN — PHENYLEPHRINE HYDROCHLORIDE 100 MCG: 10 INJECTION INTRAVENOUS at 15:11

## 2020-09-16 RX ADMIN — Medication 2 MG: at 17:35

## 2020-09-16 RX ADMIN — Medication 10 MG: at 15:11

## 2020-09-16 RX ADMIN — ROCURONIUM BROMIDE 50 MG: 10 INJECTION INTRAVENOUS at 14:37

## 2020-09-16 RX ADMIN — SODIUM CHLORIDE, POTASSIUM CHLORIDE, SODIUM LACTATE AND CALCIUM CHLORIDE: 600; 310; 30; 20 INJECTION, SOLUTION INTRAVENOUS at 13:22

## 2020-09-16 RX ADMIN — HEPARIN SODIUM 2000 UNITS: 1000 INJECTION INTRAVENOUS; SUBCUTANEOUS at 15:25

## 2020-09-16 RX ADMIN — Medication 10 MG: at 15:35

## 2020-09-16 RX ADMIN — LIDOCAINE HYDROCHLORIDE 50 MG: 10 INJECTION, SOLUTION EPIDURAL; INFILTRATION; INTRACAUDAL; PERINEURAL at 14:37

## 2020-09-16 RX ADMIN — Medication 0.4 MG: at 17:35

## 2020-09-16 RX ADMIN — FENTANYL CITRATE 100 MCG: 50 INJECTION INTRAMUSCULAR; INTRAVENOUS at 14:37

## 2020-09-16 RX ADMIN — Medication 10 MG: at 15:20

## 2020-09-16 RX ADMIN — PROPOFOL 100 MG: 10 INJECTION, EMULSION INTRAVENOUS at 14:40

## 2020-09-16 RX ADMIN — Medication 10 MG: at 15:59

## 2020-09-16 RX ADMIN — DEXAMETHASONE SODIUM PHOSPHATE 4 MG: 4 INJECTION, SOLUTION INTRAMUSCULAR; INTRAVENOUS at 15:14

## 2020-09-16 ASSESSMENT — PAIN SCALES - GENERAL
PAINLEVEL_OUTOF10: 0

## 2020-09-16 ASSESSMENT — PULMONARY FUNCTION TESTS
PIF_VALUE: 20
PIF_VALUE: 19
PIF_VALUE: 17
PIF_VALUE: 20
PIF_VALUE: 20
PIF_VALUE: 19
PIF_VALUE: 21
PIF_VALUE: 19
PIF_VALUE: 17
PIF_VALUE: 2
PIF_VALUE: 1
PIF_VALUE: 0
PIF_VALUE: 21
PIF_VALUE: 24
PIF_VALUE: 21
PIF_VALUE: 19
PIF_VALUE: 19
PIF_VALUE: 20
PIF_VALUE: 19
PIF_VALUE: 21
PIF_VALUE: 19
PIF_VALUE: 19
PIF_VALUE: 20
PIF_VALUE: 14
PIF_VALUE: 21
PIF_VALUE: 1
PIF_VALUE: 21
PIF_VALUE: 2
PIF_VALUE: 21
PIF_VALUE: 20
PIF_VALUE: 1
PIF_VALUE: 19
PIF_VALUE: 19
PIF_VALUE: 20
PIF_VALUE: 19
PIF_VALUE: 22
PIF_VALUE: 17
PIF_VALUE: 2
PIF_VALUE: 21
PIF_VALUE: 20
PIF_VALUE: 19
PIF_VALUE: 20
PIF_VALUE: 20
PIF_VALUE: 1
PIF_VALUE: 19
PIF_VALUE: 19
PIF_VALUE: 20
PIF_VALUE: 20
PIF_VALUE: 21
PIF_VALUE: 22
PIF_VALUE: 21
PIF_VALUE: 17
PIF_VALUE: 21
PIF_VALUE: 19
PIF_VALUE: 21
PIF_VALUE: 21
PIF_VALUE: 20
PIF_VALUE: 21
PIF_VALUE: 0
PIF_VALUE: 19
PIF_VALUE: 21
PIF_VALUE: 19
PIF_VALUE: 20
PIF_VALUE: 19
PIF_VALUE: 1
PIF_VALUE: 20
PIF_VALUE: 20
PIF_VALUE: 19
PIF_VALUE: 20
PIF_VALUE: 2
PIF_VALUE: 19
PIF_VALUE: 21
PIF_VALUE: 20
PIF_VALUE: 21
PIF_VALUE: 25
PIF_VALUE: 26
PIF_VALUE: 19
PIF_VALUE: 0
PIF_VALUE: 16
PIF_VALUE: 19
PIF_VALUE: 19
PIF_VALUE: 20
PIF_VALUE: 19
PIF_VALUE: 20
PIF_VALUE: 19
PIF_VALUE: 20
PIF_VALUE: 1
PIF_VALUE: 21
PIF_VALUE: 25
PIF_VALUE: 19
PIF_VALUE: 21
PIF_VALUE: 20
PIF_VALUE: 23
PIF_VALUE: 19
PIF_VALUE: 21
PIF_VALUE: 19
PIF_VALUE: 2
PIF_VALUE: 19
PIF_VALUE: 19
PIF_VALUE: 20
PIF_VALUE: 10
PIF_VALUE: 19
PIF_VALUE: 19
PIF_VALUE: 16
PIF_VALUE: 2
PIF_VALUE: 17
PIF_VALUE: 11
PIF_VALUE: 17
PIF_VALUE: 19
PIF_VALUE: 21
PIF_VALUE: 19
PIF_VALUE: 20
PIF_VALUE: 19
PIF_VALUE: 19
PIF_VALUE: 20
PIF_VALUE: 21
PIF_VALUE: 20
PIF_VALUE: 21
PIF_VALUE: 1
PIF_VALUE: 20
PIF_VALUE: 2
PIF_VALUE: 19
PIF_VALUE: 19
PIF_VALUE: 1
PIF_VALUE: 1
PIF_VALUE: 20
PIF_VALUE: 21
PIF_VALUE: 17
PIF_VALUE: 17
PIF_VALUE: 21
PIF_VALUE: 21
PIF_VALUE: 19
PIF_VALUE: 2
PIF_VALUE: 19
PIF_VALUE: 21
PIF_VALUE: 21
PIF_VALUE: 1
PIF_VALUE: 19
PIF_VALUE: 21
PIF_VALUE: 19
PIF_VALUE: 16
PIF_VALUE: 19
PIF_VALUE: 22
PIF_VALUE: 2
PIF_VALUE: 21
PIF_VALUE: 19
PIF_VALUE: 19
PIF_VALUE: 0
PIF_VALUE: 19
PIF_VALUE: 1
PIF_VALUE: 1
PIF_VALUE: 7
PIF_VALUE: 19
PIF_VALUE: 20
PIF_VALUE: 2
PIF_VALUE: 19
PIF_VALUE: 20
PIF_VALUE: 19
PIF_VALUE: 5
PIF_VALUE: 5
PIF_VALUE: 21
PIF_VALUE: 21
PIF_VALUE: 1
PIF_VALUE: 21
PIF_VALUE: 19
PIF_VALUE: 21
PIF_VALUE: 20
PIF_VALUE: 21
PIF_VALUE: 19
PIF_VALUE: 21
PIF_VALUE: 21
PIF_VALUE: 2
PIF_VALUE: 20
PIF_VALUE: 19
PIF_VALUE: 19
PIF_VALUE: 20
PIF_VALUE: 19
PIF_VALUE: 21
PIF_VALUE: 20
PIF_VALUE: 21
PIF_VALUE: 21
PIF_VALUE: 2
PIF_VALUE: 19
PIF_VALUE: 19
PIF_VALUE: 20
PIF_VALUE: 19
PIF_VALUE: 17
PIF_VALUE: 1
PIF_VALUE: 19

## 2020-09-16 ASSESSMENT — PAIN - FUNCTIONAL ASSESSMENT: PAIN_FUNCTIONAL_ASSESSMENT: 0-10

## 2020-09-16 NOTE — ANESTHESIA POSTPROCEDURE EVALUATION
Department of Anesthesiology  Postprocedure Note    Patient: Chasity Cardoso  MRN: [id number]  [place]: 1975  Date of evaluation: 9/16/2020  Time:  7:16 PM     Procedure Summary     Date:  09/16/20 Room / Location:  [facility] Special Procedures    Anesthesia Start:  0803 Anesthesia Stop:  1805    Procedure:  IR ANGIOGRAM CAROTID CEREBRAL BILATERAL Diagnosis:  Syrinx of spinal cord ([facility] [address]    Scheduled Providers:   Responsible Provider:  Yolanda Hodges MD    Anesthesia Type:  Not recorded ASA Status:  3          Anesthesia Type: No value filed. Robert Phase I: Robert Score: 9    Robert Phase II:      Last vitals: Reviewed and per EMR flowsheets.        Anesthesia Post Evaluation    Patient location during evaluation: ICU  Patient participation: complete - patient participated  Level of consciousness: sedated and ventilated  Pain score: 0  Airway patency: patent  Nausea & Vomiting: no nausea and no vomiting  Complications: no  Cardiovascular status: hemodynamically stable  Respiratory status: intubated and ventilator  Hydration status: euvolemic

## 2020-09-16 NOTE — ANESTHESIA PRE PROCEDURE
Department of Anesthesiology  Preprocedure Note       Name:  Baljit Menjivar   Age:  53 y.o.  :  1975                                          MRN:  [id number]         Date:  2020      Surgeon: * No surgeons listed *    Procedure: * No procedures listed *    Medications prior to admission:   Prior to Admission medications    Medication Sig Start Date End Date Taking? Authorizing Provider   ibuprofen (ADVIL;MOTRIN) 600 MG tablet Take 1 tablet by mouth 3 times daily as needed for Pain 20  Yes Aida Fountain APRN - CNP   vitamin D-3 (CHOLECALCIFEROL) 125 MCG (5000 UT) TABS Take 1 tablet by mouth daily 20  Yes Shey Chu APRN - CNP   cyclobenzaprine (FLEXERIL) 5 MG tablet Take 10 mg by mouth daily    Yes Historical Provider, MD   acetaminophen (TYLENOL) 500 MG tablet Take 500 mg by mouth every 6 hours as needed for Pain   Yes Historical Provider, MD       Current medications:    Current Outpatient Medications   Medication Sig Dispense Refill    ibuprofen (ADVIL;MOTRIN) 600 MG tablet Take 1 tablet by mouth 3 times daily as needed for Pain 30 tablet 0    vitamin D-3 (CHOLECALCIFEROL) 125 MCG (5000 UT) TABS Take 1 tablet by mouth daily 90 tablet 1    cyclobenzaprine (FLEXERIL) 5 MG tablet Take 10 mg by mouth daily       acetaminophen (TYLENOL) 500 MG tablet Take 500 mg by mouth every 6 hours as needed for Pain       No current facility-administered medications for this encounter. Allergies:     Allergies   Allergen Reactions    Norco [Hydrocodone-Acetaminophen] Nausea And Vomiting       Problem List:    Patient Active Problem List   Diagnosis Code    Leg weakness, bilateral R29.898    Ataxia R27.0    Syrinx of spinal cord ([facility] [address] G95.0    Myelopathy of thoracic region M47.14    At high risk for falls after spinal surgery Z91.81    Mild intermittent asthma without complication L11.65    Spasm of muscle M62.838    Weakness of left lower extremity R29.898    Urinary problem in male N39.9       Past Medical History:        Diagnosis Date    Asthma     does not know name of inhaler and hasnt used in over a yr    Ataxic gait     Developmental delay     Diet-controlled diabetes mellitus ([facility] [address]     History of syrinx of spinal cord ([facility] [address] 03/17/2020    Incomplete bladder emptying     Myelopathy of thoracic region     Osteopenia determined by x-ray     Spasm of muscle     Urinary dribbling     Urinary leakage     Uses walker     for standing and transfers    Uses wheelchair     can stand and transfer cannot walk due to leg weakness    Vitamin D deficiency 03/12/2020    Vitamin D 25OH 24       Past Surgical History:        Procedure Laterality Date    CRANIOTOMY N/A 5/14/2020    Thoracic Spine and Laminectomy Extraction of Arachnoid Cyst/Band and 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 performed by Monika Giordano MD at [facility] History:    Social History     Tobacco Use    Smoking status: Never Smoker    Smokeless tobacco: Never Used   Substance Use Topics    Alcohol use: No                                Counseling given: Not Answered      Vital Signs (Current):   Vitals:    09/15/20 1021 09/16/20 1226   Weight: 240 lb (108.9 kg) 238 lb 8 oz (108.2 kg)   Height: 5' 10\" (1.778 m) 5' 10\" (1.778 m)                                              BP Readings from Last 3 Encounters:   09/09/20 (!) 148/93   08/12/20 116/75   08/12/20 (!) 140/84       NPO Status: Time of last liquid consumption: 2345                        Time of last solid consumption: 2345                        Date of last liquid consumption: 09/15/20                        Date of last solid food consumption: 09/15/20    BMI:   Wt Readings from Last 3 Encounters:   09/16/20 238 lb 8 oz (108.2 kg)   09/09/20 236 lb 9.6 oz (107.3 kg)   08/12/20 237 lb (107.5 kg)     Body mass index is 34.22 kg/m².     CBC:   Lab Results   Component Value Date    WBC 9.0 08/12/2020    RBC 5.51 08/12/2020 HGB 16.0 08/12/2020    HCT 48.2 08/12/2020    MCV 87.5 08/12/2020    RDW 13.1 07/20/2012     08/12/2020       CMP:   Lab Results   Component Value Date     08/12/2020    K 4.1 08/12/2020     08/12/2020    CO2 25 08/12/2020    BUN 20 08/12/2020    CREATININE 0.8 08/12/2020    LABGLOM >90 08/12/2020    GLUCOSE 96 08/12/2020    PROT 7.7 08/12/2020    CALCIUM 9.7 08/12/2020    BILITOT 0.3 08/12/2020    ALKPHOS 95 08/12/2020    AST 35 08/12/2020    ALT 61 08/12/2020       POC Tests: No results for input(s): POCGLU, POCNA, POCK, POCCL, POCBUN, POCHEMO, POCHCT in the last 72 hours. Coags:   Lab Results   Component Value Date    INR 0.95 05/14/2020       HCG (If Applicable): No results found for: PREGTESTUR, PREGSERUM, HCG, HCGQUANT     ABGs: No results found for: PHART, PO2ART, OWN6WIM, BFO7TZR, BEART, V7AKYMOP     Type & Screen (If Applicable):  No results found for: LABABO, LABRH    Drug/Infectious Status (If Applicable):  No results found for: HIV, HEPCAB    COVID-19 Screening (If Applicable):   Lab Results   Component Value Date    COVID19 Not Detected 09/16/2020    COVID19 Not Detected 09/09/2020         Anesthesia Evaluation    Airway: Mallampati: II     Neck ROM: full   Dental:      Comment: Poor dentition, denies loose teeth    Pulmonary:normal exam    (+) asthma:                            Cardiovascular:    (+) hypertension:,     (-) past MI and CAD      Rhythm: regular  Rate: normal                    Neuro/Psych:   (+) neuromuscular disease:,             GI/Hepatic/Renal:             Endo/Other:    (+) Diabetes, . Abdominal:   (+) obese,         Vascular: negative vascular ROS. Anesthesia Plan      general     ASA 3       Induction: intravenous. Anesthetic plan and risks discussed with patient. Plan discussed with CRNA.                 Ziggy Larson MD   9/16/2020

## 2020-09-16 NOTE — H&P
Endovascular Neurosurgery H&P      Reason for evaluation: Dx spinal angiogram    SUBJECTIVE:   History of Chief Complaint:    Maciej Piedra is a 54 yo m, s/p T9 arachnoid cyst resection on (5/14/2020, Dr. Michelle Nicholson), who presents today for a Dx spinal angiogram due to persistent LLE numbness extending to the hip onset 3 months ago that does not worsen or improve with anything. Patient currently ambulating with 4 point walker. Per patient, symptoms returned \"a month after the srugery. \" Today, patient also complains of head ache and neck pain rated 5-6 out of 10 today and states that pain usually subsides with Tylenol. Patient denies any low back pain. Patient denies any anticoagulant use. Patient denies any iodine allergies. Patient was informed of risks and benefits of the procedure. Patient originally presented with progressive b/l LE weakness, numbness and intermittent bowel and bladder incontinent to [facility]. Lauren's on 3/5/2020 at which time MRI revealed evidence of cystic spinal lesion within the cord at T8-T9. He was last seen and evaluated in the office setting on 7/1/2020.  That visit was his initial follow-up appointment after discharge from the hospital. Sue Bhatia was ambulating with assistance of a rolling four-point walker at the time of that visit with scheduled physical therapy appointments. Sue Bhatia did not present with any significant complaints stating that incisional site pain and back and lower extremity discomfort are vastly improved.  He was intact for strength for lower extremity groups with some lingering chronic pinprick deficit involving the left distal lower extremity noted. Of note: patient stated that LLE muscle spasps during imaging and procedures and requires being weighted down    Allergies  is allergic to norco [hydrocodone-acetaminophen]. Medications  Prior to Admission medications    Medication Sig Start Date End Date Taking?  Authorizing Provider   ibuprofen (ADVIL;MOTRIN) 600 MG tablet Take 1 tablet by mouth 3 times daily as needed for Pain 8/12/20  Yes Norberto Townsend APRN - CNP   vitamin D-3 (CHOLECALCIFEROL) 125 MCG (5000 UT) TABS Take 1 tablet by mouth daily 6/30/20  Yes ISHAAN Pablo - CNP   cyclobenzaprine (FLEXERIL) 5 MG tablet Take 10 mg by mouth daily    Yes Historical Provider, MD   acetaminophen (TYLENOL) 500 MG tablet Take 500 mg by mouth every 6 hours as needed for Pain   Yes Historical Provider, MD    Scheduled Meds:  Continuous Infusions:  PRN Meds:.  Past Medical History   has a past medical history of Asthma, Ataxic gait, Developmental delay, Diet-controlled diabetes mellitus ([facility] [address] History of syrinx of spinal cord ([facility] [address] Incomplete bladder emptying, Myelopathy of thoracic region, Osteopenia determined by x-ray, Spasm of muscle, Urinary dribbling, Urinary leakage, Uses walker, Uses wheelchair, and Vitamin D deficiency. Past Surgical History   has a past surgical history that includes craniotomy (N/A, 5/14/2020). Social History   reports that he has never smoked. He has never used smokeless tobacco.   reports no history of alcohol use. reports no history of drug use. Family History  family history includes Arthritis in his mother; Diabetes in his mother; Heart Disease in his father.     Review of Systems:  CONSTITUTIONAL:  negative for fevers, chills, fatigue and malaise    EYES:  negative for double vision, blurred vision and photophobia     HEENT:  negative for tinnitus, epistaxis and sore throat    RESPIRATORY:  negative for cough, shortness of breath, wheezing    CARDIOVASCULAR:  negative for chest pain, palpitations, syncope, edema    GASTROINTESTINAL:  negative for nausea, vomiting    GENITOURINARY:  negative for incontinence    MUSCULOSKELETAL:  negative for neck or back pain    NEUROLOGICAL:  Positive for weakness and numbness in LLE  Positive for headache and neck pain  Negative for tingling   PSYCHIATRIC:  negative for anxiety      Review of systems otherwise negative. OBJECTIVE:     Vitals:    20 1240   BP: (!) 136/95   Pulse: 110   Resp: 24   Temp: 97.9 °F (36.6 °C)   SpO2: 95%        General:   Gen: normal habitus, NAD  HEENT: NCAT, mucosa moist  Cvs: RRR, S1 S2 normal  Resp: symmetric unlabored breathing  Abd: s/nd/nt  Ext: no edema  Skin: no lesions seen, warm and dry    Neuro:  Gen: awake and alert, oriented x3. Lang/speech: no aphasia or dysarthria. Follows commands. CN: PERRLA, EOMI, VFF, V1-3 intact, face symmetric, hearing intact, shoulder shrug symmetric, tongue midline  Motor: grossly 5/5 UE b/l and 4+/5 RLE and 4/5 LLE  Sense: light touch diminished on LLE and intact on RLE  Coord: FTN and HTS intact b/l  DTR: 2+ patellar and Achilles tendon reflexes, other reflexes not tested     Gait: did not examine    NIH Stroke Scale:    1a  Level of consciousness: 0 - alert; keenly responsive   1b. LOC questions:  0 - answers both questions correctly   1c. LOC commands: 0 - performs both tasks correctly   2. Best Gaze: 0 - normal   3. Visual: 0 - no visual loss   4. Facial Palsy: 0 - normal symmetric movement   5a. Motor left arm: 0 - no drift, limb holds 90 (or 45) degrees for full 10 seconds   5b. Motor right arm: 0 - no drift, limb holds 90 (or 45) degrees for full 10 seconds   6a. Motor left le - drift; leg falls by the end of the 5 second period but does not hit bed   6b  Motor right le - drift; leg falls by the end of the 5 second period but does not hit bed   7. Limb Ataxia: 0 - absent   8. Sensory: 1 - mild to moderate sensory loss; patient feels pinprick is less sharp or is dull on the affected side; there is a loss of superficial pain with pinprick but patient is aware of being touched    9. Best Language:  0 - no aphasia, normal   10. Dysarthria: 0 - normal   11. Extinction and Inattention: 0 - no abnormality         Total:   2     MRS: 3      LABS:   Reviewed.     RADIOLOGY:   Images were personally reviewed including:    MRI lumbar spine W WO contrast (8/18/2020):  1. Moderate severity right foraminal stenosis at the L5-S1 level due to asymmetric loss of disc height and a diffusely bulging disc. 2. There are mild facet degenerative changes in the lumbar spine. MRI thoracic spine W WO contrast (8/18/2020): Improved syrinx in the thoracic spinal cord at the T9 level. Abnormal signal within the central aspect of the cord represent some myelomalacia versus mild prominence of the central canal. There is no residual mass effect upon the dorsal aspect of the cord. MRI thoracic spine (4/22/2020):  Redemonstration of mild dorsal compression of the spinal cord at the T8-9 level with focal dilatation of central canal of the spinal cord at the T9 level. This likely represents subarachnoid adhesions or arachnoid cyst causing the mild dorsal compression of the spinal cord. The focal   dilatation of the cord at this level likely represents an associated syrinx. No definite abnormal enhancement to suggest an neoplasm is identified. Subtle nodular enhancement dorsal to the cord appears to represent a mildly prominent vessel. CT thoracic spine (3/6/2020):  1. No evidence of a myelographic block. Intrathecal contrast fills the thecal sac. 2. The possible intrathecal abnormality seen at the T8 and T9 levels displacing the thoracic spinal cord on MRI is not seen on CT myelogram. This abnormality could be filling with contrast. This could also represent subarachnoid adhesions. 3. Ventral epidural contrast is able to displace the thoracic spinal cord away from the posterior margins of the vertebral bodies. 4. There is subtle mass effect upon the dorsal aspect of the thoracic spinal cord at the T8-9 and T9 levels. MRI Lumbar spine W WO contrast (3/5/2020):  1. Abnormal marrow signal in the superior endplate of L5 for. This is on the right. The pattern and distribution is most consistent with an acute fracture. This is most likely secondary to underlying osteopenia process if the patient does not have a    history of trauma. Clinical correlation is recommended. 2. No evidence of significant spinal canal stenosis at any level. 3. Variable foraminal stenosis at multiple levels as described above. MRI Thoracic spine W WO contrast (3/5/2020): Mass effect upon the thoracic spinal cord at the T7-8 through T9 levels. The thoracic spinal cord is flattened against the ventral spinal canal at the T8-9 level. There is abnormal cord signal consistent with cord edema versus myelomalacia. Just below this, there is a cystic abnormality within the cord. This can represent a syrinx. There is a nodule of enhancement. The differential diagnosis includes a cystic mass in the thoracic spinal cord with enhancing nodule however, this does not explain the mass effect upon the dorsal aspect of the cord. Alternatively, this could be and arachnoid cyst dorsal to the thoracic cord compressing the cord. This could also represent subarachnoid adhesions distorting the cord. MRI Cervical spine WO contrast (3/5/2020):  1. Moderate severity spinal canal stenosis at the C3-4, C4-5 and C5-6 levels due to posterior disc osteophyte complexes. 2. Severe bilateral foraminal stenosis at the C5-6 level. 3. Normal signal in the cervical spinal cord         ASSESSMENT:   This is a 54 yo M patient who presents s/p T9 arachnoid cyst resection on May 14, 2020 due to b/l LE weakness and numbness since March 2020. Post-op, patient continued to have persistent symptoms of LLE weakness and numbness. Patient was seen by Dr. Chiqui Mann via virtual visit on 8/25/2020, at which time it was determined he would undergo diagnostic spinal angiogram to locate possible vascular malformation as etiology for patient's symptoms. covid neg. Risk and benefits discussed, pt consented to spinal dx angio.     PLAN:   Dx spinal angiogram     Case discussed with Dr. Renaldo Pride,

## 2020-09-16 NOTE — PROGRESS NOTES
Dr Meet Amin at bedside, groin and legs examined, pressure held x15 min. New safe-gonzález placed. Pt tolerated well without event.

## 2020-09-16 NOTE — SEDATION DOCUMENTATION
Report given to [facility]. Dr Wilma Davidson spoke with sister Taj Rafa.  Pt to go to recovery then home tonight

## 2020-09-16 NOTE — BRIEF OP NOTE
[facility]    NEUROENDOVASCULAR SERVICE: POST-OP NOTE: 9/16/2020    Pt Name: Jayne Ferreira  MRN: [id number]  [place]: 1975  Date of Procedure: 9/16/2020  Primary Care Physician: Sina Rodarte, APRN - CNP      Pre-Procedural Diagnosis: rule out spinal arteriovenous malformation or fistula  Post-Procedural Diagnosis: normal spinal angiogram      Procedure Performed: spinal and cerebral angiogram    Surgeon:   Rhina Ordoñez MD    Fellow:  Papa Cole MD, Ilana Mcdaniels MD, PhD     1st Assistant:  Isabel Graham    PRE-PROCEDURAL EXAM:  Prestroke baseline mRS MODIFIED BHAVYA SCORE: 3 - Moderate disability:  requiring some help, but able to walk without assistance  Neurological exam performed and unchanged from initial H&P or consult    Anesthesia: General Anesthesia  Complications: none    Intra-Operative EXAM:  Patient sedated with unchanged limited neurological exam    EBL: < less than 50       Cc            Specimens: Were not Obtained  Contrast:     Visipaque 270 low osmolar 280 Cc             Fluoro: 61.9 min    Findings:  Please see dictated Radiology note for further details  1. Normal cerebral and spinal angiogram, no evidence of aneurysm, arteriovenous malformation, or dural arteriovenous fistula. POST-PROCEDURAL EXAM :   Stable neurological Exam  Neurological exam performed and unchanged from initial H&P or consult    Closure:  right V[place] 5   F        POST-PROCEDURAL MONITORING : see orders  Disposition: Recovery room      Recommendations:  1. Back to [facility]  2. Do not bend right leg for 3 hours. 3. Groin checks per protocol. 4. Peripheral pulse checks per protocol. 5. SBP goal <140  6.  Upon discharge follow up with Dr. Candiss Osler in 2 weeks and Dr. Kailey Grimaldo in 3 months    Ilana Mcdaniels MD PhD fellow    Rhina Ordoñez MD   Pager: [phone]  Stroke, [facility]  [facility]

## 2020-09-16 NOTE — H&P
History and Physical    Pt Name: Roopa Steven  MRN: [id number]  YOB: 1975  Date of evaluation: 9/16/2020  Primary Care Physician: ISHAAN Aquino CNP  Patient evaluated at the request of  Dr. Penny Suresh    Reason for evaluation:  Syrinx of spinal cord  Thoracic   SUBJECTIVE:   History of Chief Complaint:   According to neuro note in Sept/2020    Sarah Quiroz is a 53 y.o. male ,Zeeshan Yasmany  is  who is returning to the office today for a post-op follow-up visit. He had surgery on  May 14, 2020 with Dr. Renata Pardo to undergo resection of arachnoid cyst, without complication. He was last seen and evaluated in the office setting on 7/1/2020. That visit was his initial follow-up appointment after discharge from the hospital.  He was ambulating with assistance of a rolling four-point walker at the time of that visit with scheduled physical therapy appointments. He did not present with any significant complaints stating that incisional site pain and back and lower extremity discomfort are vastly improved. He was intact for strength for lower extremity groups with some lingering chronic pinprick deficit involving the left distal lower extremity noted. An appointment with process for referral to Dr. Oswaldo jaime for a vascular evaluation. That appointment was not maintained and therefore a virtual video visit will try to be orchestrated with endovascular today. He presents today with a new MRI of the lumbar and thoracic spine along with CIS enhancement for review. Those images show improved syrinx in the thoracic spinal cord at T9 level with some abnormal signal still present within the cord and no residual mass-effect on the cord is noted. Mild severity for right foraminal stenosis at lumbar 5 sacral 1 is demonstrated on the lumbar spine MRI along with a diffusely bulging disc at that level.   He arrives for today's visit accompanied by his wife with complaints of lingering low Outpatient Rx   Medication Sig Dispense Refill    ibuprofen (ADVIL;MOTRIN) 600 MG tablet Take 1 tablet by mouth 3 times daily as needed for Pain 30 tablet 0    vitamin D-3 (CHOLECALCIFEROL) 125 MCG (5000 UT) TABS Take 1 tablet by mouth daily 90 tablet 1    cyclobenzaprine (FLEXERIL) 5 MG tablet Take 10 mg by mouth daily       acetaminophen (TYLENOL) 500 MG tablet Take 500 mg by mouth every 6 hours as needed for Pain       Continuous Infusions:  PRN Meds:. Allergies  is allergic to norco [hydrocodone-acetaminophen]. Family History    family history includes Arthritis in his mother; Diabetes in his mother; Heart Disease in his father.     Family Status   Relation Name Status    Mother  (Not Specified)   Mckaylastwillian Garcia Father      Sister  Alive    Neg Hx  (Not Specified)         Social History  Social History     Socioeconomic History    Marital status: Single     Spouse name: Not on file    Number of children: Not on file    Years of education: Not on file    Highest education level: Not on file   Occupational History    Not on file   Social Needs    Financial resource strain: Not on file    Food insecurity     Worry: Not on file     Inability: Not on file   [employer] needs     Medical: Not on file     Non-medical: Not on file   Tobacco Use    Smoking status: Never Smoker    Smokeless tobacco: Never Used   Substance and Sexual Activity    Alcohol use: No    Drug use: No    Sexual activity: Not Currently   Lifestyle    Physical activity     Days per week: Not on file     Minutes per session: Not on file    Stress: Not on file   Relationships    Social connections     Talks on phone: Not on file     Gets together: Not on file     Attends Synagogue service: Not on file     Active member of club or organization: Not on file     Attends meetings of clubs or organizations: Not on file     Relationship status: Not on file    Intimate partner violence     Fear of current or ex partner: Not on file Emotionally abused: Not on file     Physically abused: Not on file     Forced sexual activity: Not on file   Other Topics Concern    Not on file   Social History Narrative    Not on file        Service:No         Hobbies:   Outside activities     OBJECTIVE:   VITALS:  height is 5' 10\" (1.778 m) and weight is 240 lb (108.9 kg). CONSTITUTIONAL: Alert and oriented times 3, no acute distress and cooperative to examination. friendly and pleasant , stocky muscular build     SKIN: rash No    HEENT: Head is normocephalic, atraumatic. EOMI, PERRLA    Oral air way :slightly narrow Yes    NECK: neck supple, no lymphadenopathy noted, trachea midline and straight       2+ carotid, no bruit    LUNGS: Chest expands equally bilaterally upon respiration, no accessory muscle used. Ausculation reveals no adventitious breath sounds. CARDIOVASCULAR: \"Heart sounds are normal.  Regular rate and rhythm without murmur,    ABDOMEN: Bowel sounds are present in all four quadrants      GENATALIA:Deferred. NEUROLOGIC: \"CN II-XII are grossly intact. EXTREMITIES: Pitting edema:  No,    Left lower leg  spasms  Varicose veins: No     Dorsal pedal/posterior tibial pulses palpable: Yes         Strength:    Not tested       Patient Active Problem List   Diagnosis    Leg weakness, bilateral    Ataxia    Syrinx of spinal cord (HCC)    Myelopathy of thoracic region    At high risk for falls after spinal surgery    Mild intermittent asthma without complication    Spasm of muscle    Weakness of left lower extremity    Urinary problem in male               IMPRESSIONS:   1. Syrinx of spinal cord  Thoracic   2. does not have any pertinent problems on file.     Abigail [facility]  Electronically signed 9/16/2020 at 12:13 PM       Scheduled for: spinal axial angiogram

## 2020-09-17 NOTE — PROGRESS NOTES
Safe-gonzález removed, pt tolerated well. Site CDI, no drainage,no hematoma, no bruising. Dressing applied. Pedal pulses easily palpable.

## 2020-09-22 ENCOUNTER — VIRTUAL VISIT (OUTPATIENT)
Dept: NEUROLOGY | Age: 45
End: 2020-09-22
Payer: COMMERCIAL

## 2020-09-22 PROCEDURE — 99204 OFFICE O/P NEW MOD 45 MIN: CPT | Performed by: PSYCHIATRY & NEUROLOGY

## 2020-09-22 PROCEDURE — 1111F DSCHRG MED/CURRENT MED MERGE: CPT | Performed by: PSYCHIATRY & NEUROLOGY

## 2020-09-22 NOTE — PROGRESS NOTES
Neurocritical Care, Stroke & Neurointerventional Note    [address]   [address] [place] [address],  [address] , [address] [place] [address]   P: [phone]  Endovascular Neurosurgery Phone Follow Up Visit    Pt Name: Jayne Ferreira  MRN: [id number]  [place]: 1975  Date of evaluation: 9/22/2020  Primary Care Physician: ISHAAN Rincon CNP  Patient evaluated at the request of  Dr. Barbra Chavez   Reason for evaluation: evaluate for possible spinal AVF    SUBJECTIVE:   History of Chief Complaint:    Jayne Ferreira is a 53 y.o. male who presents with hx of T9 arachnoid cyst resection with thoracic spine and syrix    He was referred for spinal angiogram on 9/16/2020 which was completed showing no clear evidence of spinal AVM and AVF    Allergies  is allergic to norco [hydrocodone-acetaminophen]. Medications  Prior to Admission medications    Medication Sig Start Date End Date Taking? Authorizing Provider   ibuprofen (ADVIL;MOTRIN) 600 MG tablet Take 1 tablet by mouth 3 times daily as needed for Pain 8/12/20  Yes ISHAAN Rainey CNP   vitamin D-3 (CHOLECALCIFEROL) 125 MCG (5000 UT) TABS Take 1 tablet by mouth daily 6/30/20  Yes ISHAAN Rincon CNP   cyclobenzaprine (FLEXERIL) 5 MG tablet Take 10 mg by mouth daily    Yes Historical Provider, MD   acetaminophen (TYLENOL) 500 MG tablet Take 500 mg by mouth every 6 hours as needed for Pain   Yes Historical Provider, MD    Scheduled Meds:  Continuous Infusions:  PRN Meds:.  Past Medical History   has a past medical history of Asthma, Ataxic gait, Developmental delay, Diet-controlled diabetes mellitus ([facility] [address] History of syrinx of spinal cord ([facility] [address] Incomplete bladder emptying, Myelopathy of thoracic region, Osteopenia determined by x-ray, Spasm of muscle, Urinary dribbling, Urinary leakage, Uses walker, Uses wheelchair, and Vitamin D deficiency.   Past Surgical History   has a past surgical history that includes craniotomy (N/A, 5/14/2020) and other surgical history (09/16/2020). Social History   reports that he has never smoked. He has never used smokeless tobacco.   reports no history of alcohol use. reports no history of drug use. Family History  family history includes Arthritis in his mother; Diabetes in his mother; Heart Disease in his father. Review of Systems:  CONSTITUTIONAL:  negative for fevers, chills, fatigue and malaise    EYES:  negative for double vision, blurred vision and photophobia     HEENT:  negative for tinnitus, epistaxis and sore throat    RESPIRATORY:  negative for cough, shortness of breath, wheezing    CARDIOVASCULAR:  negative for chest pain, palpitations, syncope, edema    GASTROINTESTINAL:  negative for nausea, vomiting    GENITOURINARY:  negative for incontinence    MUSCULOSKELETAL:  negative for neck or back pain    NEUROLOGICAL:  Negative for weakness and tingling  negative for headaches and dizziness    PSYCHIATRIC:  negative for anxiety      Review of systems otherwise negative. OBJECTIVE:   Vitals: There were no vitals taken for this visit. Phone consult    Doing well  No acute distress     Neurologic: Mental status: Alert, oriented, thought content appropriate, Alert and oriented x 3, intact language, attention, knowledge      LABS:   No results for input(s): WBC, HGB, HCT, PLT, NA, K, CL, CO2, BUN, CREATININE, MG, PHOS, CALCIUM, PTT, INR, AST, ALT, BILITOT, BILIDIR, NITRU, COLORU, BACTERIA in the last 72 hours. Invalid input(s): PT, WBCU, RBCU, LEUKOCYTESUA  No results for input(s): ALKPHOS, ALT, AST, BILITOT, BILIDIR, LABALBU, AMYLASE, LIPASE in the last 72 hours.     RADIOLOGY:   Images were personally reviewed including:  He was referred for spinal angiogram on 9/16/2020 which was completed showing no clear evidence of spinal AVM and AVF        IMPRESSIONS:   Kenisha Souza is a 53 y.o. male who presents with hx of T9 arachnoid cyst resection with thoracic spine and syrix    He was referred for spinal angiogram on 9/16/2020 which was completed showing no clear evidence of spinal AVM and AVF      1. does not have any pertinent problems on file.   PLANS:   FU with Dr Ruby Robison  Consider neurology and rehab referral  No need for further fu with the neurointerventional team    Phone visit time 25 min    Luna Gomez MD  Pager [phone]  Electronically signed 9/22/2020 at 3:48 PM

## 2020-10-27 ENCOUNTER — OFFICE VISIT (OUTPATIENT)
Dept: NEUROSURGERY | Age: 45
End: 2020-10-27

## 2020-10-27 VITALS
BODY MASS INDEX: 34.15 KG/M2 | HEART RATE: 104 BPM | DIASTOLIC BLOOD PRESSURE: 97 MMHG | TEMPERATURE: 98.2 F | SYSTOLIC BLOOD PRESSURE: 161 MMHG | WEIGHT: 238.54 LBS | HEIGHT: 70 IN

## 2020-10-27 PROCEDURE — 99214 OFFICE O/P EST MOD 30 MIN: CPT | Performed by: PHYSICIAN ASSISTANT

## 2020-10-27 NOTE — PROGRESS NOTES
[facility],[address]  Dept: [phone]  Dept Fax: [phone]  Loc: [facility] Follow Visit  Visit Date: 10/27/2020      Sree Yanez  is a 53 y.o. male who is returning to the office today for a post-op follow-up visit. He had surgery on May 14, 2020 with Dr. Anibal Mcintosh to undergo resection of arachnoid cyst, without complication. Last seen and evaluated in the office setting on 9/9/2020. An appointment for neuro endovascular evaluation was scheduled and took place virtually. The latest MRI which was reviewed at that time showed improved syrinx of the thoracic cord at T9 level with some abnormal signal still present within the cord and was absent residual mass-effect on the cord. Some mild right severity for foraminal stenosis at L5-S1 which demonstrated on the lumbar image with a diffusely bulging disc at that level. Spinal axial gram was scheduled in [facility] for 16 September at 1 PM.  He arrives today for a 6-week follow-up to review findings from that examined and no progress towards resolution moving forward. Spinal angiogram on 9/16/2020 was performed and revealed no clear evidence of spinal AVM or aVF. No further follow-up with endovascular neurosurgery was recommended. Arrives at today's appointment companied by his wife and ambulating with the assistance of a rolling four-point walker. He notes an increase in urinary frequency following his endovascular appointment and procedure along with a increase for low back pain and left lower extremity spasticity noted on exam today. Last MRI of the lumbar spine which was imaged in August not reveal any compromise foraminally on the left that corresponds with his current symptom presentation. A new MRI of the lumbar spine will be ordered for comparison to rule out significant changes.   We will renew a referral to neurology for evaluation with a follow-up in our

## 2020-11-05 ENCOUNTER — HOSPITAL ENCOUNTER (OUTPATIENT)
Dept: MRI IMAGING | Age: 45
Discharge: HOME | End: 2020-11-05
Payer: MEDICAID

## 2020-11-05 PROCEDURE — 72148 MRI LUMBAR SPINE W/O DYE: CPT

## 2020-11-19 ENCOUNTER — VIRTUAL VISIT (OUTPATIENT)
Dept: NEUROLOGY | Age: 45
End: 2020-11-19

## 2020-11-19 ENCOUNTER — TELEPHONE (OUTPATIENT)
Dept: NEUROLOGY | Age: 45
End: 2020-11-19

## 2020-11-19 PROCEDURE — 99244 OFF/OP CNSLTJ NEW/EST MOD 40: CPT | Performed by: PSYCHIATRY & NEUROLOGY

## 2020-11-19 RX ORDER — BACLOFEN 10 MG/1
10 TABLET ORAL 2 TIMES DAILY
Qty: 60 TABLET | Refills: 2 | Status: SHIPPED | OUTPATIENT
Start: 2020-11-19 | End: 2021-01-21

## 2020-11-19 NOTE — PATIENT INSTRUCTIONS
1. Stop Flexeril due to lack of benefit  2. Start Baclofen 10 mg two times a day  3. Continue to follow up with neurosurgery as previously planned   4. Follow up in 2 weeks or sooner if needed. 5. Call if any questions or concerns.

## 2020-11-19 NOTE — TELEPHONE ENCOUNTER
Per Edda Young, call patient to have him stop flexeril and start baclofen. Rx sent to pharmacy. Attempted to contact patient. Unable to LM for return call. VM not set up.

## 2020-11-25 ENCOUNTER — OFFICE VISIT (OUTPATIENT)
Dept: NEUROSURGERY | Age: 45
End: 2020-11-25

## 2020-11-25 VITALS
TEMPERATURE: 96.9 F | HEIGHT: 70 IN | SYSTOLIC BLOOD PRESSURE: 150 MMHG | DIASTOLIC BLOOD PRESSURE: 94 MMHG | BODY MASS INDEX: 34.15 KG/M2 | HEART RATE: 124 BPM | WEIGHT: 238.54 LBS

## 2020-11-25 PROCEDURE — 99214 OFFICE O/P EST MOD 30 MIN: CPT | Performed by: PHYSICIAN ASSISTANT

## 2020-11-25 NOTE — PROGRESS NOTES
[facility],[address]  Dept: [phone]  Dept Fax: [phone]  Loc: [facility] 1160 Follow Visit  Visit Date: 11/25/2020      Margie Benavidez  is a 50 y.o. male who is returning to the office today for a post-op follow-up visit. He had surgery on May 14, 2020 with Dr. Tamika Franklin to undergo resection of arachnoid cyst, without complication. He was last seen and evaluated in the office setting on 10/27/2020 time of the last visit he had presented with spinal angiogram performed on 9/16/2020 which revealed no clear evidence of spinal AVM or aVF. Further follow-up with endovascular was scheduled. At his last appointment he arrived accompanied by his wife and was ambulating with the assistance of a rolling four-point walker and had noted some increase in urinary frequency along with an increase for low back pain and left lower extremity spasticity noted on exam.  New MRI was ordered for comparison and review to prior imaging with a referral to neurology ~the last visit. He arrives for today's appointment having undergone a new MRI of the lumbar spine imaged on 11/5/2020 considered stable with no interval changes since prior studies. Foraminal stenosis is noted at L5-S1 with mild to moderate bilateral stenosis at L1-L2 3 L3-4 L4-5 with degenerative changes involving the SI joints bilaterally. Arrives for today's appointment unaccompanied and again is ambulating with the assistance of a rolling four-point walker.   He was comfortable during the exam process and did not relate any significant changes or decline in his presentation since his prior exam.  He continues to complain of low back pain with left lower extremity spasticity again noted on exam.  Pain is again described as primary low back pain radiating to the legs bilaterally with numbness continuing for left lower extremity in its entirety and some numbness for distal right lower extremity involving the entirety of the foot and all the toes. The incision from his prior procedure is well-healed on exam.  Based on exam findings and stable image findings we are referring him to pain management specialist, Savannah Young for evaluation and treatment of lower extremity spasticity. We have agreed to follow-up with him in 6 months with a repeat of the MRI to be imaged with and without contrast for comparison and review at that time. He and his family are encouraged to reach out to our office in the interim with any additional questions or concerns.     · Patient was evaluated today and is doing adequately overall. · No new complaints were voiced. · Patient  lives with their spouse  · Wound: wound healing as expected  · Follow-up Studies: No orders of the defined types were placed in this encounter. ·      Assessment/Plan:  · Status Post arachnoid cyst excision performed in May 2020 by Dr. Vilma Sam, without complication. · Doing marginally overall  · Encouraged gradual increase in physical and mental activity. · Fall precaution and home safety education provided to patient. · Follow-up: Month follow-up with new MRI of the bar spine to be imaged with and without contrast.  We will to pain management specialist Dr. Xochitl Villar for evaluation and treatment of lower limb spasticity.       Electronically signed by Steffen Null PA-C on 11/25/20 at 10:46 AM EST

## 2020-11-29 NOTE — PROGRESS NOTES
2020    TELEHEALTH EVALUATION -- Audio/Visual (During RBK[facility]-37 public health emergency)    HPI:    Nay Del Castillo (:  1975) has requested an audio/video evaluation for the following concern(s): left leg numbness and weakness since 2020. Symptoms are severe and constant. Onset is progressive. Modifiers are unknown. Frequency is daily. Duration of symptoms is ongoing. He had MRI brain done 3/5/2020 that was normal. MRI cervical spine done 3/5/2020 showedModerate severity spinal canal stenosis at the C3-4, C4-5 and C5-6 levels due to posterior disc osteophyte complexes. Severe bilateral foraminal stenosis at the C5-6 level. Normal signal in the cervical spinal cord. MRI thoracic spine done 3/5/20 showed  Mass effect upon the thoracic spinal cord at the T7-8 through T9 levels. The thoracic spinal cord is flattened against the ventral spinal canal at the T8-9 level. There is abnormal cord signal consistent with cord edema versus myelomalacia. Just below this, there is a cystic abnormality within the cord. This can represent a syrinx. There is a nodule of enhancement. The differential diagnosis includes a cystic mass in the thoracic spinal cord with enhancing nodule however, this does not explain thmass effect upon the dorsal aspect of the cord. Alternatively, this could be and arachnoid cyst dorsal to the thoracic cord compressing the cord. This could also represent subarachnoid adhesions distorting the cord. MRI lumbar spine done 3/5/20 Abnormal marrow signal in the superior endplate of L5 for. This is on the right. The pattern and distribution is most consistent with an acute fracture. This is most likely secondary to underlying osteopenia process if the patient does not have a history of trauma. Clinical correlation is recommendedNo evidence of significant spinal canal stenosis at any level. Variable foraminal stenosis at multiple levels as described above.  He was also having urinary incontinence at the time. He was evaluated by neurosurgery and underwent thoracic laminectomy for  resection of intradural mass. After the surgery his left leg numbness improved some. In July 2020 he noticed his left leg becoming increasingly numb. MRI thoracic spine repeat study done 8/2020 showed Improved syrinx in the thoracic spinal cord at the T9 level. Abnormal signal within the central aspect of the cord represent some myelomalacia versus mild prominence of the central canal. There is no residual mass effect upon the dorsal aspect of the cord. Repeat lumbar spine done 8/18/20 showed Moderate severity right foraminal stenosis at the L5-S1 level due to asymmetric loss of disc height and a diffusely bulging disc   He was referred to interventional neurology in [place] and underwent spinal angiogram and was told everything was ok from their standpoint. He is having worsening muscle spasms in the left lower extremity. No falls. He does not feel his incontinence has worsened since surgery. Repeat MRI lumbar spine done 11/5/20 showed Stable MRI scan of the lumbar spine, no interval change since previous study dated 18 August 2020. There is mild canal, moderate right and mild to moderate left-sided foraminal stenosis at L5-S1. There is mild canal and mild to moderate bilateral foraminal stenosis at L1-2, L2-3, L3-4 and L4-5. There is degenerative change involving the sacroiliac joints bilaterally. History provided by patient and review of medical record. Review of Systems  All systems were reviewed and were negative other than what is mentioned in the HPI    Prior to Visit Medications    Medication Sig Taking?  Authorizing Provider   ibuprofen (ADVIL;MOTRIN) 600 MG tablet Take 1 tablet by mouth 3 times daily as needed for Pain  ISHAAN Oshea - CNP   vitamin D-3 (CHOLECALCIFEROL) 125 MCG (5000 UT) TABS Take 1 tablet by mouth daily  ISHAAN Lind - CNP   cyclobenzaprine (FLEXERIL) 5 MG tablet Take 10 mg by mouth daily   Historical Provider, MD   acetaminophen (TYLENOL) 500 MG tablet Take 500 mg by mouth every 6 hours as needed for Pain  Historical Provider, MD       Social History     Tobacco Use    Smoking status: Never Smoker    Smokeless tobacco: Never Used   Substance Use Topics    Alcohol use: No    Drug use: No        Past Medical History:   Diagnosis Date    Asthma     does not know name of inhaler and hasnt used in over a yr    Ataxic gait     Developmental delay     Diet-controlled diabetes mellitus ([facility]     History of syrinx of spinal cord ([facility] 03/17/2020    Incomplete bladder emptying     Myelopathy of thoracic region     Osteopenia determined by x-ray     Spasm of muscle     Urinary dribbling     Urinary leakage     Uses walker     for standing and transfers    Uses wheelchair     can stand and transfer cannot walk due to leg weakness    Vitamin D deficiency 03/12/2020    Vitamin D 25OH 24   ,   Past Surgical History:   Procedure Laterality Date    CRANIOTOMY N/A 5/14/2020    Thoracic Spine and Laminectomy Extraction of Arachnoid Cyst/Band and 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 performed by Raul Hodges MD at [facility]  09/16/2020    IR angiogram carotid cerebral bilateral   ,   Social History     Tobacco Use    Smoking status: Never Smoker    Smokeless tobacco: Never Used   Substance Use Topics    Alcohol use: No    Drug use: No   ,   Family History   Problem Relation Age of Onset    Arthritis Mother     Diabetes Mother     Heart Disease Father     Cancer Neg Hx     High Blood Pressure Neg Hx     Stroke Neg Hx        PHYSICAL EXAMINATION:  [ INSTRUCTIONS:  \"[x]\" Indicates a positive item  \"[]\" Indicates a negative item  -- DELETE ALL ITEMS NOT EXAMINED]  Vital Signs: (As obtained by patient/caregiver or practitioner observation)    Blood pressure-  Heart rate-    Respiratory rate-    Temperature-  Pulse oximetry- Constitutional: [x] Appears well-developed and well-nourished [x] No apparent distress      [] Abnormal-   Mental status  [x] Alert and awake  [x] Oriented to person/place/time [x]Able to follow commands      Eyes:  EOM    [x]  Normal  [] Abnormal-  Sclera  [x]  Normal  [] Abnormal -         Discharge [x]  None visible  [] Abnormal -    HENT:   [x] Normocephalic, atraumatic. [] Abnormal   [x] Mouth/Throat: Mucous membranes are moist.     External Ears [x] Normal  [] Abnormal-     Neck: [x] No visualized mass     Pulmonary/Chest: [x] Respiratory effort normal.  [] No visualized signs of difficulty breathing or respiratory distress        [] Abnormal-      Musculoskeletal:   [] Normal gait with no signs of ataxia         [x] Normal range of motion of neck        [x] Abnormal- ataxic gait      Neurological:        [x] No Facial Asymmetry (Cranial nerve 7 motor function) (limited exam to video visit)          [x] No gaze palsy        [] Abnormal-         Skin:        [x] No significant exanthematous lesions or discoloration noted on facial skin         [] Abnormal-            Psychiatric:       [x] Normal Affect [x] No Hallucinations        [] Abnormal-     Other pertinent observable physical exam findings-   I reviewed the Patient MRI T spine and C spine from 3/2020. ASSESSMENT/PLAN:  1. Left leg numbness  2. Left leg weakness  3. Ataxic gait  4. Muscle spasm    This is a 24-year-old male who presents with left leg numbness and weakness as well as urinary incontinence  since March 2020. He underwent MRI brain done on 3/5/2020 that was normal.  MRI cervical spine showed moderate severity spinal canal stenosis at C3-4, C4-5 and C5-6. MRI thoracic spine done 3/5/2020 showed mass-effect upon the thoracic spinal cord at T7-8 through T9 levels. There is abnormal cord signal consistent with cord edema versus myelomalacia. Just below this, there is a cystic abnormality within the cord. This can represent a syrinx. There is a nodule of enhancement. The differential diagnosis includes a cystic mass in the thoracic spinal cord with enhancing nodule however, this does not explain the mass effect upon the dorsal aspect of the cord. Alternatively, this could be and arachnoid cyst dorsal to the thoracic cord compressing the cord. This could also represent subarachnoid adhesions distorting the cord. He was evaluated for this finding by neurosurgery and underwent thoracic laminectomy for  resection of intradural mass. He felt his leg numbness had improved some, but his weakness was persistent the same without much benefit with his power following the surgery. He did undergo spinal angiogram with interventional neurology in [facility] and was told everything was okay from their standpoint. He underwent repeat imaging studies showed improvement of the syrinx in the thoracic spinal cord at T9 level. He is having worsening muscle spasms in the left lower extremity, he denies falls. He does not feel his incontinence has worsened since surgery. He is currently on Flexeril. We will stop the Flexeril due to lack of benefit and start him on baclofen 10 mg 3 times a day to be further increased based on patient feedback. He was counseled on the importance of continuing to follow with neurosurgery as scheduled soon. After detailed discussion with the patient we agreed on the following plan. Plan:  1. Stop Flexeril due to lack of benefit  2. Start Baclofen 10 mg two times a day  3. Continue to follow up with neurosurgery as previously planned   4. Follow up in 2 weeks or sooner if needed. 5. Call if any questions or concerns. Corina Chávez is a 50 y.o. male being evaluated by a Virtual Visit (video visit) encounter to address concerns as mentioned above. A caregiver was present when appropriate.  Due to this being a TeleHealth encounter (During [facility]-29 public health emergency), evaluation of the following organ systems was limited: Vitals/Constitutional/EENT/Resp/CV/GI//MS/Neuro/Skin/Heme-Lymph-Imm. Pursuant to the emergency declaration under the [address] [place] [address] and the [employer] and [employer] Act, this Virtual Visit was conducted with patient's (and/or legal guardian's) consent, to reduce the patient's risk of exposure to COVID-19 and provide necessary medical care. The patient (and/or legal guardian) has also been advised to contact this office for worsening conditions or problems, and seek emergency medical treatment and/or call 911 if deemed necessary. Patient identification was verified at the start of the visit: Yes    Total time spent on this encounter: 60 minutes    Services were provided through a video synchronous discussion virtually to substitute for in-person clinic visit. Patient and provider were located at their individual homes. Veleta Dance MD on 11/19/2020 at 12:23 PM    An electronic signature was used to authenticate this note.

## 2020-11-30 ENCOUNTER — VIRTUAL VISIT (OUTPATIENT)
Dept: NEUROLOGY | Age: 45
End: 2020-11-30

## 2020-11-30 PROCEDURE — 99213 OFFICE O/P EST LOW 20 MIN: CPT | Performed by: PSYCHIATRY & NEUROLOGY

## 2020-11-30 NOTE — PATIENT INSTRUCTIONS
1. Continue Baclofen 10 mg two times a day  2. Continue to follow up with neurosurgery as previously planned   3. Follow up in 3 months or sooner if needed. 4. Call if any questions or concerns.

## 2020-11-30 NOTE — PROGRESS NOTES
2020    TELEHEALTH EVALUATION -- Audio/Visual (During IWQN-50 public health emergency)    HPI:    Melany Bennett (:  1975) has requested an audio/video evaluation for the following concern(s):  Follow up for left leg numbness and weakness since 2020. Symptoms are severe and constant. He reports benefit to changing his medications to baclofen for relief of his spasms. He is following with neurosurgery for the cervical, and thoracic myelopathy. He is scheduled to undergo further imaging studies, he saw them last week. He reports that the relief of his symptoms has been adequate with the introduction of the baclofen 10 mg twice a day, he does not feel that the dose of the medication needs to be increased, he denies any falls. He is evaluated via video link to discuss plan of care going forward. Review of Systems   Constitutional: Negative. Musculoskeletal: Positive for gait problem. Skin: Negative. Prior to Visit Medications    Medication Sig Taking?  Authorizing Provider   baclofen (LIORESAL) 10 MG tablet Take 1 tablet by mouth 2 times daily  ISHAAN Patel - CNP   ibuprofen (ADVIL;MOTRIN) 600 MG tablet Take 1 tablet by mouth 3 times daily as needed for Pain  ISHAAN Ortez - CNP   vitamin D-3 (CHOLECALCIFEROL) 125 MCG (5000 UT) TABS Take 1 tablet by mouth daily  ISHAAN Kaye - CNP   acetaminophen (TYLENOL) 500 MG tablet Take 500 mg by mouth every 6 hours as needed for Pain  Historical Provider, MD       Social History     Tobacco Use    Smoking status: Never Smoker    Smokeless tobacco: Never Used   Substance Use Topics    Alcohol use: No    Drug use: No        Past Surgical History:   Procedure Laterality Date    CRANIOTOMY N/A 2020    Thoracic Spine and Laminectomy Extraction of Arachnoid Cyst/Band and 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 performed by Veronica Cortez MD at [place] [id number]  2020     angiogram carotid cerebral bilateral   ,   Social History     Tobacco Use    Smoking status: Never Smoker    Smokeless tobacco: Never Used   Substance Use Topics    Alcohol use: No    Drug use: No   ,   Family History   Problem Relation Age of Onset    Arthritis Mother     Diabetes Mother     Heart Disease Father     Cancer Neg Hx     High Blood Pressure Neg Hx     Stroke Neg Hx        PHYSICAL EXAMINATION:  [ INSTRUCTIONS:  \"[x]\" Indicates a positive item  \"[]\" Indicates a negative item  -- DELETE ALL ITEMS NOT EXAMINED]  Vital Signs: (As obtained by patient/caregiver or practitioner observation)    Blood pressure-  Heart rate-    Respiratory rate-    Temperature-  Pulse oximetry-     Constitutional: [x] Appears well-developed and well-nourished [x] No apparent distress      [] Abnormal-   Mental status  [x] Alert and awake  [x] Oriented to person/place/time [x]Able to follow commands      Eyes:  EOM    [x]  Normal  [] Abnormal-  Sclera  [x]  Normal  [] Abnormal -         Discharge [x]  None visible  [] Abnormal -    HENT:   [x] Normocephalic, atraumatic. [] Abnormal   [x] Mouth/Throat: Mucous membranes are moist.     External Ears [x] Normal  [] Abnormal-     Neck: [x] No visualized mass     Pulmonary/Chest: [x] Respiratory effort normal.  [x] No visualized signs of difficulty breathing or respiratory distress        [] Abnormal-      Musculoskeletal:   [] Normal gait with no signs of ataxia         [x] Normal range of motion of neck        [x] Abnormal- ataxic gait.      Neurological:        [x] No Facial Asymmetry (Cranial nerve 7 motor function) (limited exam to video visit)          [x] No gaze palsy        [] Abnormal-         Skin:        [x] No significant exanthematous lesions or discoloration noted on facial skin         [] Abnormal-            Psychiatric:       [x] Normal Affect [x] No Hallucinations        [] Abnormal-     Other pertinent observable physical exam findings- ASSESSMENT/PLAN:  1. Left leg numbness  2. Left leg weakness  3. Ataxic gait  4. Muscle spasm  The patient denies any new symptoms, he is following actively with neurosurgery for his myelopathy, and left leg weakness, his spasms have improved significantly with the introduction of baclofen 10 mg twice a day he does not report any more symptoms of such. He does not feel there is a need to change dosage of medication. 1. Continue Baclofen 10 mg two times a day  2. Continue to follow up with neurosurgery as previously planned   3. Follow up in 3 months or sooner if needed. 4. Call if any questions or concerns. Return in about 3 months (around 2/28/2021). Donald Soares is a 50 y.o. male being evaluated by a Virtual Visit (video visit) encounter to address concerns as mentioned above. A caregiver was present when appropriate. Due to this being a TeleHealth encounter (During YWDXB-47 public health emergency), evaluation of the following organ systems was limited: Vitals/Constitutional/EENT/Resp/CV/GI//MS/Neuro/Skin/Heme-Lymph-Imm. Pursuant to the emergency declaration under the [address] [place] [address], [address] [place] [address] authority and the [employer] and [employer] Act, this Virtual Visit was conducted with patient's (and/or legal guardian's) consent, to reduce the patient's risk of exposure to COVID-19 and provide necessary medical care. The patient (and/or legal guardian) has also been advised to contact this office for worsening conditions or problems, and seek emergency medical treatment and/or call 911 if deemed necessary. Patient identification was verified at the start of the visit: Yes    Total time spent on this encounter: 15 min    Services were provided through a video synchronous discussion virtually to substitute for in-person clinic visit. Patient and provider were located at their individual homes.     --Rebeka Rubi MD on 11/30/2020 at 10:56 AM    An electronic signature was used to authenticate this note.

## 2021-01-21 ENCOUNTER — OFFICE VISIT (OUTPATIENT)
Dept: PHYSICAL MEDICINE AND REHAB | Age: 46
End: 2021-01-21

## 2021-01-21 VITALS
HEIGHT: 70 IN | DIASTOLIC BLOOD PRESSURE: 86 MMHG | BODY MASS INDEX: 34.07 KG/M2 | WEIGHT: 238 LBS | SYSTOLIC BLOOD PRESSURE: 138 MMHG

## 2021-01-21 DIAGNOSIS — R25.2 SPASTICITY: ICD-10-CM

## 2021-01-21 DIAGNOSIS — G89.29 OTHER CHRONIC PAIN: ICD-10-CM

## 2021-01-21 DIAGNOSIS — M79.2 NEUROPATHIC PAIN: Primary | ICD-10-CM

## 2021-01-21 PROCEDURE — 99215 OFFICE O/P EST HI 40 MIN: CPT | Performed by: ANESTHESIOLOGY

## 2021-01-21 RX ORDER — BACLOFEN 10 MG/1
10 TABLET ORAL 3 TIMES DAILY
Qty: 90 TABLET | Refills: 0 | Status: SHIPPED | OUTPATIENT
Start: 2021-01-21 | End: 2021-02-24 | Stop reason: DRUGHIGH

## 2021-01-21 RX ORDER — PREGABALIN 75 MG/1
75 CAPSULE ORAL 2 TIMES DAILY
Qty: 60 CAPSULE | Refills: 0 | Status: SHIPPED | OUTPATIENT
Start: 2021-01-21 | End: 2021-02-24 | Stop reason: SDUPTHER

## 2021-01-21 NOTE — PROGRESS NOTES
Chronic Pain Clinic Note     Encounter Date: 1/21/21    Subjective:   Chief Complaint:   Chief Complaint   Patient presents with    New Patient     sprinx of spinal cord       History of Present Illness:   Emir Clifford is a 50 y.o. male seen in the Pain Clinic initially on 01/21/21 upon request from Suzie Resendiz, [address] ([address] [place] [address]) for his history of lower limb spasticity. He underwent an arachnoid cyst resection with Dr. Isabel Menchaca on May 14, 2020 after worsening bilateral leg pain. He notes about 2 months after his surgery his legs have been increasingly worse. He describes his pain to be in bilateral legs from the waist down that he describes as a constant 10/10 pain. He states the pain is a constant aching, burning pain. It is worse with any ambulation. He states is pain is improved with rest. He uses a walker for ambulation due to pain and weakness in his legs. He has been taking Baclofen 10 mg twice a day which does not help too much. He denies any saddle anesthesia or bowel/bladder incontinence. He deals with a lot of tone/spasticity in his legs. He reports stretching about 4 times a day. He was previously working at [employer] prior to his surgery.        History of Interventions:   Surgery: arachnoid cyst resection - Dr. Ksenia Lauren (5/14/20)  Injections: None    Current Treatment Medications:   Baclofen 10 mg BID    Historical Treatment Medications:   None      Past Medical History:   Diagnosis Date    Asthma     does not know name of inhaler and hasnt used in over a yr    Ataxic gait     Developmental delay     Diet-controlled diabetes mellitus ([facility]     History of syrinx of spinal cord ([facility] 03/17/2020    Incomplete bladder emptying     Myelopathy of thoracic region     Osteopenia determined by x-ray     Spasm of muscle     Urinary dribbling     Urinary leakage     Uses walker     for standing and transfers    Uses wheelchair     can stand and transfer cannot walk due to leg weakness    Vitamin D deficiency 03/12/2020    Vitamin D 25OH 24       Past Surgical History:   Procedure Laterality Date    CRANIOTOMY N/A 5/14/2020    Thoracic Spine and Laminectomy Extraction of Arachnoid Cyst/Band and 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 performed by Kamilla Godoy MD at [facility]  09/16/2020    IR angiogram carotid cerebral bilateral       Family History   Problem Relation Age of Onset    Arthritis Mother     Diabetes Mother     Heart Disease Father     Cancer Neg Hx     High Blood Pressure Neg Hx     Stroke Neg Hx        Social History     Socioeconomic History    Marital status: Single     Spouse name: Not on file    Number of children: Not on file    Years of education: Not on file    Highest education level: Not on file   Occupational History    Not on file   Social Needs    Financial resource strain: Not on file    Food insecurity     Worry: Not on file     Inability: Not on file    Transportation needs     Medical: Not on file     Non-medical: Not on file   Tobacco Use    Smoking status: Never Smoker    Smokeless tobacco: Never Used   Substance and Sexual Activity    Alcohol use: No    Drug use: No    Sexual activity: Not Currently   Lifestyle    Physical activity     Days per week: Not on file     Minutes per session: Not on file    Stress: Not on file   Relationships    Social connections     Talks on phone: Not on file     Gets together: Not on file     Attends Hoahaoism service: Not on file     Active member of club or organization: Not on file     Attends meetings of clubs or organizations: Not on file     Relationship status: Not on file    Intimate partner violence     Fear of current or ex partner: Not on file     Emotionally abused: Not on file     Physically abused: Not on file     Forced sexual activity: Not on file   Other Topics Concern    Not on file   Social History Narrative    Not on file       Medications & Allergies:   Current Outpatient Medications   Medication Instructions    acetaminophen (TYLENOL) 500 mg, Oral, EVERY 6 HOURS PRN    baclofen (LIORESAL) 10 mg, Oral, 3 TIMES DAILY    ibuprofen (ADVIL;MOTRIN) 600 mg, Oral, 3 TIMES DAILY PRN    pregabalin (LYRICA) 75 mg, Oral, 2 TIMES DAILY    vitamin D-3 (CHOLECALCIFEROL) 5,000 Units, Oral, DAILY       Allergies   Allergen Reactions    Norco [Hydrocodone-Acetaminophen] Nausea And Vomiting       Review of Systems:   Constitutional: negative for weight changes or fevers  Genitourinary: negative for bowel/bladder incontinence   Musculoskeletal: positive for leg pain  Neurological: admits to leg weakness   Behavioral/Psych: negative for anxiety/depression   All other systems reviewed and are negative    Objective:     Vitals:    01/21/21 1536   BP: 138/86       Constitutional: Pleasant, no acute distress   Head: Normocephalic, atraumatic   Eyes: Conjunctivae normal   Neck: Supple, symmetrical   Lungs: Normal respiratory effort, non-labored breathing   Cardiovascular: Limbs warm and well perfused   Abdomen: Non-protruded   Musculoskeletal: Muscle bulk atrophy in lower limbs but no gross deformities   · Lumbar Spine: Lumbar paraspinals tender bilaterally. SLR neg bilaterally. Neurological: Cranial nerves II-XII grossly intact. · Gait - Spastic gait. Ambulates with assist device. · Motor: 4/5 motor strength in bilateral HF, KE, KF, ADF, APF  · Sensory: LT sensation diminished in lower limbs diffusely  · Reflexes: hyper-reflexia in bilateral achilles and bilateral patellar reflexes, sustained ankle clonus b/l  Skin: No rashes or lesions visible in lower limbs  Psychological: Cooperative, no exaggerated pain behaviors       Assessment:    Diagnosis Orders   1. Neuropathic pain  pregabalin (LYRICA) 75 MG capsule   2. Spasticity     3. Other chronic pain         Connie Santos is a 50 y.o.male presenting to the pain clinic for evaluation of lower limb spasticity.   At this point, we will increase his baclofen to 10 mg 3 times a day and likely will have to be up to 20 mg 3 times a day to help with the spasticity in the lower limbs. In addition, I have started him on Lyrica 75 mg twice a day for neuropathic pain in the lower limbs. We will follow-up in 4 weeks. Plan: The following treatment recommendations and plan were discussed in detail with Ksenia Barry. Adjuvants: In light of the presence of a neuropathic component of pain, we will start Lyrica at 75 mg twice a day. For continued chronic pain with associated musculoskeletal component, we will increase his Baclofen to 10 mg three times a day. Multidisciplinary Pain Management:   In the presence of complex, chronic, and multi-factorial pain, the importance of a multidisciplinary approach to pain management in the patients management regimen was emphasized and discussed in great detail. PHYSICAL THERAPY: I advised the patient to continue gentle stretching atleast 4 times a day.     Referrals:  None    Prescriptions Written This Visit:   Baclofen 10 mg   Lyrica 75 mg     Follow-up: 4 weeks      Rafiq Justice DO  Interventional Pain Management/PM&R   [place]

## 2021-02-24 ENCOUNTER — OFFICE VISIT (OUTPATIENT)
Dept: PHYSICAL MEDICINE AND REHAB | Age: 46
End: 2021-02-24
Payer: MEDICAID

## 2021-02-24 VITALS
SYSTOLIC BLOOD PRESSURE: 136 MMHG | HEIGHT: 70 IN | WEIGHT: 238 LBS | TEMPERATURE: 97.5 F | BODY MASS INDEX: 34.07 KG/M2 | DIASTOLIC BLOOD PRESSURE: 96 MMHG

## 2021-02-24 DIAGNOSIS — G95.0 SYRINX OF SPINAL CORD (HCC): ICD-10-CM

## 2021-02-24 DIAGNOSIS — M79.2 NEUROPATHIC PAIN: ICD-10-CM

## 2021-02-24 DIAGNOSIS — R25.2 SPASTICITY: Primary | ICD-10-CM

## 2021-02-24 DIAGNOSIS — G89.29 OTHER CHRONIC PAIN: ICD-10-CM

## 2021-02-24 PROCEDURE — 99214 OFFICE O/P EST MOD 30 MIN: CPT | Performed by: ANESTHESIOLOGY

## 2021-02-24 RX ORDER — BACLOFEN 20 MG/1
20 TABLET ORAL 3 TIMES DAILY
Qty: 90 TABLET | Refills: 0 | Status: SHIPPED | OUTPATIENT
Start: 2021-02-24 | End: 2021-08-02 | Stop reason: SDUPTHER

## 2021-02-24 RX ORDER — PREGABALIN 75 MG/1
75 CAPSULE ORAL 2 TIMES DAILY
Qty: 60 CAPSULE | Refills: 0 | Status: SHIPPED | OUTPATIENT
Start: 2021-02-24 | End: 2021-05-28 | Stop reason: ALTCHOICE

## 2021-02-24 NOTE — PATIENT INSTRUCTIONS
The following treatment recommendations and plan were discussed in detail with Patty Chavira. Adjuvants: In light of the presence of a neuropathic component of pain, we will continue Lyrica at 75 mg twice a day. For continued chronic pain with associated musculoskeletal component, we will continue his Baclofen to 10 mg three times a day. Multidisciplinary Pain Management:   In the presence of complex, chronic, and multi-factorial pain, the importance of a multidisciplinary approach to pain management in the patients management regimen was emphasized and discussed in great detail. PHYSICAL THERAPY: I advised the patient to continue gentle stretching atleast 4 times a day.     Referrals:  None    Prescriptions Written This Visit:   Baclofen 20 mg   Lyrica 75 mg     Follow-up: 4 weeks

## 2021-03-18 ENCOUNTER — OFFICE VISIT (OUTPATIENT)
Dept: PHYSICAL MEDICINE AND REHAB | Age: 46
End: 2021-03-18
Payer: COMMERCIAL

## 2021-03-18 VITALS
HEIGHT: 70 IN | TEMPERATURE: 97.8 F | SYSTOLIC BLOOD PRESSURE: 138 MMHG | DIASTOLIC BLOOD PRESSURE: 84 MMHG | WEIGHT: 238 LBS | BODY MASS INDEX: 34.07 KG/M2

## 2021-03-18 DIAGNOSIS — R25.2 SPASTICITY: Primary | ICD-10-CM

## 2021-03-18 DIAGNOSIS — G95.0 SYRINX OF SPINAL CORD (HCC): ICD-10-CM

## 2021-03-18 DIAGNOSIS — M79.2 NEUROPATHIC PAIN: ICD-10-CM

## 2021-03-18 DIAGNOSIS — G89.29 OTHER CHRONIC PAIN: ICD-10-CM

## 2021-03-18 PROCEDURE — 99214 OFFICE O/P EST MOD 30 MIN: CPT | Performed by: ANESTHESIOLOGY

## 2021-03-18 NOTE — PATIENT INSTRUCTIONS
The following treatment recommendations and plan were discussed in detail with Mari Kwok.      Adjuvants: In light of the presence of a neuropathic component of pain but noted leg swelling we will stop his Lyrica to see if this is causing the swelling.     For continued chronic pain with associated musculoskeletal component, we will continue his Baclofen to 20 mg three times a day.     Multidisciplinary Pain Management:   In the presence of complex, chronic, and multi-factorial pain, the importance of a multidisciplinary approach to pain management in the patients management regimen was emphasized and discussed in great detail.    PHYSICAL THERAPY: I advised the patient to continue gentle stretching atleast 4 times a day.     Referrals:  None     Prescriptions Written This Visit:   None     Follow-up: 4 weeks

## 2021-03-18 NOTE — PROGRESS NOTES
Chronic Pain Clinic Note     Encounter Date: 3/18/21    Subjective:   Chief Complaint:   Chief Complaint   Patient presents with    Follow-up       History of Present Illness:   Samina Guadarrama is a 50 y.o. male seen in the clinic initially on 01/21/21 upon request from Jennifer Lerner ([address]) for his history of lower limb spasticity. He underwent an arachnoid cyst resection with Dr. Araceli Pierre on May 14, 2020 after worsening bilateral leg pain. He notes about 2 months after his surgery his legs have been increasingly worse. He describes his pain to be in bilateral legs from the waist down that he describes as a constant 10/10 pain. He states the pain is a constant aching, burning pain. It is worse with any ambulation. He states is pain is improved with rest. He uses a walker for ambulation due to pain and weakness in his legs. He has been taking Baclofen 10 mg twice a day which does not help too much. He denies any saddle anesthesia or bowel/bladder incontinence. He deals with a lot of tone/spasticity in his legs. He reports stretching about 4 times a day. He was previously working at [employer] prior to his surgery. Today, 3/18/2021, patient presents for planned follow-up of lower limb spasticity and leg pain. He feels like he is doing really well with his medications. He feels like the Lyrica is really helping with his overall leg pain. He has developed leg swelling in bilateral legs over the last 2 weeks. He denies any shortness of breath or chest pain. He feels like he is ambulating better with the use of Lyrica. He continues to use of assistive walking device. He does say he has worsening tightness and pain on the right flank where the left. He endorses continuing with his stretching exercise daily.       History of Interventions:   Surgery: arachnoid cyst resection - Dr. Va Holder (5/14/20)  Injections: None    Current Treatment Medications:   Baclofen 20 mg 3 times daily  Lyrica 75 mg twice daily    Historical Treatment Medications:   None      Past Medical History:   Diagnosis Date    Asthma     does not know name of inhaler and hasnt used in over a yr    Ataxic gait     Developmental delay     [facility]-controlled diabetes mellitus ([facility] [facility]     History of syrinx of spinal cord ([facility] [address] [facility] 03/17/2020    Incomplete bladder emptying     Myelopathy of thoracic region     Osteopenia determined by x-ray     Spasm of muscle     Urinary dribbling     Urinary leakage     Uses walker     for standing and transfers    Uses wheelchair     can stand and transfer cannot walk due to leg weakness    Vitamin D deficiency 03/12/2020    Vitamin D 25OH 24       Past Surgical History:   Procedure Laterality Date    CRANIOTOMY N/A 5/14/2020    Thoracic Spine and Laminectomy Extraction of Arachnoid Cyst/Band and 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 performed by Brian Craig MD at [address] [place] [address]  09/16/2020    IR angiogram carotid cerebral bilateral       Family History   Problem Relation Age of Onset    Arthritis Mother     Diabetes Mother     Heart Disease Father     Cancer Neg Hx     High Blood Pressure Neg Hx     Stroke Neg Hx        Social History     Socioeconomic History    Marital status: Single     Spouse name: Not on file    Number of children: Not on file    Years of education: Not on file    Highest education level: Not on file   Occupational History    Not on file   Social Needs    Financial resource strain: Not on file    Food insecurity     Worry: Not on file     Inability: Not on file    Transportation needs     Medical: Not on file     Non-medical: Not on file   Tobacco Use    Smoking status: Never Smoker    Smokeless tobacco: Never Used   Substance and Sexual Activity    Alcohol use: No    Drug use: No    Sexual activity: Not Currently   Lifestyle    Physical activity     Days per week: Not on file     Minutes per session: Not on file    Stress: Not on file   Relationships    Social connections     Talks on phone: Not on file     Gets together: Not on file     Attends Judaism service: Not on file     Active member of club or organization: Not on file     Attends meetings of clubs or organizations: Not on file     Relationship status: Not on file    Intimate partner violence     Fear of current or ex partner: Not on file     Emotionally abused: Not on file     Physically abused: Not on file     Forced sexual activity: Not on file   Other Topics Concern    Not on file   Social History Narrative    Not on file       Medications & Allergies:   Current Outpatient Medications   Medication Instructions    acetaminophen (TYLENOL) 500 mg, Oral, EVERY 6 HOURS PRN    baclofen (LIORESAL) 20 mg, Oral, 3 TIMES DAILY    ibuprofen (ADVIL;MOTRIN) 600 mg, Oral, 3 TIMES DAILY PRN    pregabalin (LYRICA) 75 mg, Oral, 2 TIMES DAILY    vitamin D-3 (CHOLECALCIFEROL) 5,000 Units, Oral, DAILY       Allergies   Allergen Reactions    Norco [Hydrocodone-Acetaminophen] Nausea And Vomiting       Review of Systems:   Constitutional: negative for weight changes or fevers  Genitourinary: negative for bowel/bladder incontinence   Musculoskeletal: positive for leg pain  Neurological: admits to leg weakness   Behavioral/Psych: negative for anxiety/depression   All other systems reviewed and are negative    Objective:     Vitals:    03/18/21 1123   BP: 138/84   Temp: 97.8 °F (36.6 °C)       Constitutional: Pleasant, no acute distress   Head: Normocephalic, atraumatic   Eyes: Conjunctivae normal   Neck: Supple, symmetrical   Lungs: Normal respiratory effort, non-labored breathing   Cardiovascular: Limbs warm and well perfused   Abdomen: Non-protruded   Musculoskeletal: Increased tone/spasticity in lower limbs (improved)  · Lumbar Spine: Lumbar paraspinals tender bilaterally. SLR neg bilaterally. Neurological: Cranial nerves II-XII grossly intact. · Gait - Spastic gait.  Ambulates with assist device. · Motor: 4/5 motor strength in bilateral HF, KE, KF, ADF, APF  · Sensory: LT sensation diminished in lower limbs diffusely  · Reflexes: hyper-reflexia in bilateral achilles and bilateral patellar reflexes, sustained ankle clonus b/l  Skin: No rashes or lesions visible in lower limbs  Psychological: Cooperative, no exaggerated pain behaviors       Assessment:    Diagnosis Orders   1. Spasticity     2. Neuropathic pain     3. Other chronic pain     4. Syrinx of spinal cord ([address]         Brittney Pineda is a 50 y.o.male presenting to the pain clinic for evaluation of lower limb spasticity. At this point, we will increase his baclofen to 10 mg 3 times a day and likely will have to be up to 20 mg 3 times a day to help with the spasticity in the lower limbs. He has obtained significant benefit for his neuropathic pain coverage with Lyrica 75 mg twice a day but has developed new onset lower limb edema. I advised him to stop this medication and let us know in about 2 weeks if this resolves. In addition, he is obtaining good benefit on baclofen 20 mg 3 times a day in regards to his overall spasticity/tone and ambulation. We will likely follow-up in 2-4 weeks for reevaluation for the lower limb swelling and potentially investigate a new neuropathic agent. Plan: The following treatment recommendations and plan were discussed in detail with Brittney Pineda. Adjuvants: In light of the presence of a neuropathic component of pain but noted leg swelling we will stop his Lyrica to see if this is causing the swelling. For continued chronic pain with associated musculoskeletal component, we will continue his Baclofen to 20 mg three times a day. Multidisciplinary Pain Management:   In the presence of complex, chronic, and multi-factorial pain, the importance of a multidisciplinary approach to pain management in the patients management regimen was emphasized and discussed in great detail. PHYSICAL THERAPY: I advised the patient to continue gentle stretching atleast 4 times a day. Referrals:  None    Prescriptions Written This Visit:   None    Follow-up: 4 weeks    I spent 30 minutes with the patient greater than 50% this time was spent discussing medication management, discussing side effects of Lyrica and cessation of this medication due to the leg swelling, and the potential medications for other neuropathic pain.       Sarah Odonnell, DO  Interventional Pain Management/PM&R   [place]

## 2021-04-19 ENCOUNTER — OFFICE VISIT (OUTPATIENT)
Dept: PHYSICAL MEDICINE AND REHAB | Age: 46
End: 2021-04-19
Payer: COMMERCIAL

## 2021-04-19 VITALS
HEIGHT: 70 IN | BODY MASS INDEX: 34.07 KG/M2 | WEIGHT: 238 LBS | SYSTOLIC BLOOD PRESSURE: 138 MMHG | DIASTOLIC BLOOD PRESSURE: 80 MMHG

## 2021-04-19 DIAGNOSIS — R25.2 SPASTICITY: Primary | ICD-10-CM

## 2021-04-19 DIAGNOSIS — M79.2 NEUROPATHIC PAIN: ICD-10-CM

## 2021-04-19 DIAGNOSIS — G89.29 OTHER CHRONIC PAIN: ICD-10-CM

## 2021-04-19 DIAGNOSIS — G95.0 SYRINX OF SPINAL CORD (HCC): ICD-10-CM

## 2021-04-19 PROCEDURE — 99214 OFFICE O/P EST MOD 30 MIN: CPT | Performed by: ANESTHESIOLOGY

## 2021-04-19 RX ORDER — LEVETIRACETAM 250 MG/1
250 TABLET ORAL 2 TIMES DAILY
Qty: 60 TABLET | Refills: 2 | Status: SHIPPED | OUTPATIENT
Start: 2021-04-19 | End: 2021-07-19 | Stop reason: DRUGHIGH

## 2021-04-19 NOTE — PROGRESS NOTES
Chronic Pain Clinic Note     Encounter Date: 4/19/21    Subjective:   Chief Complaint:   Chief Complaint   Patient presents with    Follow-up       History of Present Illness:   Ashlyn Britt is a 50 y.o. male seen in the clinic initially on 01/21/21 upon request from Jennifer Santana ([address] [place] [address]) for his history of lower limb spasticity. He underwent an arachnoid cyst resection with Dr. Flora Millan on May 14, 2020 after worsening bilateral leg pain. He notes about 2 months after his surgery his legs have been increasingly worse. He describes his pain to be in bilateral legs from the waist down that he describes as a constant 10/10 pain. He states the pain is a constant aching, burning pain. It is worse with any ambulation. He states is pain is improved with rest. He uses a walker for ambulation due to pain and weakness in his legs. He has been taking Baclofen 10 mg twice a day which does not help too much. He denies any saddle anesthesia or bowel/bladder incontinence. He deals with a lot of tone/spasticity in his legs. He reports stretching about 4 times a day. He was previously working at [employer] prior to his surgery. Today, 4/19/2021, patient presents for planned follow-up for management of lower limb spasticity and chronic pain. He reports improvement in his leg swelling since stopping his Lyrica. He states that his leg pain is definitely not worse since stopping this. He is interested in pursuing another neuropathic agent to help treat his leg pain. He also is interested in revisitation with physical therapy to help build up some of his leg strength. He denies any new symptoms of focal leg weakness, new paresthesias, saddle anesthesia, bowel/bladder incontinence. He is slated to follow-up with neurosurgery team for a repeat MRI.       History of Interventions:   Surgery: arachnoid cyst resection - Dr. Loja Nearing (5/14/20)  Injections: None    Current Treatment Medications:   Baclofen 20 mg 3 times daily    Historical Treatment Medications:   Lyricaleg swelling      Past Medical History:   Diagnosis Date    Asthma     does not know name of inhaler and hasnt used in over a yr    Ataxic gait     Developmental delay     Diet-controlled diabetes mellitus ([facility] [address]     History of syrinx of spinal cord ([facility] [address] 03/17/2020    Incomplete bladder emptying     Myelopathy of thoracic region     Osteopenia determined by x-ray     Spasm of muscle     Urinary dribbling     Urinary leakage     Uses walker     for standing and transfers    Uses wheelchair     can stand and transfer cannot walk due to leg weakness    Vitamin D deficiency 03/12/2020    Vitamin D 25OH 24       Past Surgical History:   Procedure Laterality Date    CRANIOTOMY N/A 5/14/2020    Thoracic Spine and Laminectomy Extraction of Arachnoid Cyst/Band and 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 performed by Connie Tadeo MD at [place] [address]  09/16/2020    IR angiogram carotid cerebral bilateral       Family History   Problem Relation Age of Onset    Arthritis Mother     Diabetes Mother     Heart Disease Father     Cancer Neg Hx     High Blood Pressure Neg Hx     Stroke Neg Hx        Social History     Socioeconomic History    Marital status: Single     Spouse name: Not on file    Number of children: Not on file    Years of education: Not on file    Highest education level: Not on file   Occupational History    Not on file   Social Needs    Financial resource strain: Not on file    Food insecurity     Worry: Not on file     Inability: Not on file    Transportation needs     Medical: Not on file     Non-medical: Not on file   Tobacco Use    Smoking status: Never Smoker    Smokeless tobacco: Never Used   Substance and Sexual Activity    Alcohol use: No    Drug use: No    Sexual activity: Not Currently   Lifestyle    Physical activity     Days per week: Not on file     Minutes per session: Not on file    Stress: Not on file   Relationships    Social connections     Talks on phone: Not on file     Gets together: Not on file     Attends Jain service: Not on file     Active member of club or organization: Not on file     Attends meetings of clubs or organizations: Not on file     Relationship status: Not on file    Intimate partner violence     Fear of current or ex partner: Not on file     Emotionally abused: Not on file     Physically abused: Not on file     Forced sexual activity: Not on file   Other Topics Concern    Not on file   Social History Narrative    Not on file       Medications & Allergies:   Current Outpatient Medications   Medication Instructions    acetaminophen (TYLENOL) 500 mg, Oral, EVERY 6 HOURS PRN    baclofen (LIORESAL) 20 mg, Oral, 3 TIMES DAILY    ibuprofen (ADVIL;MOTRIN) 600 mg, Oral, 3 TIMES DAILY PRN    levETIRAcetam (KEPPRA) 250 mg, Oral, 2 TIMES DAILY    pregabalin (LYRICA) 75 mg, Oral, 2 TIMES DAILY    vitamin D-3 (CHOLECALCIFEROL) 5,000 Units, Oral, DAILY       Allergies   Allergen Reactions    Norco [Hydrocodone-Acetaminophen] Nausea And Vomiting       Review of Systems:   Constitutional: negative for weight changes or fevers  Genitourinary: negative for bowel/bladder incontinence   Musculoskeletal: positive for leg pain  Neurological: admits to leg weakness   Behavioral/Psych: negative for anxiety/depression   All other systems reviewed and are negative    Objective:     Vitals:    04/19/21 1549   BP: 138/80       Constitutional: Pleasant, no acute distress   Head: Normocephalic, atraumatic   Eyes: Conjunctivae normal   Neck: Supple, symmetrical   Lungs: Normal respiratory effort, non-labored breathing   Cardiovascular: Limbs warm and well perfused   Abdomen: Non-protruded   Musculoskeletal: Increased tone/spasticity in lower limbs (improved)  · Lumbar Spine: Lumbar paraspinals tender bilaterally. SLR neg bilaterally.    Neurological: Cranial nerves II-XII grossly intact. · Gait - Spastic gait. Ambulates with assist device. · Motor: 4/5 motor strength in bilateral HF, KE, KF, ADF, APF  · Sensory: LT sensation diminished in lower limbs diffusely  · Reflexes: hyper-reflexia in bilateral achilles and bilateral patellar reflexes, sustained ankle clonus b/l  Skin: No rashes or lesions visible in lower limbs  Psychological: Cooperative, no exaggerated pain behaviors       Assessment:    Diagnosis Orders   1. Spasticity  [facility] [place]   2. Syrinx of spinal cord [facility])  [address]   3. Neuropathic pain     4. Other chronic pain         Samaria Patterson is a 50 y.o.male presenting to the pain clinic for evaluation of lower limb spasticity. At this point, we will increase his baclofen to 10 mg 3 times a day and likely will have to be up to 20 mg 3 times a day to help with the spasticity in the lower limbs. He has obtained significant benefit for his neuropathic pain coverage with Lyrica 75 mg twice a day but has developed new onset lower limb edema. I advised him to stop this medication and let us know in about 2 weeks if this resolves. In addition, he is obtaining good benefit on baclofen 20 mg 3 times a day in regards to his overall spasticity/tone and ambulation. We will likely follow-up in 2-4 weeks for reevaluation for the lower limb swelling and potentially investigate a new neuropathic agent. Plan: The following treatment recommendations and plan were discussed in detail with Samaria Patterson. Adjuvants: In light of the presence of a neuropathic component of pain and side effects to Lyrica, I started him on Keppra 250 mg twice a day. For continued chronic pain with associated musculoskeletal component, we will continue his Baclofen to 20 mg three times a day.     Multidisciplinary Pain Management:   In the presence of complex, chronic, and multi-factorial pain, the importance of a multidisciplinary approach to pain management in the patients management regimen was emphasized and discussed in great detail. PHYSICAL THERAPY: I advised the patient to continue gentle stretching atleast 4 times a day.     Referrals:  Physical Therapy - at the [facility]- [facility]     Prescriptions Written This Visit:   Edda Silvestre    Follow-up: 12 weeks      Valentina Ford DO  Interventional Pain Management/PM&R   [place]

## 2021-04-23 ENCOUNTER — HOSPITAL ENCOUNTER (OUTPATIENT)
Dept: PHYSICAL THERAPY | Age: 46
Setting detail: THERAPIES SERIES
Discharge: HOME | End: 2021-04-23
Payer: COMMERCIAL

## 2021-04-23 PROCEDURE — 97162 PT EVAL MOD COMPLEX 30 MIN: CPT

## 2021-04-23 NOTE — PROGRESS NOTES
** PLEASE SIGN, DATE AND TIME CERTIFICATION BELOW AND RETURN TO [facility] OUTPATIENT REHABILITATION (FAX #: [phone]). ATTEST/CO-SIGN IF ACCESSING VIA [employer] THANK YOU.**    I certify that I have examined the patient below and determined that Physical Medicine and Rehabilitation service is necessary and that I approve the established plan of care for up to 90 days or as specifically noted. Attestation, signature or co-signature of physician indicates approval of certification requirements.    ________________________ ____________ __________  Physician Signature   Date   Time  [address] [facility]  PHYSICAL THERAPY  [x] EVALUATION  [] DAILY NOTE (LAND) [] DAILY NOTE (AQUATIC ) [] PROGRESS NOTE [] DISCHARGE NOTE    [] [address] [facility]   [] [place] [address]    [] [facility]   [x] Gloria Seen    Date: 2021  Patient Name:  Nick Jain  : 1975  MRN: [id number]  CSN: [id number]    Referring Practitioner Julio Cesar Mena DO   Diagnosis Cramp and spasm [R25.2]    Treatment Diagnosis Difficulty walking    Date of Evaluation 21    Additional Pertinent History Spinal cord injury; incontinence       Functional Outcome Measure Used Tinetti   Functional Outcome Score  (21)       Insurance: Primary: Payor: MEDICAID [place] /  /  / ,   Secondary:    Authorization Information: Pre-certification not needed; unlimited PT/OT/ST visits; aquatic therapy is covered; modalities covered except for hot/cold packs    Visit # 1, 1/10 for progress note   Visits Allowed: Unlimited   Recertification Date:    Physician Follow-Up:    Physician Orders:    History of Present Illness: Elaina Valdes returns to therapy to address progressive weakness of bilateral LE secondary to a spinal cord injury. Elaina Valdes had surgery and recovered well initially, however a few weeks later he noticed increase in tone and weakness in bilateral LE.       SUBJECTIVE: TREATMENT   Precautions:    Pain:     X in shaded column indicates activity completed today   Modalities Parameters/  Location  Notes                     Manual Therapy Time/Technique  Notes                     Exercise/Intervention   Notes                                                                                  Specific Interventions Next Treatment: NuStep; static and dynamic balance and gait activities    Activity/Treatment Tolerance:  [x]  Patient tolerated treatment well  []  Patient limited by fatigue  []  Patient limited by pain   []  Patient limited by medical complications  []  Other:     Assessment: Denisse Bond is a 50 yr old gentleman returning to therapy to address progressive weakness and gait difficulties secondary to spinal cord injury. Since he was last seen summer of 2020 Denisse Bond has become more unsteady and overall endurane has declined. He will benefit from PT to work to improve endurance and strength while establishing a functional HEP focusing on improving balance and gait to minimize fall risk. Body Structures/Functions/Activity Limitations: edema, impaired activity tolerance, impaired balance, impaired cognition, impaired endurance, impaired motor control, impaired sensation, impaired strength, pain and abnormal gait  Prognosis: fair    GOALS:  Patient Goal: Improve LE strength so he can walk and get back to work    Short Term Goals:  Time Frame: 4 weeks  1. Denisse Bond will perform 8+ sit-stands within a 30 sec time frame as his eccentric quad strength improves. 2. Rian's Tinetti score will improve to >18/28 indicating mild fall risk. 3. Denisse Bond will be able to walk 100+yds with his FWW without reporting fatigue and/or without legs giving out as his stamina improves. Long Term Goals:  Time Frame: 8 weeks  1. Denisse Bond will be discharged with independent HEP. 2. Rian's Tinetti score will improve to >20/28 indicating no fall risk.   3. Denisse Bond will be able to perform 10+ sit to stands in 30 sec indicating no fall risk. Patient Education:   [x]  HEP/Education Completed: Plan of Care, Goals,    Medbridge Access Code:  []  No new Education completed  []  Reviewed Prior HEP      [x]  Patient verbalized and/or demonstrated understanding of education provided. []  Patient unable to verbalize and/or demonstrate understanding of education provided. Will continue education. [x]  Barriers to learning: cognitive delays apparent     PLAN:  Treatment Recommendations: Strengthening, Range of Motion, Balance Training, Endurance Training, Gait Training, Stair Training, Neuromuscular Re-education, Pain Management, Home Exercise Program, Patient Education and Aquatics    [x]  Plan of care initiated. Plan to see patient 2 times per week for 8 weeks to address the treatment planned outlined above.   []  Continue with current plan of care  []  Modify plan of care as follows:    []  Hold pending physician visit  []  Discharge    Time In 0845   Time Out 0925   Timed Code Minutes: 0 min   Total Treatment Time: 40 min       Electronically Signed by: Roman Solorio \"Mable\" Krish Vaughn DPT  [id number]

## 2021-04-27 ENCOUNTER — HOSPITAL ENCOUNTER (OUTPATIENT)
Dept: PHYSICAL THERAPY | Age: 46
Setting detail: THERAPIES SERIES
Discharge: HOME | End: 2021-04-27
Payer: COMMERCIAL

## 2021-04-27 PROCEDURE — 97112 NEUROMUSCULAR REEDUCATION: CPT

## 2021-04-27 PROCEDURE — 97530 THERAPEUTIC ACTIVITIES: CPT

## 2021-04-27 PROCEDURE — 97110 THERAPEUTIC EXERCISES: CPT

## 2021-04-27 NOTE — PROGRESS NOTES
[address] [facility]  PHYSICAL THERAPY  [] EVALUATION  [x] DAILY NOTE (LAND) [] DAILY NOTE (AQUATIC ) [] PROGRESS NOTE [] DISCHARGE NOTE    [] [address] [facility]   [] [address]    [] [address] [place]   [x] Corbin Alarcon    Date: 2021  Patient Name:  Noemi Carrasquillo  : 1975  MRN: [id number]  CSN: [id number]    Referring Practitioner Charla Christina DO   Diagnosis Cramp and spasm [R25.2]    Treatment Diagnosis Difficulty walking    Date of Evaluation 21    Additional Pertinent History Spinal cord injury; incontinence       Functional Outcome Measure Used Tinetti   Functional Outcome Score  (21)       Insurance: Primary: Payor: Jetty Councilman /  /  / ,   Secondary:    Authorization Information: Pre-certification not needed; unlimited PT/OT/ST visits; aquatic therapy is covered; modalities covered except for hot/cold packs    Visit # 2, 2/10 for progress note   Visits Allowed: 144 units allowed    Recertification Date:    Physician Follow-Up:    Physician Orders:    History of Present Illness: Alda Khan returns to therapy to address progressive weakness of bilateral LE secondary to a spinal cord injury. Alda Khan had surgery and recovered well initially, however a few weeks later he noticed increase in tone and weakness in bilateral LE.       SUBJECTIVE: Doing okay; brought in his [email or web address] card    TREATMENT   Precautions:    Pain:     X in shaded column indicates activity completed today   Modalities Parameters/  Location  Notes                     Manual Therapy Time/Technique  Notes                     Exercise/Intervention   Notes   NuStep 5 min  Level 4 x UE: 12; seat: 9          // bars: forward, marching and side stepping 2x  x           Airex: anterior/poterior wt shifts; marching; squats    x    Sit-stand squats  10x  x    Seated hip abd/add; hip openers 10x  x Green band    Seated hip flexion, knee extension and return to starting position 10x  x    Seated ankle DF 10x  x           Alternating step touch 10x 6 inch x    Forward step ups  5x 6 inch x bilaterally                   Specific Interventions Next Treatment: NuStep; static and dynamic balance and gait activities    Activity/Treatment Tolerance:  [x]  Patient tolerated treatment well  []  Patient limited by fatigue  []  Patient limited by pain   []  Patient limited by medical complications  []  Other:     Assessment: Lucien Parikh struggled with rocker board as it increased the clonus in bilateral ankles and creating increase in spasticity up his legs. Body Structures/Functions/Activity Limitations: edema, impaired activity tolerance, impaired balance, impaired cognition, impaired endurance, impaired motor control, impaired sensation, impaired strength, pain and abnormal gait  Prognosis: fair    GOALS:  Patient Goal: Improve LE strength so he can walk and get back to work    Short Term Goals:  Time Frame: 4 weeks  1. Lucien Parikh will perform 8+ sit-stands within a 30 sec time frame as his eccentric quad strength improves. 2. Rian's Tinetti score will improve to >18/28 indicating mild fall risk. 3. Lucien Parikh will be able to walk 100+yds with his FWW without reporting fatigue and/or without legs giving out as his stamina improves. Long Term Goals:  Time Frame: 8 weeks  1. Lucien Parikh will be discharged with independent HEP. 2. Rian's Tinetti score will improve to >20/28 indicating no fall risk. 3. Lucien Parikh will be able to perform 10+ sit to stands in 30 sec indicating no fall risk. Patient Education:   [x]  HEP/Education Completed: Plan of Care, Goals,    Medbridge Access Code:  []  No new Education completed  []  Reviewed Prior HEP      [x]  Patient verbalized and/or demonstrated understanding of education provided. []  Patient unable to verbalize and/or demonstrate understanding of education provided. Will continue education.   [x]  Barriers to learning: cognitive delays apparent

## 2021-04-29 ENCOUNTER — HOSPITAL ENCOUNTER (OUTPATIENT)
Dept: PHYSICAL THERAPY | Age: 46
Setting detail: THERAPIES SERIES
Discharge: HOME | End: 2021-04-29
Payer: COMMERCIAL

## 2021-04-29 PROCEDURE — 97110 THERAPEUTIC EXERCISES: CPT

## 2021-04-29 PROCEDURE — 97530 THERAPEUTIC ACTIVITIES: CPT

## 2021-04-29 PROCEDURE — 97112 NEUROMUSCULAR REEDUCATION: CPT

## 2021-04-29 NOTE — PROGRESS NOTES
position 10x  x    Seated ankle DF 10x  x           Alternating step touch 10x 6 inch x    Forward step ups  5x 6 inch x bilaterally   Hurdles: forward, lateral 2x ea green x             Specific Interventions Next Treatment: NuStep; static and dynamic balance and gait activities    Activity/Treatment Tolerance:  [x]  Patient tolerated treatment well  []  Patient limited by fatigue  []  Patient limited by pain   []  Patient limited by medical complications  []  Other:     Assessment: Added hurdles for added balance challenge. Some fatigued noted in his leg today due to increase in walking activity this AM.    Body Structures/Functions/Activity Limitations: edema, impaired activity tolerance, impaired balance, impaired cognition, impaired endurance, impaired motor control, impaired sensation, impaired strength, pain and abnormal gait  Prognosis: fair    GOALS:  Patient Goal: Improve LE strength so he can walk and get back to work    Short Term Goals:  Time Frame: 4 weeks  1. Virgilio Chu will perform 8+ sit-stands within a 30 sec time frame as his eccentric quad strength improves. 2. Rian's Tinetti score will improve to >18/28 indicating mild fall risk. 3. Virgilio Chu will be able to walk 100+yds with his FWW without reporting fatigue and/or without legs giving out as his stamina improves. Long Term Goals:  Time Frame: 8 weeks  1. Virgilio Chu will be discharged with independent HEP. 2. Rian's Tinetti score will improve to >20/28 indicating no fall risk. 3. Virgilio Chu will be able to perform 10+ sit to stands in 30 sec indicating no fall risk. Patient Education:   [x]  HEP/Education Completed: Plan of Care, Goals,    Medbridge Access Code:  []  No new Education completed  []  Reviewed Prior HEP      [x]  Patient verbalized and/or demonstrated understanding of education provided. []  Patient unable to verbalize and/or demonstrate understanding of education provided. Will continue education.   [x]  Barriers to learning: cognitive delays apparent     PLAN:  Treatment Recommendations: Strengthening, Range of Motion, Balance Training, Endurance Training, Gait Training, Stair Training, Neuromuscular Re-education, Pain Management, Home Exercise Program, Patient Education and Aquatics    []  Plan of care initiated. Plan to see patient 2 times per week for 8 weeks to address the treatment planned outlined above.   [x]  Continue with current plan of care  []  Modify plan of care as follows:    []  Hold pending physician visit  []  Discharge    Time In 1344   Time Out 1422   Timed Code Minutes: 38 min   Total Treatment Time: 38 min       Electronically Signed by: Tracee Phan \"Mable\" Ana Left, DPT  [id number]

## 2021-05-04 ENCOUNTER — HOSPITAL ENCOUNTER (OUTPATIENT)
Dept: PHYSICAL THERAPY | Age: 46
Setting detail: THERAPIES SERIES
Discharge: HOME | End: 2021-05-04
Payer: COMMERCIAL

## 2021-05-04 PROCEDURE — 97112 NEUROMUSCULAR REEDUCATION: CPT

## 2021-05-04 PROCEDURE — 97530 THERAPEUTIC ACTIVITIES: CPT

## 2021-05-04 PROCEDURE — 97110 THERAPEUTIC EXERCISES: CPT

## 2021-05-04 NOTE — PROGRESS NOTES
[facility] [facility]  PHYSICAL THERAPY  [] EVALUATION  [x] DAILY NOTE (LAND) [] DAILY NOTE (AQUATIC ) [] PROGRESS NOTE [] DISCHARGE NOTE    [] OUTPATIENT REHABILITATION CENTER [facility]   [] Jesi Mohan    [] [address] [facility]   [x] [facility]    Date: 2021  Patient Name:  Zelalem Wills  : 1975  MRN: [id number]  CSN: [id number]    Referring Practitioner Karen Ramsey DO   Diagnosis Cramp and spasm [R25.2]    Treatment Diagnosis Difficulty walking    Date of Evaluation 21    Additional Pertinent History Spinal cord injury; incontinence       Functional Outcome Measure Used Tinetti   Functional Outcome Score  (21)       Insurance: Primary: Payor: Sukumar Copeland /  /  / ,   Secondary:    Authorization Information: Pre-certification not needed; unlimited PT/OT/ST visits; aquatic therapy is covered; modalities covered except for hot/cold packs    Visit # 4, 4/10 for progress note   Visits Allowed: 144 units allowed    Recertification Date:    Physician Follow-Up:    Physician Orders:    History of Present Illness: Dougie Daley returns to therapy to address progressive weakness of bilateral LE secondary to a spinal cord injury. Dougie Daley had surgery and recovered well initially, however a few weeks later he noticed increase in tone and weakness in bilateral LE. SUBJECTIVE: Reports doing well.  States no pain but feeling tired in the legs today    TREATMENT   Precautions:    Pain:0/10     X in shaded column indicates activity completed today   Modalities Parameters/  Location  Notes                     Manual Therapy Time/Technique  Notes                     Exercise/Intervention   Notes   NuStep 5 min  Level 4 x UE: 12; seat: 9          // bars: forward, marching and side stepping 2x  x           Airex: anterior/poterior wt shifts; marching; squats    x    Sit-stand squats  10x  x    Seated hip abd/add; hip openers 10x  x Green band    Seated hip flexion, knee extension and return to starting position 10x  x    Seated ankle DF 10x  x           Alternating step touch 10x 6 inch x    Forward step ups  5x 6 inch x bilaterally   Hurdles: forward, lateral, side stepping 2x ea green x             Specific Interventions Next Treatment: NuStep; static and dynamic balance and gait activities    Activity/Treatment Tolerance:  [x]  Patient tolerated treatment well  []  Patient limited by fatigue  []  Patient limited by pain   []  Patient limited by medical complications  []  Other:     Assessment: Added hurdles for added balance challenge. Some fatigued noted in his leg today due to increase in walking activity this AM.    Body Structures/Functions/Activity Limitations: edema, impaired activity tolerance, impaired balance, impaired cognition, impaired endurance, impaired motor control, impaired sensation, impaired strength, pain and abnormal gait  Prognosis: fair    GOALS:  Patient Goal: Improve LE strength so he can walk and get back to work    Short Term Goals:  Time Frame: 4 weeks  1. Thalia Joyner will perform 8+ sit-stands within a 30 sec time frame as his eccentric quad strength improves. 2. Rian's Tinetti score will improve to >18/28 indicating mild fall risk. 3. Thalia Joyner will be able to walk 100+yds with his FWW without reporting fatigue and/or without legs giving out as his stamina improves. Long Term Goals:  Time Frame: 8 weeks  1. Thalia Joyner will be discharged with independent HEP. 2. Rian's Tinetti score will improve to >20/28 indicating no fall risk. 3. Thalia Joyner will be able to perform 10+ sit to stands in 30 sec indicating no fall risk. Patient Education:   [x]  HEP/Education Completed: Plan of Care, Goals,    Medbridge Access Code:  []  No new Education completed  []  Reviewed Prior HEP      [x]  Patient verbalized and/or demonstrated understanding of education provided. []  Patient unable to verbalize and/or demonstrate understanding of education provided.   Will

## 2021-05-06 ENCOUNTER — HOSPITAL ENCOUNTER (OUTPATIENT)
Dept: PHYSICAL THERAPY | Age: 46
Setting detail: THERAPIES SERIES
Discharge: HOME | End: 2021-05-06
Payer: COMMERCIAL

## 2021-05-06 PROCEDURE — 97530 THERAPEUTIC ACTIVITIES: CPT

## 2021-05-06 PROCEDURE — 97110 THERAPEUTIC EXERCISES: CPT

## 2021-05-06 PROCEDURE — 97112 NEUROMUSCULAR REEDUCATION: CPT

## 2021-05-11 ENCOUNTER — HOSPITAL ENCOUNTER (OUTPATIENT)
Dept: PHYSICAL THERAPY | Age: 46
Setting detail: THERAPIES SERIES
Discharge: HOME | End: 2021-05-11
Payer: COMMERCIAL

## 2021-05-11 PROCEDURE — 97530 THERAPEUTIC ACTIVITIES: CPT

## 2021-05-11 PROCEDURE — 97110 THERAPEUTIC EXERCISES: CPT

## 2021-05-11 PROCEDURE — 97112 NEUROMUSCULAR REEDUCATION: CPT

## 2021-05-11 NOTE — PROGRESS NOTES
15x  x Green band    Seated hip flexion, knee extension and return to starting position 10x  x    Seated ankle DF 10x  x    Resisted HS curles 10x ea  x Green band   Alternating step touch 10x 6 inch x    Forward step ups  5x 6 inch x bilaterally   Hurdles: forward, lateral, side stepping 2x ea green x    BOSU: alternating forward lunges  10x  x      Specific Interventions Next Treatment: NuStep; static and dynamic balance and gait activities    Activity/Treatment Tolerance:  [x]  Patient tolerated treatment well  []  Patient limited by fatigue  []  Patient limited by pain   []  Patient limited by medical complications  []  Other:     Assessment: Tolerating all therex well      Body Structures/Functions/Activity Limitations: edema, impaired activity tolerance, impaired balance, impaired cognition, impaired endurance, impaired motor control, impaired sensation, impaired strength, pain and abnormal gait  Prognosis: fair    GOALS:  Patient Goal: Improve LE strength so he can walk and get back to work    Short Term Goals:  Time Frame: 4 weeks  1. Aletha Vyas will perform 8+ sit-stands within a 30 sec time frame as his eccentric quad strength improves. 2. Rian's Tinetti score will improve to >18/28 indicating mild fall risk. 3. Aletha Vyas will be able to walk 100+yds with his FWW without reporting fatigue and/or without legs giving out as his stamina improves. Long Term Goals:  Time Frame: 8 weeks  1. Aletha Vyas will be discharged with independent HEP. 2. Taylors Tinetti score will improve to >20/28 indicating no fall risk. 3. Aletha Vyas will be able to perform 10+ sit to stands in 30 sec indicating no fall risk. Patient Education:   [x]  HEP/Education Completed: Plan of Care, Goals,    Medbridge Access Code:  []  No new Education completed  []  Reviewed Prior HEP      [x]  Patient verbalized and/or demonstrated understanding of education provided. []  Patient unable to verbalize and/or demonstrate understanding of education provided. Will continue education. [x]  Barriers to learning: cognitive delays apparent     PLAN:  Treatment Recommendations: Strengthening, Range of Motion, Balance Training, Endurance Training, Gait Training, Stair Training, Neuromuscular Re-education, Pain Management, Home Exercise Program, Patient Education and Aquatics    []  Plan of care initiated. Plan to see patient 2 times per week for 8 weeks to address the treatment planned outlined above.   [x]  Continue with current plan of care  []  Modify plan of care as follows:    []  Hold pending physician visit  []  Discharge    Time In 1115   Time Out 1155   Timed Code Minutes: 40 min   Total Treatment Time: 40 min       Electronically Signed by: Burt Javed

## 2021-05-13 ENCOUNTER — HOSPITAL ENCOUNTER (OUTPATIENT)
Dept: PHYSICAL THERAPY | Age: 46
Setting detail: THERAPIES SERIES
Discharge: HOME | End: 2021-05-13
Payer: COMMERCIAL

## 2021-05-13 PROCEDURE — 97110 THERAPEUTIC EXERCISES: CPT

## 2021-05-13 PROCEDURE — 97530 THERAPEUTIC ACTIVITIES: CPT

## 2021-05-13 PROCEDURE — 97112 NEUROMUSCULAR REEDUCATION: CPT

## 2021-05-13 NOTE — PROGRESS NOTES
[facility]  PHYSICAL THERAPY  [] EVALUATION  [x] DAILY NOTE (LAND) [] DAILY NOTE (AQUATIC ) [] PROGRESS NOTE [] DISCHARGE NOTE    [] [facility]   [] Jesi     [] [address] [facility]   [x] Mark Reyes    Date: 2021  Patient Name:  Mariama Swan  : 1975  MRN: [id number]  CSN: [id number]    Referring Practitioner Sandra Ahmadi,    Diagnosis Cramp and spasm [R25.2]    Treatment Diagnosis Difficulty walking    Date of Evaluation 21    Additional Pertinent History Spinal cord injury; incontinence       Functional Outcome Measure Used Tinetti   Functional Outcome Score  (21)       Insurance: Primary: Payor: Leigh Ruano /  /  / ,   Secondary:    Authorization Information: Pre-certification not needed; unlimited PT/OT/ST visits; aquatic therapy is covered; modalities covered except for hot/cold packs    Visit # 7, 10 for progress note   Visits Allowed: 144 units allowed    Recertification Date:    Physician Follow-Up:    Physician Orders:    History of Present Illness: Miranda Fung returns to therapy to address progressive weakness of bilateral LE secondary to a spinal cord injury. Miranda Fung had surgery and recovered well initially, however a few weeks later he noticed increase in tone and weakness in bilateral LE. SUBJECTIVE: Doing well; denies any falls. Notices that his leg is jumping more over the last few days     TREATMENT   Precautions:    Pain:0/10     X in shaded column indicates activity completed today   Modalities Parameters/  Location  Notes                     Manual Therapy Time/Technique  Notes                     Exercise/Intervention   Notes   NuStep 6 min  Level 4 x UE: 12; seat: 9          // bars: forward, marching, side stepping, retro and tandem walking 2x  x    Standing hip abd and extension 10x ea.   x Bilaterally    Airex: anterior/poterior wt shifts; marching; squats  10x ea completed  []  Reviewed Prior HEP      [x]  Patient verbalized and/or demonstrated understanding of education provided. []  Patient unable to verbalize and/or demonstrate understanding of education provided. Will continue education. [x]  Barriers to learning: cognitive delays apparent     PLAN:  Treatment Recommendations: Strengthening, Range of Motion, Balance Training, Endurance Training, Gait Training, Stair Training, Neuromuscular Re-education, Pain Management, Home Exercise Program, Patient Education and Aquatics    []  Plan of care initiated. Plan to see patient 2 times per week for 8 weeks to address the treatment planned outlined above.   [x]  Continue with current plan of care  []  Modify plan of care as follows:    []  Hold pending physician visit  []  Discharge    Time In 1115   Time Out 1155   Timed Code Minutes: 40 min   Total Treatment Time: 40 min       Electronically Signed by: Eddie Perkins \"Mable\" Vimal Damico DPT  [id number]

## 2021-05-18 ENCOUNTER — HOSPITAL ENCOUNTER (OUTPATIENT)
Dept: PHYSICAL THERAPY | Age: 46
Setting detail: THERAPIES SERIES
Discharge: HOME | End: 2021-05-18
Payer: COMMERCIAL

## 2021-05-18 PROCEDURE — 97110 THERAPEUTIC EXERCISES: CPT

## 2021-05-18 PROCEDURE — 97530 THERAPEUTIC ACTIVITIES: CPT

## 2021-05-18 PROCEDURE — 97112 NEUROMUSCULAR REEDUCATION: CPT

## 2021-05-18 NOTE — PROGRESS NOTES
[address] [facility]  PHYSICAL THERAPY  [] EVALUATION  [x] DAILY NOTE (LAND) [] DAILY NOTE (AQUATIC ) [] PROGRESS NOTE [] DISCHARGE NOTE    [] [facility]   [] Jesi [address]    [] [address] [facility]   [x] Sony Wise    Date: 2021  Patient Name:  Marely Tuttle  : 1975  MRN: [id number]  CSN: [id number]    Referring Practitioner Marcelo Gregory DO   Diagnosis Cramp and spasm [R25.2]    Treatment Diagnosis Difficulty walking    Date of Evaluation 21    Additional Pertinent History Spinal cord injury; incontinence       Functional Outcome Measure Used Tinetti   Functional Outcome Score  (21)       Insurance: Primary: Payor: Dalton Rome /  /  / ,   Secondary:    Authorization Information: Pre-certification not needed; unlimited PT/OT/ST visits; aquatic therapy is covered; modalities covered except for hot/cold packs    Visit # 8, /10 for progress note   Visits Allowed: 144 units allowed    Recertification Date:    Physician Follow-Up:    Physician Orders:    History of Present Illness: Mateo Rodriguez returns to therapy to address progressive weakness of bilateral LE secondary to a spinal cord injury. Mateo Rodriguez had surgery and recovered well initially, however a few weeks later he noticed increase in tone and weakness in bilateral LE. SUBJECTIVE: Doing well today. Reports no pain. Does have some swelling in R LEs. TREATMENT   Precautions:    Pain:0/10     X in shaded column indicates activity completed today   Modalities Parameters/  Location  Notes                     Manual Therapy Time/Technique  Notes                     Exercise/Intervention   Notes   NuStep 6 min  Level 4 x UE: 12; seat: 9          // bars: forward, marching, side stepping, retro and tandem walking 2x  x    Standing hip abd and extension 10x ea.   x Bilaterally    Airex: anterior/poterior wt shifts; marching; squats  10x ea  x     squats  10x  x Without UE support    Seated resisted hip abd and abd with ball 15x  x Green band    Seated hip flexion, knee extension and return to starting position 10x  x    Seated ankle DF 10x  x    Resisted HS curles 10x ea  x Green band   Alternating step touch 10x 6 inch x    Forward step ups  10x 6 inch x bilaterally   Hurdles: forward, lateral, side stepping 2x ea green x    BOSU: alternating forward lunges  10x  x      Specific Interventions Next Treatment: NuStep; static and dynamic balance and gait activities    Activity/Treatment Tolerance:  [x]  Patient tolerated treatment well  []  Patient limited by fatigue  []  Patient limited by pain   []  Patient limited by medical complications  []  Other:     Assessment: Showing improvement with dynamic balance/gait activities. Body Structures/Functions/Activity Limitations: edema, impaired activity tolerance, impaired balance, impaired cognition, impaired endurance, impaired motor control, impaired sensation, impaired strength, pain and abnormal gait  Prognosis: fair    GOALS:  Patient Goal: Improve LE strength so he can walk and get back to work    Short Term Goals:  Time Frame: 4 weeks  1. Kumar Giraldo will perform 8+ sit-stands within a 30 sec time frame as his eccentric quad strength improves. 2. Rian's Tinetti score will improve to >18/28 indicating mild fall risk. 3. Kumar Giraldo will be able to walk 100+yds with his FWW without reporting fatigue and/or without legs giving out as his stamina improves. Long Term Goals:  Time Frame: 8 weeks  1. Kumar Giraldo will be discharged with independent HEP. 2. Rian's Tinetti score will improve to >20/28 indicating no fall risk. 3. Kumar Giraldo will be able to perform 10+ sit to stands in 30 sec indicating no fall risk.        Patient Education:   [x]  HEP/Education Completed: Plan of Care, Goals,    Medbridge Access Code:  []  No new Education completed  []  Reviewed Prior HEP      [x]  Patient verbalized and/or demonstrated understanding of education

## 2021-05-19 ENCOUNTER — HOSPITAL ENCOUNTER (OUTPATIENT)
Dept: MRI IMAGING | Age: 46
Discharge: HOME | End: 2021-05-19
Payer: COMMERCIAL

## 2021-05-19 DIAGNOSIS — G95.0 SYRINX OF SPINAL CORD (HCC): ICD-10-CM

## 2021-05-19 PROCEDURE — 72158 MRI LUMBAR SPINE W/O & W/DYE: CPT

## 2021-05-19 PROCEDURE — 6360000004 HC RX CONTRAST MEDICATION: Performed by: PHYSICIAN ASSISTANT

## 2021-05-19 PROCEDURE — A9579 GAD-BASE MR CONTRAST NOS,1ML: HCPCS | Performed by: PHYSICIAN ASSISTANT

## 2021-05-19 RX ADMIN — GADOTERIDOL 20 ML: 279.3 INJECTION, SOLUTION INTRAVENOUS at 09:26

## 2021-05-20 ENCOUNTER — HOSPITAL ENCOUNTER (OUTPATIENT)
Dept: PHYSICAL THERAPY | Age: 46
Setting detail: THERAPIES SERIES
Discharge: HOME | End: 2021-05-20
Payer: COMMERCIAL

## 2021-05-20 PROCEDURE — 97112 NEUROMUSCULAR REEDUCATION: CPT

## 2021-05-20 PROCEDURE — 97110 THERAPEUTIC EXERCISES: CPT

## 2021-05-20 PROCEDURE — 97530 THERAPEUTIC ACTIVITIES: CPT

## 2021-05-20 NOTE — PROGRESS NOTES
discharged with independent HEP. ONGOING  2. Rian's Tinetti score will improve to >20/28 indicating no fall risk. 3. Sky Lantigua will be able to perform 10+ sit to stands in 30 sec indicating no fall risk. Patient Education:   [x]  HEP/Education Completed: Plan of Care, Goals,    Medbridge Access Code:  []  No new Education completed  []  Reviewed Prior HEP      [x]  Patient verbalized and/or demonstrated understanding of education provided. []  Patient unable to verbalize and/or demonstrate understanding of education provided. Will continue education. [x]  Barriers to learning: cognitive delays apparent     PLAN:  Treatment Recommendations: Strengthening, Range of Motion, Balance Training, Endurance Training, Gait Training, Stair Training, Neuromuscular Re-education, Pain Management, Home Exercise Program, Patient Education and Aquatics    []  Plan of care initiated. Plan to see patient 2 times per week for 8 weeks to address the treatment planned outlined above.   []  Continue with current plan of care  []  Modify plan of care as follows:    [x]  Hold pending physician visit  []  Discharge    Time In 1127   Time Out 1208   Timed Code Minutes: 41 min   Total Treatment Time: 41 min       Electronically Signed by: Karol Venegas, PT   Jose Cano [id number]T  [id number]

## 2021-05-26 ENCOUNTER — OFFICE VISIT (OUTPATIENT)
Dept: NEUROSURGERY | Age: 46
End: 2021-05-26
Payer: COMMERCIAL

## 2021-05-26 VITALS
TEMPERATURE: 98.4 F | WEIGHT: 238 LBS | HEIGHT: 70 IN | SYSTOLIC BLOOD PRESSURE: 104 MMHG | DIASTOLIC BLOOD PRESSURE: 99 MMHG | HEART RATE: 90 BPM | BODY MASS INDEX: 34.07 KG/M2

## 2021-05-26 DIAGNOSIS — G96.198 ARACHNOID CYST OF SPINE: ICD-10-CM

## 2021-05-26 DIAGNOSIS — G95.0 SYRINX OF SPINAL CORD (HCC): Primary | ICD-10-CM

## 2021-05-26 PROCEDURE — G8417 CALC BMI ABV UP PARAM F/U: HCPCS | Performed by: PHYSICIAN ASSISTANT

## 2021-05-26 PROCEDURE — 1036F TOBACCO NON-USER: CPT | Performed by: PHYSICIAN ASSISTANT

## 2021-05-26 PROCEDURE — 99214 OFFICE O/P EST MOD 30 MIN: CPT | Performed by: PHYSICIAN ASSISTANT

## 2021-05-26 PROCEDURE — G8427 DOCREV CUR MEDS BY ELIG CLIN: HCPCS | Performed by: PHYSICIAN ASSISTANT

## 2021-05-26 NOTE — PROGRESS NOTES
[address] [place] [address]  [address]  Dept: [phone]  Dept Fax: [phone]  Loc: [facility] Follow Visit  Visit Date: 5/26/2021      Rock Xie  is a 50 y.o. male who is returning to the office today for a post-op follow-up visit. He had surgery on  May 14, 2020 with Dr. Hans Agrawal to undergo resection of arachnoid cyst, without complication. He was last seen and evaluated in the office setting on 11/25/2020 where an MRI was reviewed and was considered stable with no interval changes since prior studies were performed. He had arrived for that appointment unaccompanied and was ambulating with the assistance of a rolling four-point walker. He was comfortable during that exam with some complaints of continuing low back pain with left lower extremity spasticity. Incision from his prior surgery was well-healed with today's follow-up to include a new MRI scheduled. He arrives today having undergone an MRI of the lumbar spine on 5/19/2021 which again is considered stable with no significant interval changes since his prior study in November. Multiple degenerative changes along with mild spinal and neuroforaminal canal stenosis is noted again. There were no abnormalities noted for the distal thoracic spine noted. At today's visit he arrives accompanied by his wife and was comfortable at the time of exam no new symptom presentation or changes since his prior visit. He remained stable and intact to his baseline with the findings on the MRI reviewed. Still the stable intact nature of his exam along with findings considered stable on imaging we have agreed to follow-up with him in 1 year with a new MRI of the lumbar spine to be imaged with and without contrast for comparison review at that time. He and his wife are encouraged to reach out to our office in the interim with any questions or concerns.   He remains happy with his treatment and the plan moving forward and with today's visit having his questions concerns addressed and answered. He is encouraged to keep and maintain upcoming appointments with additional specialties in the interim.     · Patient was evaluated today and is doing well overall. · No new complaints were voiced. · Patient  lives with their spouse  · Wound: wound healing as expected  · Follow-up Studies: No orders of the defined types were placed in this encounter. ·      Assessment/Plan:  · Status Post chronic back pain and postoperative evaluation. · Doing well overall  · Encouraged gradual increase in physical and mental activity. · Fall precaution and home safety education provided to patient. · Follow-up: 1 year follow-up with new MRI of the lumbar spine to be imaged with and without contrast for comparison and review. He and his wife are encouraged to reach out to our office with any additional questions or concerns or should experience any significant decline in his general condition. There are encouragement for him to keep and maintain upcoming appointments with additional specialties as reinforced.       Electronically signed by Carolann Peterson PA-C on 5/26/21 at 10:14 AM ANN

## 2021-05-28 ENCOUNTER — NURSE ONLY (OUTPATIENT)
Dept: LAB | Age: 46
End: 2021-05-28

## 2021-05-28 ENCOUNTER — TELEPHONE (OUTPATIENT)
Dept: FAMILY MEDICINE CLINIC | Age: 46
End: 2021-05-28

## 2021-05-28 ENCOUNTER — OFFICE VISIT (OUTPATIENT)
Dept: FAMILY MEDICINE CLINIC | Age: 46
End: 2021-05-28
Payer: COMMERCIAL

## 2021-05-28 VITALS
HEART RATE: 101 BPM | OXYGEN SATURATION: 97 % | WEIGHT: 260 LBS | SYSTOLIC BLOOD PRESSURE: 152 MMHG | DIASTOLIC BLOOD PRESSURE: 88 MMHG | BODY MASS INDEX: 37.31 KG/M2 | TEMPERATURE: 98.1 F

## 2021-05-28 DIAGNOSIS — R60.0 BILATERAL LEG EDEMA: Primary | ICD-10-CM

## 2021-05-28 DIAGNOSIS — G95.0 SYRINX OF SPINAL CORD (HCC): ICD-10-CM

## 2021-05-28 DIAGNOSIS — R29.898 LEG WEAKNESS, BILATERAL: ICD-10-CM

## 2021-05-28 DIAGNOSIS — J45.20 MILD INTERMITTENT ASTHMA WITHOUT COMPLICATION: ICD-10-CM

## 2021-05-28 DIAGNOSIS — I10 BENIGN ESSENTIAL HTN: ICD-10-CM

## 2021-05-28 DIAGNOSIS — E55.9 HYPOVITAMINOSIS D: ICD-10-CM

## 2021-05-28 DIAGNOSIS — N39.46 MIXED STRESS AND URGE URINARY INCONTINENCE: ICD-10-CM

## 2021-05-28 DIAGNOSIS — R73.9 HYPERGLYCEMIA: ICD-10-CM

## 2021-05-28 DIAGNOSIS — R60.0 BILATERAL LEG EDEMA: ICD-10-CM

## 2021-05-28 DIAGNOSIS — M47.14 MYELOPATHY OF THORACIC REGION: ICD-10-CM

## 2021-05-28 DIAGNOSIS — R63.5 ABNORMAL WEIGHT GAIN: ICD-10-CM

## 2021-05-28 LAB
ALBUMIN SERPL-MCNC: 4.4 G/DL (ref 3.5–5.1)
ALP BLD-CCNC: 87 U/L (ref 38–126)
ALT SERPL-CCNC: 45 U/L (ref 11–66)
ANION GAP SERPL CALCULATED.3IONS-SCNC: 10 MEQ/L (ref 8–16)
AST SERPL-CCNC: 34 U/L (ref 5–40)
AVERAGE GLUCOSE: 135 MG/DL (ref 70–126)
BASOPHILS # BLD: 0.4 %
BASOPHILS ABSOLUTE: 0 THOU/MM3 (ref 0–0.1)
BILIRUB SERPL-MCNC: 0.3 MG/DL (ref 0.3–1.2)
BUN BLDV-MCNC: 12 MG/DL (ref 7–22)
CALCIUM SERPL-MCNC: 9.4 MG/DL (ref 8.5–10.5)
CHLORIDE BLD-SCNC: 103 MEQ/L (ref 98–111)
CO2: 27 MEQ/L (ref 23–33)
CREAT SERPL-MCNC: 0.8 MG/DL (ref 0.4–1.2)
EOSINOPHIL # BLD: 5 %
EOSINOPHILS ABSOLUTE: 0.4 THOU/MM3 (ref 0–0.4)
ERYTHROCYTE [DISTWIDTH] IN BLOOD BY AUTOMATED COUNT: 13.1 % (ref 11.5–14.5)
ERYTHROCYTE [DISTWIDTH] IN BLOOD BY AUTOMATED COUNT: 41.8 FL (ref 35–45)
GFR SERPL CREATININE-BSD FRML MDRD: > 90 ML/MIN/1.73M2
GLUCOSE BLD-MCNC: 105 MG/DL (ref 70–108)
HBA1C MFR BLD: 6.5 % (ref 4.4–6.4)
HCT VFR BLD CALC: 45.8 % (ref 42–52)
HEMOGLOBIN: 14.5 GM/DL (ref 14–18)
IMMATURE GRANS (ABS): 0.04 THOU/MM3 (ref 0–0.07)
IMMATURE GRANULOCYTES: 0.5 %
LYMPHOCYTES # BLD: 19 %
LYMPHOCYTES ABSOLUTE: 1.6 THOU/MM3 (ref 1–4.8)
MCH RBC QN AUTO: 27.8 PG (ref 26–33)
MCHC RBC AUTO-ENTMCNC: 31.7 GM/DL (ref 32.2–35.5)
MCV RBC AUTO: 87.7 FL (ref 80–94)
MONOCYTES # BLD: 6.5 %
MONOCYTES ABSOLUTE: 0.5 THOU/MM3 (ref 0.4–1.3)
NUCLEATED RED BLOOD CELLS: 0 /100 WBC
PLATELET # BLD: 313 THOU/MM3 (ref 130–400)
PMV BLD AUTO: 9.6 FL (ref 9.4–12.4)
POTASSIUM SERPL-SCNC: 4.2 MEQ/L (ref 3.5–5.2)
PRO-BNP: 48.3 PG/ML (ref 0–450)
RBC # BLD: 5.22 MILL/MM3 (ref 4.7–6.1)
SEG NEUTROPHILS: 68.6 %
SEGMENTED NEUTROPHILS ABSOLUTE COUNT: 5.6 THOU/MM3 (ref 1.8–7.7)
SODIUM BLD-SCNC: 140 MEQ/L (ref 135–145)
TOTAL PROTEIN: 7.6 G/DL (ref 6.1–8)
TSH SERPL DL<=0.05 MIU/L-ACNC: 3.09 UIU/ML (ref 0.4–4.2)
VITAMIN D 25-HYDROXY: 31 NG/ML (ref 30–100)
WBC # BLD: 8.2 THOU/MM3 (ref 4.8–10.8)

## 2021-05-28 PROCEDURE — G8427 DOCREV CUR MEDS BY ELIG CLIN: HCPCS | Performed by: NURSE PRACTITIONER

## 2021-05-28 PROCEDURE — 99214 OFFICE O/P EST MOD 30 MIN: CPT | Performed by: NURSE PRACTITIONER

## 2021-05-28 PROCEDURE — G8417 CALC BMI ABV UP PARAM F/U: HCPCS | Performed by: NURSE PRACTITIONER

## 2021-05-28 PROCEDURE — 1036F TOBACCO NON-USER: CPT | Performed by: NURSE PRACTITIONER

## 2021-05-28 RX ORDER — POTASSIUM CHLORIDE 20 MEQ/1
20 TABLET, EXTENDED RELEASE ORAL DAILY
Qty: 90 TABLET | Refills: 1 | Status: SHIPPED | OUTPATIENT
Start: 2021-05-28 | End: 2021-10-05 | Stop reason: SDUPTHER

## 2021-05-28 RX ORDER — BUMETANIDE 1 MG/1
1 TABLET ORAL DAILY
Qty: 30 TABLET | Refills: 3 | Status: SHIPPED | OUTPATIENT
Start: 2021-05-28 | End: 2021-10-05 | Stop reason: SDUPTHER

## 2021-05-28 RX ORDER — INCONTINENCE PAD,LINER,DISP
1 EACH MISCELLANEOUS 4 TIMES DAILY
Qty: 120 EACH | Refills: 0 | Status: SHIPPED | OUTPATIENT
Start: 2021-05-28

## 2021-05-28 RX ORDER — OXYBUTYNIN CHLORIDE 10 MG/1
10 TABLET, EXTENDED RELEASE ORAL DAILY
Qty: 30 TABLET | Refills: 3 | Status: SHIPPED | OUTPATIENT
Start: 2021-05-28 | End: 2021-10-05 | Stop reason: ALTCHOICE

## 2021-05-28 RX ORDER — LISINOPRIL 5 MG/1
5 TABLET ORAL DAILY
Qty: 90 TABLET | Refills: 1 | Status: SHIPPED | OUTPATIENT
Start: 2021-05-28 | End: 2021-10-05 | Stop reason: SDUPTHER

## 2021-05-28 SDOH — ECONOMIC STABILITY: FOOD INSECURITY: WITHIN THE PAST 12 MONTHS, THE FOOD YOU BOUGHT JUST DIDN'T LAST AND YOU DIDN'T HAVE MONEY TO GET MORE.: NEVER TRUE

## 2021-05-28 SDOH — ECONOMIC STABILITY: FOOD INSECURITY: WITHIN THE PAST 12 MONTHS, YOU WORRIED THAT YOUR FOOD WOULD RUN OUT BEFORE YOU GOT MONEY TO BUY MORE.: NEVER TRUE

## 2021-05-28 ASSESSMENT — ENCOUNTER SYMPTOMS
EYE ITCHING: 0
VOMITING: 0
COUGH: 0
CHEST TIGHTNESS: 0
SHORTNESS OF BREATH: 0
EYE REDNESS: 0
BACK PAIN: 1
DIARRHEA: 0
EYE PAIN: 0
NAUSEA: 0
CONSTIPATION: 1
WHEEZING: 0
ALLERGIC/IMMUNOLOGIC NEGATIVE: 1

## 2021-05-28 ASSESSMENT — SOCIAL DETERMINANTS OF HEALTH (SDOH): HOW HARD IS IT FOR YOU TO PAY FOR THE VERY BASICS LIKE FOOD, HOUSING, MEDICAL CARE, AND HEATING?: NOT HARD AT ALL

## 2021-05-28 ASSESSMENT — PATIENT HEALTH QUESTIONNAIRE - PHQ9
1. LITTLE INTEREST OR PLEASURE IN DOING THINGS: 0
SUM OF ALL RESPONSES TO PHQ QUESTIONS 1-9: 0

## 2021-05-28 NOTE — PROGRESS NOTES
Future  -     TSH with Reflex; Future  3. Leg weakness, bilateral  -     Comprehensive Metabolic Panel; Future  -     Hemoglobin A1C; Future  -     TSH with Reflex; Future  -     Brain Natriuretic Peptide; Future  4. Mild intermittent asthma without complication  -     Hemoglobin A1C; Future  -     TSH with Reflex; Future  5. Myelopathy of thoracic region  6. Abnormal weight gain  -     Hemoglobin A1C; Future  7. Hypovitaminosis D  -     Vitamin D 25 Hydroxy; Future  8. Mixed stress and urge urinary incontinence  -     oxybutynin (DITROPAN XL) 10 MG extended release tablet; Take 1 tablet by mouth daily, Disp-30 tablet, R-3Normal  -     Incontinence Supply Disposable (POISE HOURGLASS SHAPE) PADS; 4 TIMES DAILY Starting Fri 5/28/2021, Disp-120 each, R-0, Normal      Return in about 2 weeks (around 6/11/2021) for Medication evaluation, Results review, Routine follow up. SUBJECTIVE/OBJECTIVE:  HPI   Here for swelling in right leg, weight gain and incontinence   Doing well with ambulation, continues to see PT for strengthening. no where near ready to go back to work.  Left leg numbness, weakness   No recent falls   Having right leg swelling. No pain, no aching.  Both legs fatigue easily. Left is numb most of the time. Jessica Bhandari for ambulation   No hx of dvt.  Incontinence of urine allof the time    Unable to get to bathroom or urinal, does not empty all the way he feels. oftend times dribbles after voiding.  No skin breakdown.    Review of Systems   Constitutional: Positive for activity change and fatigue. Negative for appetite change, fever and unexpected weight change. HENT: Negative. Eyes: Negative for pain, redness and itching. Respiratory: Negative for cough, chest tightness, shortness of breath and wheezing. Cardiovascular: Positive for leg swelling. Negative for chest pain. Gastrointestinal: Positive for constipation. Negative for diarrhea, nausea and vomiting.    Endocrine: Negative for cold intolerance and heat intolerance. Genitourinary: Positive for difficulty urinating, frequency and urgency. Musculoskeletal: Positive for back pain and gait problem. Negative for arthralgias, joint swelling and myalgias. Skin: Negative. Allergic/Immunologic: Negative. Neurological: Positive for weakness and numbness. Negative for dizziness and headaches. Hematological: Negative for adenopathy. Bruises/bleeds easily. Psychiatric/Behavioral: Negative. Physical Exam  Vitals and nursing note reviewed. Constitutional:       General: He is not in acute distress. Appearance: He is well-developed. He is obese. He is not ill-appearing. HENT:      Head: Normocephalic. Right Ear: Tympanic membrane and ear canal normal.      Left Ear: Tympanic membrane and ear canal normal.      Nose: Nose normal. No rhinorrhea. Mouth/Throat:      Mouth: Mucous membranes are moist.      Pharynx: No posterior oropharyngeal erythema. Eyes:      General: No scleral icterus. Right eye: No discharge. Left eye: No discharge. Conjunctiva/sclera: Conjunctivae normal.   Neck:      Vascular: No carotid bruit. Cardiovascular:      Rate and Rhythm: Normal rate and regular rhythm. Pulses: Normal pulses. Heart sounds: Normal heart sounds. Pulmonary:      Effort: Pulmonary effort is normal.      Breath sounds: Normal breath sounds. No wheezing or rales. Abdominal:      General: Abdomen is flat. Bowel sounds are normal. There is no distension. Palpations: Abdomen is soft. Tenderness: There is no abdominal tenderness. Musculoskeletal:         General: No tenderness. Normal range of motion. Cervical back: Normal range of motion. No muscular tenderness. Right lower leg: Edema present. Left lower leg: Edema ( + 2 ble) present. Lymphadenopathy:      Cervical: No cervical adenopathy. Skin:     General: Skin is warm and dry.       Capillary

## 2021-05-28 NOTE — PATIENT INSTRUCTIONS
1. Monitor Leg edema and go to ER if the swelling increases rather than decreases. 2. If you have a scale, weigh self daily   3. Return to office week of June 7th for re evaluation. 4.Ok to return to Therapy. Patient Education        Leg and Ankle Edema: Care Instructions  Your Care Instructions  Swelling in the legs, ankles, and feet is called edema. It is common after you sit or stand for a while. Long plane flights or car rides often cause swelling in the legs and feet. You may also have swelling if you have to stand for long periods of time at your job. Problems with the veins in the legs (varicose veins) and changes in hormones can also cause swelling. Sometimes the swelling in the ankles and feet is caused by a more serious problem, such as heart failure, infection, blood clots, or liver or kidney disease. Follow-up care is a key part of your treatment and safety. Be sure to make and go to all appointments, and call your doctor if you are having problems. It's also a good idea to know your test results and keep a list of the medicines you take. How can you care for yourself at home? · If your doctor gave you medicine, take it as prescribed. Call your doctor if you think you are having a problem with your medicine. · Whenever you are resting, raise your legs up. Try to keep the swollen area higher than the level of your heart. · Take breaks from standing or sitting in one position. ? Walk around to increase the blood flow in your lower legs. ? Move your feet and ankles often while you stand, or tighten and relax your leg muscles. · Wear support stockings. Put them on in the morning, before swelling gets worse. · Eat a balanced diet. Lose weight if you need to. · Limit the amount of salt (sodium) in your diet. Salt holds fluid in the body and may increase swelling. When should you call for help? Call 911 anytime you think you may need emergency care.  For example, call if:    · You have magnesium). What is bumetanide? Bumetanide is diuretic that is used to treat fluid retention (edema) in people with congestive heart failure, liver disease, or a kidney disorder such as nephrotic syndrome. Bumetanide may also be used for purposes not listed in this medication guide. What should I discuss with my healthcare provider before using bumetanide? You should not use bumetanide if you are allergic to it, or if you have:  · severe kidney disease or are unable to urinate;  · severe liver disease or cirrhosis;  · severe dehydration; or  · an electrolyte imbalance (such as low levels of potassium or magnesium in your blood). Tell your doctor if you have ever had:  · a heart rhythm disorder;  · liver disease;  · kidney disease (or if you are on dialysis);  · gout;  · an allergy to sulfa drugs; or  · if you are on a low-salt diet. Tell your doctor if you are pregnant or breast-feeding. Bumetanide is not approved for use by anyone younger than 25years old. How should I take bumetanide? Follow all directions on your prescription label and read all medication guides or instruction sheets. Your doctor may occasionally change your dose. Use the medicine exactly as directed. Bumetanide injection is injected into a muscle, or given as an infusion into a vein. A healthcare provider will give you this injection if you are unable to take the medicine by mouth. Bumetanide will make you urinate more often and you may get dehydrated easily. Follow your doctor's instructions about taking potassium supplements or getting enough salt and potassium in your diet. Call your doctor if you are sick with vomiting or diarrhea, or if you are sweating more than usual. You can easily become dehydrated while taking bumetanide. This can lead to very low blood pressure, a serious electrolyte imbalance, or kidney failure. You will need frequent medical tests. Store at room temperature away from heat, moisture, and light.   What happens if I miss a dose? Bumetanide is sometimes used only once, so you may not be on a dosing schedule. If you are on a dosing schedule, take the medicine as soon as you can, but skip the missed dose if it is almost time for your next dose. Do not take two doses at one time. What happens if I overdose? Seek emergency medical attention or call the Poison Help line at 1-764.340.9099. Overdose symptoms may include extreme dizziness or weakness, confusion, loss of appetite, stomach cramps, and vomiting. What should I avoid while taking bumetanide? Avoid becoming dehydrated. Follow your doctor's instructions about the type and amount of liquids you should drink while you are taking bumetanide. What are the possible side effects of bumetanide  Get emergency medical help if you have signs of an allergic reaction: hives; difficult breathing; swelling of your face, lips, tongue, or throat. Call your doctor at once if you have:  · hearing problems;  · confusion, hallucinations, problems with thought or memory;  · trouble speaking or understanding what is said to you;  · unusual weakness;  · twitching, or a seizure;  · weak or shallow breathing;  · easy bruising, unusual bleeding, purple or red spots under your skin;  · low magnesium --dizziness, irregular heartbeats, feeling jittery, muscle cramps, muscle spasms, cough or choking feeling;  · low potassium level --leg cramps, constipation, irregular heartbeats, fluttering in your chest, increased thirst or urination, numbness or tingling, muscle weakness or limp feeling; or  · dehydration symptoms --feeling very thirsty or hot, being unable to urinate, heavy sweating, or hot and dry skin. Common side effects may include:  · muscle cramps;  · dizziness;  · low blood presure;  · nausea; or  · headache. This is not a complete list of side effects and others may occur. Call your doctor for medical advice about side effects.  You may report side effects to FDA at 1-800-FDA-1088. What other drugs will affect bumetanide? Bumetanide can harm your kidneys, especially if you also use certain medicines for infections, cancer, osteoporosis, organ transplant rejection, bowel disorders, or pain or arthritis (including aspirin, Tylenol, Advil, and Aleve). Tell your doctor about all your other medicines, especially:  · lithium;  · digoxin;  · probenecid;  · indomethacin;  · blood pressure medication; or  · any other diuretic. This list is not complete. Other drugs may affect bumetanide, including prescription and over-the-counter medicines, vitamins, and herbal products. Not all possible drug interactions are listed here. Where can I get more information? Your pharmacist can provide more information about bumetanide. Remember, keep this and all other medicines out of the reach of children, never share your medicines with others, and use this medication only for the indication prescribed. Every effort has been made to ensure that the information provided by [address] [place], [place] [address] is accurate, up-to-date, and complete, but no guarantee is made to that effect. Drug information contained herein may be time sensitive. [facility] information has been compiled for use by healthcare practitioners and consumers in the [place] and therefore [facility] does not warrant that uses outside of the [place] are appropriate, unless specifically indicated otherwise. [facility]'s drug information does not endorse drugs, diagnose patients or recommend therapy. [facility] drug information is an informational resource designed to assist licensed healthcare practitioners in caring for their patients and/or to serve consumers viewing this service as a supplement to, and not a substitute for, the expertise, skill, knowledge and judgment of healthcare practitioners.  The absence of a warning for a given drug or drug combination in no way should be construed to indicate that the drug or drug combination is safe, effective or appropriate for any given patient. [facility] does not assume any responsibility for any aspect of healthcare administered with the aid of information [facility] provides. The information contained herein is not intended to cover all possible uses, directions, precautions, warnings, drug interactions, allergic reactions, or adverse effects. If you have questions about the drugs you are taking, check with your doctor, nurse or pharmacist.  Copyright 2987-8996 [address]: 7.01. Revision date: 1/3/2019. Care instructions adapted under license by [facility] ([facility]). If you have questions about a medical condition or this instruction, always ask your healthcare professional. [place] [address] any warranty or liability for your use of this information. Patient Education        lisinopril  Pronunciation:  mendy IN oh pril  Brand:  Prinivil, Qbrelis, Zestril  What is the most important information I should know about lisinopril? Do not use if you are pregnant, and tell your doctor right away if you become pregnant. If you have diabetes, do not use lisinopril together with any medication that contains aliskiren (a blood pressure medicine). Do not take lisinopril within 36 hours before or after taking medicine that contains sacubitril (such as Entresto). What is lisinopril? Lisinopril is an ACE inhibitor that is used to treat high blood pressure (hypertension) in adults and children who are at least 10years old. Lisinopril is also used to treat congestive heart failure in adults, or to improve survival after a heart attack. Lisinopril may also be used for purposes not listed in this medication guide. What should I discuss with my healthcare provider before taking lisinopril?   You should not use lisinopril if you are allergic to it, or if you:  · have a history of angioedema;  · recently took a heart medicine called sacubitril; or  · are allergic to any other ACE inhibitor, such as benazepril, captopril, enalapril, fosinopril, moexipril, perindopril, quinapril, ramipril, or trandolapril. Do not take lisinopril within 36 hours before or after taking medicine that contains sacubitril (such as Entresto). If you have diabetes, do not use lisinopril together with any medication that contains aliskiren (a blood pressure medicine). You may also need to avoid taking lisinopril with aliskiren if you have kidney disease. Tell your doctor if you have ever had:  · kidney disease (or if you are on dialysis);  · liver disease; or  · high levels of potassium in your blood. Do not use if you are pregnant, and tell your doctor right away if you become pregnant. Lisinopril can cause injury or death to the unborn baby if you take the medicine during your second or third trimester. You should not breastfeed while using this medicine. How should I take lisinopril? Follow all directions on your prescription label and read all medication guides or instruction sheets. Your doctor may occasionally change your dose. Use the medicine exactly as directed. Drink plenty of water each day while you are taking this medicine. Lisinopril can be taken with or without food. Measure liquid medicine carefully. Use the dosing syringe provided, or use a medicine dose-measuring device (not a kitchen spoon). Your blood pressure will need to be checked often. Your kidney function and electrolytes may also need to be checked. Call your doctor if you are sick with vomiting or diarrhea, or if you are sweating more than usual. You can easily become dehydrated while taking lisinopril. This can lead to very low blood pressure, a serious electrolyte imbalance, or kidney failure. If you need surgery, tell the surgeon ahead of time that you are using lisinopril. If you have high blood pressure, keep using this medicine even if you feel well. High blood pressure often has no symptoms.  You may need to use blood pressure medicine for the rest of your life. Store at room temperature away from moisture and heat. Do not freeze the oral liquid. What happens if I miss a dose? Take the medicine as soon as you can, but skip the missed dose if it is almost time for your next dose. Do not take two doses at one time. What happens if I overdose? Seek emergency medical attention or call the Poison Help line at 1-507.389.6524. What should I avoid while taking lisinopril? Drinking alcohol can further lower your blood pressure and may increase certain side effects of lisinopril. Avoid becoming overheated or dehydrated during exercise, in hot weather, or by not drinking enough fluids. Lisinopril can decrease sweating and you may be more prone to heat stroke. Do not use potassium supplements or salt substitutes, unless your doctor has told you to. Avoid getting up too fast from a sitting or lying position, or you may feel dizzy. What are the possible side effects of lisinopril? Get emergency medical help if you have signs of an allergic reaction: hives; severe stomach pain; difficulty breathing; swelling of your face, lips, tongue, or throat. You may be more likely to have an allergic reaction if you are -American. Call your doctor at once if you have:  · a light-headed feeling, like you might pass out;  · fever, sore throat;  · high potassium --nausea, weakness, tingly feeling, chest pain, irregular heartbeats, loss of movement;  · kidney problems --little or no urination, swelling in your feet or ankles, feeling tired or short of breath; or  · liver problems --nausea, upper stomach pain, itching, tired feeling, loss of appetite, dark urine, bigg-colored stools, jaundice (yellowing of the skin or eyes). Common side effects may include:  · headache, dizziness;  · cough; or  · chest pain. This is not a complete list of side effects and others may occur. Call your doctor for medical advice about side effects. You may report side effects to FDA at 6-749-FDA-2985. What other drugs will affect lisinopril? Tell your doctor about all your other medicines, especially:  · a diuretic or \"water pill\";  · lithium;  · gold injections to treat arthritis;  · insulin or oral diabetes medicine;  · a potassium supplement;  · medicine to prevent organ transplant rejection --everolimus, sirolimus, tacrolimus, temsirolimus; or  · NSAIDs (nonsteroidal anti-inflammatory drugs) --aspirin, ibuprofen (Advil, Motrin), naproxen (Aleve), celecoxib, diclofenac, indomethacin, meloxicam, and others. This list is not complete. Other drugs may affect lisinopril, including prescription and over-the-counter medicines, vitamins, and herbal products. Not all possible drug interactions are listed here. Where can I get more information? Your pharmacist can provide more information about lisinopril. Remember, keep this and all other medicines out of the reach of children, never share your medicines with others, and use this medication only for the indication prescribed. Every effort has been made to ensure that the information provided by [address] is accurate, up-to-date, and complete, but no guarantee is made to that effect. Drug information contained herein may be time sensitive. [facility] information has been compiled for use by healthcare practitioners and consumers in the [place] and therefore [employer] does not warrant that uses outside of the [place] are appropriate, unless specifically indicated otherwise. [facility]'s drug information does not endorse drugs, diagnose patients or recommend therapy. [facility]s drug information is an informational resource designed to assist licensed healthcare practitioners in caring for their patients and/or to serve consumers viewing this service as a supplement to, and not a substitute for, the expertise, skill, knowledge and judgment of healthcare practitioners.  The absence of a warning for a given drug or drug combination in no way should be construed to indicate that the drug or drug combination is safe, effective or appropriate for any given patient. [facility] does not assume any responsibility for any aspect of healthcare administered with the aid of information [facility] provides. The information contained herein is not intended to cover all possible uses, directions, precautions, warnings, drug interactions, allergic reactions, or adverse effects. If you have questions about the drugs you are taking, check with your doctor, nurse or pharmacist.  Copyright 7450-3853 [address]: 15.03. Revision date: 10/22/2019. Care instructions adapted under license by [facility] ([facility]). If you have questions about a medical condition or this instruction, always ask your healthcare professional. [address] any warranty or liability for your use of this information.

## 2021-05-28 NOTE — TELEPHONE ENCOUNTER
----- Message from ISHAAN Malcolm CNP sent at 5/28/2021  4:59 PM EDT -----  Labs are normal except for blood sugar. Would like to start pt on metformin for his blood sugar. Keep the follow up appt for when I return.

## 2021-06-01 NOTE — TELEPHONE ENCOUNTER
The patient called back. I let him know Barbra's response regarding his lab results. The patient voiced understanding and he said that he has not heard from anyone regarding the test [facility] ordered for his leg. I asked him if he wanted the number to central scheduling so he could check on it and he said yes. Number was given to him.

## 2021-06-02 ENCOUNTER — TELEPHONE (OUTPATIENT)
Dept: FAMILY MEDICINE CLINIC | Age: 46
End: 2021-06-02

## 2021-06-02 ENCOUNTER — HOSPITAL ENCOUNTER (OUTPATIENT)
Dept: ULTRASOUND IMAGING | Age: 46
Discharge: HOME | End: 2021-06-02
Payer: COMMERCIAL

## 2021-06-02 DIAGNOSIS — R60.9 SWELLING: ICD-10-CM

## 2021-06-02 PROCEDURE — 93970 EXTREMITY STUDY: CPT

## 2021-06-04 ENCOUNTER — TELEPHONE (OUTPATIENT)
Dept: FAMILY MEDICINE CLINIC | Age: 46
End: 2021-06-04

## 2021-06-08 ENCOUNTER — OFFICE VISIT (OUTPATIENT)
Dept: FAMILY MEDICINE CLINIC | Age: 46
End: 2021-06-08
Payer: COMMERCIAL

## 2021-06-08 VITALS
DIASTOLIC BLOOD PRESSURE: 84 MMHG | SYSTOLIC BLOOD PRESSURE: 110 MMHG | HEART RATE: 94 BPM | WEIGHT: 255.8 LBS | OXYGEN SATURATION: 97 % | BODY MASS INDEX: 36.7 KG/M2 | TEMPERATURE: 97.7 F

## 2021-06-08 DIAGNOSIS — N31.9 NEUROGENIC BLADDER DISORDER: ICD-10-CM

## 2021-06-08 DIAGNOSIS — R63.5 ABNORMAL WEIGHT GAIN: ICD-10-CM

## 2021-06-08 DIAGNOSIS — R29.898 LEG WEAKNESS, BILATERAL: ICD-10-CM

## 2021-06-08 DIAGNOSIS — G95.0 SYRINX OF SPINAL CORD (HCC): ICD-10-CM

## 2021-06-08 DIAGNOSIS — R27.0 ATAXIA: ICD-10-CM

## 2021-06-08 DIAGNOSIS — R60.0 BILATERAL LEG EDEMA: Primary | ICD-10-CM

## 2021-06-08 DIAGNOSIS — N39.46 MIXED STRESS AND URGE URINARY INCONTINENCE: ICD-10-CM

## 2021-06-08 PROCEDURE — G8427 DOCREV CUR MEDS BY ELIG CLIN: HCPCS | Performed by: NURSE PRACTITIONER

## 2021-06-08 PROCEDURE — 1036F TOBACCO NON-USER: CPT | Performed by: NURSE PRACTITIONER

## 2021-06-08 PROCEDURE — G8417 CALC BMI ABV UP PARAM F/U: HCPCS | Performed by: NURSE PRACTITIONER

## 2021-06-08 PROCEDURE — 99214 OFFICE O/P EST MOD 30 MIN: CPT | Performed by: NURSE PRACTITIONER

## 2021-06-08 RX ORDER — OMEPRAZOLE 40 MG/1
40 CAPSULE, DELAYED RELEASE ORAL
Qty: 90 CAPSULE | Refills: 1 | Status: SHIPPED | OUTPATIENT
Start: 2021-06-08 | End: 2021-10-05 | Stop reason: SDUPTHER

## 2021-06-08 ASSESSMENT — ENCOUNTER SYMPTOMS
CONSTIPATION: 1
DIARRHEA: 0
BACK PAIN: 1
NAUSEA: 0
WHEEZING: 0
EYE REDNESS: 0
EYE PAIN: 0
EYE ITCHING: 0
VOMITING: 0
COUGH: 0
CHEST TIGHTNESS: 0
ALLERGIC/IMMUNOLOGIC NEGATIVE: 1
SHORTNESS OF BREATH: 0

## 2021-06-08 NOTE — PATIENT INSTRUCTIONS
Patient Education        Learning About Low-Carbohydrate Foods  What foods are low in carbohydrate? The foods you eat contain nutrients, such as vitamins and minerals. Carbohydrate is a nutrient. Your body needs the right amount to stay healthy and work as it should. You can use the list below to help you make choices about which foods to eat. Some foods that are lower in carbohydrate include:  Dairy and dairy alternatives  · Cheese  · Cottage cheese  · Cream cheese  · Nut milk (unsweetened)  · Soy milk (unsweetened)  · Yogurt (Greek, plain)  Fruits  · Avocado  · [employer] and other protein foods  · Almonds  · Beef  · Chicken  · Cod  · Eggs  · Halibut  · Peanut butter and other nut butters  · Pistachios  · Pork  · Pumpkin seeds  · Tofu  · Trout  · [place]  · [place] ([facility])  · Walnuts  Vegetables  · Broccoli  · Carrots  · Cauliflower  · Green beans  · Mushrooms  · Peppers  · Salad greens  · Spinach  · Tomatoes  Work with your doctor to find out how much of this nutrient you need. Depending on your health, you may need more or less of it in your diet. Where can you learn more? Go to https://M.dotpepiceweb.[email or web address] org and sign in to your [employer] account. Enter [phone] in the [facility] box to learn more about \"Learning About Low-Carbohydrate Foods. \"     If you do not have an account, please click on the \"Sign Up Now\" link. Current as of: December 17, 2020               Content Version: 12.8  © 2006-2021 Healthwise, Incorporated. Care instructions adapted under license by [facility] ([facility]). If you have questions about a medical condition or this instruction, always ask your healthcare professional. [place] [address] any warranty or liability for your use of this information. Patient Education        Learning About Low-Carbohydrate Diets  What is a low-carbohydrate diet? A low-carbohydrate (or \"low-carb\") diet limits foods and drinks that have carbohydrates.  This includes grains, fruits, milk and yogurt, and starchy vegetables like potatoes, beans, and corn. It also avoids foods and drinks that have added sugar. Instead, low-carb diets include foods that are high in protein and fat. Why might you follow a low-carb diet? Low-carb diets may be used for a variety of reasons, such as for weight loss. People who have diabetes may use a low-carb diet to help manage their blood sugar levels. What should you do before you start the diet? Talk to your doctor before you try any diet. This is even more important if you have health problems like kidney disease, heart disease, or diabetes. Your doctor may suggest that you meet with a registered dietitian. A dietitian can help you make an eating plan that works for you. What foods do you eat on a low-carb diet? On a low-carb diet, you choose foods that are high in protein and fat. Examples of these are:  · Meat, poultry, and fish. · Eggs. · Nuts, such as walnuts, pecans, almonds, and peanuts. · Peanut butter and other nut butters. · Tofu. · Avocado. · Finn Peppers. · Non-starchy vegetables like broccoli, cauliflower, green beans, mushrooms, peppers, lettuce, and spinach. · Unsweetened non-dairy milks like almond milk and coconut milk. · Cheese, cottage cheese, and cream cheese. Current as of: December 17, 2020               Content Version: 12.8  © 2006-2021 [employer]. Care instructions adapted under license by [facility] ([facility]). If you have questions about a medical condition or this instruction, always ask your healthcare professional. [place] [address] any warranty or liability for your use of this information. Patient Education        Kegel Exercises: Care Instructions  Overview     Kegel exercises strengthen muscles around the bladder. These muscles control the flow of urine. Kegel exercises are sometime called \"pelvic floor\" exercises.  They can help prevent urine leakage and keep the pelvic organs in place. Kegel exercises can strengthen pelvic muscles that have been weakened by age, pregnancy, childbirth, and surgery. They may help prevent or treat urine leakage. You do Kegel exercises by tightening the muscles you use when you urinate. You will likely need to do these exercises for several weeks to get better. Follow-up care is a key part of your treatment and safety. Be sure to make and go to all appointments, and call your doctor if you are having problems. It's also a good idea to know your test results and keep a list of the medicines you take. How can you care for yourself at home? · Do Kegel exercises. ? Find the muscles you need to strengthen. To do this, tighten the muscles that stop your urine while you are going to the bathroom. These are the same muscles you squeeze during Kegel exercises. ? Squeeze the muscles as hard as you can. Your belly and thighs should not move. ? Hold the squeeze for 3 seconds. Then relax for 3 seconds. ? Start with 3 seconds, and then add 1 second each week until you are able to squeeze for 10 seconds. ? Repeat the exercise 10 to 15 times for each session. Do three or more sessions each day. · You can check to see if you are using the right muscles. ? Tighten the muscles that help you stop passing gas or keep you from urinating. Make sure you aren't using your stomach, leg, or buttock muscles. ? Place a finger in your vagina and squeeze around it. You are doing them right when you feel pressure around your finger. Your doctor may also suggest that you put special weights in your vagina while you do the exercises. · Check with your doctor if you don't notice a difference after trying these exercises for several weeks. Your doctor may suggest getting help from a physical therapist or recommend other treatment. Where can you learn more? Go to https://chtamika.[employer]itus. org and sign in to your [employer] account.  Enter G382 in the [address] box to learn more about \"Kegel Exercises: Care Instructions. \"     If you do not have an account, please click on the \"Sign Up Now\" link. Current as of: June 29, 2020               Content Version: 12.8  © 8763-5022 Healthwise, Incorporated. Care instructions adapted under license by [facility] ([facility]). If you have questions about a medical condition or this instruction, always ask your healthcare professional. [address] any warranty or liability for your use of this information.

## 2021-06-08 NOTE — PROGRESS NOTES
[address] [facility]  Dept: [phone]          Lauren Castañeda (:  1975) is a 50 y.o. male,Established patient, here for evaluation of the following chief complaint(s):  Leg Swelling (follow up) and Results      ASSESSMENT/PLAN:  I have reviewed the patient's medical history in detail and updated the computerized patient record. HPI/ROS per the patient and caregiver. Overall non toxic in appearance. Answers questions appropriately. Conditions discussed and addressed this visit include:     Overall good improvement with leg edema. Will stay on bumex for now. Ordered compression hose with no dvt present. Will order incontinence wear due to no improvement with ditropan. Referred to urology for assistance as well since this is most likely related to his neurological deficits. Referred to PT for FCE as pt cannot return to his job at [employer] in his current state. He also has been counseled to contact an  for help with disability application. Referred to dietician for help with weight loss and diabetes. Lab Results   Component Value Date    LABA1C 6.5 (H) 2021     No results found for: EAG      1. Bilateral leg edema  -     [facility]  -     Elastic Bandages & Supports (MEDICAL LEGWEAR/KNEE HIGH) MISC; Disp-4 each, R-0, NormalPlease dispense 2 pair of XL knee compression hose, 15-20mmHg compression.  -     Foot Care Products (SOCK AID) MISC; 1 Device by Does not apply route daily, Disp-1 each, R-0Normal  2. Leg weakness, bilateral  -     [facility]  3. Syrinx of spinal cord [facility])  -     [address] [facility] Mk Cohn MD, Urology, Susana Graff  4. Mixed stress and urge urinary incontinence  -     [facility] - [address] [facility] Mk Cohn MD, Urology, Susana Graff  5.  Madison Menjivar MD, Urology, [address]  6. Abnormal weight gain  -     [facility] Internal Medicine - Dietitian  7. Neurogenic bladder disorder      Return in about 3 months (around 9/8/2021), or if symptoms worsen or fail to improve, for Medication evaluation, Routine follow up. SUBJECTIVE/OBJECTIVE:  HPI   Here for follow up on his leg edema   It is better but not completely resolved.  Legs still feel heavy.  Continues to use walker. Cannot stand to be up onlegs for any extended periods   Incontinent of urine   No skin rashes or lesions.  dvt negative.  Is concerned about his weight gain, cannot stand long enough to exercise   Continues to work with PT   Would like diet information. Review of Systems   Constitutional: Positive for activity change and fatigue. Negative for appetite change, fever and unexpected weight change. HENT: Negative. Eyes: Negative for pain, redness and itching. Respiratory: Negative for cough, chest tightness, shortness of breath and wheezing. Cardiovascular: Positive for leg swelling. Negative for chest pain. Gastrointestinal: Positive for constipation. Negative for diarrhea, nausea and vomiting. Endocrine: Negative for cold intolerance and heat intolerance. Genitourinary: Positive for difficulty urinating, frequency and urgency. Musculoskeletal: Positive for back pain and gait problem. Negative for arthralgias, joint swelling and myalgias. Skin: Negative. Allergic/Immunologic: Negative. Neurological: Positive for weakness and numbness. Negative for dizziness and headaches. Hematological: Negative for adenopathy. Bruises/bleeds easily. Psychiatric/Behavioral: Negative. Physical Exam  Vitals and nursing note reviewed. Constitutional:       General: He is not in acute distress. Appearance: He is well-developed. He is obese. He is not ill-appearing. HENT:      Head: Normocephalic.       Right Ear: Tympanic membrane and ear canal normal.      Left Ear: Tympanic membrane and ear canal normal.      Nose: Nose normal. No rhinorrhea. Mouth/Throat:      Mouth: Mucous membranes are moist.      Pharynx: No posterior oropharyngeal erythema. Eyes:      General: No scleral icterus. Right eye: No discharge. Left eye: No discharge. Conjunctiva/sclera: Conjunctivae normal.   Neck:      Vascular: No carotid bruit. Cardiovascular:      Rate and Rhythm: Normal rate and regular rhythm. Pulses: Normal pulses. Heart sounds: Normal heart sounds. Pulmonary:      Effort: Pulmonary effort is normal.      Breath sounds: Normal breath sounds. No wheezing or rales. Abdominal:      General: Abdomen is flat. Bowel sounds are normal. There is no distension. Palpations: Abdomen is soft. Tenderness: There is no abdominal tenderness. Musculoskeletal:         General: No tenderness. Normal range of motion. Cervical back: Normal range of motion. No muscular tenderness. Right lower leg: Edema present. Left lower leg: Edema (non pitting) present. Lymphadenopathy:      Cervical: No cervical adenopathy. Skin:     General: Skin is warm and dry. Capillary Refill: Capillary refill takes 2 to 3 seconds. Findings: No erythema or rash. Neurological:      Mental Status: He is alert and oriented to person, place, and time. Mental status is at baseline. Motor: Weakness present. Coordination: Coordination abnormal.      Gait: Gait abnormal.      Comments: 5/5 strength bilateral lower extremities. Tremors with standing for extended periods of time. Relieved with mild pressure applied to extremity. Using walker for ambulation     Psychiatric:         Mood and Affect: Mood normal.         Behavior: Behavior normal.         Thought Content: Thought content normal.         Judgment: Judgment normal.                 An electronic signature was used to authenticate this note.     --Audra Arriola, ISHAAN - CNP

## 2021-06-24 ENCOUNTER — HOSPITAL ENCOUNTER (OUTPATIENT)
Dept: PHYSICAL THERAPY | Age: 46
Setting detail: THERAPIES SERIES
Discharge: HOME | End: 2021-06-24
Payer: COMMERCIAL

## 2021-06-24 NOTE — DISCHARGE SUMMARY
Jarek Ma NOTE  OUTPATIENT  [address] [facility]    Patient Name: Alysha Bush        CSN: [id number]   YOB: 1975  Gender: male  Ashton, Jake RDZ, DO,    Cramp and spasm [R25.2] ,      Patient is discharged from Physical Therapy services at this time. See last note for details related to results of therapy and goal achievement. Reason for discharge: Was on hold for physician appointment. No additional therapy required at this time. Vilma Carolina \"YFLQH\Donna Little, PATTIET  [id number]

## 2021-06-30 ENCOUNTER — TELEPHONE (OUTPATIENT)
Dept: INTERNAL MEDICINE CLINIC | Age: 46
End: 2021-06-30

## 2021-06-30 ENCOUNTER — HOSPITAL ENCOUNTER (OUTPATIENT)
Dept: GENERAL RADIOLOGY | Age: 46
Discharge: HOME | End: 2021-06-30
Payer: COMMERCIAL

## 2021-06-30 ENCOUNTER — OFFICE VISIT (OUTPATIENT)
Dept: INTERNAL MEDICINE CLINIC | Age: 46
End: 2021-06-30
Payer: COMMERCIAL

## 2021-06-30 ENCOUNTER — OFFICE VISIT (OUTPATIENT)
Dept: UROLOGY | Age: 46
End: 2021-06-30
Payer: COMMERCIAL

## 2021-06-30 ENCOUNTER — HOSPITAL ENCOUNTER (OUTPATIENT)
Age: 46
Discharge: HOME | End: 2021-06-30
Payer: COMMERCIAL

## 2021-06-30 VITALS
DIASTOLIC BLOOD PRESSURE: 78 MMHG | SYSTOLIC BLOOD PRESSURE: 124 MMHG | BODY MASS INDEX: 36.22 KG/M2 | HEIGHT: 70 IN | WEIGHT: 253 LBS

## 2021-06-30 VITALS
SYSTOLIC BLOOD PRESSURE: 130 MMHG | BODY MASS INDEX: 36.22 KG/M2 | DIASTOLIC BLOOD PRESSURE: 76 MMHG | TEMPERATURE: 98.7 F | HEIGHT: 70 IN | WEIGHT: 253 LBS | HEART RATE: 88 BPM

## 2021-06-30 DIAGNOSIS — G47.9 SLEEP DISTURBANCES: ICD-10-CM

## 2021-06-30 DIAGNOSIS — R63.5 ABNORMAL WEIGHT GAIN: Primary | ICD-10-CM

## 2021-06-30 DIAGNOSIS — N13.8 HYPERTROPHY OF PROSTATE WITH URINARY OBSTRUCTION: ICD-10-CM

## 2021-06-30 DIAGNOSIS — E11.00 TYPE 2 DIABETES MELLITUS WITH HYPEROSMOLARITY WITHOUT COMA, WITHOUT LONG-TERM CURRENT USE OF INSULIN (HCC): ICD-10-CM

## 2021-06-30 DIAGNOSIS — R33.9 URINARY RETENTION: Primary | ICD-10-CM

## 2021-06-30 DIAGNOSIS — R32 URINARY INCONTINENCE, UNSPECIFIED TYPE: ICD-10-CM

## 2021-06-30 DIAGNOSIS — N40.1 HYPERTROPHY OF PROSTATE WITH URINARY OBSTRUCTION: ICD-10-CM

## 2021-06-30 DIAGNOSIS — R06.02 SOB (SHORTNESS OF BREATH): ICD-10-CM

## 2021-06-30 DIAGNOSIS — R33.9 INCOMPLETE BLADDER EMPTYING: ICD-10-CM

## 2021-06-30 DIAGNOSIS — R27.0 ATAXIA: ICD-10-CM

## 2021-06-30 DIAGNOSIS — I10 BENIGN ESSENTIAL HTN: ICD-10-CM

## 2021-06-30 LAB
BILIRUBIN URINE: NEGATIVE
BLOOD URINE, POC: NEGATIVE
CHARACTER, URINE: CLEAR
COLOR, URINE: YELLOW
GLUCOSE URINE: NEGATIVE MG/DL
KETONES, URINE: ABNORMAL
LEUKOCYTE CLUMPS, URINE: NEGATIVE
NITRITE, URINE: NEGATIVE
PH, URINE: 5.5 (ref 5–9)
POST VOID RESIDUAL (PVR): 14 ML
PROTEIN, URINE: ABNORMAL MG/DL
SPECIFIC GRAVITY, URINE: >= 1.03 (ref 1–1.03)
UROBILINOGEN, URINE: 0.2 EU/DL (ref 0–1)

## 2021-06-30 PROCEDURE — 71046 X-RAY EXAM CHEST 2 VIEWS: CPT

## 2021-06-30 PROCEDURE — G8427 DOCREV CUR MEDS BY ELIG CLIN: HCPCS | Performed by: INTERNAL MEDICINE

## 2021-06-30 PROCEDURE — 99204 OFFICE O/P NEW MOD 45 MIN: CPT | Performed by: INTERNAL MEDICINE

## 2021-06-30 PROCEDURE — 1036F TOBACCO NON-USER: CPT | Performed by: INTERNAL MEDICINE

## 2021-06-30 PROCEDURE — 99204 OFFICE O/P NEW MOD 45 MIN: CPT | Performed by: UROLOGY

## 2021-06-30 PROCEDURE — G8417 CALC BMI ABV UP PARAM F/U: HCPCS | Performed by: INTERNAL MEDICINE

## 2021-06-30 PROCEDURE — G8427 DOCREV CUR MEDS BY ELIG CLIN: HCPCS | Performed by: UROLOGY

## 2021-06-30 PROCEDURE — 3044F HG A1C LEVEL LT 7.0%: CPT | Performed by: INTERNAL MEDICINE

## 2021-06-30 PROCEDURE — G8417 CALC BMI ABV UP PARAM F/U: HCPCS | Performed by: UROLOGY

## 2021-06-30 PROCEDURE — 81003 URINALYSIS AUTO W/O SCOPE: CPT | Performed by: UROLOGY

## 2021-06-30 PROCEDURE — 1036F TOBACCO NON-USER: CPT | Performed by: UROLOGY

## 2021-06-30 PROCEDURE — 2022F DILAT RTA XM EVC RTNOPTHY: CPT | Performed by: INTERNAL MEDICINE

## 2021-06-30 PROCEDURE — 51798 US URINE CAPACITY MEASURE: CPT | Performed by: UROLOGY

## 2021-06-30 RX ORDER — TAMSULOSIN HYDROCHLORIDE 0.4 MG/1
0.4 CAPSULE ORAL DAILY
Qty: 30 CAPSULE | Refills: 5 | Status: SHIPPED | OUTPATIENT
Start: 2021-06-30 | End: 2021-10-05 | Stop reason: SDUPTHER

## 2021-06-30 NOTE — PROGRESS NOTES
[address] [facility] Internal Medicine   [facility] [address]  [facility], [address]  Dept: [phone]  Dept Fax: [phone]        YOB: 1975    Chief Complaint   Patient presents with   [facility].     referred by Tona     Weight Gain     abnormal weight gain       Patient presents for evaluation of above issues. I have never seen the patient before. This patient is followed regularly by Landen Ramsey CNP. He does have DM-2 currently on tabs, along with hx of ataxia undergoing PT/ OT and seen neuro in the past. He is here today with his mother, and he ambulates with a walker, since the back surgery done last summer. He feels he is putting on weight, almost + 60 lbs. At times he gets SOB , he sleeps in a bed. Occasional diarrhea,   No abd pain, but has fullness. On further discussion, he does have   Hard time sleeping, and legs hurting / jumping at night. Noted CT abd from last yr, and reviewed the labs. No bleeding issues, had some falls due to leg weakness. .      Patient Active Problem List   Diagnosis    Leg weakness, bilateral    Ataxia    Syrinx of spinal cord ([facility] [address]    Myelopathy of thoracic region    At high risk for falls after spinal surgery    Mild intermittent asthma without complication    Spasm of muscle    Weakness of left lower extremity    Urinary problem in male    Benign essential HTN    Hypovitaminosis D    Mixed stress and urge urinary incontinence    Hyperglycemia    Bilateral leg edema    Neurogenic bladder disorder    Abnormal weight gain    Sleep disturbances       Current Outpatient Medications   Medication Sig Dispense Refill    Elastic Bandages & Supports (MEDICAL LEGWEAR/KNEE HIGH) MISC Please dispense 2 pair of XL knee compression hose, 15-20mmHg compression.  4 each 0    Foot Care Products (SOCK AID) MISC 1 Device by Does not apply route daily 1 each 0    omeprazole (PRILOSEC) 40 MG delayed release capsule Take 1 capsule by mouth every morning (before breakfast) 90 capsule 1    bumetanide (BUMEX) 1 MG tablet Take 1 tablet by mouth daily 30 tablet 3    potassium chloride (KLOR-CON M) 20 MEQ extended release tablet Take 1 tablet by mouth daily 90 tablet 1    lisinopril (PRINIVIL;ZESTRIL) 5 MG tablet Take 1 tablet by mouth daily 90 tablet 1    oxybutynin (DITROPAN XL) 10 MG extended release tablet Take 1 tablet by mouth daily 30 tablet 3    Incontinence Supply Disposable (POISE HOURGLASS SHAPE) PADS 1 Device by Does not apply route 4 times daily 120 each 0    metFORMIN (GLUCOPHAGE) 500 MG tablet Take 1 tablet by mouth 2 times daily (with meals) 60 tablet 5    levETIRAcetam (KEPPRA) 250 MG tablet Take 1 tablet by mouth 2 times daily 60 tablet 2    baclofen (LIORESAL) 20 MG tablet Take 1 tablet by mouth 3 times daily 90 tablet 0    ibuprofen (ADVIL;MOTRIN) 600 MG tablet Take 1 tablet by mouth 3 times daily as needed for Pain 30 tablet 0    vitamin D-3 (CHOLECALCIFEROL) 125 MCG (5000 UT) TABS Take 1 tablet by mouth daily 90 tablet 1    acetaminophen (TYLENOL) 500 MG tablet Take 500 mg by mouth every 6 hours as needed for Pain       No current facility-administered medications for this visit.        Past Medical History:   Diagnosis Date    Asthma     does not know name of inhaler and hasnt used in over a yr    Ataxic gait     Developmental delay     Diet-controlled diabetes mellitus ([facility] [address]     History of syrinx of spinal cord ([facility] [address] 03/17/2020    Incomplete bladder emptying     Myelopathy of thoracic region     Osteopenia determined by x-ray     Spasm of muscle     Urinary dribbling     Urinary leakage     Uses walker     for standing and transfers    Uses wheelchair     can stand and transfer cannot walk due to leg weakness    Vitamin D deficiency 03/12/2020    Vitamin D 25OH 24       Past Surgical History:   Procedure Laterality Date    BACK SURGERY      CRANIOTOMY N/A 5/14/2020    Thoracic Spine and Laminectomy Extraction of Arachnoid Cyst/Band and 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 performed by Krystal Cruz MD at [address] [place] [address],[facility]  09/16/2020    IR angiogram carotid cerebral bilateral       Allergies   Allergen Reactions    Norco [Hydrocodone-Acetaminophen] Nausea And Vomiting       Social History     Socioeconomic History    Marital status: Single     Spouse name: Not on file    Number of children: Not on file    Years of education: Not on file    Highest education level: Not on file   Occupational History    Not on file   Tobacco Use    Smoking status: Never Smoker    Smokeless tobacco: Never Used   Vaping Use    Vaping Use: Never used   Substance and Sexual Activity    Alcohol use: No    Drug use: No    Sexual activity: Not Currently   Other Topics Concern    Not on file   Social History Narrative    Not on file     Social Determinants of Health     Financial Resource Strain: Low Risk     Difficulty of Paying Living Expenses: Not hard at all   Food Insecurity: No Food Insecurity    Worried About [employer] in the Last Year: Never true    [facility] [employer] in the Last Year: Never true   Transportation Needs:     Lack of Transportation (Medical):      Lack of Transportation (Non-Medical):    Physical Activity:     Days of Exercise per Week:     Minutes of Exercise per Session:    Stress:     Feeling of Stress :    Social Connections:     Frequency of Communication with Friends and Family:     Frequency of Social Gatherings with Friends and Family:     Attends Anabaptist Services:     Active Member of Clubs or Organizations:     Attends Club or Organization Meetings:     Marital Status:    Intimate Partner Violence:     Fear of Current or Ex-Partner:     Emotionally Abused:     Physically Abused:     Sexually Abused:        Family History   Problem Relation Age of Onset    Arthritis Mother     Diabetes Mother     Heart Disease Father     Cancer Neg Hx Services Required     Requested Specialty:   Sleep Center     Number of Visits Requested:   1      Diagnosis Orders   1. Abnormal weight gain  [address]   2. Ataxia     3. Sleep disturbances  XR CHEST STANDARD (2 VW)    [facility]   4. Benign essential HTN     5. SOB (shortness of breath)  XR CHEST STANDARD (2 VW)   6. Type 2 diabetes mellitus with hyperosmolarity without coma, without long-term current use of insulin [facility] - Dietitian           PLAN:    His weight gain is likely multifactorial, he attributes this to post back surgery , with dec ADL's and bilateral leg weakness, chronic issue  Now, will refer him to dietician for further education. Extensive work up  Including imaging studies, and normal TSH ,other labs were reviewed . I recommend he cuts down the advil , and stick with tylenol, as former  Probably giving rise to peripheral edema. Now he admits to mild SOB,   Will obtain a CXR. No CP or LOC. Sleep studies and he might be having restless  Legs  - so formal studies will refer to sleep lab     DM-2 is well controlled based on recent labs, and his PCP is following with that.,         RTO 6 weeks. Signed by : Verónica Lozada M.D.     [facility] Internal Medicine  [address] [facility], [address]  [address] [facility], [address] [facility] [address]    Tel  [phone]  Fax [phone]

## 2021-06-30 NOTE — PROGRESS NOTES
Dr. Wm Hull MD  [facility])  Urology Clinic  New Patient Visit      Patient:  David Alcazar  YOB: 1975  Date: 6/30/2021    HISTORY OF PRESENT ILLNESS:   The patient is a 50 y.o. male who presents today for evaluation of the following problem(s): Slow urination and PVR of 14mL. OAB on Ditropan ER 10mg daily. Overall the problem(s) : are worsening. Associated Symptoms: No dysuria, gross hematuria. Pain Severity: 0/10    Summary of old records:   Ditropan ER 10mg daily    Imaging/Labs reviewed during today's visit:  I have independently reviewed and verified the following films during today's visit.  UA, see below.      Last several PSA's:  No results found for: PSA    Last total testosterone:  No results found for: TESTOSTERONE    Urinalysis today:  Results for POC orders placed in visit on 06/30/21   POCT Urinalysis No Micro (Auto)   Result Value Ref Range    Glucose, Ur Negative NEGATIVE mg/dl    Bilirubin Urine Negative     Ketones, Urine Trace (A) NEGATIVE    Specific Gravity, Urine >= 1.030 1.002 - 1.030    Blood, UA POC Negative NEGATIVE    pH, Urine 5.50 5.0 - 9.0    Protein, Urine Trace (A) NEGATIVE mg/dl    Urobilinogen, Urine 0.20 0.0 - 1.0 eu/dl    Nitrite, Urine Negative NEGATIVE    Leukocyte Clumps, Urine Negative NEGATIVE    Color, Urine Yellow YELLOW-STRAW    Character, Urine Clear [id number].[id number]   poct post void residual   Result Value Ref Range    post void residual 14 ml         Last BUN and creatinine:  Lab Results   Component Value Date    BUN 12 05/28/2021     Lab Results   Component Value Date    CREATININE 0.8 05/28/2021       Imaging Reviewed during this Office Visit:   (results were independently reviewed by physician and radiology report verified)    PAST MEDICAL, FAMILY AND SOCIAL HISTORY:  Past Medical History:   Diagnosis Date    Asthma     does not know name of inhaler and hasnt used in over a yr    Ataxic gait     Developmental delay     Diet-controlled diabetes mellitus ([facility] [address]     History of syrinx of spinal cord ([facility] [address] 03/17/2020    Incomplete bladder emptying     Myelopathy of thoracic region     Osteopenia determined by x-ray     Spasm of muscle     Urinary dribbling     Urinary leakage     Uses walker     for standing and transfers    Uses wheelchair     can stand and transfer cannot walk due to leg weakness    Vitamin D deficiency 03/12/2020    Vitamin D 25OH 24     Past Surgical History:   Procedure Laterality Date    BACK SURGERY      CRANIOTOMY N/A 5/14/2020    Thoracic Spine and Laminectomy Extraction of Arachnoid Cyst/Band and 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 performed by Kaur Wild MD at [address] [place] [address]  09/16/2020    IR angiogram carotid cerebral bilateral     Family History   Problem Relation Age of Onset    Arthritis Mother     Diabetes Mother     Heart Disease Father     Cancer Neg Hx     High Blood Pressure Neg Hx     Stroke Neg Hx      No outpatient medications have been marked as taking for the 6/30/21 encounter (Office Visit) with Angel Lopez MD.       [facility] [hydrocodone-acetaminophen]  Social History     Tobacco Use   Smoking Status Never Smoker   Smokeless Tobacco Never Used       Social History     Substance and Sexual Activity   Alcohol Use No       REVIEW OF SYSTEMS:  Constitutional: negative  Eyes: negative  Respiratory: negative  Cardiovascular: negative  Gastrointestinal: negative  Musculoskeletal: negative  Genitourinary: negative except for what is in HPI  Skin: negative   Neurological: negative  Hematological/Lymphatic: negative  Psychological: negative    Physical Exam:    This a 50 y.o. male   Vitals:    06/30/21 1023   BP: 124/78     Constitutional: Patient in no acute distress; Neuro: alert and oriented to person place and time.     Psych: Mood and affect normal.  Skin: Normal  Lungs: Respiratory effort normal  Cardiovascular:  Normal peripheral pulses  Abdomen: Soft, non-tender, non-distended with no CVA, flank pain, hepatosplenomegaly or hernia. Kidneys normal.  Bladder non-tender and not distended. Lymphatics: no palpable lymphadenopathy      Assessment and Plan      1. Urinary retention    2. Urinary incontinence, unspecified type    3. Hypertrophy of prostate with urinary obstruction    4. Incomplete bladder emptying           Plan:      No follow-ups on file. Continue Ditropan ER 10mg daily. Start Flomax 0.4mg daily  See back in 3 months.           Dr. Jacquie Hicks MD

## 2021-06-30 NOTE — PROGRESS NOTES
Unable to review medications, patients states they have not changed. Pt had appt today with dietician and meds were already checked.

## 2021-07-14 ENCOUNTER — OFFICE VISIT (OUTPATIENT)
Dept: INTERNAL MEDICINE CLINIC | Age: 46
End: 2021-07-14
Payer: COMMERCIAL

## 2021-07-14 VITALS — TEMPERATURE: 97.6 F | HEIGHT: 70 IN | BODY MASS INDEX: 35.79 KG/M2 | WEIGHT: 250 LBS

## 2021-07-14 DIAGNOSIS — R63.5 ABNORMAL WEIGHT GAIN: ICD-10-CM

## 2021-07-14 DIAGNOSIS — E11.9 TYPE 2 DIABETES MELLITUS WITHOUT COMPLICATION, WITHOUT LONG-TERM CURRENT USE OF INSULIN (HCC): ICD-10-CM

## 2021-07-14 PROCEDURE — 97802 MEDICAL NUTRITION INDIV IN: CPT | Performed by: DIETITIAN, REGISTERED

## 2021-07-14 NOTE — PATIENT INSTRUCTIONS
1. )  Get familiar with reading the nutrition facts label by looking at serving size and total carbohydrates. - Without a label refer to your carb count guide booklet. 2.)  Measure foods for accuracy in carb counting.    3.)  Healthy carb choices:  whole grains, whole wheat pasta, starchy vegetables, fresh fruit and lowfat/non-fat milk and yogurt. 4.)  Your meal plan is found on the inside cover of your carb counting guide booklet:  1st meal or Brkf:  45-60 gms carbohydrates + 1-2 oz protein  2nd meal or Lunch: 60 gms carbohydrates + 2-3 oz protein + non-starchy veggies  3rd meal or Dinner:  60 gms carbohydrates + 2-3 oz protein + non- starchy veggies    Snack time:  15 - 20 gms carbohydrates + 1 oz protein    5.)  Choose lean protein most of the time and Cut in 1/2 added fats to help with weight loss efforts. 6.)  Start doing \"youtube\" exercises - start with 10 minutes each day and work up from there. 7.) Bring a 1-2 week food log to next dietitian appt.

## 2021-07-14 NOTE — PROGRESS NOTES
[address]  [address] [place] [address] Rd.[facility], [address]  [phone] (phone)  [phone] (fax)    Patient Name: Zane Gr. Date of Birth: [id number]. MRN: [id number]      Assessment: Patient is a 50 y.o. male seen for Initial MNT visit for Abnormal weight gain and Type 2 Diabetes. -Nutritionally relevant labs:   Lab Results   Component Value Date/Time    LABA1C 6.5 (H) 05/28/2021 10:10 AM    LABA1C 6.0 05/14/2020 03:58 PM    LABA1C 6.0 03/07/2020 04:00 AM    GLUCOSE 105 05/28/2021 10:10 AM    GLUCOSE 96 08/12/2020 04:38 PM     Pt arrives walking with a walker. Pt states Back surgery last May and has had coordination problems every since his surgery. 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 Pt states he does not sleep well and did not sleep at all last night. Pt states he does not take naps. Lives with sister and mom. Mom does most of the cooking. Eats take out once a week-usually pizza from [employer].     -Blood sugar trends: Not checking. Pt does not take his metformin.    -Food recall:   Breakfast: (8AM) Cereal-1 large bowl (margarine tub) and milk (2%). Lunch: Sometimes skip. [facility]) cold meat, champion, tomato, lettuce, onion sandwich. Dinner: Main meal is supper. Mashed potato, gravy, corn, green beans, salad w/ ranch. Pop-16 oz. Snacks: Shelled peanuts (amount fills margarine bowl with shells), chips. Pt denies eating sweets    -Main Beverages: Some water 1-2 bottles with SF powder flavoring. 2-3 glasses of milk (16. Oz). Sometimes OJ in morning (16 oz.)       Physical Activity: basic chores. Tries to move throughout day, but limited on mobility.     -Impression of Dietary Intake: on average, 2-3 meals per day, on average, 0-1 servings fruit per day, on average, 1-2 servings vegetables per day.     Current Outpatient Medications on File Prior to Visit   Medication Sig Dispense Refill    tamsulosin (FLOMAX) 0.4 MG capsule Take 1 capsule by mouth daily 30 capsule 5    Elastic Bandages & Supports (MEDICAL LEGWEAR/KNEE HIGH) MISC Please dispense 2 pair of XL knee compression hose, 15-20mmHg compression. 4 each 0    Foot Care Products (SOCK AID) MISC 1 Device by Does not apply route daily 1 each 0    omeprazole (PRILOSEC) 40 MG delayed release capsule Take 1 capsule by mouth every morning (before breakfast) 90 capsule 1    bumetanide (BUMEX) 1 MG tablet Take 1 tablet by mouth daily 30 tablet 3    potassium chloride (KLOR-CON M) 20 MEQ extended release tablet Take 1 tablet by mouth daily 90 tablet 1    lisinopril (PRINIVIL;ZESTRIL) 5 MG tablet Take 1 tablet by mouth daily 90 tablet 1    oxybutynin (DITROPAN XL) 10 MG extended release tablet Take 1 tablet by mouth daily 30 tablet 3    Incontinence Supply Disposable (POISE HOURGLASS SHAPE) PADS 1 Device by Does not apply route 4 times daily 120 each 0    levETIRAcetam (KEPPRA) 250 MG tablet Take 1 tablet by mouth 2 times daily 60 tablet 2    baclofen (LIORESAL) 20 MG tablet Take 1 tablet by mouth 3 times daily 90 tablet 0    vitamin D-3 (CHOLECALCIFEROL) 125 MCG (5000 UT) TABS Take 1 tablet by mouth daily 90 tablet 1    acetaminophen (TYLENOL) 500 MG tablet Take 500 mg by mouth every 6 hours as needed for Pain      metFORMIN (GLUCOPHAGE) 500 MG tablet Take 1 tablet by mouth 2 times daily (with meals) (Patient not taking: Reported on 7/14/2021) 60 tablet 5    ibuprofen (ADVIL;MOTRIN) 600 MG tablet Take 1 tablet by mouth 3 times daily as needed for Pain (Patient not taking: Reported on 7/14/2021) 30 tablet 0     No current facility-administered medications on file prior to visit. Vitals from current and previous visits:  Temp 97.6 °F (36.4 °C)   Ht 5' 10\" (1.778 m)   Wt 250 lb (113.4 kg)   BMI 35.87 kg/m²     -Body mass index is 35.87 kg/m². 35-39.9 - Obesity Grade II.   -Weight goal: lose weight.      Nutrition Diagnosis:   Overweight/Obesity related to Excessive energy intake and sedentary lifestyle as evidenced by Pt reports Weight gain of about 60 lb, food recall, BMI 35.87 (Class 2 obesity), A1C (6.5%). Intervention:  -Impression: Pt recognized he is eating too large of portion sizes. -Instructed the patient on: Carbohydrate Counting, Consistent Carbohydrate Intake, Food Label Reading, Plate Method and The Importance of Regular Physical Activity  -Handouts given for: carbohydrate counting, plate method, 973 gm sample meal plans/menus, fancy plate pics, healthy snacks, You tube exercise list.    Patient Instructions   1.)  Get familiar with reading the nutrition facts label by looking at serving size and total carbohydrates. - Without a label refer to your carb count guide booklet. 2.)  Measure foods for accuracy in carb counting.    3.)  Healthy carb choices:  whole grains, whole wheat pasta, starchy vegetables, fresh fruit and lowfat/non-fat milk and yogurt. 4.)  Your meal plan is found on the inside cover of your carb counting guide booklet:  1st meal or Brkf:  45-60 gms carbohydrates + 1-2 oz protein  2nd meal or Lunch: 60 gms carbohydrates + 2-3 oz protein + non-starchy veggies  3rd meal or Dinner:  60 gms carbohydrates + 2-3 oz protein + non- starchy veggies    Snack time:  15 - 20 gms carbohydrates + 1 oz protein    5.)  Choose lean protein most of the time and Cut in 1/2 added fats to help with weight loss efforts. 6.)  Start doing \"youtube\" exercises - start with 10 minutes each day and work up from there. 7.) Bring a 1-2 week food log to next dietitian appt. -Nutrition prescription: 1600 calories/day, 180g carbs/day.      Monitoring/Evaluation:   -Followup visit: 6 weeks with dietitian.   -Receptiveness to education/goals: Agreeable.  -Evaluation of education: Needs reinforcement.  -Readiness to change: contemplation - ambivalent about change- recognizes he is eating too large of portions so needing to cut back on portions of milk, juice and pasta meals. -Expected compliance: fair. Thank you for your referral of this patient. Total time involved in direct patient education: 60 minutes for initial MNT visit. Note was created by Alisson Gaming, dietetic intern.   Note reviewed by Gabriela Sanchez RD, NAHUM,

## 2021-07-19 ENCOUNTER — OFFICE VISIT (OUTPATIENT)
Dept: PHYSICAL MEDICINE AND REHAB | Age: 46
End: 2021-07-19
Payer: COMMERCIAL

## 2021-07-19 VITALS
HEIGHT: 70 IN | SYSTOLIC BLOOD PRESSURE: 120 MMHG | WEIGHT: 250 LBS | BODY MASS INDEX: 35.79 KG/M2 | DIASTOLIC BLOOD PRESSURE: 74 MMHG

## 2021-07-19 DIAGNOSIS — R25.2 SPASTICITY: Primary | ICD-10-CM

## 2021-07-19 DIAGNOSIS — G89.29 OTHER CHRONIC PAIN: ICD-10-CM

## 2021-07-19 DIAGNOSIS — M79.2 NEUROPATHIC PAIN: ICD-10-CM

## 2021-07-19 DIAGNOSIS — G95.0 SYRINX OF SPINAL CORD (HCC): ICD-10-CM

## 2021-07-19 PROCEDURE — 1036F TOBACCO NON-USER: CPT | Performed by: ANESTHESIOLOGY

## 2021-07-19 PROCEDURE — 99214 OFFICE O/P EST MOD 30 MIN: CPT | Performed by: ANESTHESIOLOGY

## 2021-07-19 PROCEDURE — G8427 DOCREV CUR MEDS BY ELIG CLIN: HCPCS | Performed by: ANESTHESIOLOGY

## 2021-07-19 PROCEDURE — G8417 CALC BMI ABV UP PARAM F/U: HCPCS | Performed by: ANESTHESIOLOGY

## 2021-07-19 RX ORDER — LEVETIRACETAM 500 MG/1
500 TABLET ORAL 2 TIMES DAILY
Qty: 60 TABLET | Refills: 2 | Status: SHIPPED | OUTPATIENT
Start: 2021-07-19 | End: 2021-10-11 | Stop reason: SDUPTHER

## 2021-07-19 NOTE — PROGRESS NOTES
Chronic Pain Clinic Note     Encounter Date: 7/19/21    Subjective:   Chief Complaint:   Chief Complaint   Patient presents with    3 Month Follow-Up       History of Present Illness:   Kamille Chandler is a 50 y.o. male seen in the clinic initially on 01/21/21 upon request from Uriah Llanosma ([address] [place] [address]) for his history of lower limb spasticity. He underwent an arachnoid cyst resection with Dr. Sheldon Santacruz on May 14, 2020 after worsening bilateral leg pain. He notes about 2 months after his surgery his legs have been increasingly worse. He describes his pain to be in bilateral legs from the waist down that he describes as a constant 10/10 pain. He states the pain is a constant aching, burning pain. It is worse with any ambulation. He states is pain is improved with rest. He uses a walker for ambulation due to pain and weakness in his legs. He has been taking Baclofen 10 mg twice a day which does not help too much. He denies any saddle anesthesia or bowel/bladder incontinence. He deals with a lot of tone/spasticity in his legs. He reports stretching about 4 times a day. He was previously working at [employer] prior to his surgery. Today, 7/19/2021, patient presents for planned follow-up for management of lower limb spasticity and chronic pain. He reports doing relatively okay on his Keppra. He states that he has not seen any leg swelling since starting his Keppra. He continues to have leg \"jumping\" particular at nighttime that is bothersome to him. He states that he has not heard from physical therapy to get established to work on some of his leg strength. He denies any new symptoms of focal leg weakness, new leg paresthesias, saddle anesthesia, bowel/bladder incontinence.     History of Interventions:   Surgery: arachnoid cyst resection - Dr. Jeannine Garcia (5/14/20)  Injections: None    Current Treatment Medications:   Baclofen 20 mg 3 times daily  Keppra 250 mg twice daily    Historical Treatment Medications: Lyricaleg swelling      Past Medical History:   Diagnosis Date    Asthma     does not know name of inhaler and hasnt used in over a yr    Ataxic gait     Developmental delay     Diet-controlled diabetes mellitus ([facility] [facility]     History of syrinx of spinal cord ([facility] [address] [facility] 03/17/2020    Incomplete bladder emptying     Myelopathy of thoracic region     Osteopenia determined by x-ray     Spasm of muscle     Urinary dribbling     Urinary leakage     Uses walker     for standing and transfers    Uses wheelchair     can stand and transfer cannot walk due to leg weakness    Vitamin D deficiency 03/12/2020    Vitamin D 25OH 24       Past Surgical History:   Procedure Laterality Date    BACK SURGERY      CRANIOTOMY N/A 5/14/2020    Thoracic Spine and Laminectomy Extraction of Arachnoid Cyst/Band and 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 performed by Suzette Rodriguez MD at [address] [place] [address]  09/16/2020    IR angiogram carotid cerebral bilateral       Family History   Problem Relation Age of Onset    Arthritis Mother     Diabetes Mother     Heart Disease Father     Cancer Neg Hx     High Blood Pressure Neg Hx     Stroke Neg Hx        Social History     Socioeconomic History    Marital status: Single     Spouse name: Not on file    Number of children: Not on file    Years of education: Not on file    Highest education level: Not on file   Occupational History    Not on file   Tobacco Use    Smoking status: Never Smoker    Smokeless tobacco: Never Used   Vaping Use    Vaping Use: Never used   Substance and Sexual Activity    Alcohol use: No    Drug use: No    Sexual activity: Not Currently   Other Topics Concern    Not on file   Social History Narrative    Not on file     Social Determinants of Health     Financial Resource Strain: Low Risk     Difficulty of Paying Living Expenses: Not hard at all   Food Insecurity: No Food Insecurity    Worried About Running Out of Food in the Last Year: Never true    Ran Out of Food in the Last Year: Never true   Transportation Needs:     Lack of Transportation (Medical):  Lack of Transportation (Non-Medical):    Physical Activity:     Days of Exercise per Week:     Minutes of Exercise per Session:    Stress:     Feeling of Stress :    Social Connections:     Frequency of Communication with Friends and Family:     Frequency of Social Gatherings with Friends and Family:     Attends Catholic Services:     Active Member of Clubs or Organizations:     Attends Club or Organization Meetings:     Marital Status:    Intimate Partner Violence:     Fear of Current or Ex-Partner:     Emotionally Abused:     Physically Abused:     Sexually Abused:        Medications & Allergies:   Current Outpatient Medications   Medication Instructions    acetaminophen (TYLENOL) 500 mg, Oral, EVERY 6 HOURS PRN    baclofen (LIORESAL) 20 mg, Oral, 3 TIMES DAILY    bumetanide (BUMEX) 1 mg, Oral, DAILY    Elastic Bandages & Supports (MEDICAL LEGWEAR/KNEE HIGH) MISC Please dispense 2 pair of XL knee compression hose, 15-20mmHg compression.     Foot Care Products (SOCK AID) MISC 1 Device, Does not apply, DAILY    ibuprofen (ADVIL;MOTRIN) 600 mg, Oral, 3 TIMES DAILY PRN    Incontinence Supply Disposable (POISE HOURGLASS SHAPE) PADS 1 Device, Does not apply, 4 TIMES DAILY    levETIRAcetam (KEPPRA) 500 mg, Oral, 2 TIMES DAILY    lisinopril (PRINIVIL;ZESTRIL) 5 mg, Oral, DAILY    metFORMIN (GLUCOPHAGE) 500 mg, Oral, 2 TIMES DAILY WITH MEALS    omeprazole (PRILOSEC) 40 mg, Oral, DAILY BEFORE BREAKFAST    oxybutynin (DITROPAN XL) 10 mg, Oral, DAILY    potassium chloride (KLOR-CON M) 20 MEQ extended release tablet 20 mEq, Oral, DAILY    tamsulosin (FLOMAX) 0.4 mg, Oral, DAILY    vitamin D-3 (CHOLECALCIFEROL) 5,000 Units, Oral, DAILY       Allergies   Allergen Reactions    Norco [Hydrocodone-Acetaminophen] Nausea And Vomiting       Review of Systems: for neuropathic pain coverage. Patient has yet to be set up with physical therapy for leg strengthening. I advised patient to reach out to the physical therapy group to try to set up appointments they have called him twice now. Follow-up in 12 weeks. Plan: The following treatment recommendations and plan were discussed in detail with Kimberli Flood. Adjuvants: In light of the presence of a neuropathic component of pain and side effects to Lyrica, I have increased his Keppra to 500 mg twice a day. For continued chronic pain with associated musculoskeletal component, we will continue his Baclofen to 20 mg three times a day. Multidisciplinary Pain Management:   In the presence of complex, chronic, and multi-factorial pain, the importance of a multidisciplinary approach to pain management in the patients management regimen was emphasized and discussed in great detail. PHYSICAL THERAPY: I advised the patient to continue gentle stretching atleast 4 times a day. Advised patient to reach out to physical therapy group at [facility] as referral was already placed in they have tried to contact him twice.     Referrals:  None    Prescriptions Written This Visit:   Keppra 500 mg    Follow-up: 12 weeks      Carlton Paulino DO  Interventional Pain Management/PM&R   [place]

## 2021-07-30 RX ORDER — LEVETIRACETAM 500 MG/1
500 TABLET ORAL 2 TIMES DAILY
Qty: 60 TABLET | Refills: 2 | Status: CANCELLED | OUTPATIENT
Start: 2021-07-30

## 2021-07-30 NOTE — TELEPHONE ENCOUNTER
Xavi Severino called requesting a refill on the following medications:  Requested Prescriptions     Pending Prescriptions Disp Refills    baclofen (LIORESAL) 20 MG tablet 90 tablet 0     Sig: Take 1 tablet by mouth 3 times daily    levETIRAcetam (KEPPRA) 500 MG tablet 60 tablet 2     Sig: Take 1 tablet by mouth 2 times daily     Pharmacy verified:[facility]   . pv      Date of last visit:   Date of next visit (if applicable): 27/54/3705

## 2021-08-02 RX ORDER — BACLOFEN 20 MG/1
20 TABLET ORAL 3 TIMES DAILY
Qty: 90 TABLET | Refills: 0 | Status: SHIPPED | OUTPATIENT
Start: 2021-08-02 | End: 2021-10-05 | Stop reason: SDUPTHER

## 2021-08-11 ENCOUNTER — OFFICE VISIT (OUTPATIENT)
Dept: INTERNAL MEDICINE CLINIC | Age: 46
End: 2021-08-11
Payer: COMMERCIAL

## 2021-08-11 VITALS
BODY MASS INDEX: 34.61 KG/M2 | TEMPERATURE: 98.1 F | WEIGHT: 241.21 LBS | SYSTOLIC BLOOD PRESSURE: 110 MMHG | HEART RATE: 72 BPM | DIASTOLIC BLOOD PRESSURE: 70 MMHG

## 2021-08-11 DIAGNOSIS — R73.9 HYPERGLYCEMIA: ICD-10-CM

## 2021-08-11 DIAGNOSIS — E11.9 CONTROLLED TYPE 2 DIABETES MELLITUS WITHOUT COMPLICATION, WITHOUT LONG-TERM CURRENT USE OF INSULIN (HCC): Primary | ICD-10-CM

## 2021-08-11 PROCEDURE — G8417 CALC BMI ABV UP PARAM F/U: HCPCS | Performed by: INTERNAL MEDICINE

## 2021-08-11 PROCEDURE — G8427 DOCREV CUR MEDS BY ELIG CLIN: HCPCS | Performed by: INTERNAL MEDICINE

## 2021-08-11 PROCEDURE — 2022F DILAT RTA XM EVC RTNOPTHY: CPT | Performed by: INTERNAL MEDICINE

## 2021-08-11 PROCEDURE — 3044F HG A1C LEVEL LT 7.0%: CPT | Performed by: INTERNAL MEDICINE

## 2021-08-11 PROCEDURE — 1036F TOBACCO NON-USER: CPT | Performed by: INTERNAL MEDICINE

## 2021-08-11 PROCEDURE — 99213 OFFICE O/P EST LOW 20 MIN: CPT | Performed by: INTERNAL MEDICINE

## 2021-08-11 NOTE — PROGRESS NOTES
mouth 3 times daily 90 tablet 0    levETIRAcetam (KEPPRA) 500 MG tablet Take 1 tablet by mouth 2 times daily 60 tablet 2    tamsulosin (FLOMAX) 0.4 MG capsule Take 1 capsule by mouth daily 30 capsule 5    Elastic Bandages & Supports (MEDICAL LEGWEAR/KNEE HIGH) MISC Please dispense 2 pair of XL knee compression hose, 15-20mmHg compression. 4 each 0    Foot Care Products (SOCK AID) MISC 1 Device by Does not apply route daily 1 each 0    omeprazole (PRILOSEC) 40 MG delayed release capsule Take 1 capsule by mouth every morning (before breakfast) 90 capsule 1    bumetanide (BUMEX) 1 MG tablet Take 1 tablet by mouth daily 30 tablet 3    potassium chloride (KLOR-CON M) 20 MEQ extended release tablet Take 1 tablet by mouth daily 90 tablet 1    lisinopril (PRINIVIL;ZESTRIL) 5 MG tablet Take 1 tablet by mouth daily 90 tablet 1    oxybutynin (DITROPAN XL) 10 MG extended release tablet Take 1 tablet by mouth daily 30 tablet 3    Incontinence Supply Disposable (POISE HOURGLASS SHAPE) PADS 1 Device by Does not apply route 4 times daily 120 each 0    ibuprofen (ADVIL;MOTRIN) 600 MG tablet Take 1 tablet by mouth 3 times daily as needed for Pain 30 tablet 0    vitamin D-3 (CHOLECALCIFEROL) 125 MCG (5000 UT) TABS Take 1 tablet by mouth daily 90 tablet 1    acetaminophen (TYLENOL) 500 MG tablet Take 500 mg by mouth every 6 hours as needed for Pain      metFORMIN (GLUCOPHAGE) 500 MG tablet Take 1 tablet by mouth 2 times daily (with meals) (Patient not taking: Reported on 8/11/2021) 60 tablet 5     No current facility-administered medications for this visit.        Past Medical History:   Diagnosis Date    Asthma     does not know name of inhaler and hasnt used in over a yr    Ataxic gait     Developmental delay     Diet-controlled diabetes mellitus ([facility] [address]     History of syrinx of spinal cord ([facility] [address] 03/17/2020    Incomplete bladder emptying     Myelopathy of thoracic region     Osteopenia determined by x-ray    Asif Jean Spasm of muscle     Urinary dribbling     Urinary leakage     Uses walker     for standing and transfers    Uses wheelchair     can stand and transfer cannot walk due to leg weakness    Vitamin D deficiency 03/12/2020    Vitamin D 25OH 24       Past Surgical History:   Procedure Laterality Date    BACK SURGERY      CRANIOTOMY N/A 5/14/2020    Thoracic Spine and Laminectomy Extraction of Arachnoid Cyst/Band and 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 performed by Frida Camara MD at [facility] [facility]  09/16/2020    IR angiogram carotid cerebral bilateral       Allergies   Allergen Reactions    Norco [Hydrocodone-Acetaminophen] Nausea And Vomiting       Social History     Socioeconomic History    Marital status: Single     Spouse name: Not on file    Number of children: Not on file    Years of education: Not on file    Highest education level: Not on file   Occupational History    Not on file   Tobacco Use    Smoking status: Never Smoker    Smokeless tobacco: Never Used   Vaping Use    Vaping Use: Never used   Substance and Sexual Activity    Alcohol use: No    Drug use: No    Sexual activity: Not Currently   Other Topics Concern    Not on file   Social History Narrative    Not on file     Social Determinants of Health     Financial Resource Strain: Low Risk     Difficulty of Paying Living Expenses: Not hard at all   Food Insecurity: No Food Insecurity    Worried About Running Out of Food in the Last Year: Never true    Abilio of Food in the Last Year: Never true   Transportation Needs:     Lack of Transportation (Medical):      Lack of Transportation (Non-Medical):    Physical Activity:     Days of Exercise per Week:     Minutes of Exercise per Session:    Stress:     Feeling of Stress :    Social Connections:     Frequency of Communication with Friends and Family:     Frequency of Social Gatherings with Friends and Family:     Attends Muslim Services:     Active Member of Clubs or Organizations:     Attends Club or Organization Meetings:     Marital Status:    Intimate Partner Violence:     Fear of Current or Ex-Partner:     Emotionally Abused:     Physically Abused:     Sexually Abused:        Family History   Problem Relation Age of Onset    Arthritis Mother     Diabetes Mother     Heart Disease Father     Cancer Neg Hx     High Blood Pressure Neg Hx     Stroke Neg Hx        Review of Systems     See HPI, see above    /70 (Site: Right Upper Arm)   Pulse 72   Temp 98.1 °F (36.7 °C)   Wt 241 lb 3.4 oz (109.4 kg)   BMI 34.61 kg/m²     Physical Examination: General appearance - alert, well appearing, and in no distress and overweight  Mental status - alert, oriented to person, place, and time  Neck - supple, no significant adenopathy  Chest - clear to auscultation, no wheezes, rales or rhonchi, symmetric air entry  Heart - normal rate, regular rhythm, normal S1, S2, no murmurs, rubs, clicks or gallops  Abdomen - soft, nontender, nondistended, no masses or organomegaly  no pulsatile masses  no CVA tenderness  Neurological - alert, oriented, normal speech, no focal findings or movement disorder noted, neck supple without rigidity, leg weakness  Musculoskeletal - no muscular tenderness noted  Extremities - pedal edema trace +, Stacy's sign negative bilaterally  Skin - normal coloration and turgor, no rashes, no suspicious skin lesions noted     I have reviewed recent diagnostic testing including labs and  Imaging studies, vascular - negative for DVT, CT , MRI       Lab Results   Component Value Date     05/28/2021    K 4.2 05/28/2021    K 4.1 08/12/2020     05/28/2021    CO2 27 05/28/2021    BUN 12 05/28/2021    CREATININE 0.8 05/28/2021    GLUCOSE 105 05/28/2021    CALCIUM 9.4 05/28/2021         No orders of the defined types were placed in this encounter. Diagnosis Orders   1.  Controlled type 2 diabetes mellitus without complication, without long-term current use of insulin ([facility] [address]     2. Hyperglycemia             PLAN:    His weight gain is likely multifactorial, he attributes this to post back surgery , with dec ADL's and bilateral leg weakness, chronic issue, seen our dietician Shelly and   Overall he feels better, with weight loss. After he changed his diet , recommend he increases his exercise at least 4-5 times per day. .      Sleep studies and he might be having restless  Legs  - so formal studies will refer to sleep lab , plans on seeing them   Next week. DM-2 is stable followed by his PCP. RTO 4  months. Signed by : Jocelyn Holden M.D.     [address] [facility]  [address] [facility], [facility] [place] GRANADOS II.[place], [address]    Tel  [phone]  Fax [phone]

## 2021-08-16 ENCOUNTER — HOSPITAL ENCOUNTER (OUTPATIENT)
Age: 46
Discharge: HOME | End: 2021-08-16
Payer: COMMERCIAL

## 2021-08-16 ENCOUNTER — INITIAL CONSULT (OUTPATIENT)
Dept: PULMONOLOGY | Age: 46
End: 2021-08-16
Payer: COMMERCIAL

## 2021-08-16 VITALS
DIASTOLIC BLOOD PRESSURE: 82 MMHG | OXYGEN SATURATION: 97 % | BODY MASS INDEX: 35.22 KG/M2 | WEIGHT: 246 LBS | TEMPERATURE: 97.6 F | HEART RATE: 114 BPM | HEIGHT: 70 IN | SYSTOLIC BLOOD PRESSURE: 128 MMHG

## 2021-08-16 DIAGNOSIS — G25.81 RLS (RESTLESS LEGS SYNDROME): ICD-10-CM

## 2021-08-16 DIAGNOSIS — E66.9 OBESITY (BMI 30-39.9): ICD-10-CM

## 2021-08-16 DIAGNOSIS — R06.83 SNORES: Primary | ICD-10-CM

## 2021-08-16 DIAGNOSIS — M79.2 NEUROPATHIC PAIN: ICD-10-CM

## 2021-08-16 DIAGNOSIS — R41.840 POOR CONCENTRATION: ICD-10-CM

## 2021-08-16 DIAGNOSIS — G47.10 HYPERSOMNIA: ICD-10-CM

## 2021-08-16 DIAGNOSIS — R06.83 SNORES: ICD-10-CM

## 2021-08-16 DIAGNOSIS — G95.0 SYRINX OF SPINAL CORD (HCC): ICD-10-CM

## 2021-08-16 LAB — FERRITIN: 25 NG/ML (ref 22–322)

## 2021-08-16 PROCEDURE — 36415 COLL VENOUS BLD VENIPUNCTURE: CPT

## 2021-08-16 PROCEDURE — 82728 ASSAY OF FERRITIN: CPT

## 2021-08-16 PROCEDURE — G8417 CALC BMI ABV UP PARAM F/U: HCPCS | Performed by: INTERNAL MEDICINE

## 2021-08-16 PROCEDURE — 99203 OFFICE O/P NEW LOW 30 MIN: CPT | Performed by: INTERNAL MEDICINE

## 2021-08-16 PROCEDURE — 1036F TOBACCO NON-USER: CPT | Performed by: INTERNAL MEDICINE

## 2021-08-16 PROCEDURE — G8427 DOCREV CUR MEDS BY ELIG CLIN: HCPCS | Performed by: INTERNAL MEDICINE

## 2021-08-16 NOTE — PROGRESS NOTES
New Sleep Patient H/P      Presentation:  Staci Jose is referred by Mikhail Velez for  Sleep disruption  Sameer Derick c/o difficulty sleeping, had back surgery in may for arachnoid cyst but still c/o bilateral LE pain, tingling, weakness and \"restless legs\"--with uncontrollable shaking of the legs, urinary incontinence due to myelopathy, on Kepra and Balclofen  Mobilizes with difficulty with a rolling walker. Developmental delay, lives with mother, has gained wt due to reduced mobility. Time in Bed:   Bedtime:  4 a.m. Awakens  10 a.m. Different on weekends? No       Gregory falls asleep in 60  minutes. Any awakenings? No  Difficulty Falling back to sleep? No  Symptoms began:  4 years ago. Symptoms include: snoring, falling asleep while watching TV    Previous evaluation and treatment? No        He denies any history of sleep walking or sleep talking. No history of seizures activity. No history suggestive of restless legs syndrome. No history of bruxism. No history of head injury. Naps:  Any naps? Yes and are they helpful No    Snoring and Apneas:  Do you snore or been told you a snore? Yes  How long have known about your snoring? years  Any witnessed apneas? Yes  Any awakenings with choking or gasping? Yes    Dreams:  Any recurring dreams? No  Hallucinations? No  Sleep Paralysis? No  Symptoms of Cataplexy? No    Driving History:  Do you have a CDL or drive long distances for work? No  Any driving accidents in the past year? No  Any sleepiness while driving? Yes    Weight:  Any change in weight over the past year? Yes   How about past 5 years?  Yes  How much? 20        Past Medical History:   Diagnosis Date    Asthma     does not know name of inhaler and hasnt used in over a yr    Ataxic gait     Developmental delay     Diet-controlled diabetes mellitus ([address]     History of sy[facility]x [facility] spinal cord [facility] [address] 03/17/2020    Incomplete bladder emptying     Myelopathy of thoracic region     Osteopenia determined by x-ray     Spasm of muscle     Urinary dribbling     Urinary leakage     Uses walker     for standing and transfers    Uses wheelchair     can stand and transfer cannot walk due to leg weakness    Vitamin D deficiency 03/12/2020    Vitamin D 25OH 24       Past Surgical History:   Procedure Laterality Date    BACK SURGERY      CRANIOTOMY N/A 5/14/2020    Thoracic Spine and Laminectomy Extraction of Arachnoid Cyst/Band and 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 performed by Joey Golden MD at [place] [address]  09/16/2020    IR angiogram carotid cerebral bilateral       Social History     Tobacco Use    Smoking status: Never Smoker    Smokeless tobacco: Never Used   Vaping Use    Vaping Use: Never used   Substance Use Topics    Alcohol use: No    Drug use: No       Allergies   Allergen Reactions    Norco [Hydrocodone-Acetaminophen] Nausea And Vomiting       Current Outpatient Medications   Medication Sig Dispense Refill    baclofen (LIORESAL) 20 MG tablet Take 1 tablet by mouth 3 times daily 90 tablet 0    levETIRAcetam (KEPPRA) 500 MG tablet Take 1 tablet by mouth 2 times daily 60 tablet 2    tamsulosin (FLOMAX) 0.4 MG capsule Take 1 capsule by mouth daily 30 capsule 5    Elastic Bandages & Supports (MEDICAL LEGWEAR/KNEE HIGH) MISC Please dispense 2 pair of XL knee compression hose, 15-20mmHg compression.  4 each 0    Foot Care Products (SOCK AID) MISC 1 Device by Does not apply route daily 1 each 0    omeprazole (PRILOSEC) 40 MG delayed release capsule Take 1 capsule by mouth every morning (before breakfast) 90 capsule 1    bumetanide (BUMEX) 1 MG tablet Take 1 tablet by mouth daily 30 tablet 3    potassium chloride (KLOR-CON M) 20 MEQ extended release tablet Take 1 tablet by mouth daily 90 tablet 1    lisinopril (PRINIVIL;ZESTRIL) 5 MG tablet Take 1 tablet by mouth daily 90 tablet 1    oxybutynin (DITROPAN XL) 10 MG extended release tablet Take 1 tablet by mouth daily 30 tablet 3    Incontinence Supply Disposable (POISE HOURGLASS SHAPE) PADS 1 Device by Does not apply route 4 times daily 120 each 0    metFORMIN (GLUCOPHAGE) 500 MG tablet Take 1 tablet by mouth 2 times daily (with meals) (Patient not taking: Reported on 8/11/2021) 60 tablet 5    ibuprofen (ADVIL;MOTRIN) 600 MG tablet Take 1 tablet by mouth 3 times daily as needed for Pain 30 tablet 0    vitamin D-3 (CHOLECALCIFEROL) 125 MCG (5000 UT) TABS Take 1 tablet by mouth daily 90 tablet 1    acetaminophen (TYLENOL) 500 MG tablet Take 500 mg by mouth every 6 hours as needed for Pain       No current facility-administered medications for this visit. Family History   Problem Relation Age of Onset    Arthritis Mother     Diabetes Mother     Heart Disease Father     Cancer Neg Hx     High Blood Pressure Neg Hx     Stroke Neg Hx         Any family history of any sleep problems or any one in your family on CPAP? No    Social History     Tobacco Use    Smoking status: Never Smoker    Smokeless tobacco: Never Used   Vaping Use    Vaping Use: Never used   Substance Use Topics    Alcohol use: No    Drug use: No     Caffeine Intake: How much soda (pop), coffee, tea, power drinks do you ingest per day? 1 per day. Employment History:  Where do you work? Disabled      Review of Systems:   General/Constitutional: BMI 47  HENT: Negative. Eyes: Negative. Upper respiratory tract: No nasal stuffiness or post nasal drip. Lower respiratory tract/ lungs: No cough or sputum production. No hemoptysis. Cardiovascular: No palpitations or chest pain. Gastrointestinal: No nausea or vomiting. Neurological: bilateral LE edema, muscle spasms, numbness   Extremities: No edema. Musculoskeletal: No complaints. Genitourinary: No complaints. Hematological: Negative. Psychiatric/Behavioral: Negative. Skin: No itching.   Physical Exam:      BMI: Via Jason Miles MD on 8/16/2021 at 2:04 PM  Vitals:    08/16/21 1122   BP: 128/82   Site: Left Upper Arm   Position: Sitting   Cuff Size: Large Adult   Pulse: 114   Temp: 97.6 °F (36.4 °C)   TempSrc: Temporal   SpO2: 97%   Weight: 246 lb (111.6 kg)   Height: 5' 10\" (1.778 m)    Neck Size: 18.25  Oxygen Sat: room air    SAQLI:28 ESS: 15      Mallampati Score: 4    Constitutional: obese, pleasant, BMI 35  HENT:   Head: Normocephalic and atraumatic. Mouth/Throat: Large tongue, crowded pharynx, mallampati class 2  Eyes: Conjunctivae are normal. PERRLA. No scleral icterus. Neck: Neck supple. No JVD present. No tracheal deviation present. Cardiovascular: Normal rate, regular rhythm, normal heart sounds. No murmur heard. Pulmonary/Chest: Effort normal and breath sounds normal. No stridor. No respiratory distress. No wheezes. No rales. Abdominal: Soft. No distension. No tenderness. Musculoskeletal: Normal range of motion. Lymphadenopathy:  No cervical adenopathy. Neurological: spastic gait, ambulates with assist device,   Skin: Skin is warm and dry. Patient is not diaphoretic. Psychiatric: Normal behavior with normal mood and affect. Diagnostic Data:    Assessment    Diagnosis Orders   1. Snores  Ferritin    Baseline Diagnostic Sleep Study   2. Hypersomnia  Ferritin    Baseline Diagnostic Sleep Study   3. Obesity (BMI 30-39.9)     4. RLS (restless legs syndrome)     5. Poor concentration     6. Syrinx of spinal cord ([address]     7. Neuropathic pain           Plan   Orders Placed This Encounter   Procedures    Ferritin     Standing Status:   Future     Standing Expiration Date:   8/16/2022    Baseline Diagnostic Sleep Study     Standing Status:   Future     Standing Expiration Date:   8/16/2022     Order Specific Question:   Adult or Pediatric     Answer:   Adult Study (>7 Years)     Order Specific Question:   Location For Sleep Study     Answer:   [facility]     Order Specific Question:   Select Sleep Lab Location     Answer:    [facility]          Mask Desensitization and Pre study teaching? No  Weight Loss Information Given? Yes  Sleep Hygiene Discussed? Yes    -He was advised to call [employer] regarding supplies if needed.  -He call my office for earlier appointment if needed for worsening of sleep symptoms.  -Jeremy Casiano educated about my impression and plan. Patient verbalizes understanding.

## 2021-08-25 ENCOUNTER — TELEPHONE (OUTPATIENT)
Dept: PULMONOLOGY | Age: 46
End: 2021-08-25

## 2021-08-25 DIAGNOSIS — R79.0 LOW SERUM FERRITIN LEVEL: Primary | ICD-10-CM

## 2021-08-25 RX ORDER — LANOLIN ALCOHOL/MO/W.PET/CERES
325 CREAM (GRAM) TOPICAL 2 TIMES DAILY
Qty: 90 TABLET | Refills: 3 | Status: SHIPPED | OUTPATIENT
Start: 2021-08-25

## 2021-08-25 NOTE — TELEPHONE ENCOUNTER
Phoned patient to relay message from Dr. Blanca Hoyos, patient didn't answer, unable to leave message

## 2021-08-25 NOTE — TELEPHONE ENCOUNTER
----- Message from Bolivar Kiser MD sent at 8/25/2021  8:53 AM EDT -----  Ferritin is low  No answer on cell phone and box not set up yet  I sent script for iron supplements to his pharmacy. Repeat ferritin in 3 mos  I recommend to discuss with PCP regarding need for  colonoscopy due to low ferritin.

## 2021-08-27 ENCOUNTER — TELEPHONE (OUTPATIENT)
Dept: PULMONOLOGY | Age: 46
End: 2021-08-27

## 2021-09-15 ENCOUNTER — HOSPITAL ENCOUNTER (OUTPATIENT)
Dept: PHYSICAL THERAPY | Age: 46
Setting detail: THERAPIES SERIES
Discharge: HOME | End: 2021-09-15
Payer: COMMERCIAL

## 2021-09-15 PROCEDURE — 97750 PHYSICAL PERFORMANCE TEST: CPT

## 2021-09-15 NOTE — DISCHARGE SUMMARY
[address] [place] [address] [address]  [facility], [address]  [phone]    Name: Isaiah Sheldon   : 1975  SSN:   Referral Source: Zulema Olivo APRN-CNP  Diagnosis: Bilateral leg weakness; bilateral leg edema; ataxia    Client in clinic for completion of functional capacity evaluation. Please refer to evaluation in chart for details. Client is discharged at this time. Thank you for allowing me to assist int he care of this patient.      Florence Cr, PT #[id number] 9/15/2021 2:01 PM

## 2021-09-29 ENCOUNTER — OFFICE VISIT (OUTPATIENT)
Dept: UROLOGY | Age: 46
End: 2021-09-29
Payer: COMMERCIAL

## 2021-09-29 VITALS — HEIGHT: 70 IN | WEIGHT: 245 LBS | BODY MASS INDEX: 35.07 KG/M2

## 2021-09-29 DIAGNOSIS — N13.8 HYPERTROPHY OF PROSTATE WITH URINARY OBSTRUCTION: ICD-10-CM

## 2021-09-29 DIAGNOSIS — N40.1 HYPERTROPHY OF PROSTATE WITH URINARY OBSTRUCTION: ICD-10-CM

## 2021-09-29 DIAGNOSIS — R32 URINARY INCONTINENCE, UNSPECIFIED TYPE: Primary | ICD-10-CM

## 2021-09-29 PROCEDURE — 99214 OFFICE O/P EST MOD 30 MIN: CPT | Performed by: UROLOGY

## 2021-09-29 PROCEDURE — G8427 DOCREV CUR MEDS BY ELIG CLIN: HCPCS | Performed by: UROLOGY

## 2021-09-29 PROCEDURE — 1036F TOBACCO NON-USER: CPT | Performed by: UROLOGY

## 2021-09-29 PROCEDURE — G8417 CALC BMI ABV UP PARAM F/U: HCPCS | Performed by: UROLOGY

## 2021-09-29 RX ORDER — OXYBUTYNIN CHLORIDE 10 MG/1
10 TABLET, EXTENDED RELEASE ORAL DAILY
Qty: 90 TABLET | Refills: 3 | Status: SHIPPED | OUTPATIENT
Start: 2021-09-29

## 2021-09-29 NOTE — PROGRESS NOTES
Dr. Ian Alexandre MD  [facility])  Urology Clinic  New Patient Visit      Patient:  Kimberli Flood  YOB: 1975  Date: 9/29/2021    HISTORY OF PRESENT ILLNESS:   The patient is a 50 y.o. male who presents today for evaluation of the following problem(s): Slow urination on Flomax. with minimal PVR and OAB on Ditropan ER 10mg daily. Overall the problem(s) : are stable. Associated Symptoms: No dysuria, gross hematuria. Pain Severity: 0/10    Summary of old records:   Ditropan ER 10mg daily    Imaging/Labs reviewed during today's visit:  I have independently reviewed and verified the following films during today's visit.  UA, see below. Last several PSA's:  No results found for: PSA    Last total testosterone:  No results found for: TESTOSTERONE    Urinalysis today:  No results found for this visit on 09/29/21.       Last BUN and creatinine:  Lab Results   Component Value Date    BUN 12 05/28/2021     Lab Results   Component Value Date    CREATININE 0.8 05/28/2021       Imaging Reviewed during this Office Visit:   (results were independently reviewed by physician and radiology report verified)    PAST MEDICAL, FAMILY AND SOCIAL HISTORY:  Past Medical History:   Diagnosis Date    Asthma     does not know name of inhaler and hasnt used in over a yr    Ataxic gait     Developmental delay     Diet-controlled diabetes mellitus ([facility] [address]     History of syrinx of spinal cord ([facility] [address] 03/17/2020    Incomplete bladder emptying     Myelopathy of thoracic region     Osteopenia determined by x-ray     Spasm of muscle     Urinary dribbling     Urinary leakage     Uses walker     for standing and transfers    Uses wheelchair     can stand and transfer cannot walk due to leg weakness    Vitamin D deficiency 03/12/2020    Vitamin D 25OH 24     Past Surgical History:   Procedure Laterality Date    BACK SURGERY      CRANIOTOMY N/A 5/14/2020    Thoracic Spine and Laminectomy Extraction of Arachnoid Cyst/Band and 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 performed by Kristel Welch MD at [address],[facility]  09/16/2020    IR angiogram carotid cerebral bilateral     Family History   Problem Relation Age of Onset    Arthritis Mother     Diabetes Mother     Heart Disease Father     Cancer Neg Hx     High Blood Pressure Neg Hx     Stroke Neg Hx      Outpatient Medications Marked as Taking for the 9/29/21 encounter (Office Visit) with Anayeli Flowers MD   Medication Sig Dispense Refill    ferrous sulfate (FE TABS) 325 (65 Fe) MG EC tablet Take 1 tablet by mouth 2 times daily 90 tablet 3    baclofen (LIORESAL) 20 MG tablet Take 1 tablet by mouth 3 times daily 90 tablet 0    levETIRAcetam (KEPPRA) 500 MG tablet Take 1 tablet by mouth 2 times daily 60 tablet 2    tamsulosin (FLOMAX) 0.4 MG capsule Take 1 capsule by mouth daily 30 capsule 5    Elastic Bandages & Supports (MEDICAL LEGWEAR/KNEE HIGH) MISC Please dispense 2 pair of XL knee compression hose, 15-20mmHg compression.  4 each 0    Foot Care Products (SOCK AID) MISC 1 Device by Does not apply route daily 1 each 0    omeprazole (PRILOSEC) 40 MG delayed release capsule Take 1 capsule by mouth every morning (before breakfast) 90 capsule 1    bumetanide (BUMEX) 1 MG tablet Take 1 tablet by mouth daily 30 tablet 3    potassium chloride (KLOR-CON M) 20 MEQ extended release tablet Take 1 tablet by mouth daily 90 tablet 1    lisinopril (PRINIVIL;ZESTRIL) 5 MG tablet Take 1 tablet by mouth daily 90 tablet 1    oxybutynin (DITROPAN XL) 10 MG extended release tablet Take 1 tablet by mouth daily 30 tablet 3    Incontinence Supply Disposable (POISE HOURGLASS SHAPE) PADS 1 Device by Does not apply route 4 times daily 120 each 0    metFORMIN (GLUCOPHAGE) 500 MG tablet Take 1 tablet by mouth 2 times daily (with meals) 60 tablet 5    ibuprofen (ADVIL;MOTRIN) 600 MG tablet Take 1 tablet by mouth 3 times daily as needed for Pain 30 tablet 0    vitamin D-3 (CHOLECALCIFEROL) 125 MCG (5000 UT) TABS Take 1 tablet by mouth daily 90 tablet 1    acetaminophen (TYLENOL) 500 MG tablet Take 500 mg by mouth every 6 hours as needed for Pain         Norco [hydrocodone-acetaminophen]  Social History     Tobacco Use   Smoking Status Never Smoker   Smokeless Tobacco Never Used       Social History     Substance and Sexual Activity   Alcohol Use No       REVIEW OF SYSTEMS:  Constitutional: negative  Eyes: negative  Respiratory: negative  Cardiovascular: negative  Gastrointestinal: negative  Musculoskeletal: negative  Genitourinary: negative except for what is in HPI  Skin: negative   Neurological: negative  Hematological/Lymphatic: negative  Psychological: negative    Physical Exam:    This a 50 y.o. male   There were no vitals filed for this visit. Constitutional: Patient in no acute distress; Neuro: alert and oriented to person place and time. Psych: Mood and affect normal.  Skin: Normal  Lungs: Respiratory effort normal  Cardiovascular:  Normal peripheral pulses  Abdomen: Soft, non-tender, non-distended with no CVA, flank pain, hepatosplenomegaly or hernia. Kidneys normal.  Bladder non-tender and not distended. Lymphatics: no palpable lymphadenopathy      Assessment and Plan      1. Urinary incontinence, unspecified type    2. Hypertrophy of prostate with urinary obstruction           Plan:      No follow-ups on file. Continue Ditropan ER 10mg daily.   Start Flomax 0.4mg daily  See back in 9 months         Dr. Gagandeep Monae MD

## 2021-10-05 ENCOUNTER — OFFICE VISIT (OUTPATIENT)
Dept: FAMILY MEDICINE CLINIC | Age: 46
End: 2021-10-05
Payer: COMMERCIAL

## 2021-10-05 ENCOUNTER — NURSE ONLY (OUTPATIENT)
Dept: LAB | Age: 46
End: 2021-10-05

## 2021-10-05 VITALS
DIASTOLIC BLOOD PRESSURE: 80 MMHG | BODY MASS INDEX: 33.63 KG/M2 | HEART RATE: 101 BPM | WEIGHT: 234.4 LBS | OXYGEN SATURATION: 98 % | TEMPERATURE: 97.5 F | SYSTOLIC BLOOD PRESSURE: 118 MMHG

## 2021-10-05 DIAGNOSIS — N31.9 NEUROGENIC BLADDER DISORDER: ICD-10-CM

## 2021-10-05 DIAGNOSIS — E11.9 CONTROLLED TYPE 2 DIABETES MELLITUS WITHOUT COMPLICATION, WITHOUT LONG-TERM CURRENT USE OF INSULIN (HCC): ICD-10-CM

## 2021-10-05 DIAGNOSIS — G95.0 SYRINX OF SPINAL CORD (HCC): ICD-10-CM

## 2021-10-05 DIAGNOSIS — M47.14 MYELOPATHY OF THORACIC REGION: ICD-10-CM

## 2021-10-05 DIAGNOSIS — Z00.01 ENCOUNTER FOR WELL ADULT EXAM WITH ABNORMAL FINDINGS: Primary | ICD-10-CM

## 2021-10-05 DIAGNOSIS — R60.0 BILATERAL LEG EDEMA: ICD-10-CM

## 2021-10-05 DIAGNOSIS — I10 BENIGN ESSENTIAL HTN: ICD-10-CM

## 2021-10-05 DIAGNOSIS — Z12.11 ENCOUNTER FOR SCREENING FOR MALIGNANT NEOPLASM OF COLON: ICD-10-CM

## 2021-10-05 DIAGNOSIS — R29.898 LEG WEAKNESS, BILATERAL: ICD-10-CM

## 2021-10-05 DIAGNOSIS — R73.9 HYPERGLYCEMIA: ICD-10-CM

## 2021-10-05 DIAGNOSIS — Z00.01 ENCOUNTER FOR WELL ADULT EXAM WITH ABNORMAL FINDINGS: ICD-10-CM

## 2021-10-05 LAB
ALBUMIN SERPL-MCNC: 4.5 G/DL (ref 3.5–5.1)
ALP BLD-CCNC: 117 U/L (ref 38–126)
ALT SERPL-CCNC: 25 U/L (ref 11–66)
ANION GAP SERPL CALCULATED.3IONS-SCNC: 9 MEQ/L (ref 8–16)
AST SERPL-CCNC: 19 U/L (ref 5–40)
AVERAGE GLUCOSE: 120 MG/DL (ref 70–126)
BILIRUB SERPL-MCNC: 0.3 MG/DL (ref 0.3–1.2)
BUN BLDV-MCNC: 11 MG/DL (ref 7–22)
CALCIUM SERPL-MCNC: 9.5 MG/DL (ref 8.5–10.5)
CHLORIDE BLD-SCNC: 104 MEQ/L (ref 98–111)
CHOLESTEROL, TOTAL: 210 MG/DL (ref 100–199)
CO2: 27 MEQ/L (ref 23–33)
CREAT SERPL-MCNC: 0.9 MG/DL (ref 0.4–1.2)
GFR SERPL CREATININE-BSD FRML MDRD: > 90 ML/MIN/1.73M2
GLUCOSE BLD-MCNC: 110 MG/DL (ref 70–108)
HBA1C MFR BLD: 6 % (ref 4.4–6.4)
HDLC SERPL-MCNC: 40 MG/DL
LDL CHOLESTEROL CALCULATED: 143 MG/DL
POTASSIUM SERPL-SCNC: 4.3 MEQ/L (ref 3.5–5.2)
SODIUM BLD-SCNC: 140 MEQ/L (ref 135–145)
TOTAL PROTEIN: 7.5 G/DL (ref 6.1–8)
TRIGL SERPL-MCNC: 136 MG/DL (ref 0–199)

## 2021-10-05 PROCEDURE — 99396 PREV VISIT EST AGE 40-64: CPT | Performed by: NURSE PRACTITIONER

## 2021-10-05 PROCEDURE — G8484 FLU IMMUNIZE NO ADMIN: HCPCS | Performed by: NURSE PRACTITIONER

## 2021-10-05 RX ORDER — BACLOFEN 20 MG/1
20 TABLET ORAL 3 TIMES DAILY
Qty: 90 TABLET | Refills: 0 | Status: SHIPPED | OUTPATIENT
Start: 2021-10-05 | End: 2022-03-31 | Stop reason: SDUPTHER

## 2021-10-05 RX ORDER — TAMSULOSIN HYDROCHLORIDE 0.4 MG/1
0.4 CAPSULE ORAL DAILY
Qty: 30 CAPSULE | Refills: 5 | Status: SHIPPED | OUTPATIENT
Start: 2021-10-05 | End: 2021-12-28 | Stop reason: SDUPTHER

## 2021-10-05 RX ORDER — BUMETANIDE 1 MG/1
1 TABLET ORAL DAILY
Qty: 90 TABLET | Refills: 0 | Status: SHIPPED | OUTPATIENT
Start: 2021-10-05

## 2021-10-05 RX ORDER — POTASSIUM CHLORIDE 20 MEQ/1
20 TABLET, EXTENDED RELEASE ORAL DAILY
Qty: 90 TABLET | Refills: 1 | Status: SHIPPED | OUTPATIENT
Start: 2021-10-05

## 2021-10-05 RX ORDER — LISINOPRIL 5 MG/1
5 TABLET ORAL DAILY
Qty: 90 TABLET | Refills: 1 | Status: SHIPPED | OUTPATIENT
Start: 2021-10-05 | End: 2022-05-23 | Stop reason: SDUPTHER

## 2021-10-05 RX ORDER — OMEPRAZOLE 40 MG/1
40 CAPSULE, DELAYED RELEASE ORAL
Qty: 90 CAPSULE | Refills: 1 | Status: SHIPPED | OUTPATIENT
Start: 2021-10-05 | End: 2022-05-23 | Stop reason: SDUPTHER

## 2021-10-05 ASSESSMENT — ENCOUNTER SYMPTOMS
SHORTNESS OF BREATH: 0
WHEEZING: 0
DIARRHEA: 0
CONSTIPATION: 0
EYE REDNESS: 0
COUGH: 0
NAUSEA: 0
EYE ITCHING: 0
ALLERGIC/IMMUNOLOGIC NEGATIVE: 1
VOMITING: 0
BACK PAIN: 1
EYE PAIN: 0
CHEST TIGHTNESS: 0

## 2021-10-05 NOTE — PROGRESS NOTES
[address] [address] [address]  Dept: Jackie Selby (:  1975) is a 50 y.o. male,Established patient, here for evaluation of the following chief complaint(s):  3 Month Follow-Up and Medication Refill      ASSESSMENT/PLAN:  I have reviewed the patient's medical history in detail and updated the computerized patient record. HPI/ROS per the patient and caregiver. Overall non toxic in appearance. Answers questions appropriately. Conditions discussed and addressed this visit include:     Overall unchanged  Has had some improvement with his bladder control  Followed up with internal med, diabetes is stable  Will check annual labs today  Colonoscopy/cologard screening  Foot exam exam negative for pathology  The patient is advised to begin progressive daily aerobic exercise program, follow a low fat, low cholesterol diet, attempt to lose weight, reduce salt in diet and cooking, reduce exposure to stress, improve dietary compliance, continue current medications, continue current healthy lifestyle patterns and return for routine annual checkups. 1. Encounter for well adult exam with abnormal findings  -     Lipid Panel; Future  -     Comprehensive Metabolic Panel; Future  2. Encounter for screening for malignant neoplasm of colon  -     Cologuard (For External Results Only); Future  3. Benign essential HTN  -     lisinopril (PRINIVIL;ZESTRIL) 5 MG tablet; Take 1 tablet by mouth daily, Disp-90 tablet, R-1Normal  4. Bilateral leg edema  -     External Referral To Neurology  -     Comprehensive Metabolic Panel; Future  -     potassium chloride (KLOR-CON M) 20 MEQ extended release tablet; Take 1 tablet by mouth daily, Disp-90 tablet, R-1Normal  -     bumetanide (BUMEX) 1 MG tablet; Take 1 tablet by mouth daily, Disp-90 tablet, R-0Normal  5.  Controlled type 2 diabetes mellitus without complication, without long-term current use of insulin (HCC)  -      DIABETES FOOT EXAM  6. Hyperglycemia  -     Hemoglobin A1C; Future  7. Leg weakness, bilateral  -     External Referral To Neurology  8. Syrinx of spinal cord [facility])  -     External Referral To Neurology  9. Myelopathy of thoracic region  -     External Referral To Neurology  -     Comprehensive Metabolic Panel; Future      Return in about 3 months (around 1/5/2022) for Medication evaluation, Results review, Routine follow up. SUBJECTIVE/OBJECTIVE:  HPI   Here for follow up on his chronic conditions   Wants referral to different neurologist   Still having issues with his lower legs, especially left   Legs feel heavy, are weak and stiff, pt has great difficulty in initiating gait but once he is moving can walk with his walker for up to 150ft before resting.  No recent falls   Having trouble getting in and out of shower   Continue to perform his home exercise program several times per day   Weight down 10 lbs.  No leg edema     Review of Systems   Constitutional: Positive for activity change and fatigue. Negative for appetite change, fever and unexpected weight change. HENT: Negative. Eyes: Negative for pain, redness and itching. Respiratory: Negative for cough, chest tightness, shortness of breath and wheezing. Cardiovascular: Negative for chest pain and leg swelling. Gastrointestinal: Negative for constipation, diarrhea, nausea and vomiting. Endocrine: Negative for cold intolerance and heat intolerance. Genitourinary: Positive for difficulty urinating ( improving). Negative for frequency and urgency. Musculoskeletal: Positive for back pain and gait problem. Negative for arthralgias, joint swelling and myalgias. Skin: Negative. Allergic/Immunologic: Negative. Neurological: Positive for weakness and numbness ( ble). Negative for dizziness and headaches. Hematological: Negative for adenopathy. Bruises/bleeds easily. Psychiatric/Behavioral: Negative.         Physical Exam  Vitals and

## 2021-10-05 NOTE — PATIENT INSTRUCTIONS
Dr. Ramo Nichols, DO  Osteopath in [facility]  Address: [address] [facility], [facility], [address] [place] [address] [address]  Phone: [phone]      Patient Education        Well Visit, Ages 25 to 48: Care Instructions  Overview     Well visits can help you stay healthy. Your doctor has checked your overall health and may have suggested ways to take good care of yourself. Your doctor also may have recommended tests. At home, you can help prevent illness with healthy eating, regular exercise, and other steps. Follow-up care is a key part of your treatment and safety. Be sure to make and go to all appointments, and call your doctor if you are having problems. It's also a good idea to know your test results and keep a list of the medicines you take. How can you care for yourself at home? · Get screening tests that you and your doctor decide on. Screening helps find diseases before any symptoms appear. · Eat healthy foods. Choose fruits, vegetables, whole grains, protein, and low-fat dairy foods. Limit fat, especially saturated fat. Reduce salt in your diet. · Limit alcohol. If you are a man, have no more than 2 drinks a day or 14 drinks a week. If you are a woman, have no more than 1 drink a day or 7 drinks a week. · Get at least 30 minutes of physical activity on most days of the week. Walking is a good choice. You also may want to do other activities, such as running, swimming, cycling, or playing tennis or team sports. Discuss any changes in your exercise program with your doctor. · Reach and stay at a healthy weight. This will lower your risk for many problems, such as obesity, diabetes, heart disease, and high blood pressure. · Do not smoke or allow others to smoke around you. If you need help quitting, talk to your doctor about stop-smoking programs and medicines. These can increase your chances of quitting for good. · Care for your mental health. It is easy to get weighed down by worry and stress.  Learn strategies 2021               Content Version: 13.0  © 2006-2021 Healthwise, Viewfinity. Care instructions adapted under license by [facility] ([facility]). If you have questions about a medical condition or this instruction, always ask your healthcare professional. [address] any warranty or liability for your use of this information. Patient Education        Learning About Carbohydrate (Carb) Counting and Eating Out When You Have Diabetes  Why plan your meals? Meal planning can be a key part of managing diabetes. Planning meals and snacks with the right balance of carbohydrate, protein, and fat can help you keep your blood sugar at the target level you set with your doctor. You don't have to eat special foods. You can eat what your family eats, including sweets once in a while. But you do have to pay attention to how often you eat and how much you eat of certain foods. You may want to work with a dietitian or a certified diabetes educator. He or she can give you tips and meal ideas and can answer your questions about meal planning. This health professional can also help you reach a healthy weight if that is one of your goals. What should you know about eating carbs? Managing the amount of carbohydrate (carbs) you eat is an important part of healthy meals when you have diabetes. Carbohydrate is found in many foods. · Learn which foods have carbs. And learn the amounts of carbs in different foods. ? Bread, cereal, pasta, and rice have about 15 grams of carbs in a serving. A serving is 1 slice of bread (1 ounce), ½ cup of cooked cereal, or 1/3 cup of cooked pasta or rice. ? Fruits have 15 grams of carbs in a serving. A serving is 1 small fresh fruit, such as an apple or orange; ½ of a banana; ½ cup of cooked or canned fruit; ½ cup of fruit juice; 1 cup of melon or raspberries; or 2 tablespoons of dried fruit. ? Milk and no-sugar-added yogurt have 15 grams of carbs in a serving.  A serving is 1 cup of milk or 2/3 cup of no-sugar-added yogurt. ? Starchy vegetables have 15 grams of carbs in a serving. A serving is ½ cup of mashed potatoes or sweet potato; 1 cup winter squash; ½ of a small baked potato; ½ cup of cooked beans; or ½ cup cooked corn or green peas. · Learn how much carbs to eat each day and at each meal. A dietitian or CDE can teach you how to keep track of the amount of carbs you eat. This is called carbohydrate counting. · If you are not sure how to count carbohydrate grams, use the Plate Method to plan meals. It is a good, quick way to make sure that you have a balanced meal. It also helps you spread carbs throughout the day. ? Divide your plate by types of foods. Put non-starchy vegetables on half the plate, meat or other protein food on one-quarter of the plate, and a grain or starchy vegetable in the final quarter of the plate. To this you can add a small piece of fruit and 1 cup of milk or yogurt, depending on how many carbs you are supposed to eat at a meal.  · Try to eat about the same amount of carbs at each meal. Do not \"save up\" your daily allowance of carbs to eat at one meal.  · Proteins have very little or no carbs per serving. Examples of proteins are beef, chicken, turkey, fish, eggs, tofu, cheese, cottage cheese, and peanut butter. A serving size of meat is 3 ounces, which is about the size of a deck of cards. Examples of meat substitute serving sizes (equal to 1 ounce of meat) are 1/4 cup of cottage cheese, 1 egg, 1 tablespoon of peanut butter, and ½ cup of tofu. How can you eat out and still eat healthy? · Learn to estimate the serving sizes of foods that have carbohydrate. If you measure food at home, it will be easier to estimate the amount in a serving of restaurant food. · If the meal you order has too much carbohydrate (such as potatoes, corn, or baked beans), ask to have a low-carbohydrate food instead. Ask for a salad or green vegetables.   · If you use insulin, check your blood sugar before and after eating out to help you plan how much to eat in the future. · If you eat more carbohydrate at a meal than you had planned, take a walk or do other exercise. This will help lower your blood sugar. What are some tips for eating healthy? · Limit saturated fat, such as the fat from meat and dairy products. This is a healthy choice because people who have diabetes are at higher risk of heart disease. So choose lean cuts of meat and nonfat or low-fat dairy products. Use olive or canola oil instead of butter or shortening when cooking. · Don't skip meals. Your blood sugar may drop too low if you skip meals and take insulin or certain medicines for diabetes. · Check with your doctor before you drink alcohol. Alcohol can cause your blood sugar to drop too low. Alcohol can also cause a bad reaction if you take certain diabetes medicines. Follow-up care is a key part of your treatment and safety. Be sure to make and go to all appointments, and call your doctor if you are having problems. It's also a good idea to know your test results and keep a list of the medicines you take. Where can you learn more? Go to https://Gravity Jackpepiceweb.Crowdwave. org and sign in to your [employer] account. Enter I333 in the SmartBIM box to learn more about \"Learning About Carbohydrate (Carb) Counting and Eating Out When You Have Diabetes. \"     If you do not have an account, please click on the \"Sign Up Now\" link. Current as of: December 17, 2020               Content Version: 13.0  © 2006-2021 Healthwise, [employer]. Care instructions adapted under license by [facility] ([facility]). If you have questions about a medical condition or this instruction, always ask your healthcare professional. [place] [address] any warranty or liability for your use of this information.          Patient Education        Diabetic Neuropathy: Care Instructions  Overview     When you have diabetes, your blood sugar level may get too high. Over time, high blood sugar levels can damage nerves. This is called diabetic neuropathy. Nerve damage can cause pain, burning, tingling, and numbness and may leave you feeling weak. The feet are often affected. When you have nerve damage in your feet, you cannot feel your feet and toes as well as normal and may not notice cuts or sores. Even a small injury can lead to a serious infection. It is very important that you follow your doctor's advice on foot care. Sometimes diabetes damages nerves that help the body function. If this happens, your blood pressure, sweating, digestion, and urination might be affected. Your doctor may give you a target range for your blood sugar that is higher or lower than you are used to. Try to keep your blood sugar very close to this target range to prevent more damage. Follow-up care is a key part of your treatment and safety. Be sure to make and go to all appointments, and call your doctor if you are having problems. It's also a good idea to know your test results and keep a list of the medicines you take. How can you care for yourself at home? · Take your medicines exactly as prescribed. Call your doctor if you think you are having a problem with your medicine. · Try to keep blood sugar in your target range. ? Follow your meal plan to know how much carbohydrate you need for meals and snacks. A registered dietitian or diabetes educator can help you plan meals. ? Try to get at least 30 minutes of exercise on most days. ? Check your blood sugar as many times each day as your doctor recommends. · Take and record your blood pressure at home if your doctor tells you to. To take your blood pressure at home:  ? Ask your doctor to check your blood pressure monitor to be sure it is accurate and the cuff fits you. Also ask your doctor to watch you to make sure that you are using it right.   ? Do not use medicine known to raise blood pressure (such as some nasal decongestant sprays) before taking your blood pressure. ? Avoid taking your blood pressure if you have just exercised or are nervous or upset. Rest at least 15 minutes before you take a reading. · Do not smoke. Smoking can increase your chance for a heart attack or stroke. If you need help quitting, talk to your doctor about stop-smoking programs and medicines. These can increase your chances of quitting for good. · Limit alcohol to 2 drinks a day for men and 1 drink a day for women. Too much alcohol can cause health problems. · Eat small meals often, rather than 2 or 3 large meals a day. To care for your feet  · Prevent injury by wearing shoes at all times, even when you are indoors. · Do foot care as part of your daily routine. Wash your feet and then rub lotion on your feet, but not between your toes. Use a handheld mirror or magnifying mirror to inspect your feet for blisters, cuts, cracks, or sores. · Have your toenails trimmed and filed straight across. · Wear shoes and socks that fit well. Soft shoes that have good support and that fit well (such as tennis shoes) are best for your feet. · Check your shoes for any loose objects or rough edges before you put them on. · Ask your doctor to check your feet during each visit. Your doctor may notice a foot problem you have missed. · Get early treatment for any foot problem, even a minor one. When should you call for help? Call your doctor now or seek immediate medical care if:    · You have symptoms of infection, such as:  ? Increased pain, swelling, warmth, or redness. ? Red streaks leading from the area. ? Pus draining from the area. ? A fever.     · You have new or worse numbness, pain, or tingling in any part of your body. Watch closely for changes in your health, and be sure to contact your doctor if:    · You have a new problem with your feet, such as:  ? A new sore or ulcer.   ? A break in the skin that is not healing after several days. ? Bleeding corns or calluses. ? An ingrown toenail.     · You do not get better as expected. Where can you learn more? Go to https://WatticspeCardCash.com.Infinetics [email or web address] org and sign in to your Offerama account. Enter Q603 in the [facility] box to learn more about \"Diabetic Neuropathy: Care Instructions. \"     If you do not have an account, please click on the \"Sign Up Now\" link. Current as of: August 31, 2020               Content Version: 13.0  © 2006-2021 ClearMyMail. Care instructions adapted under license by [facility] ([facility]). If you have questions about a medical condition or this instruction, always ask your healthcare professional. [address] any warranty or liability for your use of this information. Patient Education        Neuropathic Pain: Care Instructions  Overview     Neuropathic pain is caused by pressure on or damage to your nerves. It's often simply called nerve pain. Some people feel this type of pain all the time. For others, it comes and goes. Diabetes, shingles, or an injury can cause nerve pain. Many people say the pain feels sharp, burning, or stabbing. But some people feel it as a dull ache. In some cases, it makes your skin very sensitive. So touch, pressure, and other sensations that did not hurt before may now cause pain. It's important to know that this kind of pain is real and can affect your quality of life. It's also important to know that treatment can help. Treatment includes pain medicines, exercise, and physical therapy. Medicines can help reduce the number of pain signals that travel over the nerves. This can make the painful areas less sensitive. It can also help you sleep better and improve your mood. But medicines are only one part of successful treatment. Most people do best with more than one kind of treatment.  Your doctor may recommend that you try cognitive-behavioral therapy and stress management. If you feel that your treatment is not working, talk to your doctor. And be sure to tell your doctor if you think you might be depressed or anxious. These are common problems that can also be treated. Follow-up care is a key part of your treatment and safety. Be sure to make and go to all appointments, and call your doctor if you are having problems. It's also a good idea to know your test results and keep a list of the medicines you take. How can you care for yourself at home? · Be safe with medicines. Read and follow all instructions on the label. ? If the doctor gave you a prescription medicine for pain, take it as prescribed. ? If you are not taking a prescription pain medicine, ask your doctor if you can take an over-the-counter medicine. · Save hard tasks for days when you have less pain. Follow a hard task with an easy task. And remember to take breaks. · Relax, and reduce stress. You may want to try deep breathing or meditation. These can help. · Keep moving. Gentle, daily exercise can help reduce pain. Your doctor or physical therapist can tell you what type of exercise is best for you. This may include walking, swimming, and stationary biking. It may also include stretches and range-of-motion exercises. · Try heat, cold packs, and massage. · Get enough sleep. Constant pain can make you more tired. If the pain makes it hard to sleep, talk with your doctor. · Think positively. Your thoughts can affect your pain. Do fun things to distract yourself from the pain. See a movie, read a book, listen to music, or spend time with a friend. · Keep a pain diary. Try to write down how strong your pain is and what it feels like. Also try to notice and write down how your moods, thoughts, sleep, activities, and medicine affect your pain. These notes can help you and your doctor find the best ways to treat your pain.   Reducing constipation caused by pain medicine  Pain medicines often cause constipation. To reduce constipation:  · Talk to your doctor about a laxative. If a laxative doesn't work, your doctor may suggest a prescription medicine. · Include fruits, vegetables, beans, and whole grains in your diet each day. These foods are high in fiber. · Get some exercise every day. Build up slowly to 30 to 60 minutes a day on 5 or more days of the week. · Take a fiber supplement, such as Citrucel or Metamucil, every day if needed. Read and follow all instructions on the label. · Schedule time each day for a bowel movement. Having a daily routine may help. Take your time and do not strain when having a bowel movement. When should you call for help? Call your doctor now or seek immediate medical care if:    · You feel sad, anxious, or hopeless for more than a few days. This could mean you are depressed. Depression is common in people who have a lot of pain. But it can be treated.     · You have trouble with bowel movements, such as:  ? No bowel movement in 3 days. ? Blood in the anal area, in your stool, or on the toilet paper. ? Diarrhea for more than 24 hours. Watch closely for changes in your health, and be sure to contact your doctor if:    · Your pain is getting worse.     · You can't sleep because of pain.     · You are very worried or anxious about your pain.     · You have trouble taking your pain medicine.     · You have any concerns about your pain medicine or its side effects.     · You have vomiting or cramps for more than 2 hours. Where can you learn more? Go to https://NjuicepeOnyu.Sift. org and sign in to your RecordSled account. Enter D374 in the Hobzy box to learn more about \"Neuropathic Pain: Care Instructions. \"     If you do not have an account, please click on the \"Sign Up Now\" link. Current as of: April 8, 2021               Content Version: 13.0  © 4382-4492 Healthwise, Incorporated. Care instructions adapted under license by [facility] ([facility]).  If you have questions about a medical condition or this instruction, always ask your healthcare professional. [place] [address] any warranty or liability for your use of this information.

## 2021-10-06 ENCOUNTER — TELEPHONE (OUTPATIENT)
Dept: FAMILY MEDICINE CLINIC | Age: 46
End: 2021-10-06

## 2021-10-06 RX ORDER — ATORVASTATIN CALCIUM 40 MG/1
40 TABLET, FILM COATED ORAL DAILY
Qty: 90 TABLET | Refills: 1 | Status: SHIPPED | OUTPATIENT
Start: 2021-10-06 | End: 2022-05-23 | Stop reason: SDUPTHER

## 2021-10-06 NOTE — TELEPHONE ENCOUNTER
----- Message from ISHAAN Wiley CNP sent at 10/6/2021  8:21 AM EDT -----  Labs are within normal limits except for cholesterol. Would like to start pt on cholesterol medication to be taken daily at bedtime recheck labs in 3 months.

## 2021-10-06 NOTE — TELEPHONE ENCOUNTER
I printed Barbra's response off and mailed it to the patient today along with the patient's lab order and wrote on the top of the orders to have them done on or around 01- and hi-lited it.

## 2021-10-08 ENCOUNTER — NURSE ONLY (OUTPATIENT)
Dept: LAB | Age: 46
End: 2021-10-08

## 2021-10-08 DIAGNOSIS — Z12.11 ENCOUNTER FOR SCREENING FOR MALIGNANT NEOPLASM OF COLON: ICD-10-CM

## 2021-10-08 LAB — HEMOCCULT STL QL: NEGATIVE

## 2021-10-11 ENCOUNTER — OFFICE VISIT (OUTPATIENT)
Dept: PHYSICAL MEDICINE AND REHAB | Age: 46
End: 2021-10-11
Payer: COMMERCIAL

## 2021-10-11 VITALS
WEIGHT: 235.6 LBS | DIASTOLIC BLOOD PRESSURE: 68 MMHG | HEART RATE: 90 BPM | BODY MASS INDEX: 33.73 KG/M2 | SYSTOLIC BLOOD PRESSURE: 122 MMHG | HEIGHT: 70 IN

## 2021-10-11 DIAGNOSIS — G89.29 CHRONIC PAIN OF BOTH KNEES: Primary | ICD-10-CM

## 2021-10-11 DIAGNOSIS — R25.2 SPASTICITY: ICD-10-CM

## 2021-10-11 DIAGNOSIS — M25.561 CHRONIC PAIN OF BOTH KNEES: Primary | ICD-10-CM

## 2021-10-11 DIAGNOSIS — G89.29 OTHER CHRONIC PAIN: ICD-10-CM

## 2021-10-11 DIAGNOSIS — M79.2 NEUROPATHIC PAIN: ICD-10-CM

## 2021-10-11 DIAGNOSIS — M25.562 CHRONIC PAIN OF BOTH KNEES: Primary | ICD-10-CM

## 2021-10-11 DIAGNOSIS — G95.0 SYRINX OF SPINAL CORD (HCC): ICD-10-CM

## 2021-10-11 PROCEDURE — G8417 CALC BMI ABV UP PARAM F/U: HCPCS | Performed by: ANESTHESIOLOGY

## 2021-10-11 PROCEDURE — G8427 DOCREV CUR MEDS BY ELIG CLIN: HCPCS | Performed by: ANESTHESIOLOGY

## 2021-10-11 PROCEDURE — 99214 OFFICE O/P EST MOD 30 MIN: CPT | Performed by: ANESTHESIOLOGY

## 2021-10-11 PROCEDURE — 1036F TOBACCO NON-USER: CPT | Performed by: ANESTHESIOLOGY

## 2021-10-11 PROCEDURE — G8484 FLU IMMUNIZE NO ADMIN: HCPCS | Performed by: ANESTHESIOLOGY

## 2021-10-11 RX ORDER — LEVETIRACETAM 500 MG/1
500 TABLET ORAL 2 TIMES DAILY
Qty: 60 TABLET | Refills: 2 | Status: SHIPPED | OUTPATIENT
Start: 2021-10-11 | End: 2022-01-06 | Stop reason: SDUPTHER

## 2021-10-11 NOTE — PROGRESS NOTES
Chronic Pain/PM&R Clinic Note     Encounter Date: 10/11/21    Subjective:   Chief Complaint:   Chief Complaint   Patient presents with    Follow-up     12 wk follow up - spasticity     Other     refills       History of Present Illness:   Viri Modi is a 50 y.o. male seen in the clinic initially on 01/21/21 upon request from [place], [place] ([address] [place] [address]) for his history of lower limb spasticity. He underwent an arachnoid cyst resection with Dr. Mike Morataya on May 14, 2020 after worsening bilateral leg pain. He notes about 2 months after his surgery his legs have been increasingly worse. He describes his pain to be in bilateral legs from the waist down that he describes as a constant 10/10 pain. He states the pain is a constant aching, burning pain. It is worse with any ambulation. He states is pain is improved with rest. He uses a walker for ambulation due to pain and weakness in his legs. He has been taking Baclofen 10 mg twice a day which does not help too much. He denies any saddle anesthesia or bowel/bladder incontinence. He deals with a lot of tone/spasticity in his legs. He reports stretching about 4 times a day. He was previously working at [employer] prior to his surgery. Today, 10/11/2021, patient presents for planned follow-up for management of lower limb spasticity and chronic pain. Overall, he reports doing very well overall. He feels like the increased dose of Keppra has been very effective for his nerve type pain. He states his primary care doctor has taken over his baclofen management. He does continue to have issues with his balance and proprioception and is wondering if he can get back into physical therapy. He denies any new issues with focal leg weakness, new radicular leg pain, new anesthesias, or saddle anesthesia/bowel and bladder incontinence. He does endorse some worsening pressure and pain in both of his knees particular was on his feet for an extended duration of time.   He is wondering if he can get some imaging of his knees to potentially try something alcohol with the pressure and pain.     History of Interventions:   Surgery: arachnoid cyst resection - Dr. Conrad Bradford (5/14/20)  Injections: None    Current Treatment Medications:   Baclofen 20 mg 3 times daily  Keppra 500 mg twice daily    Historical Treatment Medications:   Lyricaleg swelling      Past Medical History:   Diagnosis Date    Asthma     does not know name of inhaler and hasnt used in over a yr    Ataxic gait     Developmental delay     Diet-controlled diabetes mellitus ([facility] [address] [facility]     History of syrinx of spinal cord ([facility] [address] 03/17/2020    Incomplete bladder emptying     Myelopathy of thoracic region     Osteopenia determined by x-ray     Spasm of muscle     Urinary dribbling     Urinary leakage     Uses walker     for standing and transfers    Uses wheelchair     can stand and transfer cannot walk due to leg weakness    Vitamin D deficiency 03/12/2020    Vitamin D 25OH 24       Past Surgical History:   Procedure Laterality Date    BACK SURGERY      CRANIOTOMY N/A 5/14/2020    Thoracic Spine and Laminectomy Extraction of Arachnoid Cyst/Band and 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 performed by Leyla Germain [facility]  09/16/2020    IR angiogram carotid cerebral bilateral       Family History   Problem Relation Age of Onset    Arthritis Mother     Diabetes Mother     Heart Disease Father     Cancer Neg Hx     High Blood Pressure Neg Hx     Stroke Neg Hx        Social History     Socioeconomic History    Marital status: Single     Spouse name: Not on file    Number of children: Not on file    Years of education: Not on file    Highest education level: Not on file   Occupational History    Not on file   Tobacco Use    Smoking status: Never Smoker    Smokeless tobacco: Never Used   Vaping Use    Vaping Use: Never used   Substance and Sexual Activity    Alcohol use: No    Drug use: No    Sexual activity: Not Currently   Other Topics Concern    Not on file   Social History Narrative    Not on file     Social Determinants of Health     Financial Resource Strain: Low Risk     Difficulty of Paying Living Expenses: Not hard at all   Food Insecurity: No Food Insecurity    Worried About Running Out of Food in the Last Year: Never true    [address] in the Last Year: Never true   Transportation Needs:     Lack of Transportation (Medical):  Lack of Transportation (Non-Medical):    Physical Activity:     Days of Exercise per Week:     Minutes of Exercise per Session:    Stress:     Feeling of Stress :    Social Connections:     Frequency of Communication with Friends and Family:     Frequency of Social Gatherings with Friends and Family:     Attends Sabianism Services:     Active Member of Clubs or Organizations:     Attends Club or Organization Meetings:     Marital Status:    Intimate Partner Violence:     Fear of Current or Ex-Partner:     Emotionally Abused:     Physically Abused:     Sexually Abused:        Medications & Allergies:   Current Outpatient Medications   Medication Instructions    acetaminophen (TYLENOL) 500 mg, Oral, EVERY 6 HOURS PRN    atorvastatin (LIPITOR) 40 mg, Oral, DAILY    baclofen (LIORESAL) 20 mg, Oral, 3 TIMES DAILY    bumetanide (BUMEX) 1 mg, Oral, DAILY    Elastic Bandages & Supports (MEDICAL LEGWEAR/KNEE HIGH) MISC Please dispense 2 pair of XL knee compression hose, 15-20mmHg compression.     ferrous sulfate (FE TABS) 325 mg, Oral, 2 TIMES DAILY    Foot Care Products (SOCK AID) MISC 1 Device, Does not apply, DAILY    ibuprofen (ADVIL;MOTRIN) 600 mg, Oral, 3 TIMES DAILY PRN    Incontinence Supply Disposable (POISE HOURGLASS SHAPE) PADS 1 Device, Does not apply, 4 TIMES DAILY    levETIRAcetam (KEPPRA) 500 mg, Oral, 2 TIMES DAILY    lisinopril (PRINIVIL;ZESTRIL) 5 mg, Oral, DAILY    omeprazole (PRILOSEC) 40 mg, Oral, DAILY BEFORE BREAKFAST    oxybutynin (DITROPAN XL) 10 mg, Oral, DAILY    potassium chloride (KLOR-CON M) 20 MEQ extended release tablet 20 mEq, Oral, DAILY    tamsulosin (FLOMAX) 0.4 mg, Oral, DAILY    vitamin D-3 (CHOLECALCIFEROL) 5,000 Units, Oral, DAILY       Allergies   Allergen Reactions    Norco [Hydrocodone-Acetaminophen] Nausea And Vomiting       Review of Systems:   Constitutional: negative for weight changes or fevers  Genitourinary: negative for bowel/bladder incontinence   Musculoskeletal: positive for leg pain  Neurological:  Positive for spasticity and bilateral leg weakness  Behavioral/Psych: negative for anxiety/depression   All other systems reviewed and are negative    Objective:     Vitals:    10/11/21 1619   BP: 122/68   Pulse: 90       Constitutional: Pleasant, no acute distress   Head: Normocephalic, atraumatic   Eyes: Conjunctivae normal   Neck: Supple, symmetrical   Lungs: Normal respiratory effort, non-labored breathing   Cardiovascular: Limbs warm and well perfused   Abdomen: Non-protruded   Musculoskeletal: Increased tone/spasticity in lower limbs (improved)  · Lumbar Spine: Lumbar paraspinals tender bilaterally. SLR neg bilaterally. Neurological: Cranial nerves II-XII grossly intact. · Gait - Spastic gait. Ambulates with assist device. · Motor: 4/5 motor strength in bilateral HF, KE, KF, ADF, APF  · Sensory: LT sensation diminished in lower limbs diffusely  · Reflexes: hyper-reflexia in bilateral achilles and bilateral patellar reflexes, sustained ankle clonus b/l  Skin: No rashes or lesions visible in lower limbs  Psychological: Cooperative, no exaggerated pain behaviors       Assessment:    Diagnosis Orders   1. Chronic pain of both knees  XR KNEE BILATERAL STANDING   2. Spasticity  [facility]   3. Syrinx of spinal cord ([address]     4. Neuropathic pain     5.  Other chronic pain         Ivan Bland is a 50 y.o.male presenting to the pain clinic for evaluation of lower limb spasticity. At this point, we will increase his baclofen to 10 mg 3 times a day and likely will have to be up to 20 mg 3 times a day to help with the spasticity in the lower limbs. In addition, he is obtaining good benefit on baclofen 20 mg 3 times a day in regards to his overall spasticity/tone and ambulation. He had noted swelling in his legs from Lyrica and we stopped this medication. He is doing well with his Keppra for neuropathic pain coverage. I have had a new referral to physical therapy for gait assessment and proprioceptive improvement. I have ordered x-rays of his knees to potentially look at injection therapy to help out with knee pain. Plan: The following treatment recommendations and plan were discussed in detail with Stuart Oconnor. Imaging:  With worsening bilateral knee pain, have ordered x-rays of the knees to further evaluate for arthritis. Adjuvants: In light of the presence of a neuropathic component of pain and side effects to Lyrica, I have continued his Keppra to 500 mg twice a day. For continued chronic pain with associated musculoskeletal component, we will continue his Baclofen to 20 mg three times a day. Multidisciplinary Pain Management:   In the presence of complex, chronic, and multi-factorial pain, the importance of a multidisciplinary approach to pain management in the patients management regimen was emphasized and discussed in great detail. PHYSICAL THERAPY: I advised the patient to continue gentle stretching atleast 4 times a day.      Referrals:  None    Prescriptions Written This Visit:   Keppra 500 mg    Follow-up: 12 weeks      Sachin Rushing DO  Interventional Pain Management/PM&R   [place]

## 2021-10-15 ENCOUNTER — HOSPITAL ENCOUNTER (OUTPATIENT)
Dept: GENERAL RADIOLOGY | Age: 46
Discharge: HOME | End: 2021-10-15
Payer: COMMERCIAL

## 2021-10-15 ENCOUNTER — HOSPITAL ENCOUNTER (OUTPATIENT)
Age: 46
Discharge: HOME | End: 2021-10-15
Payer: COMMERCIAL

## 2021-10-15 DIAGNOSIS — G89.29 CHRONIC PAIN OF BOTH KNEES: ICD-10-CM

## 2021-10-15 DIAGNOSIS — M25.562 CHRONIC PAIN OF BOTH KNEES: ICD-10-CM

## 2021-10-15 DIAGNOSIS — M25.561 CHRONIC PAIN OF BOTH KNEES: ICD-10-CM

## 2021-10-15 PROCEDURE — 73565 X-RAY EXAM OF KNEES: CPT

## 2021-10-20 ENCOUNTER — HOSPITAL ENCOUNTER (OUTPATIENT)
Dept: PHYSICAL THERAPY | Age: 46
Setting detail: THERAPIES SERIES
Discharge: HOME | End: 2021-10-20
Payer: COMMERCIAL

## 2021-10-20 PROCEDURE — 97162 PT EVAL MOD COMPLEX 30 MIN: CPT

## 2021-10-20 NOTE — PROGRESS NOTES
(grabs, staggers)   Turned 360 Degrees: Continuity of Steps Discontinuous steps   Sitting Down Uses arms or not a smooth motion   Balance Score 6   Initiation of Gait No hesitancy   Step Height: R Swing Foot Right foot complete clears floor   Step Length: R Swing Foot Passes left stance foot   Step Height: L Swing Foot Left foot complete clears floor   Step Length: L Swing Foot Passes right stance foot   Step Symmetry Right and left step length not equal (estimate)   Step Continuity Stopping or discontinuity between steps   Path Mild/moderate deviation or uses walking aid   Trunk Marked sway or uses walking aid   Walking Time Heels almost touching while walking   Gait Score 7   Tinetti Total Score 13   Tinetti Disability Index 40-59%   Tinetti CMS MODIFIER CK       TREATMENT   Precautions:    Pain:     X in shaded column indicates activity completed today   Modalities Parameters/  Location  Notes                     Manual Therapy Time/Technique  Notes                     Exercise/Intervention   Notes                                                                                  Specific Interventions Next Treatment:balance and gait exercise,  also LE strengthening. Activity/Treatment Tolerance:  [x]  Patient tolerated treatment well  []  Patient limited by fatigue  []  Patient limited by pain   []  Patient limited by medical complications  []  Other:     Assessment: moderate spasticity. Worse on the left. Weakness also limiting transfers gait. Body Structures/Functions/Activity Limitations: impaired activity tolerance, impaired balance, impaired motor control, impaired muscle tone, impaired ROM, impaired strength, pain, abnormal gait and abnormal posture  Prognosis: fair    GOALS:  Patient Goal: move better, less spticity,     Short Term Goals:  Time Frame: 4 weeks  1. Alexx Hale will perform 8+ sit-stands within a 30 sec time frame as his eccentric quad strength improves.   2. Rian's Tinetti score will improve to >18/28 indicating mild fall risk. 3. 2011 Douglas Infante will be able to walk 100+yds with his FWW without reporting fatigue and/or without legs giving out as his stamina improves. Initiate HEP     Long Term Goals:  Time Frame: 8 weeks  1. 2011 Douglas Infante will be discharged with independent HEP. 2. Rian's Tinetti score will improve to >20/28 indicating no fall risk. 3. 2011 Douglas Infante will be able to perform 10+ sit to stands in 30 sec indicating no fall risk. 4   Increase B LE strength to 4+/5       Patient Education:   [x]  HEP/Education Completed: Plan of Care, Goals, POC    Medbridge Access Code:  []  No new Education completed  []  Reviewed Prior HEP      []  Patient verbalized and/or demonstrated understanding of education provided. []  Patient unable to verbalize and/or demonstrate understanding of education provided. Will continue education. [x]  Barriers to learning: None     PLAN:  Treatment Recommendations: Strengthening, Range of Motion, Balance Training, Functional Mobility Training, Endurance Training, Gait Training, Home Exercise Program, Self-Care Education and Training and Positioning    [x]  Plan of care initiated. Plan to see patient 2 times per week for 8 weeks to address the treatment planned outlined above.   []  Continue with current plan of care  []  Modify plan of care as follows:    []  Hold pending physician visit  []  Discharge    Time In 1415   Time Out 1500   Timed Code Minutes: 0  min   Total Treatment Time: 45 min       Electronically Signed by: Chandrakant Pino PT

## 2021-10-26 ENCOUNTER — HOSPITAL ENCOUNTER (OUTPATIENT)
Dept: SLEEP CENTER | Age: 46
Discharge: HOME | End: 2021-10-28
Payer: COMMERCIAL

## 2021-10-26 DIAGNOSIS — R06.83 SNORES: ICD-10-CM

## 2021-10-26 DIAGNOSIS — G47.10 HYPERSOMNIA: ICD-10-CM

## 2021-10-26 PROCEDURE — 95810 POLYSOM 6/> YRS 4/> PARAM: CPT

## 2021-10-27 LAB — STATUS: NORMAL

## 2021-10-28 ENCOUNTER — HOSPITAL ENCOUNTER (OUTPATIENT)
Dept: PHYSICAL THERAPY | Age: 46
Setting detail: THERAPIES SERIES
Discharge: HOME | End: 2021-10-28
Payer: COMMERCIAL

## 2021-10-28 PROCEDURE — 97110 THERAPEUTIC EXERCISES: CPT

## 2021-10-28 PROCEDURE — 97530 THERAPEUTIC ACTIVITIES: CPT

## 2021-10-28 NOTE — PROGRESS NOTES
[facility]  [facility]  [] EVALUATION  [x] DAILY NOTE (LAND) [] DAILY NOTE (AQUATIC ) [] PROGRESS NOTE [] DISCHARGE NOTE    [] [facility]   [] [address]    [] [address] [facility]   [x] Ying Moyer    Date: 10/28/2021  Patient Name:  Matilda Borrego  : 1975  MRN: [id number]    Referring Practitioner Homar Cain DO   Diagnosis Cramp and spasm [R25.2]    Treatment Diagnosis Gait dysfunction    Date of Evaluation 10/20/21    Additional Pertinent History Spinal cord compression, bladder issues      Functional Outcome Measure Used TInnetti    Functional Outcome Score (10/20/21)       Insurance: Primary: Payor: Samantha Harris /  /  / ,   Secondary:    Authorization Information: Needs auth    30 per year,  9 already used this year. Visit # 2, 2/10 for progress note   Visits Allowed: 0   Recertification Date:   8 weeks,    Physician Follow-Up: unknown   Physician Orders: Gait and balance training,    History of Present Illness: Back surgery which led to cord compression and spasticity.        SUBJECTIVE: Reports bilat knee stiffness with some pain          TREATMENT   Precautions:    Pain:5/10 B knees    X in shaded column indicates activity completed today   Modalities Parameters/  Location  Notes                     Manual Therapy Time/Technique  Notes                     Exercise/Intervention   Notes   NuStep x6 min  x Load 5, steat 9, arms 9          Standing:       Heel/toe raises, marches, mini squats x10  x    3-way hip, HS curls x10  x           Rocker board fwd/lat x15 ea  x 10 sec balance with UE support          Seated:         LAQs x10  x      Resisted HS curls T- band x10  x blue     Resisted hip abduction T-band x10  x blue     Resisted hip adduction Ball x10  x      Marches x10  x           Supine:         Quad sets x10 5 sec       glute sets x10 5 sec       SLR x10        SAQs x10 Specific Interventions Next Treatment:balance and gait exercise,  also LE strengthening. Activity/Treatment Tolerance:  [x]  Patient tolerated treatment well  []  Patient limited by fatigue  []  Patient limited by pain   []  Patient limited by medical complications  []  Other:     Assessment: Initiated there ex and balance training. Body Structures/Functions/Activity Limitations: impaired activity tolerance, impaired balance, impaired motor control, impaired muscle tone, impaired ROM, impaired strength, pain, abnormal gait and abnormal posture  Prognosis: fair    GOALS:  Patient Goal: move better, less spticity,     Short Term Goals:  Time Frame: 4 weeks  1. Bernadine Quiroga will perform 8+ sit-stands within a 30 sec time frame as his eccentric quad strength improves. 2. Rian's Tinetti score will improve to >18/28 indicating mild fall risk. 3. Bernadine Quiroga will be able to walk 100+yds with his FWW without reporting fatigue and/or without legs giving out as his stamina improves. Initiate HEP     Long Term Goals:  Time Frame: 8 weeks  1. Bernadine Quiroga will be discharged with independent HEP. 2. Rian's Tinetti score will improve to >20/28 indicating no fall risk. 3. Bernadine Quiroga will be able to perform 10+ sit to stands in 30 sec indicating no fall risk. 4   Increase B LE strength to 4+/5       Patient Education:   [x]  HEP/Education Completed: Plan of Care, Goals, POC    Medbridge Access Code:  []  No new Education completed  []  Reviewed Prior HEP      []  Patient verbalized and/or demonstrated understanding of education provided. []  Patient unable to verbalize and/or demonstrate understanding of education provided. Will continue education. [x]  Barriers to learning: None     PLAN:  Treatment Recommendations: Strengthening, Range of Motion, Balance Training, Functional Mobility Training, Endurance Training, Gait Training, Home Exercise Program, Self-Care Education and Training and Positioning    [x]  Plan of care initiated. Plan to see patient 2 times per week for 8 weeks to address the treatment planned outlined above.   []  Continue with current plan of care  []  Modify plan of care as follows:    []  Hold pending physician visit  []  Discharge    Time In 0925   Time Out 1005   Timed Code Minutes: 40  min   Total Treatment Time: 40  min       Electronically Signed by: Aquiles Martinez

## 2021-11-01 ENCOUNTER — HOSPITAL ENCOUNTER (OUTPATIENT)
Dept: PHYSICAL THERAPY | Age: 46
Setting detail: THERAPIES SERIES
Discharge: HOME | End: 2021-11-01
Payer: COMMERCIAL

## 2021-11-01 PROCEDURE — 97110 THERAPEUTIC EXERCISES: CPT

## 2021-11-01 NOTE — PROGRESS NOTES
[address] [facility]  PHYSICAL THERAPY  [] EVALUATION  [x] DAILY NOTE (LAND) [] DAILY NOTE (AQUATIC ) [] PROGRESS NOTE [] DISCHARGE NOTE    [] [facility]   [] [address]    [] [address] [facility]   [x] Jin Osorio    Date: 2021  Patient Name:  Kiley Stuart  : 1975  MRN: [id number]    Referring Practitioner Js Khan,    Diagnosis Cramp and spasm [R25.2]    Treatment Diagnosis Gait dysfunction    Date of Evaluation 10/20/21    Additional Pertinent History Spinal cord compression, bladder issues      Functional Outcome Measure Used TInnetti    Functional Outcome Score (10/20/21)       Insurance: Primary: Payor: [facility] /  /  / ,   Secondary:    Authorization Information: 32 VISITS FROM 10/20/21 - 21 FOR CPT CODES 69894, 49684, 34263, [id number]  37 per year,  9 already used this year. Visit # 3  3/10 for progress note   Visits Allowed: ? Recertification Date:   8 weeks   Physician Follow-Up: unknown   Physician Orders: Gait and balance training,    History of Present Illness: Back surgery which led to cord compression and spasticity. SUBJECTIVE: sleep study last week , get result this week.    .          TREATMENT   Precautions:    Pain:5/10 B knees    X in shaded column indicates activity completed today   Modalities Parameters/  Location  Notes                     Manual Therapy Time/Technique  Notes                     Exercise/Intervention   Notes   NuStep x6 min  x Load 5, seat 9, arms 9          Standing:       Heel/toe raises, marches, mini squats  On air ex  x10  x  UE support    3-way hip, HS curls x10  x           Rocker board fwd/lat x15 ea  x 10 sec balance with UE support   Air ex    x    Seated:         LAQs x10  x      Resisted HS curls T- band x10  x blue     Resisted hip abduction T-band x10  x blue     Resisted hip adduction Ball x10  x      Marches x10  x           Supine: Endurance Training, Gait Training, Home Exercise Program, Self-Care Education and Training and Positioning    []  Plan of care initiated. Plan to see patient 2 times per week for 8 weeks to address the treatment planned outlined above.   [x]  Continue with current plan of care  []  Modify plan of care as follows:    []  Hold pending physician visit  []  Discharge    Time In 1545   Time Out 1625   Timed Code Minutes: 40  min   Total Treatment Time: 40  min       Electronically Signed by: Jessica Colmenares PT

## 2021-11-05 ENCOUNTER — HOSPITAL ENCOUNTER (OUTPATIENT)
Dept: PHYSICAL THERAPY | Age: 46
Setting detail: THERAPIES SERIES
Discharge: HOME | End: 2021-11-05
Payer: COMMERCIAL

## 2021-11-05 PROCEDURE — 97110 THERAPEUTIC EXERCISES: CPT

## 2021-11-05 PROCEDURE — 97112 NEUROMUSCULAR REEDUCATION: CPT

## 2021-11-05 PROCEDURE — 97530 THERAPEUTIC ACTIVITIES: CPT

## 2021-11-07 DIAGNOSIS — G47.33 OSA (OBSTRUCTIVE SLEEP APNEA): Primary | ICD-10-CM

## 2021-11-07 NOTE — PROGRESS NOTES
[address] [place] [address]                               SLEEP STUDY REPORT    PATIENT NAME: Carolynn Amaya                    :        1975  MED REC NO:   [id number]                           ROOM:  ACCOUNT NO:   [de-identified]                           ADMIT DATE: 10/26/2021  [facility]     [facility]  10/26/2021    DIAGNOSTIC POLYSOMNOGRAM REPORT    REFERRING PROVIDER:  Patric Jha MD    HISTORY OF PRESENT ILLNESS:  The patient is a 64-year-old gentleman  referred to the sleep lab by his primary care physician for concerns  regarding obstructive sleep apnea with loud snoring, nonrestorative  sleep. Weight 246 pounds, height 70 inches, BMI 35.3. METHODS:  The patient underwent digital polysomnography in compliance  with the standards and specifications from the AASM Manual including the  simultaneous recording of 3 EEG channels (F4-M1, C4-M1, and O2-M1 with  back up electrodes F3-M2, C3-M2, and O1-M2), 2 EOG channels (E1-M2, and  E2-M1,), EMG (chin, left & right leg), EKG, Nonin pulse oximetry with   less than 2 second averaging time, body position, airflow recorded by  oral-nasal thermal sensor and nasal air pressure transducer, plus  respiratory effort recorded by calibrated respiratory inductance  plethysmography (RIP), flow volume loop, sound and video. Sleep staging  and scoring followed the standard put forth by the American Academy of  Sleep Medicine and utilized the 1B obstructive hypopnea event  desaturation of 4 percent or greater. DETAILS OF THE STUDY:  Lights out 09:32 p.m., lights on 05:32 a.m. Total recording time 480 minutes, sleep period time 438 minutes, total  sleep time 391 minutes, sleep efficiency 81.4%. Latency to sleep 42  minutes, wake after sleep onset 47 minutes. Latency to  minutes.     SLEEP STAGING:  The patient slept 59 minutes or 15.1% of total sleep  time in N1 sleep, 286 minutes or 73% in N2 sleep, there was no N3 sleep,  46 minutes or 11.8% in REM sleep. RESPIRATORY SUMMARY:  1 central apnea, 19 obstructive apneas, 27  obstructive hypopneas for an AHI of 7.5. The REM AHI was 49.6 and the  supine AHI was 7.2. BODY POSITION SUMMARY:  391 minutes of supine sleep. SLEEP DISTURBANCE SUMMARY:  There were 47 arousals out of which 17 were  caused by respiratory obstructions for a respiratory arousal index of  2.6. PERIODIC LIMB MOVEMENT SUMMARY:  There was none. PULSE OXIMETRY SUMMARY:  Mean oxygen saturation during wakefulness  92.3%, during sleep 91.3%. Lowest oxygen saturation 74%. There were  12.5 minutes of oxygen saturation below 88% through the night. ECG SUMMARY:  Normal sinus rhythm. ASSESSMENT:  Obstructive sleep apnea which worsened during REM sleep  with a REM AHI of 49.6. Nocturnal oxygen desaturation and sleep  disruption associated to obstructive respiratory events. RECOMMENDATIONS:  PAP therapies are recommended.   The patient will be  contacted, and if he is agreeable, we will bring him back to the sleep  lab for a PAP titration polysomnogram.        Kaleb Mcdaniel M.D.    D: 11/06/2021 17:15:06       T: 11/07/2021 6:46:59     [email or web address]  Job#: [id number]     Doc#: [id number]    CC:

## 2021-11-08 ENCOUNTER — OFFICE VISIT (OUTPATIENT)
Dept: PULMONOLOGY | Age: 46
End: 2021-11-08
Payer: COMMERCIAL

## 2021-11-08 VITALS
OXYGEN SATURATION: 96 % | SYSTOLIC BLOOD PRESSURE: 122 MMHG | HEIGHT: 70 IN | BODY MASS INDEX: 32.78 KG/M2 | DIASTOLIC BLOOD PRESSURE: 78 MMHG | TEMPERATURE: 98.7 F | WEIGHT: 229 LBS | HEART RATE: 106 BPM

## 2021-11-08 DIAGNOSIS — G47.33 OSA (OBSTRUCTIVE SLEEP APNEA): Primary | ICD-10-CM

## 2021-11-08 DIAGNOSIS — G47.10 HYPERSOMNIA: ICD-10-CM

## 2021-11-08 PROCEDURE — G8427 DOCREV CUR MEDS BY ELIG CLIN: HCPCS | Performed by: INTERNAL MEDICINE

## 2021-11-08 PROCEDURE — G8484 FLU IMMUNIZE NO ADMIN: HCPCS | Performed by: INTERNAL MEDICINE

## 2021-11-08 PROCEDURE — G8417 CALC BMI ABV UP PARAM F/U: HCPCS | Performed by: INTERNAL MEDICINE

## 2021-11-08 PROCEDURE — 1036F TOBACCO NON-USER: CPT | Performed by: INTERNAL MEDICINE

## 2021-11-08 PROCEDURE — 99213 OFFICE O/P EST LOW 20 MIN: CPT | Performed by: INTERNAL MEDICINE

## 2021-11-08 NOTE — PROGRESS NOTES
Sleep Medicine    Manny Poole, 48 y.o.  [id number]    Nurses Notes   Patient here for sleep study follow up. Study Results    Initial Study Date -  10/26/21  AHI -  7.5    Total Events - 49  (Apneas  20  Hypopneas 29  Central  1)  LM w/Arousals - 0  Sleep Efficiency - 81.4 % (Total Sleep Time - 391 min)  Time with Sats below 88% - 12.5 min  Oxygen level - Room [place]  91.3      [place] [address]                                SLEEP STUDY REPORT     PATIENT NAME: Christiana Lopez                    :        1975  MED REC NO:   [id number]                           ROOM:  ACCOUNT NO:   [de-identified]                           ADMIT DATE: 10/26/2021  PROVIDER:     Luzmaria Cruz M.D.     DATE OF STUDY:  10/26/2021     DIAGNOSTIC POLYSOMNOGRAM REPORT     REFERRING PROVIDER:  Jose Angel Burns MD     HISTORY OF PRESENT ILLNESS:  The patient is a 64-year-old gentleman  referred to the sleep lab by his primary care physician for concerns  regarding obstructive sleep apnea with loud snoring, nonrestorative  sleep. Weight 246 pounds, height 70 inches, BMI 35. 3.     METHODS:  The patient underwent digital polysomnography in compliance  with the standards and specifications from the AASM Manual including the  simultaneous recording of 3 EEG channels (F4-M1, C4-M1, and O2-M1 with  back up electrodes F3-M2, C3-M2, and O1-M2), 2 EOG channels (E1-M2, and  E2-M1,), EMG (chin, left & right leg), EKG, Nonin pulse oximetry with   less than 2 second averaging time, body position, airflow recorded by  oral-nasal thermal sensor and nasal air pressure transducer, plus  respiratory effort recorded by calibrated respiratory inductance  plethysmography (RIP), flow volume loop, sound and video.   Sleep staging  and scoring followed the standard put forth by the American Academy of  Sleep Medicine and utilized the 1B obstructive hypopnea event  desaturation of 4 percent or greater.     DETAILS OF THE STUDY:  Lights out 09:32 p.m., lights on 05:32 a.m. Total recording time 480 minutes, sleep period time 438 minutes, total  sleep time 391 minutes, sleep efficiency 81.4%. Latency to sleep 42  minutes, wake after sleep onset 47 minutes. Latency to  minutes.     SLEEP STAGING:  The patient slept 59 minutes or 15.1% of total sleep  time in N1 sleep, 286 minutes or 73% in N2 sleep, there was no N3 sleep,  46 minutes or 11.8% in REM sleep.     RESPIRATORY SUMMARY:  1 central apnea, 19 obstructive apneas, 27  obstructive hypopneas for an AHI of 7.5. The REM AHI was 49.6 and the  supine AHI was 7.2.     BODY POSITION SUMMARY:  391 minutes of supine sleep.     SLEEP DISTURBANCE SUMMARY:  There were 47 arousals out of which 17 were  caused by respiratory obstructions for a respiratory arousal index of  2.6.     PERIODIC LIMB MOVEMENT SUMMARY:  There was none.     PULSE OXIMETRY SUMMARY:  Mean oxygen saturation during wakefulness  92.3%, during sleep 91.3%. Lowest oxygen saturation 74%. There were  12.5 minutes of oxygen saturation below 88% through the night.     ECG SUMMARY:  Normal sinus rhythm.     ASSESSMENT:  Obstructive sleep apnea which worsened during REM sleep  with a REM AHI of 49.6. Nocturnal oxygen desaturation and sleep  disruption associated to obstructive respiratory events.     RECOMMENDATIONS:  PAP therapies are recommended.   The patient will be  contacted, and if he is agreeable, we will bring him back to the sleep  lab for a PAP titration polysomnogram.           Jin Tristan M.D.     D: 11/06/2021 17:15:06       T: 11/07/2021 6:46:59     [id number]  Job#: [id number]     Doc#: [id number]     CC:               Last signed by: Valentino Carmichael MD at 11/8/2021  7:09 AM         INTERVAL HISTORY         Stuart Oconnor is a 48 y.o. old male who comes in to review the results of his recent sleep study, to answer questions and to explore options for treatment.     [facility]  Past Medical History:   Diagnosis Date    Asthma     does not know name of inhaler and hasnt used in over a yr    Ataxic gait     Developmental delay     Diet-controlled diabetes mellitus ([facility] [address]     History of syrinx of spinal cord ([facility] [address] 03/17/2020    Incomplete bladder emptying     Myelopathy of thoracic region     Osteopenia determined by x-ray     Spasm of muscle     Urinary dribbling     Urinary leakage     Uses walker     for standing and transfers    Uses wheelchair     can stand and transfer cannot walk due to leg weakness    Vitamin D deficiency 03/12/2020    Vitamin D 25OH 24     Past Surgical History:   Procedure Laterality Date    BACK SURGERY      CRANIOTOMY N/A 5/14/2020    Thoracic Spine and Laminectomy Extraction of Arachnoid Cyst/Band and 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 performed by Collins Aguilar MD at [place] [address]  09/16/2020    IR angiogram carotid cerebral bilateral     Social History     Tobacco Use    Smoking status: Never Smoker    Smokeless tobacco: Never Used   Vaping Use    Vaping Use: Never used   Substance Use Topics    Alcohol use: No    Drug use: No     Family History   Problem Relation Age of Onset    Arthritis Mother     Diabetes Mother     Heart Disease Father     Cancer Neg Hx     High Blood Pressure Neg Hx     Stroke Neg Hx        ALLERGIES  Allergies   Allergen Reactions    Norco [Hydrocodone-Acetaminophen] Nausea And Vomiting       MEDS  Current Outpatient Medications   Medication Sig Dispense Refill    levETIRAcetam (KEPPRA) 500 MG tablet Take 1 tablet by mouth 2 times daily 60 tablet 2    atorvastatin (LIPITOR) 40 MG tablet Take 1 tablet by mouth daily 90 tablet 1    baclofen (LIORESAL) 20 MG tablet Take 1 tablet by mouth 3 times daily 90 tablet 0    tamsulosin (FLOMAX) 0.4 MG capsule Take 1 capsule by mouth daily 30 capsule 5    lisinopril (PRINIVIL;ZESTRIL) 5 MG tablet Take 1 tablet by patient. We discussed various treatment options including positional therapy, OAT and PAP therapies along with the benefits and limitations of each. We also discussed the health risks with untreated GARETT. Due to the severity of apnea, oxygen desaturation and symptoms, PAP therapies is recommended. Patient agrees to proceed with APAP set up (pending insurance) with mask fitting. Educated on proper sleep hygiene measures. 30 minutes was spent on this visit with > 50% being face to face obtaining HPI, examining patient, reviewing test results, educating and coordinating a treatment plan. Follow up 8 weeks after PAP set up with download.

## 2021-11-09 ENCOUNTER — HOSPITAL ENCOUNTER (OUTPATIENT)
Dept: PHYSICAL THERAPY | Age: 46
Setting detail: THERAPIES SERIES
Discharge: HOME | End: 2021-11-09
Payer: COMMERCIAL

## 2021-11-09 PROCEDURE — 97110 THERAPEUTIC EXERCISES: CPT

## 2021-11-09 PROCEDURE — 97530 THERAPEUTIC ACTIVITIES: CPT

## 2021-11-09 NOTE — PROGRESS NOTES
[address] [facility]  PHYSICAL THERAPY  [] EVALUATION  [x] DAILY NOTE (LAND) [] DAILY NOTE (AQUATIC ) [] PROGRESS NOTE [] DISCHARGE NOTE    [] [facility]   [] [address]    [] [address] [facility]   [x] Nitin Palomo    Date: 2021  Patient Name:  Jamshid Hopkins  : 1975  MRN: [id number]    Referring Practitioner Johnnie Schwab,    Diagnosis Cramp and spasm [R25.2]    Treatment Diagnosis Gait dysfunction    Date of Evaluation 10/20/21    Additional Pertinent History Spinal cord compression, bladder issues      Functional Outcome Measure Used TInnetti    Functional Outcome Score (10/20/21)       Insurance: Primary: Payor: Fidelia Villatoro /  /  / ,   Secondary:    Authorization Information: 32 VISITS FROM 10/20/21 - 21 FOR CPT CODES 02551, 22166, [id number], 71703  13 per year,  9 already used this year. Visit # 5  5/10 for progress note   Visits Allowed: ? Recertification Date:   8 weeks   Physician Follow-Up: unknown   Physician Orders: Gait and balance training,    History of Present Illness: Back surgery which led to cord compression and spasticity. SUBJECTIVE: Reports  stiffness in bilat knees this morning.   .          TREATMENT   Precautions:    Pain:0/10 B knees    X in shaded column indicates activity completed today   Modalities Parameters/  Location  Notes                     Manual Therapy Time/Technique  Notes                     Exercise/Intervention   Notes   NuStep x6 min  x Load 5, seat 9, arms 9          Standing:       Heel/toe raises, marches, mini squats  On air ex  x10  x  UE support    3-way hip, HS curls x10  x           Rocker board fwd/lat x15 ea  x 10 sec balance with UE support          Seated:         LAQs x10  x      Resisted HS curls T- band x10  x blue     Resisted hip abduction T-band x10  x blue     Resisted hip adduction Ball x10  x      Marches x10  x           Supine: Quad sets x10 5 sec x      glute sets x10 5 sec x      SLR x10  x      SAQs x10  x                           Specific Interventions Next Treatment:balance and gait exercise,  also LE strengthening. Activity/Treatment Tolerance:  [x]  Patient tolerated treatment well  []  Patient limited by fatigue  []  Patient limited by pain   []  Patient limited by medical complications  []  Other:     Assessment: good  Tolerance of all exercise today. UE needed for standing activities. Body Structures/Functions/Activity Limitations: impaired activity tolerance, impaired balance, impaired motor control, impaired muscle tone, impaired ROM, impaired strength, pain, abnormal gait and abnormal posture  Prognosis: fair    GOALS:  Patient Goal: move better, less spticity,     Short Term Goals:  Time Frame: 4 weeks  1. Tana Daly will perform 8+ sit-stands within a 30 sec time frame as his eccentric quad strength improves. 2. Rian's Tinetti score will improve to >18/28 indicating mild fall risk. 3. Tana Daly will be able to walk 100+yds with his FWW without reporting fatigue and/or without legs giving out as his stamina improves. Initiate HEP     Long Term Goals:  Time Frame: 8 weeks  1. Tana Daly will be discharged with independent HEP. 2. Rian's Tinetti score will improve to >20/28 indicating no fall risk. 3. Tana Daly will be able to perform 10+ sit to stands in 30 sec indicating no fall risk. 4   Increase B LE strength to 4+/5       Patient Education:   [x]  HEP/Education Completed: Plan of Care, Goals, POC    Medbridge Access Code:  []  No new Education completed  []  Reviewed Prior HEP      []  Patient verbalized and/or demonstrated understanding of education provided. []  Patient unable to verbalize and/or demonstrate understanding of education provided. Will continue education.   [x]  Barriers to learning: None     PLAN:  Treatment Recommendations: Strengthening, Range of Motion, Balance Training, Functional Mobility Training, Endurance Training, Gait Training, Home Exercise Program, Self-Care Education and Training and Positioning    []  Plan of care initiated. Plan to see patient 2 times per week for 8 weeks to address the treatment planned outlined above.   [x]  Continue with current plan of care  []  Modify plan of care as follows:    []  Hold pending physician visit  []  Discharge    Time In 3849   Time Out 0916   Timed Code Minutes: 38  min   Total Treatment Time: 38  min       Electronically Signed by: Aquiles Martinez

## 2021-11-12 ENCOUNTER — HOSPITAL ENCOUNTER (OUTPATIENT)
Dept: PHYSICAL THERAPY | Age: 46
Setting detail: THERAPIES SERIES
Discharge: HOME | End: 2021-11-12
Payer: COMMERCIAL

## 2021-11-12 PROCEDURE — 97530 THERAPEUTIC ACTIVITIES: CPT

## 2021-11-12 PROCEDURE — 97110 THERAPEUTIC EXERCISES: CPT

## 2021-11-12 NOTE — PROGRESS NOTES
Training, Gait Training, Home Exercise Program, Self-Care Education and Training and Positioning    []  Plan of care initiated. Plan to see patient 2 times per week for 8 weeks to address the treatment planned outlined above.   [x]  Continue with current plan of care  []  Modify plan of care as follows:    []  Hold pending physician visit  []  Discharge    Time In 0835   Time Out 0915   Timed Code Minutes: 40  min   Total Treatment Time: 40  min       Electronically Signed by: Kareen Palma

## 2021-11-16 ENCOUNTER — HOSPITAL ENCOUNTER (OUTPATIENT)
Dept: PHYSICAL THERAPY | Age: 46
Setting detail: THERAPIES SERIES
Discharge: HOME | End: 2021-11-16
Payer: COMMERCIAL

## 2021-11-16 PROCEDURE — 97112 NEUROMUSCULAR REEDUCATION: CPT

## 2021-11-16 PROCEDURE — 97530 THERAPEUTIC ACTIVITIES: CPT

## 2021-11-16 PROCEDURE — 97110 THERAPEUTIC EXERCISES: CPT

## 2021-11-16 NOTE — PROGRESS NOTES
Quad sets x10 5 sec x      glute sets x10 5 sec x      SLR x10  x      SAQs x10  x                           Specific Interventions Next Treatment:balance and gait exercise,  also LE strengthening. Activity/Treatment Tolerance:  [x]  Patient tolerated treatment well  []  Patient limited by fatigue  []  Patient limited by pain   []  Patient limited by medical complications  []  Other:     Assessment: Tolerated all exercise well today. Still requires UE needed for standing activities. Body Structures/Functions/Activity Limitations: impaired activity tolerance, impaired balance, impaired motor control, impaired muscle tone, impaired ROM, impaired strength, pain, abnormal gait and abnormal posture  Prognosis: fair    GOALS:  Patient Goal: move better, less spticity,     Short Term Goals:  Time Frame: 4 weeks  1. Tana Daly will perform 8+ sit-stands within a 30 sec time frame as his eccentric quad strength improves. 2. Rian's Tinetti score will improve to >18/28 indicating mild fall risk. 3. Tana Daly will be able to walk 100+yds with his FWW without reporting fatigue and/or without legs giving out as his stamina improves. Initiate HEP     Long Term Goals:  Time Frame: 8 weeks  1. Tana Daly will be discharged with independent HEP. 2. Rian's Tinetti score will improve to >20/28 indicating no fall risk. 3. Tana Daly will be able to perform 10+ sit to stands in 30 sec indicating no fall risk. 4   Increase B LE strength to 4+/5       Patient Education:   [x]  HEP/Education Completed: Plan of Care, Goals, POC    Medbridge Access Code:  []  No new Education completed  []  Reviewed Prior HEP      []  Patient verbalized and/or demonstrated understanding of education provided. []  Patient unable to verbalize and/or demonstrate understanding of education provided. Will continue education.   [x]  Barriers to learning: None     PLAN:  Treatment Recommendations: Strengthening, Range of Motion, Balance Training, Functional Mobility Training, Endurance Training, Gait Training, Home Exercise Program, Self-Care Education and Training and Positioning    []  Plan of care initiated. Plan to see patient 2 times per week for 8 weeks to address the treatment planned outlined above.   [x]  Continue with current plan of care  []  Modify plan of care as follows:    []  Hold pending physician visit  []  Discharge    Time In 0835   Time Out 0915   Timed Code Minutes: 40  min   Total Treatment Time: 40  min       Electronically Signed by: Lauren Brown

## 2021-11-19 ENCOUNTER — TELEPHONE (OUTPATIENT)
Dept: FAMILY MEDICINE CLINIC | Age: 46
End: 2021-11-19

## 2021-11-19 ENCOUNTER — HOSPITAL ENCOUNTER (OUTPATIENT)
Dept: PHYSICAL THERAPY | Age: 46
Setting detail: THERAPIES SERIES
Discharge: HOME | End: 2021-11-19
Payer: COMMERCIAL

## 2021-11-19 PROCEDURE — 97530 THERAPEUTIC ACTIVITIES: CPT

## 2021-11-19 PROCEDURE — 97110 THERAPEUTIC EXERCISES: CPT

## 2021-11-19 NOTE — TELEPHONE ENCOUNTER
I called [facility] and [place] [address] and spoke to BODØ. She said that the prescription was on hold because it was too soon but it will be ok now. They are going to fill it and it will be ready in an hour. I asked her if they notify the patient and she said that they can call him. I gave her the number that the patient gave [place] and she she said she will call the patient when it is ready.

## 2021-11-19 NOTE — PROGRESS NOTES
[address] [facility]  PHYSICAL THERAPY  [] EVALUATION  [x] DAILY NOTE (LAND) [] DAILY NOTE (AQUATIC ) [] PROGRESS NOTE [] DISCHARGE NOTE    [] [address] [facility]   [] [address]    [] [address] [facility]   [x] Maxim End    Date: 2021  Patient Name:  J Carlos Cuevas  : 1975  MRN: [id number]    Referring Practitioner Axel Plascencia DO   Diagnosis Cramp and spasm [R25.2]    Treatment Diagnosis Gait dysfunction    Date of Evaluation 10/20/21    Additional Pertinent History Spinal cord compression, bladder issues      Functional Outcome Measure Used TInnetti    Functional Outcome Score (10/20/21)       Insurance: Primary: Payor: Hammad Rodarte /  /  / ,   Secondary:    Authorization Information: 32 VISITS FROM 10/20/21 - 21 FOR CPT CODES 20658, 01364, 98142, 05415  30 per year,  9 already used this year. Visit # 8,  8/10 for progress note   Visits Allowed: ? Recertification Date:   8 weeks   Physician Follow-Up: unknown   Physician Orders: Gait and balance training,    History of Present Illness: Back surgery which led to cord compression and spasticity. SUBJECTIVE: Reports stiffness in bilat knees remains this morning.   .          TREATMENT   Precautions:    Pain:0/10 B knees    X in shaded column indicates activity completed today   Modalities Parameters/  Location  Notes                     Manual Therapy Time/Technique  Notes                     Exercise/Intervention   Notes   NuStep x6 min  x Load 5, seat 9, arms 9          Standing:       Heel/toe raises, marches, mini squats  On air ex  x10  x  UE support    3-way hip, HS curls x10  x           Rocker board fwd/lat x15 ea  x 10 sec balance with UE support          Seated:         LAQs x10  x      Resisted HS curls T- band x10  x blue     Resisted hip abduction T-band x10  x blue     Resisted hip adduction Ball x10  x      Marches x10  x           Supine: Quad sets x10 5 sec x      glute sets x10 5 sec x      SLR x10  x      SAQs x10  x                           Specific Interventions Next Treatment:balance and gait exercise,  also LE strengthening. Activity/Treatment Tolerance:  [x]  Patient tolerated treatment well  []  Patient limited by fatigue  []  Patient limited by pain   []  Patient limited by medical complications  []  Other:     Assessment: Tolerated all exercise well today. Still requires UE needed for standing activities. Body Structures/Functions/Activity Limitations: impaired activity tolerance, impaired balance, impaired motor control, impaired muscle tone, impaired ROM, impaired strength, pain, abnormal gait and abnormal posture  Prognosis: fair    GOALS:  Patient Goal: move better, less spticity,     Short Term Goals:  Time Frame: 4 weeks  1. Mitzy Horton will perform 8+ sit-stands within a 30 sec time frame as his eccentric quad strength improves. 2. Rian's Tinetti score will improve to >18/28 indicating mild fall risk. 3. Mitzy Horton will be able to walk 100+yds with his FWW without reporting fatigue and/or without legs giving out as his stamina improves. Initiate HEP     Long Term Goals:  Time Frame: 8 weeks  1. Mitzy Horton will be discharged with independent HEP. 2. Rian's Tinetti score will improve to >20/28 indicating no fall risk. 3. Mitzy Horton will be able to perform 10+ sit to stands in 30 sec indicating no fall risk. 4   Increase B LE strength to 4+/5       Patient Education:   [x]  HEP/Education Completed: Plan of Care, Goals, POC    Medbridge Access Code:  []  No new Education completed  []  Reviewed Prior HEP      []  Patient verbalized and/or demonstrated understanding of education provided. []  Patient unable to verbalize and/or demonstrate understanding of education provided. Will continue education.   [x]  Barriers to learning: None     PLAN:  Treatment Recommendations: Strengthening, Range of Motion, Balance Training, Functional Mobility

## 2021-11-19 NOTE — TELEPHONE ENCOUNTER
Samuel Kandi came into the office today requesting a refill of his lisinopril 5 mg once daily. He states he has a new pharmacy - [facility] in [place] [address]. He can be reached at [phone].

## 2021-11-22 ENCOUNTER — HOSPITAL ENCOUNTER (OUTPATIENT)
Dept: PHYSICAL THERAPY | Age: 46
Setting detail: THERAPIES SERIES
Discharge: HOME | End: 2021-11-22
Payer: COMMERCIAL

## 2021-11-22 PROCEDURE — 97110 THERAPEUTIC EXERCISES: CPT

## 2021-11-22 NOTE — PROGRESS NOTES
[facility]  PHYSICAL THERAPY  [] EVALUATION  [] DAILY NOTE (LAND) [] DAILY NOTE (AQUATIC ) [x] PROGRESS NOTE [] DISCHARGE NOTE    [] [facility] [place]   [] Jesi Mohan    [] [facility]   [x] Sophy Carmen    Date: 2021  Patient Name:  Akbar Alexander  : 1975  MRN: [id number]    Referring Practitioner Barbara Ibarra DO   Diagnosis Cramp and spasm [R25.2]    Treatment Diagnosis Gait dysfunction    Date of Evaluation 10/20/21    Additional Pertinent History Spinal cord compression, bladder issues      Functional Outcome Measure Used TInnetti    Functional Outcome Score (10/20/21)  (21)      Insurance: Primary: Payor: Suleman Cesar /  /  / ,   Secondary:    Authorization Information: 32 VISITS FROM 10/20/21 - 21 FOR CPT CODES 00364, 90082, 15464, 21978  30 per year,  9 already used this year. Visit # 9,  0/10 for progress note   Visits Allowed: ? Recertification Date:   8 weeks   Physician Follow-Up: unknown   Physician Orders: Gait and balance training   History of Present Illness: Back surgery which led to cord compression and spasticity. SUBJECTIVE: Patient reprots he is getting around fairly well.         TREATMENT   Precautions:    Pain:0/10 B knees    X in shaded column indicates activity completed today   Modalities Parameters/  Location  Notes                     Manual Therapy Time/Technique  Notes                     Exercise/Intervention   Notes   NuStep x6 min  x Load 5, seat 9, arms 9          Standing:       Heel/toe raises, marches, mini squats  On air ex  x10  x  UE support    3-way hip, HS curls  B   Orange band on both legs  x10  x UE support   Side stepping and forward B t band  1 lap x 12 feet  x    Rocker board fwd/lat x15 ea  x 10 sec balance with UE support          Seated:         LAQs  3#  x10  x      Resisted HS curls T- band x10  x blue     Resisted hip abduction T-band x10  x blue     Resisted hip adduction Ball x10  x      Marches 3#   x10  x           Supine:         Quad sets   x10 5 sec       glute sets x10 5 sec       SLR x10        SAQs x10                           TINETTI BALANCE TEST    BALANCE Patient is seated in a hard, armless chair   1 SITTING BALANCE 1 - Steady, safe   2. RISES FROM CHAIR 2 - Able without use of arms   3. ATTEMPTS TO RISE 1 - Able, requires > 1 attempt   4. IMMEDIATE STANDING BALANCE (FIRST 5 SECONDS) 2 - Steady without walker or other support   5. STANDING BALANCE 2 - Narrow stance without support   6. NUDGED 2 - Steady   7. EYES CLOSED 0 - Unsteady   8. TURNING 360 DEGREES 1 - Continuous and 0 - Unsteady (grabs,staggers)   9. SITTING DOWN 2 - Safe,smooth motion   BALANCE SCORE 13/16       GAIT SECTION Patient stands with therapist, walks across room (+/- aids), fist at usual pace, then at rapid pace. 1.  INDICATION OF GAIT (Immediately after told to \"go\" 1 - No hesitancy   2. STEP LENGTH AND HEIGHT 0 - Step to   3. FOOT CLEARANCE 1 - Left foot clears floor and 1 - Right foot clears floor   4. STEP SYMMETRY 0 - Right and left step length not equal   5. STEP CONTINUITY 1 - Steps appear continuous   6. PATH 1 - Mild/moderate deviation or uses walking aid   7. TRUNK 1 - No sway but flexes knees or back or uses arms for stability   8. WALKING TIME 1 - Heels almost touching while walking   GAIT SCORE 7/12   TOTAL SCORE 20/28   Risk Indicators:  Less than/equal to 18 = high risk  19-23 Moderate risk  Greater than/equal to 24 = low risk          30 sec sit to stand  --  7 reps      Specific Interventions Next Treatment:balance and gait exercise,  also LE strengthening. Activity/Treatment Tolerance:  [x]  Patient tolerated treatment well  []  Patient limited by fatigue  []  Patient limited by pain   []  Patient limited by medical complications  []  Other:     Assessment: Tolerated all exercise well today.  Still requires UE needed for standing activities. Body Structures/Functions/Activity Limitations: impaired activity tolerance, impaired balance, impaired motor control, impaired muscle tone, impaired ROM, impaired strength, pain, abnormal gait and abnormal posture  Prognosis: fair    GOALS:  Patient Goal: move better, less spticity,     Short Term Goals:  Time Frame: 4 weeks  1. Ernestina Weber will perform 8+ sit-stands within a 30 sec time frame as his eccentric quad strength improves. Partially met -- now at 7 reps, cont goal   2. Rian's Tinetti score will improve to >18/28 indicating mild fall risk. MET  See long term goal   3. Ernestina Weber will be able to walk 100+yds with his FWW without reporting fatigue and/or without legs giving out as his stamina improves. MET  See long term goal   Initiate HEP   MET      Long Term Goals:  Time Frame: 8 weeks  1. Ernestina Weber will be discharged with independent HEP.  NOT MET  Cont goal   2. Taylors Tinetti score will improve to >20/28 indicating no fall risk. MET    No new goal   3. Ernestina Weber will be able to perform 10+ sit to stands in 30 sec indicating no fall risk. NOT MET  cotn goal   4   Increase B LE strength to 4+/5 NOT MET  Cont goal       Patient Education:   []  HEP/Education Completed: Plan of Care, Goals, POC    Medbridge Access Code:  [x]  No new Education completed  []  Reviewed Prior HEP      []  Patient verbalized and/or demonstrated understanding of education provided. []  Patient unable to verbalize and/or demonstrate understanding of education provided. Will continue education. [x]  Barriers to learning: None     PLAN:  Treatment Recommendations: Strengthening, Range of Motion, Balance Training, Functional Mobility Training, Endurance Training, Gait Training, Home Exercise Program, Self-Care Education and Training and Positioning    []  Plan of care initiated. Plan to see patient 2 times per week for 8 weeks to address the treatment planned outlined above.   [x]  Continue with current plan of care 2 times for 4 weeks  Work on strength  []  Modify plan of care as follows:    []  Hold pending physician visit  []  Discharge    Time In 0830   Time Out 0910   Timed Code Minutes: 40  Min   Total Treatment Time: 40  min       Electronically Signed by: Sarah Segura PT

## 2021-11-29 ENCOUNTER — HOSPITAL ENCOUNTER (OUTPATIENT)
Dept: PHYSICAL THERAPY | Age: 46
Setting detail: THERAPIES SERIES
Discharge: HOME | End: 2021-11-29
Payer: COMMERCIAL

## 2021-11-29 PROCEDURE — 97110 THERAPEUTIC EXERCISES: CPT

## 2021-11-29 NOTE — PROGRESS NOTES
[facility] [facility]  PHYSICAL THERAPY  [] EVALUATION  [x] DAILY NOTE (LAND) [] DAILY NOTE (AQUATIC ) [] PROGRESS NOTE [] DISCHARGE NOTE    [] [facility]   [] Jes[address]    [] [address] [facility]   [x] Corie Crimes    Date: 2021  Patient Name:  Deshaun Abraham  : 1975  MRN: [id number]    Referring Practitioner Mike Saez DO   Diagnosis Cramp and spasm [R25.2]    Treatment Diagnosis Gait dysfunction    Date of Evaluation 10/20/21    Additional Pertinent History Spinal cord compression, bladder issues      Functional Outcome Measure Used TInnetti    Functional Outcome Score (10/20/21)  (21)      Insurance: Primary: Payor: Shira Dela Cruz /  /  / ,   Secondary:    Authorization Information: 32 VISITS FROM 10/20/21 - 21 FOR CPT CODES 99276, 70568, 97663, 89257  30 per year,  9 already used this year. Visit # 10,  1/10 for progress note   Visits Allowed: ? Recertification Date:   8 weeks   Physician Follow-Up: unknown   Physician Orders: Gait and balance training   History of Present Illness: Back surgery which led to cord compression and spasticity. SUBJECTIVE: Pt stated he is doing well with therapy but has noticed knee pressure and he is scared they will give out.        TREATMENT   Precautions:    Pain:5/10 B knees    X in shaded column indicates activity completed today   Modalities Parameters/  Location  Notes                     Manual Therapy Time/Technique  Notes                     Exercise/Intervention   Notes   NuStep x6 min  x Load 5, seat 9, arms 9          Standing:       Heel/toe raises, marches, mini squats  On air ex  x15  x UE support    3-way hip, HS curls  B peach  band on both legs  x15  x UE support   Side stepping and forward B t band  1 lap x 12 feet peach x    Rocker board fwd/lat x15 ea  x 10 sec balance with UE support   Stepping on/off of blue foam x10  x Seated:         LAQs  3#  x15  x      Resisted HS curls T- band x15  x blue     Resisted hip abduction T-band x15  x blue     Resisted hip adduction Ball x15  x      Marches 3#   x15  x           Supine:         Quad sets   x10 5 sec       glute sets x10 5 sec       SLR x10        SAQs x10                              Specific Interventions Next Treatment:balance and gait exercise,  also LE strengthening. Activity/Treatment Tolerance:  [x]  Patient tolerated treatment well  []  Patient limited by fatigue  []  Patient limited by pain   []  Patient limited by medical complications  []  Other:     Assessment: Progressed patient to 15 reps with exercises this session. Added stepping on and off unstable surface to challenge balance. Pt does require UE support with standing exercises due to B LE tremors. Body Structures/Functions/Activity Limitations: impaired activity tolerance, impaired balance, impaired motor control, impaired muscle tone, impaired ROM, impaired strength, pain, abnormal gait and abnormal posture  Prognosis: fair    GOALS:  Patient Goal: move better, less spticity,     Short Term Goals:  Time Frame: 4 weeks  1. Alexx Hale will perform 8+ sit-stands within a 30 sec time frame as his eccentric quad strength improves. Partially met -- now at 7 reps, cont goal   2. Rian's Tinetti score will improve to >18/28 indicating mild fall risk. MET  See long term goal   3. Alexx Hale will be able to walk 100+yds with his FWW without reporting fatigue and/or without legs giving out as his stamina improves. MET  See long term goal   Initiate HEP   MET      Long Term Goals:  Time Frame: 8 weeks  1. Alexx Hale will be discharged with independent HEP.  NOT MET  Cont goal   2. Rian's Tinetti score will improve to >20/28 indicating no fall risk. MET    No new goal   3. Alexx Hale will be able to perform 10+ sit to stands in 30 sec indicating no fall risk.    NOT MET  cotn goal   4   Increase B LE strength to 4+/5 NOT MET  Cont goal Patient Education:    [x]  HEP/Education Completed: sitting marching, LAQ, hip abduction, adductor squeeze, HS curls   Medbridge Access [id number]  Gold Vanessa   []  No new Education completed  [x]  Reviewed Prior HEP      [x]  Patient verbalized and/or demonstrated understanding of education provided. []  Patient unable to verbalize and/or demonstrate understanding of education provided. Will continue education. [x]  Barriers to learning: None     PLAN:  Treatment Recommendations: Strengthening, Range of Motion, Balance Training, Functional Mobility Training, Endurance Training, Gait Training, Home Exercise Program, Self-Care Education and Training and Positioning    []  Plan of care initiated. Plan to see patient 2 times per week for 8 weeks to address the treatment planned outlined above.   [x]  Continue with current plan of care 2 times for 4 weeks  Work on strength  []  Modify plan of care as follows:    []  Hold pending physician visit  []  Discharge    Time In 0830   Time Out 0910   Timed Code Minutes: 40  Min   Total Treatment Time: 40  min       Electronically Signed by: Juan Jose Chanel PTA

## 2021-12-01 ENCOUNTER — HOSPITAL ENCOUNTER (OUTPATIENT)
Dept: PHYSICAL THERAPY | Age: 46
Setting detail: THERAPIES SERIES
Discharge: HOME | End: 2021-12-01
Payer: COMMERCIAL

## 2021-12-01 PROCEDURE — 97110 THERAPEUTIC EXERCISES: CPT

## 2021-12-01 PROCEDURE — 97112 NEUROMUSCULAR REEDUCATION: CPT

## 2021-12-01 NOTE — PROGRESS NOTES
[address] [facility]  PHYSICAL THERAPY  [] EVALUATION  [x] DAILY NOTE (LAND) [] DAILY NOTE (AQUATIC ) [] PROGRESS NOTE [] DISCHARGE NOTE    [] [facility]   [] [address]    [] [address] [facility]   [x] Ariana Roof    Date: 2021  Patient Name:  Roberto Spencer  : 1975  MRN: [id number]    Referring Practitioner Hernán Castellano DO   Diagnosis Cramp and spasm [R25.2]    Treatment Diagnosis Gait dysfunction    Date of Evaluation 10/20/21    Additional Pertinent History Spinal cord compression, bladder issues      Functional Outcome Measure Used TInnetti    Functional Outcome Score (10/20/21)  (21)      Insurance: Primary: Payor: Parisa Dupree /  /  / ,   Secondary:    Authorization Information: 32 VISITS FROM 10/20/21 - 21 FOR CPT CODES 39114, 62268, 85211, 64805  30 per year,  9 already used this year. Visit # 6,  2/10 for progress note   Visits Allowed: ? Recertification Date:   8 weeks   Physician Follow-Up: unknown   Physician Orders: Gait and balance training   History of Present Illness: Back surgery which led to cord compression and spasticity. SUBJECTIVE: Pt stated he felt ok after last session. Pt stated mornings are bad with knee stiffness. Pt stated knees feel like, \" they are locking up. \" Pt has neurologist appt on Dec 16.        TREATMENT   Precautions:    Pain:6-7/10 B knees    X in shaded column indicates activity completed today   Modalities Parameters/  Location  Notes                     Manual Therapy Time/Technique  Notes                     Exercise/Intervention   Notes   NuStep x6 min  x Load 5, seat 9, arms 9          Standing:       Heel/toe raises, marches, mini squats  On air ex  x15  x UE support    3-way hip, HS curls  B peach  band on both legs  x15  x UE support   Side stepping and forward B t band  2 lap x 12 feet peach x    Rocker board fwd/lat x15 ea  x 10 sec stands in 30 sec indicating no fall risk. NOT MET  cotn goal   4   Increase B LE strength to 4+/5 NOT MET  Cont goal       Patient Education:   Rosa Ag []  HEP/Education Completed: sitting marching, LAQ, hip abduction, adductor squeeze, HS curls   Medbridge Access [email or web address]  Linda Maldonado   []  No new Education completed  [x]  Reviewed Prior HEP      [x]  Patient verbalized and/or demonstrated understanding of education provided. []  Patient unable to verbalize and/or demonstrate understanding of education provided. Will continue education. [x]  Barriers to learning: None     PLAN:  Treatment Recommendations: Strengthening, Range of Motion, Balance Training, Functional Mobility Training, Endurance Training, Gait Training, Home Exercise Program, Self-Care Education and Training and Positioning    []  Plan of care initiated. Plan to see patient 2 times per week for 8 weeks to address the treatment planned outlined above.   [x]  Continue with current plan of care 2 times for 4 weeks  Work on strength  []  Modify plan of care as follows:    []  Hold pending physician visit  []  Discharge    Time In 0830   Time Out 0915   Timed Code Minutes: 45  Min   Total Treatment Time: 45  min       Electronically Signed by: Feliciano Goldsmith PTA

## 2021-12-06 ENCOUNTER — HOSPITAL ENCOUNTER (OUTPATIENT)
Dept: PHYSICAL THERAPY | Age: 46
Setting detail: THERAPIES SERIES
Discharge: HOME | End: 2021-12-06
Payer: COMMERCIAL

## 2021-12-06 PROCEDURE — 97110 THERAPEUTIC EXERCISES: CPT

## 2021-12-06 NOTE — PROGRESS NOTES
[address] [facility]  PHYSICAL THERAPY  [] EVALUATION  [x] DAILY NOTE (LAND) [] DAILY NOTE (AQUATIC ) [] PROGRESS NOTE [] DISCHARGE NOTE    [] [facility]   [] Jesi [address]    [] [address] [facility]   [x] Carlotta Payne    Date: 2021  Patient Name:  Fleurette Apley  : 1975  MRN: [id number]    Referring Practitioner Honorio Jung DO   Diagnosis Cramp and spasm [R25.2]    Treatment Diagnosis Gait dysfunction    Date of Evaluation 10/20/21    Additional Pertinent History Spinal cord compression, bladder issues      Functional Outcome Measure Used TInnetti    Functional Outcome Score (10/20/21)  (21)      Insurance: Primary: Payor: Rahel Menchaca /  /  / ,   Secondary:    Authorization Information: 32 VISITS FROM 10/20/21 - 21 FOR CPT CODES 44949, 58895, 55695, 49529  30 per year,  9 already used this year. Visit # 12,  3/10 for progress note   Visits Allowed: ? Recertification Date:   8 weeks   Physician Follow-Up: unknown   Physician Orders: Gait and balance training   History of Present Illness: Back surgery which led to cord compression and spasticity. SUBJECTIVE: Pt stated he feels good with no new concerns. Knees feel good today. Pt stated he went to bed early last night and slept well.        TREATMENT   Precautions:    Pain: 3-4/10 B knees    X in shaded column indicates activity completed today   Modalities Parameters/  Location  Notes                     Manual Therapy Time/Technique  Notes                     Exercise/Intervention   Notes   NuStep x6 min  x Load 5, seat 9, arms 9          Standing:       Heel/toe raises, marches, mini squats  On air ex  x15  x UE support    3-way hip, HS curls  B peach  band on both legs  x15  x UE support   Side stepping and forward B t band  2 lap x 12 feet peach x    Rocker board fwd/lat x15 ea  x 15 sec balance with UE support   Stepping on/off of blue foam x15  x    Standing on blue foam; ft together 20sec 2x x Close SBA, cues for upper trunk stability            Seated:         LAQs  4#  x15  x Cues to relax through shoulders. Resisted HS curls T- band x15  x blue     Resisted hip abduction T-band x15  x blue     Resisted hip adduction Ball x15  x      Marches 4#   x15  x Cues to keep trunk muscles tight          Supine:         Quad sets   x10 5 sec       glute sets x10 5 sec       SLR x10        SAQs x10                              Specific Interventions Next Treatment:balance and gait exercise,  also LE strengthening. Activity/Treatment Tolerance:  [x]  Patient tolerated treatment well  []  Patient limited by fatigue  []  Patient limited by pain   []  Patient limited by medical complications  []  Other:     Assessment:  Continued with balance activities to reduce fall risk. Increased ankle weights to 4# which pt required cues for proper posture when performing ex. Body Structures/Functions/Activity Limitations: impaired activity tolerance, impaired balance, impaired motor control, impaired muscle tone, impaired ROM, impaired strength, pain, abnormal gait and abnormal posture  Prognosis: fair    GOALS:  Patient Goal: move better, less spticity,     Short Term Goals:  Time Frame: 4 weeks  1. Tana Daly will perform 8+ sit-stands within a 30 sec time frame as his eccentric quad strength improves. Partially met -- now at 7 reps, cont goal   2. Rian's Tinetti score will improve to >18/28 indicating mild fall risk. MET  See long term goal   3. Tana Daly will be able to walk 100+yds with his FWW without reporting fatigue and/or without legs giving out as his stamina improves. MET  See long term goal   Initiate HEP   MET      Long Term Goals:  Time Frame: 8 weeks  1. Tana Daly will be discharged with independent HEP.  NOT MET  Cont goal   2. Rian's Tinetti score will improve to >20/28 indicating no fall risk. MET    No new goal   3.  Tana Daly will be able to perform 10+ sit to stands in 30 sec indicating no fall risk. NOT MET  cotn goal   4   Increase B LE strength to 4+/5 NOT MET  Cont goal       Patient Education:   Warden Finney []  HEP/Education Completed: sitting marching, LAQ, hip abduction, adductor squeeze, HS curls   Medbridge Access [email or web address]  Adelita Marroquin   []  No new Education completed  [x]  Reviewed Prior HEP      [x]  Patient verbalized and/or demonstrated understanding of education provided. []  Patient unable to verbalize and/or demonstrate understanding of education provided. Will continue education. [x]  Barriers to learning: None     PLAN:  Treatment Recommendations: Strengthening, Range of Motion, Balance Training, Functional Mobility Training, Endurance Training, Gait Training, Home Exercise Program, Self-Care Education and Training and Positioning    []  Plan of care initiated. Plan to see patient 2 times per week for 8 weeks to address the treatment planned outlined above.   [x]  Continue with current plan of care 2 times for 4 weeks  Work on strength  []  Modify plan of care as follows:    []  Hold pending physician visit  []  Discharge    Time In 0825   Time Out 0910   Timed Code Minutes: 45  Min   Total Treatment Time: 45  min       Electronically Signed by: Miriam Layton PTA

## 2021-12-08 ENCOUNTER — HOSPITAL ENCOUNTER (OUTPATIENT)
Dept: PHYSICAL THERAPY | Age: 46
Setting detail: THERAPIES SERIES
Discharge: HOME | End: 2021-12-08
Payer: COMMERCIAL

## 2021-12-08 PROCEDURE — 97110 THERAPEUTIC EXERCISES: CPT

## 2021-12-08 NOTE — PROGRESS NOTES
[address] [facility]  PHYSICAL THERAPY  [] EVALUATION  [x] DAILY NOTE (LAND) [] DAILY NOTE (AQUATIC ) [] PROGRESS NOTE [] DISCHARGE NOTE    [] [facility]   [] [address]    [] [address] [place]   [x] Rosanne Citizen    Date: 2021  Patient Name:  Gregoria Grimaldo  : 1975  MRN: [id number]    Referring Practitioner Tye Babin, DO   Diagnosis Cramp and spasm [R25.2]    Treatment Diagnosis Gait dysfunction    Date of Evaluation 10/20/21    Additional Pertinent History Spinal cord compression, bladder issues      Functional Outcome Measure Used TInnetti    Functional Outcome Score (10/20/21)  (21)      Insurance: Primary: Payor: Claire Smith /  /  / ,   Secondary:    Authorization Information: 32 VISITS FROM 10/20/21 - 21 FOR CPT CODES 69052, 97038, 61854, 31482  30 per year,  9 already used this year. Visit # 13,  4/10 for progress note   Visits Allowed: ? Recertification Date:   8 weeks   Physician Follow-Up: unknown   Physician Orders: Gait and balance training   History of Present Illness: Back surgery which led to cord compression and spasticity. SUBJECTIVE: Pt stated both knees feel pretty good today. Pt stated he has a lot of running around today with family.       TREATMENT   Precautions:    Pain: denies B knees    X in shaded column indicates activity completed today   Modalities Parameters/  Location  Notes                     Manual Therapy Time/Technique  Notes                     Exercise/Intervention   Notes   NuStep x6 min  x Load 5, seat 9, arms 9          Standing:       Heel/toe raises, marches, mini squats  On air ex  x15  x UE support    3-way hip, HS curls  B peach  band on both legs  x15  x UE support   Side stepping and forward B t band  2 lap x 12 feet peach x    Rocker board fwd/lat x15 ea  x 15 sec balance with UE support   Stepping on/off of blue foam x15  x Standing on blue foam; ft together 20sec 2x x Close SBA, cues for upper trunk stability            Seated:         LAQs  4#  x15  x Cues to relax through shoulders. Resisted HS curls T- band x15  x blue     Resisted hip abduction T-band x15  x blue     Resisted hip adduction Ball x15  x      Marches 4#   x15  x Cues to keep trunk muscles tight          Supine:         Quad sets   x10 5 sec       glute sets x10 5 sec       SLR x10        SAQs x10                              Specific Interventions Next Treatment:balance and gait exercise,  also LE strengthening. Activity/Treatment Tolerance:  [x]  Patient tolerated treatment well  []  Patient limited by fatigue  []  Patient limited by pain   []  Patient limited by medical complications  []  Other:     Assessment: Pt continues to have tremors occasionally with balance exercises. Cues to keep WBing to decrease tremor. L LE demonstrated decreased control today. Body Structures/Functions/Activity Limitations: impaired activity tolerance, impaired balance, impaired motor control, impaired muscle tone, impaired ROM, impaired strength, pain, abnormal gait and abnormal posture  Prognosis: fair    GOALS:  Patient Goal: move better, less spticity,     Short Term Goals:  Time Frame: 4 weeks  1. Terri Jackson will perform 8+ sit-stands within a 30 sec time frame as his eccentric quad strength improves. Partially met -- now at 7 reps, cont goal   2. Rian's Tinetti score will improve to >18/28 indicating mild fall risk. MET  See long term goal   3. Terri Jackson will be able to walk 100+yds with his FWW without reporting fatigue and/or without legs giving out as his stamina improves. MET  See long term goal   Initiate HEP   MET      Long Term Goals:  Time Frame: 8 weeks  1. Terri Jackson will be discharged with independent HEP.  NOT MET  Cont goal   2. Rian's Tinetti score will improve to >20/28 indicating no fall risk. MET    No new goal   3.  Terri Jackson will be able to perform 10+ sit to stands in 30 sec indicating no fall risk. NOT MET  cotn goal   4   Increase B LE strength to 4+/5 NOT MET  Cont goal       Patient Education:    [x]  HEP/Education Completed: above listed exercises, WBing through joint for input to stop tremor. [address]   []  No new Education completed  [x]  Reviewed Prior HEP      [x]  Patient verbalized and/or demonstrated understanding of education provided. []  Patient unable to verbalize and/or demonstrate understanding of education provided. Will continue education. [x]  Barriers to learning: None     PLAN:  Treatment Recommendations: Strengthening, Range of Motion, Balance Training, Functional Mobility Training, Endurance Training, Gait Training, Home Exercise Program, Self-Care Education and Training and Positioning    []  Plan of care initiated. Plan to see patient 2 times per week for 8 weeks to address the treatment planned outlined above.   [x]  Continue with current plan of care 2 times for 4 weeks  Work on strength  []  Modify plan of care as follows:    []  Hold pending physician visit  []  Discharge    Time In 0825   Time Out 0910   Timed Code Minutes: 45  Min   Total Treatment Time: 45  min       Electronically Signed by: Keisha Conner PTA

## 2021-12-14 ENCOUNTER — HOSPITAL ENCOUNTER (OUTPATIENT)
Dept: PHYSICAL THERAPY | Age: 46
Setting detail: THERAPIES SERIES
Discharge: HOME | End: 2021-12-14
Payer: COMMERCIAL

## 2021-12-14 PROCEDURE — 97112 NEUROMUSCULAR REEDUCATION: CPT

## 2021-12-14 PROCEDURE — 97110 THERAPEUTIC EXERCISES: CPT

## 2021-12-14 PROCEDURE — 97530 THERAPEUTIC ACTIVITIES: CPT

## 2021-12-14 NOTE — PROGRESS NOTES
[address] [facility]  PHYSICAL THERAPY  [] EVALUATION  [x] DAILY NOTE (LAND) [] DAILY NOTE (AQUATIC ) [] PROGRESS NOTE [] DISCHARGE NOTE    [] [facility]   [] [address]    [] [address] [employer]   [x] Randjose Erps    Date: 2021  Patient Name:  Tori John  : 1975  MRN: [id number]    Referring Practitioner Lior Pro DO   Diagnosis Cramp and spasm [R25.2]    Treatment Diagnosis Gait dysfunction    Date of Evaluation 10/20/21    Additional Pertinent History Spinal cord compression, bladder issues      Functional Outcome Measure Used TInnetti    Functional Outcome Score (10/20/21)  (21)      Insurance: Primary: Payor: Katlin  /  /  / ,   Secondary:    Authorization Information: 32 VISITS FROM 10/20/21 - 21 FOR CPT CODES 47894, 99909, 47793, 47142  30 per year,  9 already used this year. Visit # 14,  5/10 for progress note   Visits Allowed: ? Recertification Date:   8 weeks   Physician Follow-Up: unknown   Physician Orders: Gait and balance training   History of Present Illness: Back surgery which led to cord compression and spasticity. SUBJECTIVE: Reports feeling good today with no pain.       TREATMENT   Precautions:    Pain: denies B knees    X in shaded column indicates activity completed today   Modalities Parameters/  Location  Notes                     Manual Therapy Time/Technique  Notes                     Exercise/Intervention   Notes   NuStep x6 min  x Load 5, seat 9, arms 9          Standing:       Heel/toe raises, marches, mini squats  On air ex  x15  x UE support    3-way hip, HS curls  B peach  band on both legs  x15  x UE support   Side stepping and forward B t band  2 lap x 12 feet peach x    Rocker board fwd/lat x15 ea  x 15 sec balance with UE support   Stepping on/off of blue foam x15  x    Standing on blue foam; ft together 20sec 2x x Close SBA, cues for upper trunk stability            Seated:         LAQs  4#  x15  x Cues to relax through shoulders. Resisted HS curls T- band x15  x blue     Resisted hip abduction T-band x15  x blue     Resisted hip adduction Ball x15  x      Marches 4#   x15  x Cues to keep trunk muscles tight          Supine:         Quad sets   x10 5 sec       glute sets x10 5 sec       SLR x10        SAQs x10                              Specific Interventions Next Treatment:balance and gait exercise,  also LE strengthening. Activity/Treatment Tolerance:  [x]  Patient tolerated treatment well  []  Patient limited by fatigue  []  Patient limited by pain   []  Patient limited by medical complications  []  Other:     Assessment: Pt continues to have tremors occasionally with balance exercises. Body Structures/Functions/Activity Limitations: impaired activity tolerance, impaired balance, impaired motor control, impaired muscle tone, impaired ROM, impaired strength, pain, abnormal gait and abnormal posture  Prognosis: fair    GOALS:  Patient Goal: move better, less spticity,     Short Term Goals:  Time Frame: 4 weeks  1. Ruiz Russell will perform 8+ sit-stands within a 30 sec time frame as his eccentric quad strength improves. Partially met -- now at 7 reps, cont goal   2. Rian's Tinetti score will improve to >18/28 indicating mild fall risk. MET  See long term goal   3. Ruiz Russell will be able to walk 100+yds with his FWW without reporting fatigue and/or without legs giving out as his stamina improves. MET  See long term goal   Initiate HEP   MET      Long Term Goals:  Time Frame: 8 weeks  1. Ruiz Russell will be discharged with independent HEP.  NOT MET  Cont goal   2. Rian's Tinetti score will improve to >20/28 indicating no fall risk. MET    No new goal   3. Ruiz Russell will be able to perform 10+ sit to stands in 30 sec indicating no fall risk.    NOT MET  cotn goal   4   Increase B LE strength to 4+/5 NOT MET  Cont goal       Patient Education:    [x] HEP/Education Completed: above listed exercises, WBing through joint for input to stop tremor. [address]   []  No new Education completed  [x]  Reviewed Prior HEP      [x]  Patient verbalized and/or demonstrated understanding of education provided. []  Patient unable to verbalize and/or demonstrate understanding of education provided. Will continue education. [x]  Barriers to learning: None     PLAN:  Treatment Recommendations: Strengthening, Range of Motion, Balance Training, Functional Mobility Training, Endurance Training, Gait Training, Home Exercise Program, Self-Care Education and Training and Positioning    []  Plan of care initiated. Plan to see patient 2 times per week for 8 weeks to address the treatment planned outlined above.   [x]  Continue with current plan of care 2 times for 4 weeks  Work on strength  []  Modify plan of care as follows:    []  Hold pending physician visit  []  Discharge    Time In [address] [place] [address]   Time Out 0905   Timed Code Minutes: 40 Min   Total Treatment Time: 40 min       Electronically Signed by: Althea Tavarez

## 2021-12-16 ENCOUNTER — HOSPITAL ENCOUNTER (OUTPATIENT)
Dept: PHYSICAL THERAPY | Age: 46
Setting detail: THERAPIES SERIES
Discharge: HOME | End: 2021-12-16
Payer: COMMERCIAL

## 2021-12-16 PROCEDURE — 97110 THERAPEUTIC EXERCISES: CPT

## 2021-12-16 PROCEDURE — 97530 THERAPEUTIC ACTIVITIES: CPT

## 2021-12-16 NOTE — PROGRESS NOTES
[address] [facility]  PHYSICAL THERAPY  [] EVALUATION  [x] DAILY NOTE (LAND) [] DAILY NOTE (AQUATIC ) [] PROGRESS NOTE [] DISCHARGE NOTE    [] 615 [facility]   [] [address]    [] [facility]   [x] Jonnie Biggs    Date: 2021  Patient Name:  Viri Modi  : 1975  MRN: [id number]    Referring Practitioner Federica Jones DO   Diagnosis Cramp and spasm [R25.2]    Treatment Diagnosis Gait dysfunction    Date of Evaluation 10/20/21    Additional Pertinent History Spinal cord compression, bladder issues      Functional Outcome Measure Used TInnetti    Functional Outcome Score (10/20/21)  (21)      Insurance: Primary: Payor: Hayder Ceja /  /  / ,   Secondary:    Authorization Information: 32 VISITS FROM 10/20/21 - 21 FOR CPT CODES 68887, 18476, 66512, 03987  30 per year,  9 already used this year. Visit # 15,  6/10 for progress note   Visits Allowed: ? Recertification Date:   8 weeks   Physician Follow-Up: unknown   Physician Orders: Gait and balance training   History of Present Illness: Back surgery which led to cord compression and spasticity. SUBJECTIVE: Reports feeling good today with no pain.       TREATMENT   Precautions:    Pain: denies B knees    X in shaded column indicates activity completed today   Modalities Parameters/  Location  Notes                     Manual Therapy Time/Technique  Notes                     Exercise/Intervention   Notes   NuStep x6 min  x Load 5, seat 9, arms 9          Standing:       Heel/toe raises, marches, mini squats  On air ex  x15  x UE support    3-way hip, HS curls  B peach  band on both legs  x15  x UE support   Side stepping and forward B t band  2 lap x 12 feet peach x    Rocker board fwd/lat x15 ea  x 15 sec balance with UE support   Stepping on/off of blue foam x15  x    Standing on blue foam; ft together 20sec 2x x Close SBA, cues for upper trunk stability            Seated:         LAQs  4#  x15  x Cues to relax through shoulders. Resisted HS curls T- band x15  x blue     Resisted hip abduction T-band x15  x blue     Resisted hip adduction Ball x15  x      Marches 4#   x15  x Cues to keep trunk muscles tight          Supine:         Quad sets   x10 5 sec       glute sets x10 5 sec       SLR x10        SAQs x10                              Specific Interventions Next Treatment:balance and gait exercise,  also LE strengthening. Activity/Treatment Tolerance:  [x]  Patient tolerated treatment well  []  Patient limited by fatigue  []  Patient limited by pain   []  Patient limited by medical complications  []  Other:     Assessment: Pt continues to have tremors occasionally with balance exercises. Body Structures/Functions/Activity Limitations: impaired activity tolerance, impaired balance, impaired motor control, impaired muscle tone, impaired ROM, impaired strength, pain, abnormal gait and abnormal posture  Prognosis: fair    GOALS:  Patient Goal: move better, less spticity,     Short Term Goals:  Time Frame: 4 weeks  1. Javier Hope will perform 8+ sit-stands within a 30 sec time frame as his eccentric quad strength improves. Partially met -- now at 7 reps, cont goal   2. Rian's Tinetti score will improve to >18/28 indicating mild fall risk. MET  See long term goal   3. Javier Hope will be able to walk 100+yds with his FWW without reporting fatigue and/or without legs giving out as his stamina improves. MET  See long term goal   Initiate HEP   MET      Long Term Goals:  Time Frame: 8 weeks  1. Javier Hope will be discharged with independent HEP.  NOT MET  Cont goal   2. Taylors Tinetti score will improve to >20/28 indicating no fall risk. MET    No new goal   3. Javier Hope will be able to perform 10+ sit to stands in 30 sec indicating no fall risk.    NOT MET  cotn goal   4   Increase B LE strength to 4+/5 NOT MET  Cont goal       Patient Education:    [x] HEP/Education Completed: above listed exercises, WBing through joint for input to stop tremor. [address]   []  No new Education completed  [x]  Reviewed Prior HEP      [x]  Patient verbalized and/or demonstrated understanding of education provided. []  Patient unable to verbalize and/or demonstrate understanding of education provided. Will continue education. [x]  Barriers to learning: None     PLAN:  Treatment Recommendations: Strengthening, Range of Motion, Balance Training, Functional Mobility Training, Endurance Training, Gait Training, Home Exercise Program, Self-Care Education and Training and Positioning    []  Plan of care initiated. Plan to see patient 2 times per week for 8 weeks to address the treatment planned outlined above.   [x]  Continue with current plan of care 2 times for 4 weeks  Work on strength  []  Modify plan of care as follows:    []  Hold pending physician visit  []  Discharge    Time In 0820   Time Out 0900   Timed Code Minutes: 40 Min   Total Treatment Time: 40 min       Electronically Signed by: Reva Osgood

## 2021-12-20 ENCOUNTER — HOSPITAL ENCOUNTER (OUTPATIENT)
Dept: PHYSICAL THERAPY | Age: 46
Setting detail: THERAPIES SERIES
Discharge: HOME | End: 2021-12-20
Payer: COMMERCIAL

## 2021-12-20 PROCEDURE — 97110 THERAPEUTIC EXERCISES: CPT

## 2021-12-20 NOTE — DISCHARGE SUMMARY
7[address] [facility]  PHYSICAL THERAPY  [] EVALUATION  [] DAILY NOTE (LAND) [] DAILY NOTE (AQUATIC ) [] PROGRESS NOTE [x] DISCHARGE NOTE    [] OUT[facility]IENT [facility]   [] Wyattjaylene Mohan    [] [address] [place]   [x] Maxim End    Date: 2021  Patient Name:  J Carlos Cuevas  : 1975  MRN: [id number]    Referring Practitioner Axel Plascencia DO   Diagnosis Cramp and spasm [R25.2]    Treatment Diagnosis Gait dysfunction    Date of Evaluation 10/20/21    Additional Pertinent History Spinal cord compression, bladder issues      Functional Outcome Measure Used TInnetti    Functional Outcome Score (10/20/21)  (21)  at [address]: Primary: Payor: Hammad Rodatre /  /  / ,   Secondary:    Authorization Information: 32 VISITS FROM 10/20/21 - 21 FOR CPT CODES 44037, 96781, 09289, 81870  30 per year,  9 already used this year. Visit # 16   7/10 for progress note   Visits Allowed: ? Recertification Date:   8 weeks   Physician Follow-Up: unknown   Physician Orders: Gait and balance training   History of Present Illness: Back surgery which led to cord compression and spasticity. SUBJECTIVE: saw new neurologist thurs. Dr Jena Bender in [facility]. She ordered blood work and will see him in 3 months.         TREATMENT   Precautions:    Pain: denies B knees    X in shaded column indicates activity completed today   Modalities Parameters/  Location  Notes                     Manual Therapy Time/Technique  Notes                     Exercise/Intervention   Notes   NuStep X 6 min  x Load 5, seat 9, arms 9          Standing:       Heel/toe raises, marches, mini squats  On air ex  x15  x UE support    3-way hip, HS curls  B peach  band on both legs  x15  x UE support   Side stepping and forward B t band  2 lap x 12 feet peach x    Rocker board fwd/lat x15 ea  x 15 sec balance with UE support   Stepping on/off of blue foam x15  x    Standing on blue foam; ft together 20sec 2x x Close SBA, cues for upper trunk stability            Seated:         LAQs  4#  x15  x Cues to relax through shoulders. Resisted HS curls T- band x15  x blue     Resisted hip abduction T-band x15  x blue     Resisted hip adduction Ball x15  x      Marches 4#   x15  x Cues to keep trunk muscles tight          Supine:         Quad sets   x10 5 sec       glute sets x10 5 sec       SLR x10        SAQs x10             30 sec sit to stand   9 reps   x            TINETTI BALANCE TEST    BALANCE Patient is seated in a hard, armless chair   1 SITTING BALANCE 1 - Steady, safe   2. RISES FROM CHAIR 2 - Able without use of arms   3. ATTEMPTS TO RISE 2 - Able to rise, 1 attempt   4. IMMEDIATE STANDING BALANCE (FIRST 5 SECONDS) 1 - Steady but uses walker or other support   5. STANDING BALANCE 2 - Narrow stance without support   6. NUDGED 2 - Steady   7. EYES CLOSED 0 - Unsteady   8. TURNING 360 DEGREES 1 - Continuous and 0 - Unsteady (grabs,staggers)   9. SITTING DOWN 1 - Uses arms or not a smooth motion   BALANCE SCORE 12/16       GAIT SECTION Patient stands with therapist, walks across room (+/- aids), fist at usual pace, then at rapid pace. 1.  INDICATION OF GAIT (Immediately after told to \"go\" 1 - No hesitancy   2. STEP LENGTH AND HEIGHT 0 - Step to and 1 - Step through Right   3. FOOT CLEARANCE 1 - Left foot clears floor and 1 - Right foot clears floor   4. STEP SYMMETRY 0 - Right and left step length not equal   5. STEP CONTINUITY 1 - Steps appear continuous   6. PATH 1 - Mild/moderate deviation or uses walking aid   7. TRUNK 1 - No sway but flexes knees or back or uses arms for stability   8.   WALKING TIME 1 - Heels almost touching while walking   GAIT SCORE 8/12   TOTAL SCORE 20 /28   Risk Indicators:  Less than/equal to 18 = high risk  19-23 Moderate risk  Greater than/equal to 24 = low risk          Specific Interventions Next Treatment:balance and gait exercise,  also LE strengthening. Activity/Treatment Tolerance:  [x]  Patient tolerated treatment well  []  Patient limited by fatigue  []  Patient limited by pain   []  Patient limited by medical complications  []  Other:    occasionally with balance exercises. Assessment: Pt continues to have tremors, mainly with balance activities. HE know his HEP and should cont to with current actvitiy level through is HEP    Body Structures/Functions/Activity Limitations: impaired activity tolerance, impaired balance, impaired motor control, impaired muscle tone, impaired ROM, impaired strength, pain, abnormal gait and abnormal posture  Prognosis: fair    GOALS:  Patient Goal: move better, less spticity,     Short Term Goals:  Time Frame: 4 weeks  1. Mitzy Horton will perform 8+ sit-stands within a 30 sec time frame as his eccentric quad strength improves. Met   2. Rian's Tinetti score will improve to >18/28 indicating mild fall risk. MET    3. Mitzy Horton will be able to walk 100+yds with his FWW without reporting fatigue and/or without legs giving out as his stamina improves. MET    Initiate HEP   MET      Long Term Goals:  Time Frame: 8 weeks  1. Mitzy Horton will be discharged with independent HEP. MET   2. Rian's Tinetti score will improve to >20/28 indicating no fall risk. MET    3. Mitzy Horton will be able to perform 10+ sit to stands in 30 sec indicating no fall risk. Partially met  9 reps. 4   Increase B LE strength to 4+/5 MET      Patient Education:    [x]  HEP/Education Completed: above listed exercises, WBing through joint for input to stop tremor. [address]   []  No new Education completed  [x]  Reviewed Prior HEP      [x]  Patient verbalized and/or demonstrated understanding of education provided. []  Patient unable to verbalize and/or demonstrate understanding of education provided. Will continue education.   [x]  Barriers to learning: None     PLAN:  Treatment Recommendations: Strengthening, Range of Motion, Balance Training, Functional Mobility Training, Endurance Training, Gait Training, Home Exercise Program, Self-Care Education and Training and Positioning    []  Plan of care initiated. Plan to see patient 2 times per week for 8 weeks to address the treatment planned outlined above.   []  Continue with current plan of care 2 times for 4 weeks  Work on strength  []  Modify plan of care as follows:    []  Hold pending physician visit  [x]  Discharge cont HEP     Time In 0915   Time Out 1000   Timed Code Minutes: 45 min   Total Treatment Time: 45 min        Electronically Signed by: Sunil Ray PT

## 2021-12-21 LAB
ALBUMIN SERPL-MCNC: 4.8 G/DL
ALP BLD-CCNC: 168 U/L
ALT SERPL-CCNC: 46 U/L
ANION GAP SERPL CALCULATED.3IONS-SCNC: NORMAL MMOL/L
AST SERPL-CCNC: 29 U/L
AVERAGE GLUCOSE: 137
BILIRUB SERPL-MCNC: 0.7 MG/DL (ref 0.1–1.4)
BUN BLDV-MCNC: 18 MG/DL
CALCIUM SERPL-MCNC: 9.6 MG/DL
CHLORIDE BLD-SCNC: 100 MMOL/L
CO2: 29 MMOL/L
CREAT SERPL-MCNC: 1 MG/DL
GFR CALCULATED: 89
GLUCOSE BLD-MCNC: 116 MG/DL
HBA1C MFR BLD: 6.4 %
POTASSIUM SERPL-SCNC: 3.9 MMOL/L
SODIUM BLD-SCNC: 140 MMOL/L
TOTAL PROTEIN: 7.5

## 2021-12-27 DIAGNOSIS — M47.14 MYELOPATHY OF THORACIC REGION: ICD-10-CM

## 2021-12-27 DIAGNOSIS — N31.9 NEUROGENIC BLADDER DISORDER: ICD-10-CM

## 2021-12-27 NOTE — TELEPHONE ENCOUNTER
Deshaun Abraham called requesting a refill on the following medications:  Requested Prescriptions     Pending Prescriptions Disp Refills    tamsulosin (FLOMAX) 0.4 MG capsule 30 capsule 5     Sig: Take 1 capsule by mouth daily     Pharmacy verified: [facility] in [facility]  .       Date of last visit: 9/29/2021  Date of next visit (if applicable): Visit date not found

## 2021-12-28 DIAGNOSIS — G47.33 OSA (OBSTRUCTIVE SLEEP APNEA): Primary | ICD-10-CM

## 2021-12-28 RX ORDER — TAMSULOSIN HYDROCHLORIDE 0.4 MG/1
0.4 CAPSULE ORAL DAILY
Qty: 30 CAPSULE | Refills: 5 | Status: SHIPPED | OUTPATIENT
Start: 2021-12-28 | End: 2022-07-06 | Stop reason: SDUPTHER

## 2022-01-06 ENCOUNTER — OFFICE VISIT (OUTPATIENT)
Dept: PHYSICAL MEDICINE AND REHAB | Age: 47
End: 2022-01-06
Payer: COMMERCIAL

## 2022-01-06 VITALS
DIASTOLIC BLOOD PRESSURE: 84 MMHG | HEIGHT: 70 IN | SYSTOLIC BLOOD PRESSURE: 112 MMHG | WEIGHT: 229 LBS | BODY MASS INDEX: 32.78 KG/M2

## 2022-01-06 DIAGNOSIS — M25.562 CHRONIC PAIN OF BOTH KNEES: ICD-10-CM

## 2022-01-06 DIAGNOSIS — M79.2 NEUROPATHIC PAIN: ICD-10-CM

## 2022-01-06 DIAGNOSIS — R25.2 SPASTICITY: Primary | ICD-10-CM

## 2022-01-06 DIAGNOSIS — G95.0 SYRINX OF SPINAL CORD (HCC): ICD-10-CM

## 2022-01-06 DIAGNOSIS — M25.561 CHRONIC PAIN OF BOTH KNEES: ICD-10-CM

## 2022-01-06 DIAGNOSIS — G89.29 OTHER CHRONIC PAIN: ICD-10-CM

## 2022-01-06 DIAGNOSIS — G89.29 CHRONIC PAIN OF BOTH KNEES: ICD-10-CM

## 2022-01-06 PROCEDURE — 99214 OFFICE O/P EST MOD 30 MIN: CPT | Performed by: ANESTHESIOLOGY

## 2022-01-06 PROCEDURE — G8484 FLU IMMUNIZE NO ADMIN: HCPCS | Performed by: ANESTHESIOLOGY

## 2022-01-06 PROCEDURE — G8417 CALC BMI ABV UP PARAM F/U: HCPCS | Performed by: ANESTHESIOLOGY

## 2022-01-06 PROCEDURE — G8427 DOCREV CUR MEDS BY ELIG CLIN: HCPCS | Performed by: ANESTHESIOLOGY

## 2022-01-06 PROCEDURE — 1036F TOBACCO NON-USER: CPT | Performed by: ANESTHESIOLOGY

## 2022-01-06 RX ORDER — LEVETIRACETAM 500 MG/1
500 TABLET ORAL 2 TIMES DAILY
Qty: 60 TABLET | Refills: 2 | Status: SHIPPED | OUTPATIENT
Start: 2022-01-06 | End: 2022-03-31 | Stop reason: SDUPTHER

## 2022-01-06 NOTE — PROGRESS NOTES
Chronic Pain/PM&R Clinic Note     Encounter Date: 1/6/22    Subjective:   Chief Complaint:   Chief Complaint   Patient presents with    Follow-up    Medication Refill     baclofen       History of Present Illness:   Kaylie Aponte is a 48 y.o. male seen in the clinic initially on 01/21/21 upon request from Milind Mason, [address] ([address] [place] [address]) for his history of lower limb spasticity. He underwent an arachnoid cyst resection with Dr. Sandra Mancia on May 14, 2020 after worsening bilateral leg pain. He notes about 2 months after his surgery his legs have been increasingly worse. He describes his pain to be in bilateral legs from the waist down that he describes as a constant 10/10 pain. He states the pain is a constant aching, burning pain. It is worse with any ambulation. He states is pain is improved with rest. He uses a walker for ambulation due to pain and weakness in his legs. He has been taking Baclofen 10 mg twice a day which does not help too much. He denies any saddle anesthesia or bowel/bladder incontinence. He deals with a lot of tone/spasticity in his legs. He reports stretching about 4 times a day. He was previously working at [employer] prior to his surgery. 10/11/2021, patient presents for planned follow-up for management of lower limb spasticity and chronic pain. Overall, he reports doing very well overall. He feels like the increased dose of Keppra has been very effective for his nerve type pain. He states his primary care doctor has taken over his baclofen management. He does continue to have issues with his balance and proprioception and is wondering if he can get back into physical therapy. He denies any new issues with focal leg weakness, new radicular leg pain, new anesthesias, or saddle anesthesia/bowel and bladder incontinence. He does endorse some worsening pressure and pain in both of his knees particular was on his feet for an extended duration of time.   He is wondering if he can get some imaging of his knees to potentially try something alcohol with the pressure and pain. Today, 1/6/2022, patient presents for planned follow-up for management of lower limb spasticity and chronic pain. He states that he has met with Dr. Brooklynn Fajardo (neurology) and had some studies done on him. He continues to obtain good relief with his Keppra. He is wondering if he is a candidate for Botox and is when these injections can be done for him. He denies any new symptoms this point continues to work with rehab. He states that he did complete 18 sessions of water therapy.     History of Interventions:   Surgery: arachnoid cyst resection - Dr. Tonya Brewer (5/14/20)  Injections: None    Current Treatment Medications:   Baclofen 20 mg 3 times daily  Keppra 500 mg twice daily    Historical Treatment Medications:   Lyricaleg swelling      Past Medical History:   Diagnosis Date    Asthma     does not know name of inhaler and hasnt used in over a yr    Ataxic gait     Developmental delay     Diet-controlled diabetes mellitus ([facility]     History of syrinx of spinal cord ([facility] [facility] 03/17/2020    Incomplete bladder emptying     Myelopathy of thoracic region     Osteopenia determined by x-ray     Spasm of muscle     Urinary dribbling     Urinary leakage     Uses walker     for standing and transfers    Uses wheelchair     can stand and transfer cannot walk due to leg weakness    Vitamin D deficiency 03/12/2020    Vitamin D 25OH 24       Past Surgical History:   Procedure Laterality Date    BACK SURGERY      CRANIOTOMY N/A 5/14/2020    Thoracic Spine and Laminectomy Extraction of Arachnoid Cyst/Band and 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 performed by Ruthie Aragon MD at [address] [place] [address]  09/16/2020    IR angiogram carotid cerebral bilateral       Family History   Problem Relation Age of Onset    Arthritis Mother     Diabetes Mother     Heart Disease Father     Cancer Neg Hx     High Blood Pressure Neg Hx     Stroke Neg Hx        Social History     Socioeconomic History    Marital status: Single     Spouse name: Not on file    Number of children: Not on file    Years of education: Not on file    Highest education level: Not on file   Occupational History    Not on file   Tobacco Use    Smoking status: Never Smoker    Smokeless tobacco: Never Used   Vaping Use    Vaping Use: Never used   Substance and Sexual Activity    Alcohol use: No    Drug use: No    Sexual activity: Not Currently   Other Topics Concern    Not on file   Social History Narrative    Not on file     Social Determinants of Health     Financial Resource Strain: Low Risk     Difficulty of Paying Living Expenses: Not hard at all   Food Insecurity: No Food Insecurity    Worried About [employer] in the Last Year: Never true    [employer] in the Last Year: Never true   Transportation Needs:     Lack of Transportation (Medical): Not on file    Lack of Transportation (Non-Medical):  Not on file   Physical Activity:     Days of Exercise per Week: Not on file    Minutes of Exercise per Session: Not on file   Stress:     Feeling of Stress : Not on file   Social Connections:     Frequency of Communication with Friends and Family: Not on file    Frequency of Social Gatherings with Friends and Family: Not on file    Attends [employer] Services: Not on file    Active Member of [address] [place] [address] or Organizations: Not on file    Attends Club or Organization Meetings: Not on file    Marital Status: Not on file   Intimate Partner Violence:     Fear of Current or Ex-Partner: Not on file    Emotionally Abused: Not on file    Physically Abused: Not on file    Sexually Abused: Not on file   Housing Stability:     Unable to Pay for Housing in the Last Year: Not on file    Number of [place] in the Last Year: Not on file    Unstable Housing in the Last Year: Not on file       Medications & Allergies:   Current Outpatient Medications   Medication Instructions    acetaminophen (TYLENOL) 500 mg, Oral, EVERY 6 HOURS PRN    atorvastatin (LIPITOR) 40 mg, Oral, DAILY    baclofen (LIORESAL) 20 mg, Oral, 3 TIMES DAILY    bumetanide (BUMEX) 1 mg, Oral, DAILY    Elastic Bandages & Supports (MEDICAL LEGWEAR/KNEE HIGH) MISC Please dispense 2 pair of XL knee compression hose, 15-20mmHg compression.     ferrous sulfate (FE TABS) 325 mg, Oral, 2 TIMES DAILY    Foot Care Products (SOCK AID) MISC 1 Device, Does not apply, DAILY    ibuprofen (ADVIL;MOTRIN) 600 mg, Oral, 3 TIMES DAILY PRN    Incontinence Supply Disposable (POISE HOURGLASS SHAPE) PADS 1 Device, Does not apply, 4 TIMES DAILY    levETIRAcetam (KEPPRA) 500 mg, Oral, 2 TIMES DAILY    lisinopril (PRINIVIL;ZESTRIL) 5 mg, Oral, DAILY    omeprazole (PRILOSEC) 40 mg, Oral, DAILY BEFORE BREAKFAST    oxybutynin (DITROPAN XL) 10 mg, Oral, DAILY    potassium chloride (KLOR-CON M) 20 MEQ extended release tablet 20 mEq, Oral, DAILY    tamsulosin (FLOMAX) 0.4 mg, Oral, DAILY    vitamin D-3 (CHOLECALCIFEROL) 5,000 Units, Oral, DAILY       Allergies   Allergen Reactions    Norco [Hydrocodone-Acetaminophen] Nausea And Vomiting       Review of Systems:   Constitutional: negative for weight changes or fevers  Genitourinary: negative for bowel/bladder incontinence   Musculoskeletal: positive for leg pain  Neurological:  Positive for spasticity and bilateral leg weakness  Behavioral/Psych: negative for anxiety/depression   All other systems reviewed and are negative    Objective:     Vitals:    01/06/22 1547   BP: 112/84       Constitutional: Pleasant, no acute distress   Head: Normocephalic, atraumatic   Eyes: Conjunctivae normal   Neck: Supple, symmetrical   Lungs: Normal respiratory effort, non-labored breathing   Cardiovascular: Limbs warm and well perfused   Abdomen: Non-protruded   Musculoskeletal: Increased tone/spasticity in lower limbs (improved)  · Lumbar Spine: Lumbar paraspinals tender bilaterally. SLR neg bilaterally. Neurological: Cranial nerves II-XII grossly intact. · Gait - Spastic gait. Ambulates with assist device. · Motor: 4/5 motor strength in bilateral HF, KE, KF, ADF, APF  · Sensory: LT sensation diminished in lower limbs diffusely  · Reflexes: hyper-reflexia in bilateral achilles and bilateral patellar reflexes, sustained ankle clonus b/l  Skin: No rashes or lesions visible in lower limbs  Psychological: Cooperative, no exaggerated pain behaviors       Assessment:    Diagnosis Orders   1. Spasticity     2. Chronic pain of both knees     3. Syrinx of spinal cord ([address]     4. Neuropathic pain     5. Other chronic pain         Anitha Murguia is a 48 y.o.male presenting to the pain clinic for evaluation of lower limb spasticity. At this point, we will increase his baclofen to 10 mg 3 times a day and likely will have to be up to 20 mg 3 times a day to help with the spasticity in the lower limbs. In addition, he is obtaining good benefit on baclofen 20 mg 3 times a day in regards to his overall spasticity/tone and ambulation. He had noted swelling in his legs from Lyrica and we stopped this medication. He is doing well with his Keppra for neuropathic pain coverage. We may potentially investigate the use of Botox for spasticity especially for his left leg. We will discuss this at his follow-up visit in 12 weeks. Plan: The following treatment recommendations and plan were discussed in detail with Anitha Murguia. Imaging:  With worsening bilateral knee pain, have ordered x-rays of the knees to further evaluate for arthritis. Adjuvants: In light of the presence of a neuropathic component of pain and side effects to Lyrica, I have continued his Keppra to 500 mg twice a day. For continued chronic pain with associated musculoskeletal component, we will continue his Baclofen to 20 mg three times a day. Multidisciplinary Pain Management:    In the presence of complex, chronic, and multi-factorial pain, the importance of a multidisciplinary approach to pain management in the patients management regimen was emphasized and discussed in great detail. PHYSICAL THERAPY: I advised the patient to continue gentle stretching atleast 4 times a day.      Referrals:  None    Prescriptions Written This Visit:   Keppra 500 mg    Follow-up: 12 weeks      Ellen Read DO  Interventional Pain Management/PM&R   [place]

## 2022-03-09 ENCOUNTER — TELEPHONE (OUTPATIENT)
Dept: PULMONOLOGY | Age: 47
End: 2022-03-09

## 2022-03-09 NOTE — TELEPHONE ENCOUNTER
Called to schedule titration 8 wk f/u but patient has no voicemail. I made a f/u appt and mailed it to the patient.

## 2022-03-10 ENCOUNTER — HOSPITAL ENCOUNTER (OUTPATIENT)
Dept: SLEEP CENTER | Age: 47
Discharge: HOME | End: 2022-03-12
Payer: COMMERCIAL

## 2022-03-10 DIAGNOSIS — G47.33 OSA (OBSTRUCTIVE SLEEP APNEA): ICD-10-CM

## 2022-03-10 PROCEDURE — 95811 POLYSOM 6/>YRS CPAP 4/> PARM: CPT

## 2022-03-11 LAB — STATUS: NORMAL

## 2022-03-11 NOTE — PROGRESS NOTES
Title:  CPAP/BiLevel Titration's    Approved by:  Samina Cespedes MD      Approval Date:  December, 2019 Next Review:  December, 2021     Responsible Party:  [facility] [employer] Applies to:   [employer] [employer] Number:  None    Document Type:  Such as Guideline, Policy, Policy & Procedure, or Procedure, Instructions  Manual:  Policy and Procedures    Section: IV Policy Start Date: October, 9706           POLICY: Positive airway pressure device is used to treat patients with sleep related breathing disorders characterized by full or partial occlusion of the upper airway during sleep. A patient must have undergone polysomnography and diagnosed with obstructive sleep apnea. All individuals who record sleep studies must follow best practices for CPAP/bilevel/ASV titrations in order to attain the ideal pressure setting for their patients. Too low of pressure may cause patients to either be sub-optimally treated or to wake up in a panic. Too much pressure may cause the patient to experience bloating or mask leakage. Determining the appropriate pressure setting for each patient will lead to improved adherence and outcome. Titrations are not an exact science, and it is understood that technologists may need to make minor changes for individual patients. The procedures outlined below are meant to be a guideline and follow the spirit of the AASM clinical guidelines. PROCEDURE:    CPAP:    1. Review the patients pertinent medical al history, previous sleep study or studies to ass the severity of sleep disordered breathing. Review of pertinent information will help to attain a better titration. 2. Applications of electrodes, montages, filters, sensitivities and scoring will be performed according to the current version of the AASM Scoring Manual.   3. Prior to initiating study collect all appropriate PAP supplies  a. Tubing   b.  Humidifier (filled with distilled water)  c. Masks   4. The technologist should assess and measure patient for most appropriate mask prior to start of study. 5. CPAP should be initiated at 5 cm H20.  a. More pressure at start of study may be necessary if patient is morbidly obese or unable to fall asleep on a pressure of 5 cm H20   6. If apneas or frequent hypopneas are present, pressure settings should be increased by 2 cm H20. If occasional hypopneas, snoring, or mask flow limitation are present, pressure settings should be increased by 1 cm H20 and maintained for at least fie minutes to determine if events improve or resolve. Pressure settings may need to be increased more quickly during REM sleep given the limited amount of REM during sleep and the need to treat events during this stage. 7. If a mask leak occurs, the tech should first fix the leakage before raising the pressure. Otherwise, the final pressure setting chosen for the patient may be too high. Once the mask leak has been fixed, decrease the pressure to the last setting where mouth breathing and/or mask leakage was not present, and then re-titrate as indicated. Make sure to document directly on the study the steps taken to resolve the leak and the type of masks used. Pressure setting usually do not need to be set as high with a nasal-mask than with a full-face mask. 8. The recording technologist should document directly on the study at least every 30 minutes. 9. If the patient takes a break from wearing the mask, do not decrease the CPAP pressure on attempted return to sleep unless the patient remains awake for 15 minutes or the patient specifically requests that the pressure be lowered. 10. Do not raise pressure for central apneas. If the patient develops central apneas, pressure setting may need to be lowered. 11. If the patient is unable to tolerate CPAP secondary due to:  a. Persistent mouth breathing despite use of a full-face mask/chin strap  b.  Inability to exhale against higher expiratory pressures (typically beginning anywhere from 15 to 20 cm of H20.  c. Has frequent central apneas, the use of bilevel positive airway pressure may be indicated. Document directly on study why the patient is being switched from CPAP to bilevel. 12. Ensure that supine sleep has been seen on the chosen setting. Going above the chosen setting 1 or 2 cm H20 to show range may be helpful to ensure that the correct pressure has been established. BiLEVEL:    1. Technologist may change from CPAP to bilevel during a study if proven the patient is unable to tolerate CPAP. 2. Review the patients pertinent medical al history, previous sleep study or studies to ass the severity of sleep disordered breathing. Review of pertinent information will help to attain a better titration. 3. Applications of electrodes, montages, filters, sensitivities and scoring will be performed according to the current version of the AASM Scoring Manual.   4. Prior to initiating study collect all appropriate PAP supplies  a. Tubing   b. Humidifier (filled with distilled water)  c. Masks   5. The technologist should assess and measure patient for most appropriate mask prior to start of study. 6. If the patient has not previously been on CPAP, beginning pressures should be 8/4 cm H20 or higher if patient is morbidly obese or unable to fall asleep at lower pressures. If the patient has been previously successfully treated on CPAP start expiratory pressure at therapeutic setting and set inspiratory pressure 4 cm H20 higher. If advancing from CPAP to bilevel during a study expiratory pressure should be set at most successful CPAP setting and inspiratory pressure set 4 cm h20 higher. 7. The standard differential pressure utilized during bilevel titrations typically ranges from 3 to 5 cm H20, with 4 cm H20 being the most common.   Higher differential pressures may be needed in patients who are morbidly obese or who have neuromuscular diseases. 8. If apneas or frequent hypopneas are present, inspiratory and expiratory pressure settings should be increased by 2 cm H20. If occasional hypopneas, snoring, or mask flow limitation are present inspiratory and expiratory pressures settings should be increased by 1 cm h20 and maintained for at least 5 minutes to determine if events improve or resolve. Pressure settings may need to be increased more quickly during REM sleep given the limited amount of REM during sleep and the need to treat events during this stage. 9. If a mask leak occurs, the tech should first fix the leakage before raising the pressure. Otherwise, the final pressure setting chosen for the patient may be too high. Once the mask leak has been fixed, decrease the pressure to the last setting where mouth breathing and/or mask leakage was not present, and then re-titrate as indicated. Make sure to document directly on the study the steps taken to resolve the leak and the type of masks used. Pressure setting usually do not need to be set as high with a nasal-mask than with a full-face mask. 10. The recording technologist should document directly on the study at least every 30 minutes. 11. If the patient takes a break from wearing the mask, do not decrease the CPAP pressure on attempted return to sleep unless the patient remains awake for 15 minutes or the patient specifically requests that the pressure be lowered. 12. Do not raise pressure for central apneas. If the patient develops central apneas, pressure setting may need to be lowered. If the patient has central apneas on bilevel, the use of spontaneous (ST) mode may be indicated. a. ST mode may only be used in 2 cases  i. An order for ST mode with a primary diagnosis of central sleep apnea  ii. During a titration if obstructive events are less than 5/ hour and centrals must be greater than 50% of total respiratory events.    13. Ensure that supine sleep has been seen on the chosen setting. Going above the chosen setting 1 or 2 cm H20 to show range may be helpful to ensure that the correct pressure has been established.

## 2022-03-13 DIAGNOSIS — G47.33 OSA (OBSTRUCTIVE SLEEP APNEA): Primary | ICD-10-CM

## 2022-03-13 NOTE — PROGRESS NOTES
[address] [place] [address]                               SLEEP STUDY REPORT    PATIENT NAME: Corin Levin                    :        1975  MED REC NO:   [id number]                           ROOM:  ACCOUNT NO:   [de-identified]                           ADMIT DATE: 03/10/2022  PROVIDER:     Cristiano Spain M.D.    [employer]:  03/10/2022    CPAP TITRATION POLYSOMNOGRAM    REFERRING PROVIDER:  Chase Lam MD    HISTORY OF PRESENT ILLNESS:  The patient is a 47-year-old gentleman,  recently diagnosed with obstructive sleep apnea, brought back to the  sleep lab for PAP titration polysomnogram.    Weight 246 pounds, height 70 inches, BMI 35.3. METHODS:  The patient underwent digital polysomnography in compliance  with the standards and specifications from the AASM Manual including the  simultaneous recording of 3 EEG channels (F4-M1, C4-M1, and O2-M1 with  back up electrodes F3-M2, C3-M2, and O1-M2), 2 EOG channels (E1-M2, and  E2-M1,), EMG (chin, left & right leg), EKG, Nonin pulse oximetry with   less than 2 second averaging time, body position, airflow recorded by  oral-nasal thermal sensor and nasal air pressure transducer, plus  respiratory effort recorded by calibrated respiratory inductance  plethysmography (RIP), flow volume loop, sound and video. Sleep staging  and scoring followed the standard put forth by the American Academy of  Sleep Medicine and utilized the 1B obstructive hypopnea event  desaturation of 4 percent or greater. DETAILS OF THE STUDY:  Lights out 09:49 p.m., lights on 05:01 a.m. Total recording time 432 minutes, sleep period time 430 minutes, total  sleep time 372 minutes, sleep efficiency 86%. Latency to sleep 1.9  minutes. Wake after sleep onset 57 minutes. Latency to   minutes.     SLEEP STAGING:  The patient slept 78 minutes or 20.9% of total sleep  time in N1 sleep, 231 minutes or 62% in N2 sleep, 36 minutes or 9.7% in  N3 sleep, 27.5 minutes or 7.4% in REM sleep. BODY POSITION SUMMARY:  372 minutes of supine sleep. PERIODIC LIMB MOVEMENT SUMMARY:  There were 553 events for a PLM index  of 89.  17 of these events caused sleep disruption with a PLM arousal  index of 2.7. ECG SUMMARY:  Normal sinus rhythm. PAP TITRATION SUMMARY:  CPAP was initiated at 5 cm of water pressure and  increased to maximal pressure of 13. Initiating with a CPAP pressure of  9 all the way to 13, the obstructive events were controlled; however, a  little REM sleep was recorded. ASSESSMENT:  1.  Obstructive sleep apnea. 2.  None of the pressures trialed during this titration are appropriate  to prescribe due to the lack of sufficient REM sleep recorded. RECOMMENDATIONS:  To initiate APAP with a pressure range of 9 to 16 cm  of water. Chosen interface was an F30 medium size full-face mask. Follow up in the sleep clinic eight weeks after starting PAP therapies  to review download.         Jaja Pierre M.D.    D: 03/12/2022 22:24:54       T: 03/13/2022 17:49:22     [email or web address]  Job#: [id number]     Doc#: [id number]    CC:

## 2022-03-31 ENCOUNTER — OFFICE VISIT (OUTPATIENT)
Dept: PHYSICAL MEDICINE AND REHAB | Age: 47
End: 2022-03-31
Payer: COMMERCIAL

## 2022-03-31 VITALS
SYSTOLIC BLOOD PRESSURE: 144 MMHG | BODY MASS INDEX: 32.78 KG/M2 | DIASTOLIC BLOOD PRESSURE: 86 MMHG | WEIGHT: 229 LBS | HEIGHT: 70 IN

## 2022-03-31 DIAGNOSIS — N31.9 NEUROGENIC BLADDER DISORDER: ICD-10-CM

## 2022-03-31 DIAGNOSIS — R29.898 LEG WEAKNESS, BILATERAL: ICD-10-CM

## 2022-03-31 DIAGNOSIS — G95.0 SYRINX OF SPINAL CORD (HCC): ICD-10-CM

## 2022-03-31 DIAGNOSIS — M47.14 MYELOPATHY OF THORACIC REGION: ICD-10-CM

## 2022-03-31 DIAGNOSIS — R25.2 SPASTICITY: Primary | ICD-10-CM

## 2022-03-31 PROCEDURE — G8484 FLU IMMUNIZE NO ADMIN: HCPCS | Performed by: ANESTHESIOLOGY

## 2022-03-31 PROCEDURE — G8427 DOCREV CUR MEDS BY ELIG CLIN: HCPCS | Performed by: ANESTHESIOLOGY

## 2022-03-31 PROCEDURE — 99214 OFFICE O/P EST MOD 30 MIN: CPT | Performed by: ANESTHESIOLOGY

## 2022-03-31 PROCEDURE — G8417 CALC BMI ABV UP PARAM F/U: HCPCS | Performed by: ANESTHESIOLOGY

## 2022-03-31 PROCEDURE — 1036F TOBACCO NON-USER: CPT | Performed by: ANESTHESIOLOGY

## 2022-03-31 RX ORDER — BACLOFEN 20 MG/1
20 TABLET ORAL 3 TIMES DAILY
Qty: 90 TABLET | Refills: 0 | Status: SHIPPED | OUTPATIENT
Start: 2022-03-31

## 2022-03-31 RX ORDER — LEVETIRACETAM 500 MG/1
500 TABLET ORAL 2 TIMES DAILY
Qty: 60 TABLET | Refills: 2 | Status: SHIPPED | OUTPATIENT
Start: 2022-03-31 | End: 2022-09-30 | Stop reason: SDUPTHER

## 2022-03-31 NOTE — PROGRESS NOTES
Chronic Pain/PM&R Clinic Note     Encounter Date: 3/31/22    Subjective:   Chief Complaint:   Chief Complaint   Patient presents with    Follow-up     discuss botox        History of Present Illness:   Dunia Ramirez is a 48 y.o. male seen in the clinic initially on 01/21/21 upon request from [place], [place] ([address] [place] [address]) for his history of lower limb spasticity. He underwent an arachnoid cyst resection with Dr. Leward Severe on May 14, 2020 after worsening bilateral leg pain. He notes about 2 months after his surgery his legs have been increasingly worse. He describes his pain to be in bilateral legs from the waist down that he describes as a constant 10/10 pain. He states the pain is a constant aching, burning pain. It is worse with any ambulation. He states is pain is improved with rest. He uses a walker for ambulation due to pain and weakness in his legs. He has been taking Baclofen 10 mg twice a day which does not help too much. He denies any saddle anesthesia or bowel/bladder incontinence. He deals with a lot of tone/spasticity in his legs. He reports stretching about 4 times a day. He was previously working at [employer] prior to his surgery. Today, 3/31/2022, patient presents for planned follow-up for management lower limb spasticity and chronic pain. Overall, reports doing relatively okay since last visit. He would like to look into Botox for his left lower limb as he feels like he continues to have difficulty ambulating on this leg and knee pain which he feels like is related to his tone/spasticity. He continues take his Keppra with good relief for his nerve pain and he feels like this does help him sleep. He denies any other new symptoms this point.     History of Interventions:   Surgery: arachnoid cyst resection - Dr. Raquel Obando (5/14/20)  Injections: None    Current Treatment Medications:   Baclofen 20 mg 3 times daily  Keppra 500 mg twice daily    Historical Treatment Medications:   Lyrica-leg swelling      Past Medical History:   Diagnosis Date    Asthma     does not know name of inhaler and hasnt used in over a yr    Ataxic gait     Developmental delay     Diet-controlled diabetes mellitus ([address]     History of syrinx of spinal cord ([address] 03/17/2020    Incomplete bladder emptying     Myelopathy of thoracic region     Osteopenia determined by x-ray     Spasm of muscle     Urinary dribbling     Urinary leakage     Uses walker     for standing and transfers    Uses wheelchair     can stand and transfer cannot walk due to leg weakness    Vitamin D deficiency 03/12/2020    Vitamin D 25OH 24       Past Surgical History:   Procedure Laterality Date    BACK SURGERY      CRANIOTOMY N/A 5/14/2020    Thoracic Spine and Laminectomy Extraction of Arachnoid Cyst/Band and 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 performed by Andrei Tovar MD at [address] [place] [address]  09/16/2020    IR angiogram carotid cerebral bilateral       Family History   Problem Relation Age of Onset    Arthritis Mother     Diabetes Mother     Heart Disease Father     Cancer Neg Hx     High Blood Pressure Neg Hx     Stroke Neg Hx        Social History     Socioeconomic History    Marital status: Single     Spouse name: Not on file    Number of children: Not on file    Years of education: Not on file    Highest education level: Not on file   Occupational History    Not on file   Tobacco Use    Smoking status: Never Smoker    Smokeless tobacco: Never Used   Vaping Use    Vaping Use: Never used   Substance and Sexual Activity    Alcohol use: No    Drug use: No    Sexual activity: Not Currently   Other Topics Concern    Not on file   Social History Narrative    Not on file     Social Determinants of Health     Financial Resource Strain: Low Risk     Difficulty of Paying Living Expenses: Not hard at all   Food Insecurity: No Food Insecurity    Worried About [facility] in the Last Year: Never true    Ran Out of Food in the Last Year: Never true   Transportation Needs:     Lack of Transportation (Medical): Not on file    Lack of Transportation (Non-Medical): Not on file   Physical Activity:     Days of Exercise per Week: Not on file    Minutes of Exercise per Session: Not on file   Stress:     Feeling of Stress : Not on file   Social Connections:     Frequency of Communication with Friends and Family: Not on file    Frequency of Social Gatherings with Friends and Family: Not on file    Attends [employer] Services: Not on file    Active Member of [address] [place] [address] or Organizations: Not on file    Attends Club or Organization Meetings: Not on file    Marital Status: Not on file   Intimate Partner Violence:     Fear of Current or Ex-Partner: Not on file    Emotionally Abused: Not on file    Physically Abused: Not on file    Sexually Abused: Not on file   Housing Stability:     Unable to Pay for Housing in the Last Year: Not on file    Number of [place] in the Last Year: Not on file    Unstable Housing in the Last Year: Not on file       Medications & Allergies:   Current Outpatient Medications   Medication Instructions    acetaminophen (TYLENOL) 500 mg, Oral, EVERY 6 HOURS PRN    atorvastatin (LIPITOR) 40 mg, Oral, DAILY    baclofen (LIORESAL) 20 mg, Oral, 3 TIMES DAILY    bumetanide (BUMEX) 1 mg, Oral, DAILY    Elastic Bandages & Supports (MEDICAL LEGWEAR/KNEE HIGH) MISC Please dispense 2 pair of XL knee compression hose, 15-20mmHg compression.     ferrous sulfate (FE TABS) 325 mg, Oral, 2 TIMES DAILY    Foot Care Products (SOCK AID) MISC 1 Device, Does not apply, DAILY    ibuprofen (ADVIL;MOTRIN) 600 mg, Oral, 3 TIMES DAILY PRN    Incontinence Supply Disposable (POISE HOURGLASS SHAPE) PADS 1 Device, Does not apply, 4 TIMES DAILY    levETIRAcetam (KEPPRA) 500 mg, Oral, 2 TIMES DAILY    lisinopril (PRINIVIL;ZESTRIL) 5 mg, Oral, DAILY    omeprazole (PRILOSEC) 40 mg, Oral, DAILY BEFORE BREAKFAST    oxybutynin (DITROPAN XL) 10 mg, Oral, DAILY    potassium chloride (KLOR-CON M) 20 MEQ extended release tablet 20 mEq, Oral, DAILY    tamsulosin (FLOMAX) 0.4 mg, Oral, DAILY    vitamin D-3 (CHOLECALCIFEROL) 5,000 Units, Oral, DAILY       Allergies   Allergen Reactions    Norco [Hydrocodone-Acetaminophen] Nausea And Vomiting       Review of Systems:   Constitutional: negative for weight changes or fevers  Genitourinary: negative for bowel/bladder incontinence   Musculoskeletal: positive for leg pain  Neurological:  Positive for spasticity and bilateral leg weakness  Behavioral/Psych: negative for anxiety/depression   All other systems reviewed and are negative    Objective:     Vitals:    03/31/22 1506   BP: (!) 144/86       Constitutional: Pleasant, no acute distress   Head: Normocephalic, atraumatic   Eyes: Conjunctivae normal   Neck: Supple, symmetrical   Lungs: Normal respiratory effort, non-labored breathing   Cardiovascular: Limbs warm and well perfused   Abdomen: Non-protruded   Musculoskeletal: Increased tone/spasticity in lower limbs (improved)  · Lumbar Spine: Lumbar paraspinals tender bilaterally. SLR neg bilaterally. Neurological: Cranial nerves II-XII grossly intact. · Gait - Spastic gait. Ambulates with assist device. · Motor: 4/5 motor strength in bilateral HF, KE, KF, ADF, APF  · Sensory: LT sensation diminished in lower limbs diffusely  · Reflexes: hyper-reflexia in bilateral achilles and bilateral patellar reflexes, sustained ankle clonus b/l  Skin: No rashes or lesions visible in lower limbs  Psychological: Cooperative, no exaggerated pain behaviors       Assessment:    Diagnosis Orders   1. Spasticity     2. Leg weakness, bilateral     3. Syrinx of spinal cord ([address]     4. Myelopathy of thoracic region     5. Neurogenic bladder disorder         Kaylee Albright is a 48 y.o.male presenting to the pain clinic for evaluation of lower limb spasticity.   At this point, we will increase his baclofen to 10 mg 3 times a day and likely will have to be up to 20 mg 3 times a day to help with the spasticity in the lower limbs. In addition, he is obtaining good benefit on baclofen 20 mg 3 times a day in regards to his overall spasticity/tone and ambulation. He had noted swelling in his legs from Lyrica and we stopped this medication. He is doing well with his Keppra for neuropathic pain coverage. I have submitted for Botox injections for his left lower limb spasticity. Plan: The following treatment recommendations and plan were discussed in detail with Mariama Swan. Imaging:  With worsening bilateral knee pain, have ordered x-rays of the knees to further evaluate for arthritis. Interventions: For pain secondary to spasticity and failure to control spasticity with oral medications, I have set the patient up for Botox injections. We will target his left semitendinosis, semimembranosus, biceps femoris for a total of 200 units. Adjuvants: In light of the presence of a neuropathic component of pain and side effects to Lyrica, I have continued his Keppra to 500 mg twice a day. For continued chronic pain with associated musculoskeletal component, we will continue his Baclofen to 20 mg three times a day. Multidisciplinary Pain Management:   In the presence of complex, chronic, and multi-factorial pain, the importance of a multidisciplinary approach to pain management in the patients management regimen was emphasized and discussed in great detail. PHYSICAL THERAPY: I advised the patient to continue gentle stretching atleast 4 times a day.      Referrals:  None    Prescriptions Written This Visit:   Keppra 500 mg    Follow-up: 12 weeks      Ellen Read DO  Interventional Pain Management/PM&R   [place]

## 2022-05-18 ENCOUNTER — HOSPITAL ENCOUNTER (OUTPATIENT)
Dept: MRI IMAGING | Age: 47
Discharge: HOME | End: 2022-05-18
Payer: COMMERCIAL

## 2022-05-18 DIAGNOSIS — G95.0 SYRINX OF SPINAL CORD (HCC): ICD-10-CM

## 2022-05-18 LAB — POC CREATININE WHOLE BLOOD: 0.9 MG/DL (ref 0.5–1.2)

## 2022-05-18 PROCEDURE — 6360000004 HC RX CONTRAST MEDICATION: Performed by: PHYSICIAN ASSISTANT

## 2022-05-18 PROCEDURE — 72158 MRI LUMBAR SPINE W/O & W/DYE: CPT

## 2022-05-18 PROCEDURE — 82565 ASSAY OF CREATININE: CPT

## 2022-05-18 PROCEDURE — A9579 GAD-BASE MR CONTRAST NOS,1ML: HCPCS | Performed by: PHYSICIAN ASSISTANT

## 2022-05-18 PROCEDURE — 72157 MRI CHEST SPINE W/O & W/DYE: CPT

## 2022-05-18 RX ADMIN — GADOTERIDOL 20 ML: 279.3 INJECTION, SOLUTION INTRAVENOUS at 11:29

## 2022-05-23 ENCOUNTER — OFFICE VISIT (OUTPATIENT)
Dept: FAMILY MEDICINE CLINIC | Age: 47
End: 2022-05-23
Payer: COMMERCIAL

## 2022-05-23 VITALS
WEIGHT: 218.6 LBS | DIASTOLIC BLOOD PRESSURE: 88 MMHG | TEMPERATURE: 97.5 F | BODY MASS INDEX: 33.13 KG/M2 | SYSTOLIC BLOOD PRESSURE: 128 MMHG | OXYGEN SATURATION: 99 % | HEIGHT: 68 IN | HEART RATE: 90 BPM

## 2022-05-23 DIAGNOSIS — K20.90 ESOPHAGITIS: ICD-10-CM

## 2022-05-23 DIAGNOSIS — R29.898 WEAKNESS OF LEFT LOWER EXTREMITY: ICD-10-CM

## 2022-05-23 DIAGNOSIS — J01.10 ACUTE NON-RECURRENT FRONTAL SINUSITIS: ICD-10-CM

## 2022-05-23 DIAGNOSIS — J45.20 MILD INTERMITTENT ASTHMA WITHOUT COMPLICATION: Primary | ICD-10-CM

## 2022-05-23 DIAGNOSIS — E11.00 TYPE 2 DIABETES MELLITUS WITH HYPEROSMOLARITY WITHOUT COMA, WITHOUT LONG-TERM CURRENT USE OF INSULIN (HCC): ICD-10-CM

## 2022-05-23 DIAGNOSIS — E11.9 CONTROLLED TYPE 2 DIABETES MELLITUS WITHOUT COMPLICATION, WITHOUT LONG-TERM CURRENT USE OF INSULIN (HCC): ICD-10-CM

## 2022-05-23 DIAGNOSIS — I10 BENIGN ESSENTIAL HTN: ICD-10-CM

## 2022-05-23 DIAGNOSIS — R73.9 HYPERGLYCEMIA: ICD-10-CM

## 2022-05-23 PROCEDURE — 1036F TOBACCO NON-USER: CPT | Performed by: NURSE PRACTITIONER

## 2022-05-23 PROCEDURE — 3046F HEMOGLOBIN A1C LEVEL >9.0%: CPT | Performed by: NURSE PRACTITIONER

## 2022-05-23 PROCEDURE — G8417 CALC BMI ABV UP PARAM F/U: HCPCS | Performed by: NURSE PRACTITIONER

## 2022-05-23 PROCEDURE — 2022F DILAT RTA XM EVC RTNOPTHY: CPT | Performed by: NURSE PRACTITIONER

## 2022-05-23 PROCEDURE — G8427 DOCREV CUR MEDS BY ELIG CLIN: HCPCS | Performed by: NURSE PRACTITIONER

## 2022-05-23 PROCEDURE — 99214 OFFICE O/P EST MOD 30 MIN: CPT | Performed by: NURSE PRACTITIONER

## 2022-05-23 RX ORDER — OMEPRAZOLE 40 MG/1
40 CAPSULE, DELAYED RELEASE ORAL
Qty: 90 CAPSULE | Refills: 1 | Status: SHIPPED | OUTPATIENT
Start: 2022-05-23 | End: 2022-05-23 | Stop reason: ALTCHOICE

## 2022-05-23 RX ORDER — CETIRIZINE HYDROCHLORIDE 10 MG/1
10 TABLET ORAL DAILY
Qty: 90 TABLET | Refills: 1 | Status: SHIPPED | OUTPATIENT
Start: 2022-05-23

## 2022-05-23 RX ORDER — LISINOPRIL 5 MG/1
5 TABLET ORAL DAILY
Qty: 90 TABLET | Refills: 1 | Status: SHIPPED | OUTPATIENT
Start: 2022-05-23

## 2022-05-23 RX ORDER — FAMOTIDINE 40 MG/1
40 TABLET, FILM COATED ORAL NIGHTLY
Qty: 90 TABLET | Refills: 1 | Status: SHIPPED | OUTPATIENT
Start: 2022-05-23

## 2022-05-23 RX ORDER — LANSOPRAZOLE 30 MG/1
30 CAPSULE, DELAYED RELEASE ORAL DAILY
Qty: 30 CAPSULE | Refills: 0 | Status: SHIPPED | OUTPATIENT
Start: 2022-05-23

## 2022-05-23 RX ORDER — DOXYCYCLINE HYCLATE 100 MG
100 TABLET ORAL 2 TIMES DAILY
Qty: 20 TABLET | Refills: 0 | Status: SHIPPED | OUTPATIENT
Start: 2022-05-23 | End: 2022-06-02

## 2022-05-23 RX ORDER — METHYLPREDNISOLONE 4 MG/1
TABLET ORAL
Qty: 1 KIT | Refills: 0 | Status: SHIPPED | OUTPATIENT
Start: 2022-05-23 | End: 2022-05-29

## 2022-05-23 RX ORDER — ATORVASTATIN CALCIUM 40 MG/1
40 TABLET, FILM COATED ORAL DAILY
Qty: 90 TABLET | Refills: 1 | Status: SHIPPED | OUTPATIENT
Start: 2022-05-23

## 2022-05-23 RX ORDER — THIAMINE HCL 100 MG
2500 TABLET ORAL DAILY
COMMUNITY

## 2022-05-23 ASSESSMENT — ENCOUNTER SYMPTOMS
SINUS PRESSURE: 1
EYE ITCHING: 0
ALLERGIC/IMMUNOLOGIC NEGATIVE: 1
NAUSEA: 1
SHORTNESS OF BREATH: 0
DIARRHEA: 0
EYE PAIN: 0
EYE REDNESS: 0
CHEST TIGHTNESS: 0
WHEEZING: 0
VOMITING: 1
CONSTIPATION: 0
SORE THROAT: 1
COUGH: 0
BACK PAIN: 1

## 2022-05-23 NOTE — PATIENT INSTRUCTIONS
Patient Education        Controlling Your Asthma: Care Instructions  Your Care Instructions     Asthma is a long-term condition that affects your breathing. It causes the airways that lead to the lungs to swell. During an asthma attack, the airways swell and narrow. This makes it hard to breathe. You may wheeze or cough. If you have a bad attack, you may need emergency care. There are two things to do to treat asthma. · Control asthma over the long term. · Treat attacks when they occur. You and your doctor can make an asthma action plan. It tells you what medicines you need to take every day to control asthma symptoms and what to do if you have an asthma attack. Your asthma action plan can help prevent and treat attacks. When you keep your asthma under control, you can prevent severe attacks and lasting damage to your airways. You need to treat your asthma even when you are not having symptoms. Although asthma is a lifelong disease, treatment can help control it and help you stay healthy. Follow-up care is a key part of your treatment and safety. Be sure to make and go to all appointments, and call your doctor if you are having problems. It's also a good idea to know your test results and keep a list of the medicines you take. How can you care for yourself at home? To control asthma over the long term  Medicines   Controller medicines reduce swelling in your lungs. They also prevent asthma attacks. Take your controller medicine exactly as prescribed. Talk to your doctor if you have any problems with your medicine. · Inhaled corticosteroid is a common and effective controller medicine. Using it the right way can prevent or reduce most side effects. · Take your controller medicine every day, not just when you have symptoms. It helps prevent problems before they occur. · Your doctor may prescribe another medicine that you use along with the corticosteroid. This is often a long-acting bronchodilator.  Do not take this medicine by itself. Using a long-acting bronchodilator by itself can increase your risk of a severe or fatal asthma attack. · Do not take inhaled corticosteroids or long-acting bronchodilators to stop an asthma attack that has already started. They don't work fast enough to help. · Talk to your doctor before you use other medicines. Some medicines, such as aspirin, can cause asthma attacks in some people. Education   · Learn what triggers an asthma attack. Avoid these triggers when you can. Common triggers include colds, smoke, air pollution, dust, pollen, mold, pets, cockroaches, stress, and cold air. · Check yourself for asthma symptoms to know which step to follow in your action plan. Watch for things like being short of breath, having chest tightness, coughing, and wheezing. Also notice if symptoms wake you up at night or if you get tired quickly when you exercise. · If you have a peak flow meter, use it to check how well you are breathing. It can help you know when an asthma attack is going to occur. Then you can take medicine to prevent the asthma attack or make it less severe. · Do not smoke or allow others to smoke around you. Avoid smoky places. Smoking makes asthma worse. If you need help quitting, talk to your doctor about stop-smoking programs and medicines. These can increase your chances of quitting for good. · Avoid colds and the flu. Get a pneumococcal vaccine shot. If you have had one before, ask your doctor whether you need a second dose. Get a flu vaccine every year, as soon as it's available. If you must be around people with colds or the flu, wash your hands often. To treat attacks when they occur  Use your asthma action plan when you have an attack. Your quick-relief medicine will stop an asthma attack. It relaxes the muscles that get tight around the airways. If your doctor prescribed corticosteroid pills to use during an attack, take them as directed.  They may take hours to work, but they may shorten the attack and help you breathe better. · Albuterol is an effective quick-relief inhaler. · Take your quick-relief medicine exactly as prescribed. · Always bring your asthma medicine with you when you travel. · You may need to use quick-relief medicine before you exercise. · Call your doctor if you think you are having a problem with your medicine. When should you call for help? Call 911 anytime you think you may need emergency care. For example, call if:    · You are having severe trouble breathing. Call your doctor now or seek immediate medical care if:    · Your symptoms do not get better after you have followed your asthma action plan.     · You cough up yellow, dark brown, or bloody mucus (sputum). Watch closely for changes in your health, and be sure to contact your doctor if:    · Your coughing and wheezing get worse.     · You need to use your quick-relief medicine on more than 2 days a week within a month (unless it is just for exercise).     · You need help figuring out what is triggering your asthma attacks. Where can you learn more? Go to https://Hooked [employer] GrouppeBESOS.Sk[employer] Matter. org and sign in to your Namshi account. Enter D973 in the W[employer] Inns box to learn more about \"Controlling Your Asthma: Care Instructions. \"     If you do not have an account, please click on the \"Sign Up Now\" link. Current as of: October 26, 2020               Content Version: 12.9  © 3657-5823 [employer]. Care instructions adapted under license by [facility] ([facility]). If you have questions about a medical condition or this instruction, always ask your healthcare professional. [place] [address] any warranty or liability for your use of this information. Patient Education        Gastroesophageal Reflux Disease (GERD): Care Instructions  Overview     Gastroesophageal reflux disease (GERD) is the backward flow of stomach acid into the esophagus. The esophagus is the tube that leads from your throat to your stomach. A one-way valve prevents the stomach acid from backing up into this tube. But when you have GERD, this valve does not close tightly enough. This can also cause pain and swelling in your esophagus. (This is calledesophagitis.)  If you have mild GERD symptoms including heartburn, you may be able to control the problem with antacids or over-the-counter medicine. You can also make lifestyle changes to help reduce your symptoms. These include changing yourdiet and eating habits, such as not eating late at night and losing weight. Follow-up care is a key part of your treatment and safety. Be sure to make and go to all appointments, and call your doctor if you are having problems. It's also a good idea to know your test results and keep alist of the medicines you take. How can you care for yourself at home?  Take your medicines exactly as prescribed. Call your doctor if you think you are having a problem with your medicine.  Your doctor may recommend over-the-counter medicine. For mild or occasional indigestion, antacids, such as Tums, Gaviscon, Mylanta, or Maalox, may help. Your doctor also may recommend over-the-counter acid reducers, such as famotidine (Pepcid AC), cimetidine (Tagamet HB), or omeprazole (Prilosec). Read and follow all instructions on the label. If you use these medicines often, talk with your doctor.  Change your eating habits. ? It's best to eat several small meals instead of two or three large meals. ? After you eat, wait 2 to 3 hours before you lie down. ? Avoid foods that make your symptoms worse. These may include chocolate, mint, alcohol, pepper, spicy foods, high-fat foods, or drinks with caffeine in them, such as tea, coffee, shayy, or energy drinks. If your symptoms are worse after you eat a certain food, you may want to stop eating it to see if your symptoms get better.    Do not smoke or chew tobacco. Smoking can make GERD worse. If you need help quitting, talk to your doctor about stop-smoking programs and medicines. These can increase your chances of quitting for good.  If you have GERD symptoms at night, raise the head of your bed 6 to 8 inches by putting the frame on blocks or placing a foam wedge under the head of your mattress. (Adding extra pillows does not work.)   Do not wear tight clothing around your middle.  Lose weight if you need to. Losing just 5 to 10 pounds can help. When should you call for help? Call your doctor now or seek immediate medical care if:     You have new or different belly pain.      Your stools are black and tarlike or have streaks of blood. Watch closely for changes in your health, and be sure to contact your doctor if:     Your symptoms have not improved after 2 days.      Food seems to catch in your throat or chest.   Where can you learn more? Go to https://C[employer] Match.[employer]. org and sign in to your IntroBridge account. Enter T587 in the Doostang box to learn more about \"Gastroesophageal Reflux Disease (GERD): Care Instructions. \"     If you do not have an account, please click on the \"Sign Up Now\" link. Current as of: September 8, 2021               Content Version: 13.2  © 2006-2022 Healthwise, Incorporated. Care instructions adapted under license by [facility] ([facility]). If you have questions about a medical condition or this instruction, always ask your healthcare professional. [place] [address] any warranty or liability for your use of this information. Patient Education        Learning About Acid-Reducing Medicines  What are they? Acid-reducing medicines can help relieve heartburn and other symptoms of indigestion. They can help prevent damage to your digestive system from stomachacids. They also are used to treat reflux and ulcer symptoms. These medicines include H2 blockers and proton pump inhibitors (PPIs).  They help your stomach make less acid. You can buy them over the counter. Some ofthem also come in prescription strengths. Antacids can also help relieve heartburn symptoms. They reduce the acid that isalready in your stomach. You can buy them over the counter. Which medicine is best for you depends on what is causing your symptoms. How do they work? Acid-reducing medicines work in two ways. H2 blockers and proton pump inhibitors (PPIs) lower the amount of acid your stomach makes. They don't work on the acid that's already there. Antacids work by making stomach juices lessacidic. But your heartburn may come back as your stomach makes more acid. What are some examples? Examples of acid reducers include:  H2 blockers.  Tagamet (cimetidine)   Pepcid (famotidine)  Proton pump inhibitors (PPIs).  Nexium (esomeprazole)   Prevacid (lansoprazole)   Prilosec, Zegerid (omeprazole)   Protonix (pantoprazole)   Aciphex (rabeprazole)  Antacids.  Gaviscon   Mylanta   Maalox   Tums  What are side effects might you have? Many people don't have side effects. And minor side effects might go away aftera while. H2 blockers can cause headaches or make you dizzy. They might cause diarrhea orconstipation. You may have nausea and vomiting. PPIs can cause headaches and diarrhea. Using them for a long time may raiseyour risk for infections or broken bones. Some antacids can cause constipation or diarrhea. The brands vary in theingredients they use. They can have different side effects. If you use too much heartburn medicine, your body may not get enough of someminerals from your food. How can you take these medicines safely? Some H2 blockers and PPIs can affect how other medicines work. Tell your doctor if you use other medicines. He or she may change the dose or give you adifferent medicine. Many antacids have aspirin in them. Read the label to make sure that you don'ttake too much. Too much aspirin can be harmful.   Be safe

## 2022-05-23 NOTE — PROGRESS NOTES
[address]  Dept: Jackie Selby (:  1975) is a 48 y.o. male,Established patient, here for evaluation of the following chief complaint(s):  Knee Pain (left), Allergies, and Medication Refill (lisinopril)      ASSESSMENT/PLAN:  I have reviewed the patient's medical history in detail and updated the computerized patient record. HPI/ROS per the patient and caregiver. Overall non toxic in appearance. Answers questions appropriately. Conditions discussed and addressed this visit include:     Pt with acute sinus symptoms  Aggravating his gerd symptoms. Change the ppi today. PRotonix not covered. Will add famotidine to bedtime. gerd diet and precautions discussed  Refer out to GI for esophageal assessment and recommendations. Meds refilled. No  Labs today. 1. Mild intermittent asthma without complication  2. Type 2 diabetes mellitus with hyperosmolarity without coma, without long-term current use of insulin ([address]  Being evaluated/managed by pcp. No acute findings today meriting change in management plan. 3. Benign essential HTN  -     lisinopril (PRINIVIL;ZESTRIL) 5 MG tablet; Take 1 tablet by mouth daily, Disp-90 tablet, R-1Normal  4. Weakness of left lower extremity  5. Controlled type 2 diabetes mellitus without complication, without long-term current use of insulin ([address]  Being evaluated/managed by pcp. No acute findings today meriting change in management plan. -     atorvastatin (LIPITOR) 40 MG tablet; Take 1 tablet by mouth daily, Disp-90 tablet, R-1Normal  6. Acute non-recurrent frontal sinusitis  -     methylPREDNISolone (MEDROL, EFRAIN,) 4 MG tablet; Take by mouth., Disp-1 kit, R-0Normal  -     cetirizine (ZYRTEC) 10 MG tablet; Take 1 tablet by mouth daily, Disp-90 tablet, R-1Normal  -     doxycycline hyclate (VIBRA-TABS) 100 MG tablet; Take 1 tablet by mouth 2 times daily for 10 days, Disp-20 tablet, R-0Normal  7. Hyperglycemia  8. Esophagitis  -     AFL - Sanjana Bacon MD, Gastroenterology, [facility] [address]      Return in about 6 months (around 11/23/2022) for Medication evaluation, Results review, Routine follow up. SUBJECTIVE/OBJECTIVE:  Here for nausea and vomiting that occurs after nearly every meal. Hx of narrow esophagus. Aggravated by his sinus congestion and drainage. Has not been taking any allergy medication. No fever  No concern for covid. Nausea & Vomiting  This is a recurrent problem. The current episode started more than 1 month ago. The problem occurs 2 to 4 times per day. The problem has been unchanged. Associated symptoms include congestion, fatigue, headaches, nausea, a sore throat, vomiting and weakness. Pertinent negatives include no arthralgias, chest pain, coughing, fever, joint swelling, myalgias or numbness. The symptoms are aggravated by drinking and eating. He has tried rest and position changes for the symptoms. The treatment provided no relief. Sinusitis  This is a recurrent problem. The current episode started 1 to 4 weeks ago. The problem is unchanged. There has been no fever. His pain is at a severity of 5/10. The pain is moderate. Associated symptoms include congestion, ear pain, headaches, sinus pressure and a sore throat. Pertinent negatives include no coughing or shortness of breath. Past treatments include acetaminophen, lying down and saline sprays. The treatment provided no relief.    Review of Systems   Constitutional: Positive for activity change and fatigue. Negative for appetite change, fever and unexpected weight change. HENT: Positive for congestion, ear pain, sinus pressure and sore throat. Eyes: Negative for pain, redness and itching. Respiratory: Negative for cough, chest tightness, shortness of breath and wheezing. Cardiovascular: Negative for chest pain and leg swelling. Gastrointestinal: Positive for nausea and vomiting. Negative for constipation and diarrhea.    Endocrine: Negative for cold intolerance and heat intolerance. Genitourinary: Positive for difficulty urinating ( improving). Negative for frequency and urgency. Musculoskeletal: Positive for back pain and gait problem. Negative for arthralgias, joint swelling and myalgias. Skin: Negative. Allergic/Immunologic: Negative. Neurological: Positive for weakness and headaches. Negative for dizziness and numbness. Hematological: Negative for adenopathy. Bruises/bleeds easily. Psychiatric/Behavioral: Negative. Physical Exam  Vitals and nursing note reviewed. Constitutional:       General: He is not in acute distress. Appearance: He is well-developed. He is obese. He is not ill-appearing. HENT:      Head: Normocephalic. Right Ear: Tympanic membrane and ear canal normal.      Left Ear: Tympanic membrane and ear canal normal.      Nose: Congestion and rhinorrhea ( purulent) present. Mouth/Throat:      Mouth: Mucous membranes are moist.      Pharynx: No posterior oropharyngeal erythema. Eyes:      General: No scleral icterus. Right eye: No discharge. Left eye: No discharge. Conjunctiva/sclera: Conjunctivae normal.   Neck:      Vascular: No carotid bruit. Cardiovascular:      Rate and Rhythm: Normal rate and regular rhythm. Pulses: Normal pulses. Dorsalis pedis pulses are 2+ on the right side and 2+ on the left side. Posterior tibial pulses are 2+ on the right side and 2+ on the left side. Heart sounds: Normal heart sounds. Pulmonary:      Effort: Pulmonary effort is normal.      Breath sounds: Normal breath sounds. No wheezing or rales. Abdominal:      General: Abdomen is flat. Bowel sounds are normal. There is no distension. Palpations: Abdomen is soft. Tenderness: There is no abdominal tenderness. Comments: Epigastric pain and distension after eating. Musculoskeletal:         General: No tenderness. Normal range of motion. Cervical back: Normal range of motion. No tenderness. No muscular tenderness. Right lower leg: No edema. Left lower leg: No edema. Right foot: No deformity, foot drop or prominent metatarsal heads. Left foot: No deformity, foot drop or prominent metatarsal heads. Feet:      Right foot:      Protective Sensation: 5 sites tested. 5 sites sensed. Skin integrity: Skin integrity normal.      Left foot:      Protective Sensation: 5 sites tested. 5 sites sensed. Skin integrity: Skin integrity normal.   Lymphadenopathy:      Cervical: No cervical adenopathy. Skin:     General: Skin is warm and dry. Capillary Refill: Capillary refill takes 2 to 3 seconds. Findings: No erythema or rash. Neurological:      Mental Status: He is alert and oriented to person, place, and time. Mental status is at baseline. Motor: Weakness present. Coordination: Coordination abnormal.      Gait: Gait abnormal.      Comments:      Psychiatric:         Mood and Affect: Mood normal.         Behavior: Behavior normal.         Thought Content: Thought content normal.         Judgment: Judgment normal.                 An electronic signature was used to authenticate this note.     --ISHAAN Powers - CNP

## 2022-06-08 ENCOUNTER — OFFICE VISIT (OUTPATIENT)
Dept: NEUROSURGERY | Age: 47
End: 2022-06-08
Payer: COMMERCIAL

## 2022-06-08 VITALS
WEIGHT: 218 LBS | SYSTOLIC BLOOD PRESSURE: 108 MMHG | HEIGHT: 68 IN | DIASTOLIC BLOOD PRESSURE: 70 MMHG | BODY MASS INDEX: 33.04 KG/M2

## 2022-06-08 DIAGNOSIS — G95.0 SYRINX OF SPINAL CORD (HCC): Primary | ICD-10-CM

## 2022-06-08 DIAGNOSIS — R29.898 LEG WEAKNESS, BILATERAL: ICD-10-CM

## 2022-06-08 DIAGNOSIS — G96.198 ARACHNOID CYST OF SPINE: ICD-10-CM

## 2022-06-08 PROCEDURE — 99213 OFFICE O/P EST LOW 20 MIN: CPT | Performed by: PHYSICIAN ASSISTANT

## 2022-06-08 PROCEDURE — G8417 CALC BMI ABV UP PARAM F/U: HCPCS | Performed by: PHYSICIAN ASSISTANT

## 2022-06-08 PROCEDURE — G8427 DOCREV CUR MEDS BY ELIG CLIN: HCPCS | Performed by: PHYSICIAN ASSISTANT

## 2022-06-08 PROCEDURE — 1036F TOBACCO NON-USER: CPT | Performed by: PHYSICIAN ASSISTANT

## 2022-06-08 NOTE — PROGRESS NOTES
[address] [facility]  Dept: [phone]  Dept Fax: [phone]  Loc: [facility] Follow Visit  Visit Date: 6/8/2022      Bobbi Gamez  is a 48 y.o. male who is returning to the office today for a post-op follow-up visit. He had surgery on  May 14, 2020 with Dr. Chelsey Nicholas to undergo resection of arachnoid cyst, without complication. Last seen and evaluated in our office setting on 5/26/2021 with an MRI which was reviewed revealing stable findings with no significant interval changes since his study prior. At that visit he was accompanied by his wife and was comfortable at the time of exam with no new symptom presentation or changes since his prior visit. Based on the stable intact nature of his exam along with findings on imaging we agreed to follow-up with him today as a 1 year follow-up with a new MRI of the lumbar spine for comparison and review. He arrives today and undergone MRIs of the lumbar and thoracic spine on 5/18/2022 with the lumbar spine image considered stable with mild degenerative changes absent any significant spinal canal stenosis at any level. 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  He arrives for today's appointment unaccompanied and ambulating with assistance of rolling four-point walker. He relates being seen and evaluated in [address] [place] [address] by Dr. Montiel/neurologist and is requested that any notes or imaging be forwarded to her service. We will facilitate that request following today's appointment.   Incision is well-healed and following the review of today's imaging which is largely stable compared to prior exams we have agreed to follow-up with him as needed moving forward with encouragement for him to reach out to our office with any

## 2022-06-23 ENCOUNTER — OFFICE VISIT (OUTPATIENT)
Dept: PHYSICAL MEDICINE AND REHAB | Age: 47
End: 2022-06-23
Payer: COMMERCIAL

## 2022-06-23 VITALS
SYSTOLIC BLOOD PRESSURE: 112 MMHG | BODY MASS INDEX: 33.04 KG/M2 | WEIGHT: 218 LBS | HEIGHT: 68 IN | DIASTOLIC BLOOD PRESSURE: 70 MMHG

## 2022-06-23 DIAGNOSIS — R25.2 SPASTICITY: Primary | ICD-10-CM

## 2022-06-23 DIAGNOSIS — R29.898 LEG WEAKNESS, BILATERAL: ICD-10-CM

## 2022-06-23 DIAGNOSIS — G95.0 SYRINX OF SPINAL CORD (HCC): ICD-10-CM

## 2022-06-23 DIAGNOSIS — M47.14 MYELOPATHY OF THORACIC REGION: ICD-10-CM

## 2022-06-23 PROCEDURE — G8427 DOCREV CUR MEDS BY ELIG CLIN: HCPCS | Performed by: ANESTHESIOLOGY

## 2022-06-23 PROCEDURE — 1036F TOBACCO NON-USER: CPT | Performed by: ANESTHESIOLOGY

## 2022-06-23 PROCEDURE — 99213 OFFICE O/P EST LOW 20 MIN: CPT | Performed by: ANESTHESIOLOGY

## 2022-06-23 PROCEDURE — G8417 CALC BMI ABV UP PARAM F/U: HCPCS | Performed by: ANESTHESIOLOGY

## 2022-06-23 NOTE — PROGRESS NOTES
Chronic Pain/PM&R Clinic Note     Encounter Date: 6/23/22    Subjective:   Chief Complaint:   Chief Complaint   Patient presents with    Follow-up     Botox has not been approved by insurance     Medication Refill     Baclofen and Keppra       History of Present Illness:   Cory Maravilla is a 48 y.o. male seen in the clinic initially on 01/21/21 upon request from [place], [place] ([address] [place] [address]) for his history of lower limb spasticity. He underwent an arachnoid cyst resection with Dr. Alysha Napier on May 14, 2020 after worsening bilateral leg pain. He notes about 2 months after his surgery his legs have been increasingly worse. He describes his pain to be in bilateral legs from the waist down that he describes as a constant 10/10 pain. He states the pain is a constant aching, burning pain. It is worse with any ambulation. He states is pain is improved with rest. He uses a walker for ambulation due to pain and weakness in his legs. He has been taking Baclofen 10 mg twice a day which does not help too much. He denies any saddle anesthesia or bowel/bladder incontinence. He deals with a lot of tone/spasticity in his legs. He reports stretching about 4 times a day. He was previously working at [employer] prior to his surgery. Today, 6/23/2022, patient presents for planned follow-up for management lower limb spasticity and chronic pain. Overall, he states that he has been doing relatively okay but continues to struggle with his spasticity interfering with his ability to walk and causing knee pain. Continues to take his Keppra and baclofen without any side effects. He states he would like to proceed with the Botox injections that we previously discussed. He denies any other new symptoms this point.     History of Interventions:   Surgery: arachnoid cyst resection - Dr. Karli Johnson (5/14/20)  Injections: None    Current Treatment Medications:   Baclofen 20 mg 3 times daily  Keppra 500 mg twice daily    Historical Treatment Medications:   Lyrica-leg swelling      Past Medical History:   Diagnosis Date    Asthma     does not know name of inhaler and hasnt used in over a yr    Ataxic gait     Developmental delay     Diet-controlled diabetes mellitus ([facility]     History of syrinx of spinal cord ([facility] 03/17/2020    Incomplete bladder emptying     Myelopathy of thoracic region     Osteopenia determined by x-ray     Spasm of muscle     Urinary dribbling     Urinary leakage     Uses walker     for standing and transfers    Uses wheelchair     can stand and transfer cannot walk due to leg weakness    Vitamin D deficiency 03/12/2020    Vitamin D 25OH 24       Past Surgical History:   Procedure Laterality Date    BACK SURGERY      CRANIOTOMY N/A 5/14/2020    Thoracic Spine and Laminectomy Extraction of Arachnoid Cyst/Band and 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 performed by Frida Camara MD at [facility]  09/16/2020    IR angiogram carotid cerebral bilateral       Family History   Problem Relation Age of Onset    Arthritis Mother     Diabetes Mother     Heart Disease Father     Cancer Neg Hx     High Blood Pressure Neg Hx     Stroke Neg Hx        Social History     Socioeconomic History    Marital status: Single     Spouse name: Not on file    Number of children: Not on file    Years of education: Not on file    Highest education level: Not on file   Occupational History    Not on file   Tobacco Use    Smoking status: Never Smoker    Smokeless tobacco: Never Used   Vaping Use    Vaping Use: Never used   Substance and Sexual Activity    Alcohol use: No    Drug use: No    Sexual activity: Not Currently   Other Topics Concern    Not on file   Social History Narrative    Not on file     Social Determinants of Health     Financial Resource Strain:     Difficulty of Paying Living Expenses: Not on file   Food Insecurity:     Worried About Running Out of Food in the Last Year: Not on file    Ran Out of Food in the Last Year: Not on file   Transportation Needs:     Lack of Transportation (Medical): Not on file    Lack of Transportation (Non-Medical): Not on file   Physical Activity:     Days of Exercise per Week: Not on file    Minutes of Exercise per Session: Not on file   Stress:     Feeling of Stress : Not on file   Social Connections:     Frequency of Communication with Friends and Family: Not on file    Frequency of Social Gatherings with Friends and Family: Not on file    Attends Rastafari Services: Not on file    Active Member of [address] [place] [address] [employer] or Organizations: Not on file    Attends Club or Organization Meetings: Not on file    Marital Status: Not on file   Intimate Partner Violence:     Fear of Current or Ex-Partner: Not on file    Emotionally Abused: Not on file    Physically Abused: Not on file    Sexually Abused: Not on file   Housing Stability:     Unable to Pay for Housing in the Last Year: Not on file    Number of [place] in the Last Year: Not on file    Unstable Housing in the Last Year: Not on file       Medications & Allergies:   Current Outpatient Medications   Medication Instructions    acetaminophen (TYLENOL) 500 mg, Oral, EVERY 6 HOURS PRN    atorvastatin (LIPITOR) 40 mg, Oral, DAILY    baclofen (LIORESAL) 20 mg, Oral, 3 TIMES DAILY    bumetanide (BUMEX) 1 mg, Oral, DAILY    cetirizine (ZYRTEC) 10 mg, Oral, DAILY    Elastic Bandages & Supports (MEDICAL LEGWEAR/KNEE HIGH) MISC Please dispense 2 pair of XL knee compression hose, 15-20mmHg compression.     famotidine (PEPCID) 40 mg, Oral, Nightly    ferrous sulfate (FE TABS) 325 mg, Oral, 2 TIMES DAILY    Foot Care Products (SOCK AID) MISC 1 Device, Does not apply, DAILY    ibuprofen (ADVIL;MOTRIN) 600 mg, Oral, 3 TIMES DAILY PRN    Incontinence Supply Disposable (POISE HOURGLASS SHAPE) PADS 1 Device, Does not apply, 4 TIMES DAILY    lansoprazole (PREVACID) 30 mg, Oral, DAILY    presenting to the pain clinic for evaluation of lower limb spasticity. At this point, we will increase his baclofen to 10 mg 3 times a day and likely will have to be up to 20 mg 3 times a day to help with the spasticity in the lower limbs. In addition, he is obtaining good benefit on baclofen 20 mg 3 times a day in regards to his overall spasticity/tone and ambulation. He had noted swelling in his legs from Lyrica and we stopped this medication. He is doing well with his Keppra for neuropathic pain coverage. I have submitted for Botox injections for his left lower limb spasticity. Plan: The following treatment recommendations and plan were discussed in detail with Bernadine Chávez. Imaging:  With worsening bilateral knee pain, have ordered x-rays of the knees to further evaluate for arthritis. Interventions: For pain secondary to spasticity and failure to control spasticity with oral medications, I have set the patient up for Botox injections. We will target his left semitendinosis, semimembranosus, biceps femoris for a total of 200 units. Adjuvants: In light of the presence of a neuropathic component of pain and side effects to Lyrica, I have continued his Keppra to 500 mg twice a day. For continued chronic pain with associated musculoskeletal component, we will continue his Baclofen to 20 mg three times a day. Multidisciplinary Pain Management:   In the presence of complex, chronic, and multi-factorial pain, the importance of a multidisciplinary approach to pain management in the patients management regimen was emphasized and discussed in great detail. PHYSICAL THERAPY: I advised the patient to continue gentle stretching atleast 4 times a day.      Referrals:  None    Prescriptions Written This Visit:   None    Follow-up: For Botox injections      Carlos Boogie, DO  Interventional Pain Management/PM&R   [place]

## 2022-06-30 ENCOUNTER — OFFICE VISIT (OUTPATIENT)
Dept: PHYSICAL MEDICINE AND REHAB | Age: 47
End: 2022-06-30
Payer: COMMERCIAL

## 2022-06-30 VITALS
HEIGHT: 68 IN | BODY MASS INDEX: 33.04 KG/M2 | WEIGHT: 218 LBS | SYSTOLIC BLOOD PRESSURE: 118 MMHG | DIASTOLIC BLOOD PRESSURE: 80 MMHG

## 2022-06-30 DIAGNOSIS — M47.14 MYELOPATHY OF THORACIC REGION: ICD-10-CM

## 2022-06-30 DIAGNOSIS — R25.2 SPASTICITY: Primary | ICD-10-CM

## 2022-06-30 DIAGNOSIS — R29.898 LEG WEAKNESS, BILATERAL: ICD-10-CM

## 2022-06-30 DIAGNOSIS — G95.0 SYRINX OF SPINAL CORD (HCC): ICD-10-CM

## 2022-06-30 PROCEDURE — G8417 CALC BMI ABV UP PARAM F/U: HCPCS | Performed by: ANESTHESIOLOGY

## 2022-06-30 PROCEDURE — 99213 OFFICE O/P EST LOW 20 MIN: CPT | Performed by: ANESTHESIOLOGY

## 2022-06-30 PROCEDURE — 64643 CHEMODENERV 1 EXTREM 1-4 EA: CPT | Performed by: ANESTHESIOLOGY

## 2022-06-30 PROCEDURE — 1036F TOBACCO NON-USER: CPT | Performed by: ANESTHESIOLOGY

## 2022-06-30 PROCEDURE — 64642 CHEMODENERV 1 EXTREMITY 1-4: CPT | Performed by: ANESTHESIOLOGY

## 2022-06-30 PROCEDURE — G8427 DOCREV CUR MEDS BY ELIG CLIN: HCPCS | Performed by: ANESTHESIOLOGY

## 2022-06-30 NOTE — PROGRESS NOTES
Chronic Pain/PM&R Clinic Note     Encounter Date: 6/30/22    Subjective:   Chief Complaint:   Chief Complaint   Patient presents with    Botox Injection     200 units Spasticity       History of Present Illness:   Corina Chávez is a 48 y.o. male seen in the clinic initially on 01/21/21 upon request from Jennifer Mesa ([address] [place] [address]) for his history of lower limb spasticity. He underwent an arachnoid cyst resection with Dr. Jesús Aly on May 14, 2020 after worsening bilateral leg pain. He notes about 2 months after his surgery his legs have been increasingly worse. He describes his pain to be in bilateral legs from the waist down that he describes as a constant 10/10 pain. He states the pain is a constant aching, burning pain. It is worse with any ambulation. He states is pain is improved with rest. He uses a walker for ambulation due to pain and weakness in his legs. He has been taking Baclofen 10 mg twice a day which does not help too much. He denies any saddle anesthesia or bowel/bladder incontinence. He deals with a lot of tone/spasticity in his legs. He reports stretching about 4 times a day. He was previously working at [employer] prior to his surgery. Today, 6/30/2022, patient presents for planned follow-up for management of lower limb spasticity and chronic pain. He states that he is ready to proceed with his Botox injections and states that his left leg has been more problematic than his right. He does continue to work on active daily stretching and continues to take his Keppra and baclofen without any side effects. He denies any other new symptoms this point is actually working with some of the pulmonary team right now for CPAP fitting.     History of Interventions:   Surgery: arachnoid cyst resection - Dr. Sabiha Russell (5/14/20)  Injections: None    Current Treatment Medications:   Baclofen 20 mg 3 times daily  Keppra 500 mg twice daily    Historical Treatment Medications:   Krystal Murillo swelling      Past Medical History:   Diagnosis Date    Asthma     does not know name of inhaler and hasnt used in over a yr    Ataxic gait     Developmental delay     Diet-controlled diabetes mellitus ([facility] [address]     History of syrinx of spinal cord ([facility] [address] 03/17/2020    Incomplete bladder emptying     Myelopathy of thoracic region     Osteopenia determined by x-ray     Spasm of muscle     Urinary dribbling     Urinary leakage     Uses walker     for standing and transfers    Uses wheelchair     can stand and transfer cannot walk due to leg weakness    Vitamin D deficiency 03/12/2020    Vitamin D 25OH 24       Past Surgical History:   Procedure Laterality Date    BACK SURGERY      CRANIOTOMY N/A 5/14/2020    Thoracic Spine and Laminectomy Extraction of Arachnoid Cyst/Band and 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 performed by Sergio Do MD at [place] [address]  09/16/2020    IR angiogram carotid cerebral bilateral       Family History   Problem Relation Age of Onset    Arthritis Mother     Diabetes Mother     Heart Disease Father     Cancer Neg Hx     High Blood Pressure Neg Hx     Stroke Neg Hx        Social History     Socioeconomic History    Marital status: Single     Spouse name: Not on file    Number of children: Not on file    Years of education: Not on file    Highest education level: Not on file   Occupational History    Not on file   Tobacco Use    Smoking status: Never Smoker    Smokeless tobacco: Never Used   Vaping Use    Vaping Use: Never used   Substance and Sexual Activity    Alcohol use: No    Drug use: No    Sexual activity: Not Currently   Other Topics Concern    Not on file   Social History Narrative    Not on file     Social Determinants of Health     Financial Resource Strain:     Difficulty of Paying Living Expenses: Not on file   Food Insecurity:     Worried About Running Out of Food in the Last Year: Not on file    Abilio of Food DAILY    lisinopril (PRINIVIL;ZESTRIL) 5 mg, Oral, DAILY    oxybutynin (DITROPAN XL) 10 mg, Oral, DAILY    potassium chloride (KLOR-CON M) 20 MEQ extended release tablet 20 mEq, Oral, DAILY    tamsulosin (FLOMAX) 0.4 mg, Oral, DAILY    Vitamin B-12 2,500 mcg, SubLINGual, DAILY    vitamin D-3 (CHOLECALCIFEROL) 5,000 Units, Oral, DAILY       Allergies   Allergen Reactions    Norco [Hydrocodone-Acetaminophen] Nausea And Vomiting       Review of Systems:   Constitutional: negative for weight changes or fevers  Genitourinary: negative for bowel/bladder incontinence   Musculoskeletal: positive for leg pain  Neurological:  Positive for spasticity and bilateral leg weakness  Behavioral/Psych: negative for anxiety/depression   All other systems reviewed and are negative    Objective:     Vitals:    06/30/22 0910   BP: 118/80       Constitutional: Pleasant, no acute distress   Head: Normocephalic, atraumatic   Eyes: Conjunctivae normal   Neck: Supple, symmetrical   Lungs: Normal respiratory effort, non-labored breathing   Cardiovascular: Limbs warm and well perfused   Abdomen: Non-protruded   Musculoskeletal: Increased tone/spasticity in lower limbs (improved)  · Lumbar Spine: Lumbar paraspinals tender bilaterally. SLR neg bilaterally. Neurological: Cranial nerves II-XII grossly intact. · Gait - Spastic gait. Ambulates with assist device. · Motor: 4/5 motor strength in bilateral HF, KE, KF, ADF, APF  · Sensory: LT sensation diminished in lower limbs diffusely  · Reflexes: hyper-reflexia in bilateral achilles and bilateral patellar reflexes, sustained ankle clonus b/l  Skin: No rashes or lesions visible in lower limbs  Psychological: Cooperative, no exaggerated pain behaviors       Assessment:    Diagnosis Orders   1. Spasticity     2. Leg weakness, bilateral     3. Syrinx of spinal cord ([address]     4.  Myelopathy of thoracic region         Kamille Chandler is a 48 y.o.male presenting to the pain clinic for evaluation of lower limb spasticity. At this point, we will increase his baclofen to 10 mg 3 times a day and likely will have to be up to 20 mg 3 times a day to help with the spasticity in the lower limbs. In addition, he is obtaining good benefit on baclofen 20 mg 3 times a day in regards to his overall spasticity/tone and ambulation. He had noted swelling in his legs from Lyrica and we stopped this medication. He is doing well with his Keppra for neuropathic pain coverage. He underwent Botox injections for lower limb spasticity today. We will follow-up in 6 weeks to determine effectiveness from the injections. Plan: The following treatment recommendations and plan were discussed in detail with Kamille Chandler. Imaging:  None    Interventions: For pain secondary to spasticity and failure to control spasticity with oral medications, patient underwent Botox injections for bilateral lower limb spasticity. Please see attached procedure note. Adjuvants: In light of the presence of a neuropathic component of pain and side effects to Lyrica, I have continued his Keppra to 500 mg twice a day. For continued chronic pain with associated musculoskeletal component, we will continue his Baclofen to 20 mg three times a day. Multidisciplinary Pain Management:   In the presence of complex, chronic, and multi-factorial pain, the importance of a multidisciplinary approach to pain management in the patients management regimen was emphasized and discussed in great detail. PHYSICAL THERAPY: I advised the patient to continue gentle stretching atleast 4 times a day.      Referrals:  None    Prescriptions Written This Visit:   None    Follow-up: 6 weeks       Getachew Blanco DO  Interventional Pain Management/PM&R   [place]

## 2022-06-30 NOTE — PROGRESS NOTES
Pre-operative Diagnosis: Spasticity      Post-operative Diagnosis: Spasticity      Procedure: Botox for spasticity      Procedure Description:  Patient was seated in wheelchair. Botox was drawn up into two 2 mL syringes. Skin was prepped with alcohol wipes. The following muscles were injected after negative aspiration; bilateral biceps femoris and semimembranosus for a total of 200 units. The patient tolerated the procedure well.     Amount medication wasted: 0 units        Procedural Complications: None        Jake Person DO  Interventional Pain Management/PM&R   [place]

## 2022-07-06 ENCOUNTER — OFFICE VISIT (OUTPATIENT)
Dept: UROLOGY | Age: 47
End: 2022-07-06
Payer: COMMERCIAL

## 2022-07-06 VITALS
WEIGHT: 220 LBS | SYSTOLIC BLOOD PRESSURE: 132 MMHG | BODY MASS INDEX: 33.34 KG/M2 | HEIGHT: 68 IN | DIASTOLIC BLOOD PRESSURE: 80 MMHG

## 2022-07-06 DIAGNOSIS — M47.14 MYELOPATHY OF THORACIC REGION: ICD-10-CM

## 2022-07-06 DIAGNOSIS — N31.9 NEUROGENIC BLADDER DISORDER: ICD-10-CM

## 2022-07-06 PROCEDURE — G8417 CALC BMI ABV UP PARAM F/U: HCPCS | Performed by: UROLOGY

## 2022-07-06 PROCEDURE — 1036F TOBACCO NON-USER: CPT | Performed by: UROLOGY

## 2022-07-06 PROCEDURE — 99214 OFFICE O/P EST MOD 30 MIN: CPT | Performed by: UROLOGY

## 2022-07-06 PROCEDURE — G8428 CUR MEDS NOT DOCUMENT: HCPCS | Performed by: UROLOGY

## 2022-07-06 RX ORDER — TAMSULOSIN HYDROCHLORIDE 0.4 MG/1
0.4 CAPSULE ORAL DAILY
Qty: 90 CAPSULE | Refills: 3 | Status: SHIPPED | OUTPATIENT
Start: 2022-07-06

## 2022-07-06 RX ORDER — OXYBUTYNIN CHLORIDE 10 MG/1
10 TABLET, EXTENDED RELEASE ORAL DAILY
Qty: 90 TABLET | Refills: 3 | Status: SHIPPED | OUTPATIENT
Start: 2022-07-06

## 2022-07-06 NOTE — PROGRESS NOTES
Dr. Jose Sapp MD  [facility])  Urology Clinic  New Patient Visit      Patient:  Mela Abrams  YOB: 1975  Date: 7/6/2022    HISTORY OF PRESENT ILLNESS:   The patient is a 48 y.o. male who presents today for evaluation of the following problem(s): Slow urination on Flomax. with minimal PVR and OAB on Ditropan ER 10mg daily. We saw him back in July 2022 and he is doing well. Will continue these. Overall the problem(s) : are stable. Associated Symptoms: No dysuria, gross hematuria. Pain Severity: 0/10    Summary of old records:   Ditropan ER 10mg daily    Imaging/Labs reviewed during today's visit:  I have independently reviewed and verified the following films during today's visit.  UA, see below. Last several PSA's:  No results found for: PSA    Last total testosterone:  No results found for: TESTOSTERONE    Urinalysis today:  No results found for this visit on 07/06/22.       Last BUN and creatinine:  Lab Results   Component Value Date    BUN 18 12/21/2021     Lab Results   Component Value Date    CREATININE 1.0 12/21/2021       Imaging Reviewed during this Office Visit:   (results were independently reviewed by physician and radiology report verified)    PAST MEDICAL, FAMILY AND SOCIAL HISTORY:  Past Medical History:   Diagnosis Date    Asthma     does not know name of inhaler and hasnt used in over a yr    Ataxic gait     Developmental delay     Diet-controlled diabetes mellitus ([facility] [address]     History of syrinx of spinal cord ([facility] [address] 03/17/2020    Incomplete bladder emptying     Myelopathy of thoracic region     Osteopenia determined by x-ray     Spasm of muscle     Urinary dribbling     Urinary leakage     Uses walker     for standing and transfers    Uses wheelchair     can stand and transfer cannot walk due to leg weakness    Vitamin D deficiency 03/12/2020    Vitamin D 25OH 24     Past Surgical History:   Procedure Laterality Date    BACK SURGERY      CRANIOTOMY N/A 5/14/2020    Thoracic Spine and Laminectomy Extraction of Arachnoid Cyst/Band and 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 performed by Carla Dominguez MD at [address]  09/16/2020    IR angiogram carotid cerebral bilateral     Family History   Problem Relation Age of Onset    Arthritis Mother     Diabetes Mother     Heart Disease Father     Cancer Neg Hx     High Blood Pressure Neg Hx     Stroke Neg Hx      Outpatient Medications Marked as Taking for the 7/6/22 encounter (Office Visit) with Luzmaria Cordero MD   Medication Sig Dispense Refill    Cyanocobalamin (VITAMIN B-12) 2500 MCG SUBL Place 2,500 mcg under the tongue daily      lisinopril (PRINIVIL;ZESTRIL) 5 MG tablet Take 1 tablet by mouth daily 90 tablet 1    atorvastatin (LIPITOR) 40 MG tablet Take 1 tablet by mouth daily 90 tablet 1    cetirizine (ZYRTEC) 10 MG tablet Take 1 tablet by mouth daily 90 tablet 1    lansoprazole (PREVACID) 30 MG delayed release capsule Take 1 capsule by mouth daily 30 capsule 0    famotidine (PEPCID) 40 MG tablet Take 1 tablet by mouth at bedtime 90 tablet 1    baclofen (LIORESAL) 20 MG tablet Take 1 tablet by mouth 3 times daily 90 tablet 0    levETIRAcetam (KEPPRA) 500 MG tablet Take 1 tablet by mouth 2 times daily 60 tablet 2    tamsulosin (FLOMAX) 0.4 MG capsule Take 1 capsule by mouth daily 30 capsule 5    potassium chloride (KLOR-CON M) 20 MEQ extended release tablet Take 1 tablet by mouth daily 90 tablet 1    bumetanide (BUMEX) 1 MG tablet Take 1 tablet by mouth daily 90 tablet 0    oxybutynin (DITROPAN XL) 10 MG extended release tablet Take 1 tablet by mouth daily 90 tablet 3    ferrous sulfate (FE TABS) 325 (65 Fe) MG EC tablet Take 1 tablet by mouth 2 times daily 90 tablet 3    Elastic Bandages & Supports (MEDICAL LEGWEAR/KNEE HIGH) MISC Please dispense 2 pair of XL knee compression hose, 15-20mmHg compression.  4 each 0    Foot Care Products (SOCK AID) MISC 1 Device by Does not apply route daily 1 each 0    Incontinence Supply Disposable (POISE HOURGLASS SHAPE) PADS 1 Device by Does not apply route 4 times daily 120 each 0    ibuprofen (ADVIL;MOTRIN) 600 MG tablet Take 1 tablet by mouth 3 times daily as needed for Pain 30 tablet 0    vitamin D-3 (CHOLECALCIFEROL) 125 MCG (5000 UT) TABS Take 1 tablet by mouth daily 90 tablet 1    acetaminophen (TYLENOL) 500 MG tablet Take 500 mg by mouth every 6 hours as needed for Pain         Norco [hydrocodone-acetaminophen]  Social History     Tobacco Use   Smoking Status Never Smoker   Smokeless Tobacco Never Used       Social History     Substance and Sexual Activity   Alcohol Use No       REVIEW OF SYSTEMS:  Constitutional: negative  Eyes: negative  Respiratory: negative  Cardiovascular: negative  Gastrointestinal: negative  Musculoskeletal: negative  Genitourinary: negative except for what is in HPI  Skin: negative   Neurological: negative  Hematological/Lymphatic: negative  Psychological: negative    Physical Exam:    This a 48 y.o. male   Vitals:    07/06/22 1006   BP: 132/80     Constitutional: Patient in no acute distress; Neuro: alert and oriented to person place and time. Psych: Mood and affect normal.  Skin: Normal  Lungs: Respiratory effort normal  Cardiovascular:  Normal peripheral pulses  Abdomen: Soft, non-tender, non-distended with no CVA, flank pain, hepatosplenomegaly or hernia. Kidneys normal.  Bladder non-tender and not distended. Lymphatics: no palpable lymphadenopathy      Assessment and Plan      1. Myelopathy of thoracic region    2. Neurogenic bladder disorder           Plan:      No follow-ups on file. Continue Ditropan ER 10mg daily. Start Flomax 0.4mg daily  See back in 1 year.          Dr. Melinda Bell MD

## 2022-08-11 ENCOUNTER — OFFICE VISIT (OUTPATIENT)
Dept: PHYSICAL MEDICINE AND REHAB | Age: 47
End: 2022-08-11
Payer: COMMERCIAL

## 2022-08-11 VITALS
SYSTOLIC BLOOD PRESSURE: 102 MMHG | BODY MASS INDEX: 33.34 KG/M2 | HEIGHT: 68 IN | DIASTOLIC BLOOD PRESSURE: 60 MMHG | WEIGHT: 220 LBS

## 2022-08-11 DIAGNOSIS — R25.2 SPASTICITY: Primary | ICD-10-CM

## 2022-08-11 DIAGNOSIS — M47.14 MYELOPATHY OF THORACIC REGION: ICD-10-CM

## 2022-08-11 DIAGNOSIS — R29.898 LEG WEAKNESS, BILATERAL: ICD-10-CM

## 2022-08-11 DIAGNOSIS — G95.0 SYRINX OF SPINAL CORD (HCC): ICD-10-CM

## 2022-08-11 PROCEDURE — G8417 CALC BMI ABV UP PARAM F/U: HCPCS | Performed by: ANESTHESIOLOGY

## 2022-08-11 PROCEDURE — 1036F TOBACCO NON-USER: CPT | Performed by: ANESTHESIOLOGY

## 2022-08-11 PROCEDURE — G8427 DOCREV CUR MEDS BY ELIG CLIN: HCPCS | Performed by: ANESTHESIOLOGY

## 2022-08-11 PROCEDURE — 99213 OFFICE O/P EST LOW 20 MIN: CPT | Performed by: ANESTHESIOLOGY

## 2022-08-11 NOTE — PROGRESS NOTES
Asthma     does not know name of inhaler and hasnt used in over a yr    Ataxic gait     Developmental delay     Diet-controlled diabetes mellitus ([facility] [address]     History of syrinx of spinal cord ([facility] [address] 03/17/2020    Incomplete bladder emptying     Myelopathy of thoracic region     Osteopenia determined by x-ray     Spasm of muscle     Urinary dribbling     Urinary leakage     Uses walker     for standing and transfers    Uses wheelchair     can stand and transfer cannot walk due to leg weakness    Vitamin D deficiency 03/12/2020    Vitamin D 25OH 24       Past Surgical History:   Procedure Laterality Date    BACK SURGERY      CRANIOTOMY N/A 5/14/2020    Thoracic Spine and Laminectomy Extraction of Arachnoid Cyst/Band and 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 performed by Mode Bal MD at [address] [place] [address]  09/16/2020    IR angiogram carotid cerebral bilateral       Family History   Problem Relation Age of Onset    Arthritis Mother     Diabetes Mother     Heart Disease Father     Cancer Neg Hx     High Blood Pressure Neg Hx     Stroke Neg Hx        Social History     Socioeconomic History    Marital status: Single     Spouse name: Not on file    Number of children: Not on file    Years of education: Not on file    Highest education level: Not on file   Occupational History    Not on file   Tobacco Use    Smoking status: Never    Smokeless tobacco: Never   Vaping Use    Vaping Use: Never used   Substance and Sexual Activity    Alcohol use: No    Drug use: No    Sexual activity: Not Currently   Other Topics Concern    Not on file   Social History Narrative    Not on file     Social Determinants of Health     Financial Resource Strain: Not on file   Food Insecurity: Not on file   Transportation Needs: Not on file   Physical Activity: Not on file   Stress: Not on file   Social Connections: Not on file   Intimate Partner Violence: Not on file   Housing Stability: Not on file       Medications & Allergies:   Current Outpatient Medications   Medication Instructions    acetaminophen (TYLENOL) 500 mg, Oral, EVERY 6 HOURS PRN    atorvastatin (LIPITOR) 40 mg, Oral, DAILY    baclofen (LIORESAL) 20 mg, Oral, 3 TIMES DAILY    bumetanide (BUMEX) 1 mg, Oral, DAILY    cetirizine (ZYRTEC) 10 mg, Oral, DAILY    Elastic Bandages & Supports (MEDICAL LEGWEAR/KNEE HIGH) MISC Please dispense 2 pair of XL knee compression hose, 15-20mmHg compression.     famotidine (PEPCID) 40 mg, Oral, Nightly    ferrous sulfate (FE TABS) 325 mg, Oral, 2 TIMES DAILY    Foot Care Products (SOCK AID) MISC 1 Device, Does not apply, DAILY    ibuprofen (ADVIL;MOTRIN) 600 mg, Oral, 3 TIMES DAILY PRN    Incontinence Supply Disposable (POISE HOURGLASS SHAPE) PADS 1 Device, Does not apply, 4 TIMES DAILY    lansoprazole (PREVACID) 30 mg, Oral, DAILY    levETIRAcetam (KEPPRA) 500 mg, Oral, 2 TIMES DAILY    lisinopril (PRINIVIL;ZESTRIL) 5 mg, Oral, DAILY    oxybutynin (DITROPAN XL) 10 mg, Oral, DAILY    oxybutynin (DITROPAN XL) 10 mg, Oral, DAILY    potassium chloride (KLOR-CON M) 20 MEQ extended release tablet 20 mEq, Oral, DAILY    tamsulosin (FLOMAX) 0.4 mg, Oral, DAILY    Vitamin B-12 2,500 mcg, SubLINGual, DAILY    vitamin D-3 (CHOLECALCIFEROL) 5,000 Units, Oral, DAILY       Allergies   Allergen Reactions    Norco [Hydrocodone-Acetaminophen] Nausea And Vomiting       Review of Systems:   Constitutional: negative for weight changes or fevers  Genitourinary: negative for bowel/bladder incontinence   Musculoskeletal: positive for leg pain  Neurological:  Positive for spasticity and bilateral leg weakness  Behavioral/Psych: negative for anxiety/depression   All other systems reviewed and are negative    Objective:     Vitals:    08/11/22 1450   BP: 102/60       Constitutional: Pleasant, no acute distress   Head: Normocephalic, atraumatic   Eyes: Conjunctivae normal   Neck: Supple, symmetrical   Lungs: Normal respiratory effort, non-labored breathing Cardiovascular: Limbs warm and well perfused   Abdomen: Non-protruded   Musculoskeletal: Increased tone/spasticity in lower limbs (improved)  · Lumbar Spine: Lumbar paraspinals tender bilaterally. SLR neg bilaterally. Neurological: Cranial nerves II-XII grossly intact. · Gait - Spastic gait. Ambulates with assist device. · Motor: 4/5 motor strength in bilateral HF, KE, KF, ADF, APF  · Sensory: LT sensation diminished in lower limbs diffusely  · Reflexes: hyper-reflexia in bilateral achilles and bilateral patellar reflexes, sustained ankle clonus b/l  Skin: No rashes or lesions visible in lower limbs  Psychological: Cooperative, no exaggerated pain behaviors       Assessment:    Diagnosis Orders   1. Spasticity        2. Leg weakness, bilateral        3. Syrinx of spinal cord ([address]        4. Myelopathy of thoracic region              Codie Rojas is a 48 y.o.male presenting to the pain clinic for evaluation of lower limb spasticity. At this point, we will increase his baclofen to 10 mg 3 times a day and likely will have to be up to 20 mg 3 times a day to help with the spasticity in the lower limbs. In addition, he is obtaining good benefit on baclofen 20 mg 3 times a day in regards to his overall spasticity/tone and ambulation. He had noted swelling in his legs from Lyrica and we stopped this medication. He is doing well with his Keppra for neuropathic pain coverage. He underwent Botox injections for lower limb spasticity with good relief and we will follow-up in 6 weeks for repeat Botox injections. Plan: The following treatment recommendations and plan were discussed in detail with Codie Rojas. Imaging:  None    Interventions:  None    Adjuvants: In light of the presence of a neuropathic component of pain and side effects to Lyrica, I have continued his Keppra to 500 mg twice a day.     For continued chronic pain with associated musculoskeletal component, we will continue his

## 2022-09-08 LAB
AVERAGE GLUCOSE: NORMAL
HBA1C MFR BLD: 6.2 %

## 2022-09-29 ENCOUNTER — OFFICE VISIT (OUTPATIENT)
Dept: PHYSICAL MEDICINE AND REHAB | Age: 47
End: 2022-09-29
Payer: COMMERCIAL

## 2022-09-29 VITALS
DIASTOLIC BLOOD PRESSURE: 76 MMHG | SYSTOLIC BLOOD PRESSURE: 106 MMHG | HEIGHT: 68 IN | WEIGHT: 220 LBS | BODY MASS INDEX: 33.34 KG/M2

## 2022-09-29 DIAGNOSIS — G95.0 SYRINX OF SPINAL CORD (HCC): ICD-10-CM

## 2022-09-29 DIAGNOSIS — R25.2 SPASTICITY: Primary | ICD-10-CM

## 2022-09-29 DIAGNOSIS — M47.14 MYELOPATHY OF THORACIC REGION: ICD-10-CM

## 2022-09-29 DIAGNOSIS — R29.898 LEG WEAKNESS, BILATERAL: ICD-10-CM

## 2022-09-29 PROCEDURE — 1036F TOBACCO NON-USER: CPT | Performed by: ANESTHESIOLOGY

## 2022-09-29 PROCEDURE — G8427 DOCREV CUR MEDS BY ELIG CLIN: HCPCS | Performed by: ANESTHESIOLOGY

## 2022-09-29 PROCEDURE — 64643 CHEMODENERV 1 EXTREM 1-4 EA: CPT | Performed by: ANESTHESIOLOGY

## 2022-09-29 PROCEDURE — G8417 CALC BMI ABV UP PARAM F/U: HCPCS | Performed by: ANESTHESIOLOGY

## 2022-09-29 PROCEDURE — 99213 OFFICE O/P EST LOW 20 MIN: CPT | Performed by: ANESTHESIOLOGY

## 2022-09-29 PROCEDURE — 64642 CHEMODENERV 1 EXTREMITY 1-4: CPT | Performed by: ANESTHESIOLOGY

## 2022-09-30 RX ORDER — LEVETIRACETAM 500 MG/1
500 TABLET ORAL 2 TIMES DAILY
Qty: 60 TABLET | Refills: 2 | Status: SHIPPED | OUTPATIENT
Start: 2022-09-30

## 2022-09-30 NOTE — PROGRESS NOTES
Chronic Pain/PM&R Clinic Note     Encounter Date: 9/29/22    Subjective:   Chief Complaint:   Chief Complaint   Patient presents with    Botox Injection     Botox 200 units spasticity      Medication Refill     Keppra        History of Present Illness:   Ivan Bland is a 48 y.o. male seen in the clinic initially on 01/21/21 upon request from [place], [place] ([address] [place] [address]) for his history of lower limb spasticity. He underwent an arachnoid cyst resection with Dr. Michell Vidales on May 14, 2020 after worsening bilateral leg pain. He notes about 2 months after his surgery his legs have been increasingly worse. He describes his pain to be in bilateral legs from the waist down that he describes as a constant 10/10 pain. He states the pain is a constant aching, burning pain. It is worse with any ambulation. He states is pain is improved with rest. He uses a walker for ambulation due to pain and weakness in his legs. He has been taking Baclofen 10 mg twice a day which does not help too much. He denies any saddle anesthesia or bowel/bladder incontinence. He deals with a lot of tone/spasticity in his legs. He reports stretching about 4 times a day. He was previously working at [employer] prior to his surgery. Today, 9/29/2022, patient presents for planned follow-up for management of lower limb spasticity and chronic pain. He was happy with his last round of Botox. He states that this provided him significant relief in his overall tone in his legs and feels like he was able to a lot more stand transfers and ambulate without as much tone in his legs. He also found it easier to perform ADLs such as daily hygiene and cleaning. He states he has had an EMG/NCS due to some new numbness he is experiencing in his right leg. He denies any other changes at this point.     History of Interventions:   Surgery: arachnoid cyst resection - Dr. Irena Kurtz (5/14/20)  Injections: None    Current Treatment Medications:   Baclofen 20 mg 3 times daily  Keppra 500 mg twice daily    Historical Treatment Medications:   Lyrica-leg swelling      Past Medical History:   Diagnosis Date    Asthma     does not know name of inhaler and hasnt used in over a yr    Ataxic gait     Developmental delay     Diet-controlled diabetes mellitus ([facility] [address]     History of syrinx of spinal cord ([facility] [address] 03/17/2020    Incomplete bladder emptying     Myelopathy of thoracic region     Osteopenia determined by x-ray     Spasm of muscle     Urinary dribbling     Urinary leakage     Uses walker     for standing and transfers    Uses wheelchair     can stand and transfer cannot walk due to leg weakness    Vitamin D deficiency 03/12/2020    Vitamin D 25OH 24       Past Surgical History:   Procedure Laterality Date    BACK SURGERY      CRANIOTOMY N/A 5/14/2020    Thoracic Spine and Laminectomy Extraction of Arachnoid Cyst/Band and 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 performed by Eren Melton MD at [address] [place] [address]  09/16/2020    IR angiogram carotid cerebral bilateral       Family History   Problem Relation Age of Onset    Arthritis Mother     Diabetes Mother     Heart Disease Father     Cancer Neg Hx     High Blood Pressure Neg Hx     Stroke Neg Hx        Social History     Socioeconomic History    Marital status: Single     Spouse name: Not on file    Number of children: Not on file    Years of education: Not on file    Highest education level: Not on file   Occupational History    Not on file   Tobacco Use    Smoking status: Never    Smokeless tobacco: Never   Vaping Use    Vaping Use: Never used   Substance and Sexual Activity    Alcohol use: No    Drug use: No    Sexual activity: Not Currently   Other Topics Concern    Not on file   Social History Narrative    Not on file     Social Determinants of Health     Financial Resource Strain: Not on file   Food Insecurity: Not on file   Transportation Needs: Not on file   Physical Activity: Not on file   Stress: Not on file   Social Connections: Not on file   Intimate Partner Violence: Not on file   Housing Stability: Not on file       Medications & Allergies:   Current Outpatient Medications   Medication Instructions    acetaminophen (TYLENOL) 500 mg, Oral, EVERY 6 HOURS PRN    atorvastatin (LIPITOR) 40 mg, Oral, DAILY    baclofen (LIORESAL) 20 mg, Oral, 3 TIMES DAILY    bumetanide (BUMEX) 1 mg, Oral, DAILY    cetirizine (ZYRTEC) 10 mg, Oral, DAILY    Elastic Bandages & Supports (MEDICAL LEGWEAR/KNEE HIGH) MISC Please dispense 2 pair of XL knee compression hose, 15-20mmHg compression.     famotidine (PEPCID) 40 mg, Oral, Nightly    ferrous sulfate (FE TABS) 325 mg, Oral, 2 TIMES DAILY    Foot Care Products (SOCK AID) MISC 1 Device, Does not apply, DAILY    ibuprofen (ADVIL;MOTRIN) 600 mg, Oral, 3 TIMES DAILY PRN    Incontinence Supply Disposable (POISE HOURGLASS SHAPE) PADS 1 Device, Does not apply, 4 TIMES DAILY    lansoprazole (PREVACID) 30 mg, Oral, DAILY    levETIRAcetam (KEPPRA) 500 mg, Oral, 2 TIMES DAILY    lisinopril (PRINIVIL;ZESTRIL) 5 mg, Oral, DAILY    oxybutynin (DITROPAN XL) 10 mg, Oral, DAILY    oxybutynin (DITROPAN XL) 10 mg, Oral, DAILY    potassium chloride (KLOR-CON M) 20 MEQ extended release tablet 20 mEq, Oral, DAILY    tamsulosin (FLOMAX) 0.4 mg, Oral, DAILY    Vitamin B-12 2,500 mcg, SubLINGual, DAILY    vitamin D-3 (CHOLECALCIFEROL) 5,000 Units, Oral, DAILY       Allergies   Allergen Reactions    Norco [Hydrocodone-Acetaminophen] Nausea And Vomiting       Review of Systems:   Constitutional: negative for weight changes or fevers  Genitourinary: negative for bowel/bladder incontinence   Musculoskeletal: positive for leg pain  Neurological:  Positive for spasticity and bilateral leg weakness  Behavioral/Psych: negative for anxiety/depression   All other systems reviewed and are negative    Objective:     Vitals:    09/29/22 1558   BP: 106/76       Constitutional: Pleasant, no acute distress   Head: Normocephalic, atraumatic   Eyes: Conjunctivae normal   Neck: Supple, symmetrical   Lungs: Normal respiratory effort, non-labored breathing   Cardiovascular: Limbs warm and well perfused   Abdomen: Non-protruded   Musculoskeletal: Increased tone/spasticity in lower limbs (improved)  · Lumbar Spine: Lumbar paraspinals tender bilaterally. SLR neg bilaterally. Neurological: Cranial nerves II-XII grossly intact. · Gait - Spastic gait. Ambulates with assist device. · Motor: 4/5 motor strength in bilateral HF, KE, KF, ADF, APF  · Sensory: LT sensation diminished in lower limbs diffusely  · Reflexes: hyper-reflexia in bilateral achilles and bilateral patellar reflexes, sustained ankle clonus b/l  Skin: No rashes or lesions visible in lower limbs  Psychological: Cooperative, no exaggerated pain behaviors       Assessment:    Diagnosis Orders   1. Spasticity        2. Leg weakness, bilateral        3. Syrinx of spinal cord ([address]        4. Myelopathy of thoracic region                J Carlos Cuevas is a 48 y.o.male presenting to the pain clinic for evaluation of lower limb spasticity. At this point, we will increase his baclofen to 10 mg 3 times a day and likely will have to be up to 20 mg 3 times a day to help with the spasticity in the lower limbs. In addition, he is obtaining good benefit on baclofen 20 mg 3 times a day in regards to his overall spasticity/tone and ambulation. He had noted swelling in his legs from Lyrica and we stopped this medication. He is doing well with his Keppra for neuropathic pain coverage. He underwent repeat Botox injection today in clinic for lower limb spasticity. Plan: The following treatment recommendations and plan were discussed in detail with J Carlos Cuevas. Imaging:  None    Interventions:  None    Adjuvants: In light of the presence of a neuropathic component of pain and side effects to Lyrica, I have continued his Keppra at 500 mg twice a day.     For continued chronic pain with associated musculoskeletal component, we will continue his Baclofen to 20 mg three times a day. Interventions: For pain secondary to spasticity and failure to control spasticity with oral medications, I have set the patient up for Botox injections. We will target his left semitendinosis, semimembranosus, biceps femoris for a total of 200 units. Multidisciplinary Pain Management:   In the presence of complex, chronic, and multi-factorial pain, the importance of a multidisciplinary approach to pain management in the patients management regimen was emphasized and discussed in great detail. PHYSICAL THERAPY: I advised the patient to continue gentle stretching atleast 4 times a day.      Referrals:  None    Prescriptions Written This Visit:   None    Follow-up: 12 weeks (repeat Botox)      Rosenda Miller DO  Interventional Pain Management/PM&R   [place]

## 2022-10-19 ENCOUNTER — OFFICE VISIT (OUTPATIENT)
Dept: PULMONOLOGY | Age: 47
End: 2022-10-19
Payer: COMMERCIAL

## 2022-10-19 VITALS
HEART RATE: 97 BPM | OXYGEN SATURATION: 98 % | BODY MASS INDEX: 31.44 KG/M2 | TEMPERATURE: 98.1 F | SYSTOLIC BLOOD PRESSURE: 122 MMHG | HEIGHT: 70 IN | DIASTOLIC BLOOD PRESSURE: 84 MMHG | WEIGHT: 219.6 LBS

## 2022-10-19 DIAGNOSIS — G47.33 OSA ON CPAP: Primary | ICD-10-CM

## 2022-10-19 DIAGNOSIS — E66.9 OBESITY (BMI 30-39.9): ICD-10-CM

## 2022-10-19 DIAGNOSIS — Z99.89 OSA ON CPAP: Primary | ICD-10-CM

## 2022-10-19 PROCEDURE — G8484 FLU IMMUNIZE NO ADMIN: HCPCS | Performed by: NURSE PRACTITIONER

## 2022-10-19 PROCEDURE — 99213 OFFICE O/P EST LOW 20 MIN: CPT | Performed by: NURSE PRACTITIONER

## 2022-10-19 PROCEDURE — 1036F TOBACCO NON-USER: CPT | Performed by: NURSE PRACTITIONER

## 2022-10-19 PROCEDURE — G8417 CALC BMI ABV UP PARAM F/U: HCPCS | Performed by: NURSE PRACTITIONER

## 2022-10-19 PROCEDURE — G8427 DOCREV CUR MEDS BY ELIG CLIN: HCPCS | Performed by: NURSE PRACTITIONER

## 2022-10-19 ASSESSMENT — ENCOUNTER SYMPTOMS
CHEST TIGHTNESS: 0
EYES NEGATIVE: 1
VOMITING: 0
RESPIRATORY NEGATIVE: 1
GASTROINTESTINAL NEGATIVE: 1
ALLERGIC/IMMUNOLOGIC NEGATIVE: 1
NAUSEA: 0
WHEEZING: 0
DIARRHEA: 0
STRIDOR: 0

## 2022-10-19 NOTE — PROGRESS NOTES
[facility]      Lovely Kellogg         [id number]  10/19/2022   Chief Complaint   Patient presents with    Follow-up     GARETT follow up with download after titration, 3/10/22. Pt of Dr. Mina Ellington    PAP Download:   Original or initial AHI: 7.5    Date of initial study: 10/26/21      Compliant  93%   Noncompliant 0 %     PAP Type AutoSet Level  Min 9 cmH20 Max 16 cmH20   Avg Hrs/Day 4 hours 36 minutes  AHI: 2.9   Recorded compliance dates 9/18/22-10/17/22  Machine/Mfg:   [x] ResMed    [] Respironics/Dreamstation   Interface:   [] Nasal    [] Nasal pillows   [x] FFM      Provider:      [x] [facility]     []Terell     [] Paula    [] Dinorah Aldridge    [] [place]               [] [facility]      [] [facility]    [] [address]      [] Other    Neck Size: 16 inches  Mallampati 4  ESS:    SAQLI: 62    Here is a scan of the most recent download:                Presentation:   Kelin Bahena presents for sleep medicine follow up for obstructive sleep apnea  Since the last visit, Kelin Bahena has been compliant with his APAP therapy   Awake and alert today in the office     Equipment issues: The pressure is  acceptable, the mask is acceptable     Sleep issues:  Do you feel better? Yes  More rested? Yes   Better concentration? NA    Progress History:   Since last visit any new medical issues? No  New ER or hospital visits? No  Any new or changes in medicines? No  Any new sleep medicines? No    Review of Systems -   Review of Systems   Constitutional: Negative. Negative for chills, fever and unexpected weight change. HENT: Negative. Eyes: Negative. Respiratory: Negative. Negative for chest tightness, wheezing and stridor. Cardiovascular:  Negative for chest pain and leg swelling. Gastrointestinal: Negative. Negative for diarrhea, nausea and vomiting. Endocrine: Negative. Genitourinary: Negative. Negative for dysuria. Musculoskeletal:  Positive for gait problem.    Skin: Negative. Allergic/Immunologic: Negative. Hematological: Negative. Psychiatric/Behavioral: Negative. Physical Exam:    BMI:  Body mass index is 31.51 kg/m². Wt Readings from Last 3 Encounters:   10/19/22 219 lb 9.6 oz (99.6 kg)   09/29/22 220 lb (99.8 kg)   08/11/22 220 lb (99.8 kg)     Weight stable / unchanged  Vitals: /84 (Site: Left Upper Arm, Position: Sitting, Cuff Size: Medium Adult)   Pulse 97   Temp 98.1 °F (36.7 °C) (Oral)   Ht 5' 10\" (1.778 m)   Wt 219 lb 9.6 oz (99.6 kg)   SpO2 98% Comment: Patient on room air  BMI 31.51 kg/m²       Physical Exam  Vitals and nursing note reviewed. Constitutional:       General: He is not in acute distress. Appearance: He is well-developed. He is obese. HENT:      Head: Normocephalic and atraumatic. Neck:      Trachea: No tracheal deviation. Cardiovascular:      Rate and Rhythm: Normal rate and regular rhythm. Heart sounds: Normal heart sounds. No murmur heard. Pulmonary:      Effort: Pulmonary effort is normal. No respiratory distress. Breath sounds: Normal breath sounds. No stridor. No wheezing or rales. Chest:      Chest wall: No tenderness. Abdominal:      General: Bowel sounds are normal. There is no distension. Palpations: Abdomen is soft. Skin:     General: Skin is warm and dry. Capillary Refill: Capillary refill takes less than 2 seconds. Neurological:      Mental Status: He is alert and oriented to person, place, and time. Motor: Weakness present. Gait: Gait abnormal.   Psychiatric:         Behavior: Behavior normal.         Thought Content: Thought content normal.         Judgment: Judgment normal.     ASSESSMENT/DIAGNOSIS     Diagnosis Orders   1. GARETT on CPAP        2. Obesity (BMI 30-39. 9)             Plan   Do you need any equipment today?  No    - Download reviewed and discussed with patient  - He  was advised to continue current positive airway pressure therapy with above described pressure. - He  advised to keep good compliance with current recommended pressure to get optimal results and clinical improvement  - Recommend 7-9 hours of sleep with PAP  - He was advised to call [employer] company regarding supplies if needed.   -He call my office for earlier appointment if needed for worsening of sleep symptoms.   - He was instructed on weight loss  - Rjei Carmona was educated about my impression and plan. Patient verbalizesunderstanding.   We will see Hans Del Rio back in: 6 months with download    Information added by my medical assistant/LPN was reviewed today     Electronically signed by ISHAAN Brown CNP on 10/19/2022 at 1:36 PM

## 2022-11-14 ENCOUNTER — OFFICE VISIT (OUTPATIENT)
Dept: FAMILY MEDICINE CLINIC | Age: 47
End: 2022-11-14
Payer: COMMERCIAL

## 2022-11-14 ENCOUNTER — TELEPHONE (OUTPATIENT)
Dept: UROLOGY | Age: 47
End: 2022-11-14

## 2022-11-14 ENCOUNTER — NURSE ONLY (OUTPATIENT)
Dept: LAB | Age: 47
End: 2022-11-14

## 2022-11-14 VITALS
SYSTOLIC BLOOD PRESSURE: 118 MMHG | HEART RATE: 99 BPM | OXYGEN SATURATION: 97 % | BODY MASS INDEX: 30.22 KG/M2 | DIASTOLIC BLOOD PRESSURE: 52 MMHG | WEIGHT: 210.6 LBS

## 2022-11-14 DIAGNOSIS — E11.00 TYPE 2 DIABETES MELLITUS WITH HYPEROSMOLARITY WITHOUT COMA, WITHOUT LONG-TERM CURRENT USE OF INSULIN (HCC): ICD-10-CM

## 2022-11-14 DIAGNOSIS — J45.20 MILD INTERMITTENT ASTHMA WITHOUT COMPLICATION: ICD-10-CM

## 2022-11-14 DIAGNOSIS — I10 BENIGN ESSENTIAL HTN: ICD-10-CM

## 2022-11-14 DIAGNOSIS — E55.9 HYPOVITAMINOSIS D: ICD-10-CM

## 2022-11-14 DIAGNOSIS — F32.9 REACTIVE DEPRESSION: ICD-10-CM

## 2022-11-14 DIAGNOSIS — D50.9 IRON DEFICIENCY ANEMIA, UNSPECIFIED IRON DEFICIENCY ANEMIA TYPE: ICD-10-CM

## 2022-11-14 DIAGNOSIS — Z00.01 ENCOUNTER FOR WELL ADULT EXAM WITH ABNORMAL FINDINGS: Primary | ICD-10-CM

## 2022-11-14 DIAGNOSIS — N39.46 MIXED STRESS AND URGE URINARY INCONTINENCE: ICD-10-CM

## 2022-11-14 DIAGNOSIS — G95.0 SYRINX OF SPINAL CORD (HCC): ICD-10-CM

## 2022-11-14 LAB
ALBUMIN SERPL-MCNC: 4.9 G/DL (ref 3.5–5.1)
ALP BLD-CCNC: 146 U/L (ref 38–126)
ALT SERPL-CCNC: 35 U/L (ref 11–66)
ANION GAP SERPL CALCULATED.3IONS-SCNC: 12 MEQ/L (ref 8–16)
AST SERPL-CCNC: 22 U/L (ref 5–40)
AVERAGE GLUCOSE: 120 MG/DL (ref 70–126)
BASOPHILS # BLD: 0.5 %
BASOPHILS ABSOLUTE: 0 THOU/MM3 (ref 0–0.1)
BILIRUB SERPL-MCNC: 0.7 MG/DL (ref 0.3–1.2)
BUN BLDV-MCNC: 13 MG/DL (ref 7–22)
CALCIUM SERPL-MCNC: 10.4 MG/DL (ref 8.5–10.5)
CHLORIDE BLD-SCNC: 105 MEQ/L (ref 98–111)
CHOLESTEROL, TOTAL: 125 MG/DL (ref 100–199)
CO2: 28 MEQ/L (ref 23–33)
CREAT SERPL-MCNC: 0.9 MG/DL (ref 0.4–1.2)
EOSINOPHIL # BLD: 5.3 %
EOSINOPHILS ABSOLUTE: 0.3 THOU/MM3 (ref 0–0.4)
ERYTHROCYTE [DISTWIDTH] IN BLOOD BY AUTOMATED COUNT: 12.3 % (ref 11.5–14.5)
ERYTHROCYTE [DISTWIDTH] IN BLOOD BY AUTOMATED COUNT: 38.6 FL (ref 35–45)
GFR SERPL CREATININE-BSD FRML MDRD: > 60 ML/MIN/1.73M2
GLUCOSE BLD-MCNC: 99 MG/DL (ref 70–108)
HBA1C MFR BLD: 6 % (ref 4.4–6.4)
HCT VFR BLD CALC: 48.9 % (ref 42–52)
HDLC SERPL-MCNC: 40 MG/DL
HEMOGLOBIN: 15.9 GM/DL (ref 14–18)
IMMATURE GRANS (ABS): 0.01 THOU/MM3 (ref 0–0.07)
IMMATURE GRANULOCYTES: 0.2 %
IRON SATURATION: 40 % (ref 20–50)
IRON: 109 UG/DL (ref 65–195)
LDL CHOLESTEROL CALCULATED: 67 MG/DL
LYMPHOCYTES # BLD: 21.7 %
LYMPHOCYTES ABSOLUTE: 1.4 THOU/MM3 (ref 1–4.8)
MCH RBC QN AUTO: 28.1 PG (ref 26–33)
MCHC RBC AUTO-ENTMCNC: 32.5 GM/DL (ref 32.2–35.5)
MCV RBC AUTO: 86.4 FL (ref 80–94)
MONOCYTES # BLD: 4.9 %
MONOCYTES ABSOLUTE: 0.3 THOU/MM3 (ref 0.4–1.3)
NUCLEATED RED BLOOD CELLS: 0 /100 WBC
PLATELET # BLD: 284 THOU/MM3 (ref 130–400)
PMV BLD AUTO: 9.9 FL (ref 9.4–12.4)
POTASSIUM SERPL-SCNC: 4.6 MEQ/L (ref 3.5–5.2)
RBC # BLD: 5.66 MILL/MM3 (ref 4.7–6.1)
SEG NEUTROPHILS: 67.4 %
SEGMENTED NEUTROPHILS ABSOLUTE COUNT: 4.4 THOU/MM3 (ref 1.8–7.7)
SODIUM BLD-SCNC: 145 MEQ/L (ref 135–145)
TOTAL IRON BINDING CAPACITY: 275 UG/DL (ref 171–450)
TOTAL PROTEIN: 7.5 G/DL (ref 6.1–8)
TRIGL SERPL-MCNC: 91 MG/DL (ref 0–199)
TSH SERPL DL<=0.05 MIU/L-ACNC: 2.57 UIU/ML (ref 0.4–4.2)
VITAMIN D 25-HYDROXY: 23 NG/ML (ref 30–100)
WBC # BLD: 6.6 THOU/MM3 (ref 4.8–10.8)

## 2022-11-14 PROCEDURE — 3078F DIAST BP <80 MM HG: CPT | Performed by: NURSE PRACTITIONER

## 2022-11-14 PROCEDURE — G8484 FLU IMMUNIZE NO ADMIN: HCPCS | Performed by: NURSE PRACTITIONER

## 2022-11-14 PROCEDURE — 3074F SYST BP LT 130 MM HG: CPT | Performed by: NURSE PRACTITIONER

## 2022-11-14 PROCEDURE — 99396 PREV VISIT EST AGE 40-64: CPT | Performed by: NURSE PRACTITIONER

## 2022-11-14 RX ORDER — LISINOPRIL 5 MG/1
2.5 TABLET ORAL DAILY
Qty: 90 TABLET | Refills: 1 | Status: SHIPPED | OUTPATIENT
Start: 2022-11-14

## 2022-11-14 RX ORDER — CITALOPRAM 10 MG/1
10 TABLET ORAL DAILY
Qty: 30 TABLET | Refills: 5 | Status: SHIPPED | OUTPATIENT
Start: 2022-11-14

## 2022-11-14 SDOH — ECONOMIC STABILITY: FOOD INSECURITY: WITHIN THE PAST 12 MONTHS, THE FOOD YOU BOUGHT JUST DIDN'T LAST AND YOU DIDN'T HAVE MONEY TO GET MORE.: NEVER TRUE

## 2022-11-14 SDOH — ECONOMIC STABILITY: FOOD INSECURITY: WITHIN THE PAST 12 MONTHS, YOU WORRIED THAT YOUR FOOD WOULD RUN OUT BEFORE YOU GOT MONEY TO BUY MORE.: NEVER TRUE

## 2022-11-14 ASSESSMENT — ENCOUNTER SYMPTOMS
ALLERGIC/IMMUNOLOGIC NEGATIVE: 1
BACK PAIN: 1
COUGH: 0
CHEST TIGHTNESS: 0
VOMITING: 0
EYE PAIN: 0
CONSTIPATION: 0
WHEEZING: 0
EYE ITCHING: 0
EYE REDNESS: 0
DIARRHEA: 0
NAUSEA: 0

## 2022-11-14 ASSESSMENT — PATIENT HEALTH QUESTIONNAIRE - PHQ9
SUM OF ALL RESPONSES TO PHQ QUESTIONS 1-9: 0
1. LITTLE INTEREST OR PLEASURE IN DOING THINGS: 0
2. FEELING DOWN, DEPRESSED OR HOPELESS: 0
SUM OF ALL RESPONSES TO PHQ QUESTIONS 1-9: 0
SUM OF ALL RESPONSES TO PHQ9 QUESTIONS 1 & 2: 0

## 2022-11-14 ASSESSMENT — SOCIAL DETERMINANTS OF HEALTH (SDOH): HOW HARD IS IT FOR YOU TO PAY FOR THE VERY BASICS LIKE FOOD, HOUSING, MEDICAL CARE, AND HEATING?: NOT HARD AT ALL

## 2022-11-14 NOTE — TELEPHONE ENCOUNTER
Contacted by pt's PCP July Linda. Patient is having ongoing bladder control issues, and ED. Please facilitate scheduling of appt to further evaluate in office.

## 2022-11-14 NOTE — PATIENT INSTRUCTIONS
Cut lisinopril in half and take half tablet per day. Let me know in 2 weeks how you are doing with the citalopram.   Take the citalopram in the morning.

## 2022-11-14 NOTE — Clinical Note
Rochelle Faye, could you please schedule OV for pt to see you regarding ongoing bladder control issues, and ED. New EMG in care everywhere documenting his ongoing spinal compression issues.  Thank you

## 2022-11-14 NOTE — PROGRESS NOTES
[address]  Dept: Jackie Selby (:  1975) is a 50 y.o. male,Established patient, here for evaluation of the following chief complaint(s):  Hypertension      ASSESSMENT/PLAN:  I have reviewed the patient's medical history in detail and updated the computerized patient record. HPI/ROS per the patient and caregiver. Overall non toxic in appearance. Answers questions appropriately. Conditions discussed and addressed this visit include:   Here for well adult and follow up on his chronic conditions. Refer back to urology with assistance on incontinence and ED. Decrease lisinopril to 0.5 tablet per day. Continue to monitor blood pressure. Sugar controlled. Will continue to monitor   May need to consider metformin. Pt will obtain the name of cardiology his family is using, and will refer for cardiac check up. Currently no chest pain, leg swelling, palpitations. Labs today  The patient is advised to follow a low fat, low cholesterol diet, attempt to lose weight, reduce salt in diet and cooking, reduce exposure to stress, improve dietary compliance, continue current medications, continue current healthy lifestyle patterns, and return for routine annual checkups. 1. Encounter for well adult exam with abnormal findings  2. Benign essential HTN  -     lisinopril (PRINIVIL;ZESTRIL) 5 MG tablet; Take 0.5 tablets by mouth daily, Disp-90 tablet, R-1Normal  -     Lipid Panel; Future  -     Comprehensive Metabolic Panel; Future  -     CBC with Auto Differential; Future  -     Hemoglobin A1C; Future  -     TSH with Reflex; Future  -     Vitamin D 25 Hydroxy; Future  -     Iron; Future  -     Iron Binding Capacity; Future  -     Iron Saturation; Future  3. Type 2 diabetes mellitus with hyperosmolarity without coma, without long-term current use of insulin (HCC)  -     Lipid Panel; Future  -     Comprehensive Metabolic Panel;  Future  -     CBC with Auto Differential; Future  -     Hemoglobin A1C; Future  -     TSH with Reflex; Future  -     Vitamin D 25 Hydroxy; Future  -     Iron; Future  -     Iron Binding Capacity; Future  -     Iron Saturation; Future  4. Mild intermittent asthma without complication  -     Lipid Panel; Future  -     Comprehensive Metabolic Panel; Future  -     CBC with Auto Differential; Future  -     Hemoglobin A1C; Future  -     TSH with Reflex; Future  -     Vitamin D 25 Hydroxy; Future  -     Iron; Future  -     Iron Binding Capacity; Future  -     Iron Saturation; Future  5. Syrinx of spinal cord ([address]  6. Hypovitaminosis D  -     Vitamin D 25 Hydroxy; Future  7. Mixed stress and urge urinary incontinence  8. Iron deficiency anemia, unspecified iron deficiency anemia type  -     Iron; Future  -     Iron Binding Capacity; Future  -     Iron Saturation; Future  9. Reactive depression  -     citalopram (CELEXA) 10 MG tablet; Take 1 tablet by mouth daily, Disp-30 tablet, R-5Normal    Return in about 3 months (around 2023) for Results review, Routine follow up, Disease management. SUBJECTIVE/OBJECTIVE:  HPI  Here for 6 month follow up  New EMG shows spinal cord compression the thoracic region  Ongoing bladder issues. Can have complete loss of control at times  Is unable to tell if bladder is full  Trying to empty every couple of hours. Trouble with ED as well. Unable to obtain and maintain erection  No issue with bowel function at this time  Depressed/anxious with new results. Unsure if he will need more surgery   Blood pressure good today. Has not been checking at home  Concern for hypotension and lightheadedness  Reports sister was just treated for early CAD. [email or web address]. Pt would like to be checked as well due to his dad  at 39 with early heart disease and now with his sisters current illness  Blood sugar wnl at this time.    Hemoglobin A1C   Date Value Ref Range Status   2022 6.2 % Final     Weight down 15 lbs now. Review of Systems   Constitutional:  Positive for activity change and fatigue. Negative for appetite change, fever and unexpected weight change. HENT: Negative. Eyes:  Negative for pain, redness and itching. Respiratory:  Negative for cough, chest tightness and wheezing. Cardiovascular:  Negative for palpitations and leg swelling. Gastrointestinal:  Negative for constipation, diarrhea, nausea and vomiting. Endocrine: Negative for cold intolerance and heat intolerance. Genitourinary:  Positive for difficulty urinating ( improving). Negative for frequency, penile swelling, scrotal swelling and urgency. Musculoskeletal:  Positive for back pain, gait problem and myalgias. Negative for arthralgias and joint swelling. Skin: Negative. Allergic/Immunologic: Negative. Neurological:  Positive for weakness and numbness ( ble). Negative for dizziness and tremors. Hematological:  Negative for adenopathy. Does not bruise/bleed easily. Psychiatric/Behavioral: Negative. Physical Exam  Vitals and nursing note reviewed. Constitutional:       General: He is not in acute distress. Appearance: Normal appearance. He is well-developed. He is obese. He is not ill-appearing. HENT:      Head: Normocephalic. Right Ear: Tympanic membrane, ear canal and external ear normal.      Left Ear: Tympanic membrane, ear canal and external ear normal.      Nose: Nose normal. No rhinorrhea. Mouth/Throat:      Mouth: Mucous membranes are moist.      Pharynx: No posterior oropharyngeal erythema. Eyes:      General: No scleral icterus. Right eye: No discharge. Left eye: No discharge. Conjunctiva/sclera: Conjunctivae normal.   Neck:      Vascular: No carotid bruit. Cardiovascular:      Rate and Rhythm: Normal rate and regular rhythm. Pulses: Normal pulses. Dorsalis pedis pulses are 2+ on the right side and 2+ on the left side.         Posterior tibial pulses are 2+ on the right side and 2+ on the left side. Heart sounds: Normal heart sounds. Pulmonary:      Effort: Pulmonary effort is normal.      Breath sounds: Normal breath sounds. No wheezing or rales. Abdominal:      General: Abdomen is flat. Bowel sounds are normal. There is no distension. Palpations: Abdomen is soft. Tenderness: There is no abdominal tenderness. Musculoskeletal:         General: No tenderness. Normal range of motion. Cervical back: Normal range of motion. No tenderness. No muscular tenderness. Right lower leg: No edema. Left lower leg: No edema. Right foot: No deformity, foot drop or prominent metatarsal heads. Left foot: No deformity, foot drop or prominent metatarsal heads. Feet:      Right foot:      Protective Sensation: 5 sites tested. 5 sites sensed. Skin integrity: Skin integrity normal.      Left foot:      Protective Sensation: 5 sites tested. 5 sites sensed. Skin integrity: Skin integrity normal.   Lymphadenopathy:      Cervical: No cervical adenopathy. Skin:     General: Skin is warm and dry. Capillary Refill: Capillary refill takes less than 2 seconds. Findings: No erythema or rash. Neurological:      Mental Status: He is alert and oriented to person, place, and time. Mental status is at baseline. Motor: Weakness present. Coordination: Coordination abnormal.      Gait: Gait abnormal.      Comments: Using walker for ambulation     Psychiatric:         Mood and Affect: Mood normal.         Behavior: Behavior normal.         Thought Content: Thought content normal.         Judgment: Judgment normal.               An electronic signature was used to authenticate this note.     --July Linda, ISHAAN - CNP

## 2022-11-15 ENCOUNTER — TELEPHONE (OUTPATIENT)
Dept: FAMILY MEDICINE CLINIC | Age: 47
End: 2022-11-15

## 2022-11-15 DIAGNOSIS — J01.10 ACUTE NON-RECURRENT FRONTAL SINUSITIS: ICD-10-CM

## 2022-11-15 DIAGNOSIS — E11.9 CONTROLLED TYPE 2 DIABETES MELLITUS WITHOUT COMPLICATION, WITHOUT LONG-TERM CURRENT USE OF INSULIN (HCC): ICD-10-CM

## 2022-11-15 RX ORDER — ATORVASTATIN CALCIUM 40 MG/1
40 TABLET, FILM COATED ORAL DAILY
Qty: 90 TABLET | Refills: 1 | Status: SHIPPED | OUTPATIENT
Start: 2022-11-15

## 2022-11-15 RX ORDER — LANSOPRAZOLE 30 MG/1
30 CAPSULE, DELAYED RELEASE ORAL DAILY
Qty: 90 CAPSULE | Refills: 1 | Status: SHIPPED | OUTPATIENT
Start: 2022-11-15

## 2022-11-15 RX ORDER — CETIRIZINE HYDROCHLORIDE 10 MG/1
10 TABLET ORAL DAILY
Qty: 90 TABLET | Refills: 1 | Status: SHIPPED | OUTPATIENT
Start: 2022-11-15

## 2022-11-15 RX ORDER — FAMOTIDINE 40 MG/1
40 TABLET, FILM COATED ORAL NIGHTLY
Qty: 90 TABLET | Refills: 1 | Status: SHIPPED | OUTPATIENT
Start: 2022-11-15

## 2022-11-15 NOTE — TELEPHONE ENCOUNTER
----- Message from ISHAAN Qureshi CNP sent at 11/15/2022  9:08 AM EST -----  Labs are all within pt normal range. Can stop the iron. Continue to limit high sugar and simple starch food. No other changes to medications. Refills have been sent in. See back in 3 months.

## 2022-11-30 ENCOUNTER — TELEPHONE (OUTPATIENT)
Dept: PHYSICAL MEDICINE AND REHAB | Age: 47
End: 2022-11-30

## 2022-11-30 NOTE — TELEPHONE ENCOUNTER
Patient said he had a call from someone at Dr. Prince Rosario office, but didn't know what it was about.  DONV 12/23/2022  Please advise

## 2022-11-30 NOTE — TELEPHONE ENCOUNTER
Notified pt of unknown reason of call, called back and informed unable to find a reason for call except has appt in dec and no other information was needed.

## 2023-01-11 ENCOUNTER — OFFICE VISIT (OUTPATIENT)
Dept: PHYSICAL MEDICINE AND REHAB | Age: 48
End: 2023-01-11

## 2023-01-11 VITALS
SYSTOLIC BLOOD PRESSURE: 142 MMHG | BODY MASS INDEX: 30.06 KG/M2 | DIASTOLIC BLOOD PRESSURE: 80 MMHG | WEIGHT: 210 LBS | HEIGHT: 70 IN

## 2023-01-11 DIAGNOSIS — G95.0 SYRINX OF SPINAL CORD (HCC): ICD-10-CM

## 2023-01-11 DIAGNOSIS — R25.2 SPASTICITY: Primary | ICD-10-CM

## 2023-01-11 DIAGNOSIS — R29.898 LEG WEAKNESS, BILATERAL: ICD-10-CM

## 2023-01-11 DIAGNOSIS — M47.14 MYELOPATHY OF THORACIC REGION: ICD-10-CM

## 2023-01-11 RX ORDER — LEVETIRACETAM 500 MG/1
500 TABLET ORAL 2 TIMES DAILY
Qty: 60 TABLET | Refills: 2 | Status: SHIPPED | OUTPATIENT
Start: 2023-01-11

## 2023-01-12 NOTE — PROGRESS NOTES
Chronic Pain/PM&R Clinic Note     Encounter Date: 9/29/22    Subjective:   Chief Complaint:   Chief Complaint   Patient presents with    Botox Injection     200 units Spasticity    Medication Refill     Keppra        History of Present Illness:   Dm Luong is a 50 y.o. male seen in the clinic initially on 01/21/21 upon request from [place], [place] ([address] [place] [address]) for his history of lower limb spasticity. He underwent an arachnoid cyst resection with Dr. Liset Pro on May 14, 2020 after worsening bilateral leg pain. He notes about 2 months after his surgery his legs have been increasingly worse. He describes his pain to be in bilateral legs from the waist down that he describes as a constant 10/10 pain. He states the pain is a constant aching, burning pain. It is worse with any ambulation. He states is pain is improved with rest. He uses a walker for ambulation due to pain and weakness in his legs. He has been taking Baclofen 10 mg twice a day which does not help too much. He denies any saddle anesthesia or bowel/bladder incontinence. He deals with a lot of tone/spasticity in his legs. He reports stretching about 4 times a day. He was previously working at [employer] prior to his surgery. Today, 1/11/2023, patient presents for planned follow-up for management of lower limb spasticity and chronic pain. He states that he continues to do very well with his Botox injections. He feels like this provides significant relief in the tone in his legs and allows him to do a lot more stand transfers and ambulate without his legs locking up on him. He continues taking his Keppra and baclofen with good relief and does need refills on his medications. He states that he had his EMG/NCS done with the local neurology group here but states that he will need more advanced care based on my conversation and is wondering if he needs to go to [place] for further evaluation.     History of Interventions:   Surgery: arachnoid cyst resection - Dr. Wilbert Cintron (5/14/20)  Injections: None    Current Treatment Medications:   Baclofen 20 mg 3 times daily  Keppra 500 mg twice daily    Historical Treatment Medications:   Lyrica-leg swelling      Past Medical History:   Diagnosis Date    Asthma     does not know name of inhaler and hasnt used in over a yr    Ataxic gait     Developmental delay     Diet-controlled diabetes mellitus ([facility]     History of syrinx of spinal cord ([facility] 03/17/2020    Incomplete bladder emptying     Myelopathy of thoracic region     Osteopenia determined by x-ray     Spasm of muscle     Urinary dribbling     Urinary leakage     Uses walker     for standing and transfers    Uses wheelchair     can stand and transfer cannot walk due to leg weakness    Vitamin D deficiency 03/12/2020    Vitamin D 25OH 24       Past Surgical History:   Procedure Laterality Date    BACK SURGERY      CRANIOTOMY N/A 5/14/2020    Thoracic Spine and Laminectomy Extraction of Arachnoid Cyst/Band and 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 performed by Ian Hanson MD at [address] [place] [address]  09/16/2020    IR angiogram carotid cerebral bilateral       Family History   Problem Relation Age of Onset    Arthritis Mother     Diabetes Mother     Heart Disease Father     Cancer Neg Hx     High Blood Pressure Neg Hx     Stroke Neg Hx        Social History     Socioeconomic History    Marital status: Single     Spouse name: Not on file    Number of children: Not on file    Years of education: Not on file    Highest education level: Not on file   Occupational History    Not on file   Tobacco Use    Smoking status: Never    Smokeless tobacco: Never   Vaping Use    Vaping Use: Never used   Substance and Sexual Activity    Alcohol use: No    Drug use: No    Sexual activity: Not Currently   Other Topics Concern    Not on file   Social History Narrative    Not on file     Social Determinants of Health     Financial Resource Strain: Low Risk Difficulty of Paying Living Expenses: Not hard at all   Food Insecurity: No Food Insecurity    Worried About Running Out of Food in the Last Year: Never true    Ran Out of Food in the Last Year: Never true   Transportation Needs: Not on file   Physical Activity: Not on file   Stress: Not on file   Social Connections: Not on file   Intimate Partner Violence: Not on file   Housing Stability: Not on file       Medications & Allergies:   Current Outpatient Medications   Medication Instructions    acetaminophen (TYLENOL) 500 mg, Oral, EVERY 6 HOURS PRN    atorvastatin (LIPITOR) 40 mg, Oral, DAILY    cetirizine (ZYRTEC) 10 mg, Oral, DAILY    citalopram (CELEXA) 10 mg, Oral, DAILY    Elastic Bandages & Supports (MEDICAL LEGWEAR/KNEE HIGH) MISC Please dispense 2 pair of XL knee compression hose, 15-20mmHg compression.     famotidine (PEPCID) 40 mg, Oral, Nightly    ferrous sulfate (FE TABS) 325 mg, Oral, 2 TIMES DAILY    Foot Care Products (SOCK AID) MISC 1 Device, Does not apply, DAILY    ibuprofen (ADVIL;MOTRIN) 600 mg, Oral, 3 TIMES DAILY PRN    Incontinence Supply Disposable (POISE HOURGLASS SHAPE) PADS 1 Device, Does not apply, 4 TIMES DAILY    lansoprazole (PREVACID) 30 mg, Oral, DAILY    levETIRAcetam (KEPPRA) 500 mg, Oral, 2 TIMES DAILY    lisinopril (PRINIVIL;ZESTRIL) 2.5 mg, Oral, DAILY    oxybutynin (DITROPAN XL) 10 mg, Oral, DAILY    tamsulosin (FLOMAX) 0.4 mg, Oral, DAILY    Vitamin B-12 2,500 mcg, SubLINGual, DAILY    vitamin D-3 (CHOLECALCIFEROL) 5,000 Units, Oral, DAILY       Allergies   Allergen Reactions    Norco [Hydrocodone-Acetaminophen] Nausea And Vomiting       Review of Systems:   Constitutional: negative for weight changes or fevers  Genitourinary: negative for bowel/bladder incontinence   Musculoskeletal: positive for leg pain  Neurological:  Positive for spasticity and bilateral leg weakness  Behavioral/Psych: negative for anxiety/depression   All other systems reviewed and are negative    Objective:     Vitals:    01/11/23 1305   BP: (!) 142/80       Constitutional: Pleasant, no acute distress   Head: Normocephalic, atraumatic   Eyes: Conjunctivae normal   Neck: Supple, symmetrical   Lungs: Normal respiratory effort, non-labored breathing   Cardiovascular: Limbs warm and well perfused   Abdomen: Non-protruded   Musculoskeletal: Increased tone/spasticity in lower limbs (improved)  · Lumbar Spine: Lumbar paraspinals tender bilaterally. SLR neg bilaterally. Neurological: Cranial nerves II-XII grossly intact. · Gait - Spastic gait. Ambulates with assist device. · Motor: 4/5 motor strength in bilateral HF, KE, KF, ADF, APF  · Sensory: LT sensation diminished in lower limbs diffusely  · Reflexes: hyper-reflexia in bilateral achilles and bilateral patellar reflexes, sustained ankle clonus b/l  Skin: No rashes or lesions visible in lower limbs  Psychological: Cooperative, no exaggerated pain behaviors       Assessment:    Diagnosis Orders   1. Spasticity        2. Leg weakness, bilateral        3. Syrinx of spinal cord ([address]        4. Myelopathy of thoracic region                  Lovely Kellogg is a 50 y.o.male presenting to the pain clinic for evaluation of lower limb spasticity. At this point, we will increase his baclofen to 10 mg 3 times a day and likely will have to be up to 20 mg 3 times a day to help with the spasticity in the lower limbs. In addition, he is obtaining good benefit on baclofen 20 mg 3 times a day in regards to his overall spasticity/tone and ambulation. He had noted swelling in his legs from Lyrica and we stopped this medication. He is doing well with his Keppra for neuropathic pain coverage. He underwent repeat Botox injection today in clinic for lower limb spasticity. Plan: The following treatment recommendations and plan were discussed in detail with Lovely Kellogg. Imaging:  None    Interventions:  None    Adjuvants:    In light of the presence of a neuropathic component of pain and side effects to Lyrica, I have continued his Keppra at 500 mg twice a day. For continued chronic pain with associated musculoskeletal component, we will continue his Baclofen to 20 mg three times a day. Interventions: For pain secondary to spasticity and failure to control spasticity with oral medications, patient underwent Botox injections today. Please see attached procedure note. Multidisciplinary Pain Management:   In the presence of complex, chronic, and multi-factorial pain, the importance of a multidisciplinary approach to pain management in the patients management regimen was emphasized and discussed in great detail. PHYSICAL THERAPY: I advised the patient to continue gentle stretching atleast 4 times a day.      Referrals:  None    Prescriptions Written This Visit:   Keppra 250 mg   Baclofen 20 mg     Follow-up: 12 weeks (repeat Botox)      Melba Mackey DO  Interventional Pain Management/PM&R   [place]

## 2023-04-06 ENCOUNTER — OFFICE VISIT (OUTPATIENT)
Dept: PHYSICAL MEDICINE AND REHAB | Age: 48
End: 2023-04-06

## 2023-04-06 VITALS
HEIGHT: 70 IN | DIASTOLIC BLOOD PRESSURE: 66 MMHG | BODY MASS INDEX: 30.06 KG/M2 | WEIGHT: 210 LBS | SYSTOLIC BLOOD PRESSURE: 118 MMHG

## 2023-04-06 DIAGNOSIS — G95.0 SYRINX OF SPINAL CORD (HCC): ICD-10-CM

## 2023-04-06 DIAGNOSIS — M62.838 SPASM OF MUSCLE: ICD-10-CM

## 2023-04-06 DIAGNOSIS — M79.2 NEUROPATHIC PAIN: ICD-10-CM

## 2023-04-06 DIAGNOSIS — R29.898 LEG WEAKNESS, BILATERAL: ICD-10-CM

## 2023-04-06 DIAGNOSIS — R25.2 SPASTICITY: Primary | ICD-10-CM

## 2023-04-06 RX ORDER — LEVETIRACETAM 750 MG/1
750 TABLET ORAL 2 TIMES DAILY
Qty: 60 TABLET | Refills: 3 | Status: SHIPPED | OUTPATIENT
Start: 2023-04-06

## 2023-04-06 NOTE — PROGRESS NOTES
Chronic Pain/PM&R Clinic Note     Encounter Date: 4/6/23    Subjective:   Chief Complaint:   Chief Complaint   Patient presents with    Botox Injection     Botox 200 units Spasticity     Discuss Medications     Increase Keppra        History of Present Illness:   Cuco Carter is a 50 y.o. male seen in the clinic initially on 01/21/21 upon request from [place], [place] ([address] [place] [address]) for his history of lower limb spasticity. He underwent an arachnoid cyst resection with Dr. Yun Martinez on May 14, 2020 after worsening bilateral leg pain. He notes about 2 months after his surgery his legs have been increasingly worse. He describes his pain to be in bilateral legs from the waist down that he describes as a constant 10/10 pain. He states the pain is a constant aching, burning pain. It is worse with any ambulation. He states is pain is improved with rest. He uses a walker for ambulation due to pain and weakness in his legs. He has been taking Baclofen 10 mg twice a day which does not help too much. He denies any saddle anesthesia or bowel/bladder incontinence. He deals with a lot of tone/spasticity in his legs. He reports stretching about 4 times a day. He was previously working at [employer] prior to his surgery. Today, 4/6/2023, patient presents for planned follow-up for management of lower limb spasticity and chronic pain. He states that he continues to do well with his Botox injections. He feels like this provides significant relief in his lower legs because he has more range of motion and less spasticity. He also has a significant improvement in his ability to stand transfers and ambulate without his legs locking up on him. He feels like since having his ultrasound of his left knee he has noticed worsening pain in his left knee and is wondering if he can up his Keppra as this was working previously for him.     History of Interventions:   Surgery: arachnoid cyst resection - Dr. Liz Blackmon (5/14/20)  Injections:

## 2023-04-18 NOTE — PROGRESS NOTES
[facility]      Marcelo Gastelum         [id number]  4/19/2023   Chief Complaint   Patient presents with    Follow-up     6mo GARETT f/u w/[facility] download. Doing well with PAP. Pt of Dr. Almeida November     PAP Download:   Original or initial AHI: 7.5    Date of initial study: 10/26/21      Compliant  87%     Noncompliant 13 %     PAP Type APAP    Level  9/06hqV1F   Avg Hrs/Day 4hrs 37mins  AHI: 1.7   Recorded compliance dates , 3/19/23  to 4/17/23   Machine/Mfg:   [x] ResMed    [] Respironics/Dreamstation   Interface:   [] Nasal    [] Nasal pillows   [x] FFM      Provider:      [x] -HME     []Terell     [] Paula    [] Amy Johnson    [] [place]               [] [facility]      [] Adaptive    [] [address]      [] Other    Neck Size: 16  Mallampati 4  ESS:  9  SAQLI: 74    Here is a scan of the most recent download:                Presentation:   Lavonne Diana presents for sleep medicine follow up for obstructive sleep apnea and RLS  Since the last visit, Lavonne Diana has been compliant with his PAP therapy and continues to see benefit from its use. Awake and alert today in the office   Slightly obese BMI 32  No medication use for the RLS   Keppra dosage increased due to BLE weakness- ambulates with walker due to weakness   Still trying to get disability      Weight stable / unchanged    Equipment issues: The pressure is  acceptable, the mask is acceptable     Sleep issues:  Do you feel better? Yes  More rested? Yes   Better concentration? yes  Difficulty falling sleep? No  Difficulty staying asleep? No  Snoring? No    Progress History:   Since last visit any new medical issues? No  New ER or hospital visits? No  Any new or changes in medicines? No  Any new sleep medicines? No    Review of Systems -   Review of Systems   Constitutional: Negative. Negative for chills, fever and unexpected weight change. HENT: Negative. Eyes: Negative. Respiratory: Negative.   Negative for

## 2023-04-19 ENCOUNTER — OFFICE VISIT (OUTPATIENT)
Dept: PULMONOLOGY | Age: 48
End: 2023-04-19
Payer: COMMERCIAL

## 2023-04-19 VITALS
TEMPERATURE: 98.5 F | HEIGHT: 70 IN | SYSTOLIC BLOOD PRESSURE: 112 MMHG | HEART RATE: 86 BPM | WEIGHT: 225.4 LBS | OXYGEN SATURATION: 95 % | BODY MASS INDEX: 32.27 KG/M2 | DIASTOLIC BLOOD PRESSURE: 72 MMHG

## 2023-04-19 DIAGNOSIS — E66.9 OBESITY (BMI 30-39.9): ICD-10-CM

## 2023-04-19 DIAGNOSIS — Z99.89 OSA ON CPAP: Primary | ICD-10-CM

## 2023-04-19 DIAGNOSIS — G25.81 RLS (RESTLESS LEGS SYNDROME): ICD-10-CM

## 2023-04-19 DIAGNOSIS — G47.33 OSA ON CPAP: Primary | ICD-10-CM

## 2023-04-19 PROCEDURE — 3078F DIAST BP <80 MM HG: CPT | Performed by: NURSE PRACTITIONER

## 2023-04-19 PROCEDURE — G8427 DOCREV CUR MEDS BY ELIG CLIN: HCPCS | Performed by: NURSE PRACTITIONER

## 2023-04-19 PROCEDURE — G8417 CALC BMI ABV UP PARAM F/U: HCPCS | Performed by: NURSE PRACTITIONER

## 2023-04-19 PROCEDURE — 3074F SYST BP LT 130 MM HG: CPT | Performed by: NURSE PRACTITIONER

## 2023-04-19 PROCEDURE — 1036F TOBACCO NON-USER: CPT | Performed by: NURSE PRACTITIONER

## 2023-04-19 PROCEDURE — 99214 OFFICE O/P EST MOD 30 MIN: CPT | Performed by: NURSE PRACTITIONER

## 2023-04-19 ASSESSMENT — ENCOUNTER SYMPTOMS
DIARRHEA: 0
ALLERGIC/IMMUNOLOGIC NEGATIVE: 1
STRIDOR: 0
CHEST TIGHTNESS: 0
NAUSEA: 0
WHEEZING: 0
VOMITING: 0
EYES NEGATIVE: 1
GASTROINTESTINAL NEGATIVE: 1
RESPIRATORY NEGATIVE: 1

## 2023-05-22 ENCOUNTER — TELEPHONE (OUTPATIENT)
Dept: FAMILY MEDICINE CLINIC | Age: 48
End: 2023-05-22

## 2023-05-22 ENCOUNTER — OFFICE VISIT (OUTPATIENT)
Dept: FAMILY MEDICINE CLINIC | Age: 48
End: 2023-05-22
Payer: COMMERCIAL

## 2023-05-22 ENCOUNTER — NURSE ONLY (OUTPATIENT)
Dept: LAB | Age: 48
End: 2023-05-22

## 2023-05-22 VITALS
BODY MASS INDEX: 34.1 KG/M2 | HEART RATE: 95 BPM | WEIGHT: 225 LBS | HEIGHT: 68 IN | SYSTOLIC BLOOD PRESSURE: 120 MMHG | TEMPERATURE: 98 F | DIASTOLIC BLOOD PRESSURE: 88 MMHG | OXYGEN SATURATION: 98 %

## 2023-05-22 DIAGNOSIS — F32.9 REACTIVE DEPRESSION: ICD-10-CM

## 2023-05-22 DIAGNOSIS — E11.9 CONTROLLED TYPE 2 DIABETES MELLITUS WITHOUT COMPLICATION, WITHOUT LONG-TERM CURRENT USE OF INSULIN (HCC): ICD-10-CM

## 2023-05-22 DIAGNOSIS — E11.00 TYPE 2 DIABETES MELLITUS WITH HYPEROSMOLARITY WITHOUT COMA, WITHOUT LONG-TERM CURRENT USE OF INSULIN (HCC): ICD-10-CM

## 2023-05-22 DIAGNOSIS — E11.9 CONTROLLED TYPE 2 DIABETES MELLITUS WITHOUT COMPLICATION, WITHOUT LONG-TERM CURRENT USE OF INSULIN (HCC): Primary | ICD-10-CM

## 2023-05-22 DIAGNOSIS — N39.46 MIXED STRESS AND URGE URINARY INCONTINENCE: ICD-10-CM

## 2023-05-22 DIAGNOSIS — J30.1 SEASONAL ALLERGIC RHINITIS DUE TO POLLEN: ICD-10-CM

## 2023-05-22 DIAGNOSIS — I10 BENIGN ESSENTIAL HTN: ICD-10-CM

## 2023-05-22 DIAGNOSIS — Z82.41 FAMILY HISTORY OF SUDDEN CARDIAC DEATH: ICD-10-CM

## 2023-05-22 DIAGNOSIS — N31.9 NEUROGENIC BLADDER DISORDER: ICD-10-CM

## 2023-05-22 DIAGNOSIS — H04.123 DRY EYE SYNDROME OF BOTH EYES: ICD-10-CM

## 2023-05-22 DIAGNOSIS — M47.14 MYELOPATHY OF THORACIC REGION: ICD-10-CM

## 2023-05-22 DIAGNOSIS — E55.9 HYPOVITAMINOSIS D: ICD-10-CM

## 2023-05-22 PROBLEM — D50.9 IRON DEFICIENCY ANEMIA: Status: RESOLVED | Noted: 2022-11-14 | Resolved: 2023-05-22

## 2023-05-22 PROBLEM — R73.9 HYPERGLYCEMIA: Status: RESOLVED | Noted: 2021-05-28 | Resolved: 2023-05-22

## 2023-05-22 PROBLEM — N39.9 URINARY PROBLEM IN MALE: Status: RESOLVED | Noted: 2020-08-23 | Resolved: 2023-05-22

## 2023-05-22 PROBLEM — R60.0 BILATERAL LEG EDEMA: Status: RESOLVED | Noted: 2021-05-28 | Resolved: 2023-05-22

## 2023-05-22 PROBLEM — R63.5 ABNORMAL WEIGHT GAIN: Status: RESOLVED | Noted: 2021-06-30 | Resolved: 2023-05-22

## 2023-05-22 PROBLEM — G47.9 SLEEP DISTURBANCES: Status: RESOLVED | Noted: 2021-06-30 | Resolved: 2023-05-22

## 2023-05-22 LAB
ALBUMIN SERPL BCG-MCNC: 4.9 G/DL (ref 3.5–5.1)
ALP SERPL-CCNC: 130 U/L (ref 38–126)
ALT SERPL W/O P-5'-P-CCNC: 31 U/L (ref 11–66)
ANION GAP SERPL CALC-SCNC: 14 MEQ/L (ref 8–16)
AST SERPL-CCNC: 17 U/L (ref 5–40)
BILIRUB SERPL-MCNC: 0.8 MG/DL (ref 0.3–1.2)
BUN SERPL-MCNC: 11 MG/DL (ref 7–22)
CALCIUM SERPL-MCNC: 10 MG/DL (ref 8.5–10.5)
CHLORIDE SERPL-SCNC: 102 MEQ/L (ref 98–111)
CHOLEST SERPL-MCNC: 156 MG/DL (ref 100–199)
CO2 SERPL-SCNC: 26 MEQ/L (ref 23–33)
CREAT SERPL-MCNC: 0.9 MG/DL (ref 0.4–1.2)
DEPRECATED MEAN GLUCOSE BLD GHB EST-ACNC: 120 MG/DL (ref 70–126)
GFR SERPL CREATININE-BSD FRML MDRD: > 60 ML/MIN/1.73M2
GLUCOSE SERPL-MCNC: 97 MG/DL (ref 70–108)
HBA1C MFR BLD HPLC: 6 % (ref 4.4–6.4)
HDLC SERPL-MCNC: 44 MG/DL
LDLC SERPL CALC-MCNC: 80 MG/DL
POTASSIUM SERPL-SCNC: 4.6 MEQ/L (ref 3.5–5.2)
PROT SERPL-MCNC: 7.6 G/DL (ref 6.1–8)
SODIUM SERPL-SCNC: 142 MEQ/L (ref 135–145)
TRIGL SERPL-MCNC: 160 MG/DL (ref 0–199)

## 2023-05-22 PROCEDURE — 2022F DILAT RTA XM EVC RTNOPTHY: CPT | Performed by: NURSE PRACTITIONER

## 2023-05-22 PROCEDURE — 3046F HEMOGLOBIN A1C LEVEL >9.0%: CPT | Performed by: NURSE PRACTITIONER

## 2023-05-22 PROCEDURE — 3074F SYST BP LT 130 MM HG: CPT | Performed by: NURSE PRACTITIONER

## 2023-05-22 PROCEDURE — G8427 DOCREV CUR MEDS BY ELIG CLIN: HCPCS | Performed by: NURSE PRACTITIONER

## 2023-05-22 PROCEDURE — G8417 CALC BMI ABV UP PARAM F/U: HCPCS | Performed by: NURSE PRACTITIONER

## 2023-05-22 PROCEDURE — 1036F TOBACCO NON-USER: CPT | Performed by: NURSE PRACTITIONER

## 2023-05-22 PROCEDURE — 3079F DIAST BP 80-89 MM HG: CPT | Performed by: NURSE PRACTITIONER

## 2023-05-22 PROCEDURE — 99214 OFFICE O/P EST MOD 30 MIN: CPT | Performed by: NURSE PRACTITIONER

## 2023-05-22 RX ORDER — AZELASTINE 1 MG/ML
2 SPRAY, METERED NASAL 2 TIMES DAILY
Qty: 60 ML | Refills: 5 | Status: SHIPPED | OUTPATIENT
Start: 2023-05-22

## 2023-05-22 RX ORDER — FAMOTIDINE 40 MG/1
40 TABLET, FILM COATED ORAL NIGHTLY
Qty: 90 TABLET | Refills: 1 | Status: SHIPPED | OUTPATIENT
Start: 2023-05-22

## 2023-05-22 RX ORDER — OXYBUTYNIN CHLORIDE 10 MG/1
10 TABLET, EXTENDED RELEASE ORAL DAILY
Qty: 90 TABLET | Refills: 3 | Status: SHIPPED | OUTPATIENT
Start: 2023-05-22

## 2023-05-22 RX ORDER — LORATADINE 10 MG/1
10 TABLET ORAL DAILY
Qty: 90 TABLET | Refills: 1 | Status: SHIPPED | OUTPATIENT
Start: 2023-05-22

## 2023-05-22 RX ORDER — ATORVASTATIN CALCIUM 40 MG/1
40 TABLET, FILM COATED ORAL DAILY
Qty: 90 TABLET | Refills: 1 | Status: SHIPPED | OUTPATIENT
Start: 2023-05-22

## 2023-05-22 RX ORDER — LISINOPRIL 5 MG/1
2.5 TABLET ORAL DAILY
Qty: 45 TABLET | Refills: 1 | Status: SHIPPED | OUTPATIENT
Start: 2023-05-22

## 2023-05-22 RX ORDER — CITALOPRAM 10 MG/1
10 TABLET ORAL DAILY
Qty: 90 TABLET | Refills: 1 | Status: SHIPPED | OUTPATIENT
Start: 2023-05-22

## 2023-05-22 RX ORDER — TAMSULOSIN HYDROCHLORIDE 0.4 MG/1
0.4 CAPSULE ORAL DAILY
Qty: 90 CAPSULE | Refills: 3 | Status: SHIPPED | OUTPATIENT
Start: 2023-05-22

## 2023-05-22 RX ORDER — CARBOXYMETHYLCELLULOSE SODIUM 5 MG/ML
1 SOLUTION/ DROPS OPHTHALMIC 3 TIMES DAILY
Qty: 70 EACH | Refills: 1 | Status: SHIPPED | OUTPATIENT
Start: 2023-05-22

## 2023-05-22 RX ORDER — LANSOPRAZOLE 30 MG/1
30 CAPSULE, DELAYED RELEASE ORAL DAILY
Qty: 90 CAPSULE | Refills: 1 | Status: SHIPPED | OUTPATIENT
Start: 2023-05-22

## 2023-05-22 RX ORDER — FLUTICASONE PROPIONATE 50 MCG
2 SPRAY, SUSPENSION (ML) NASAL DAILY
Qty: 16 G | Refills: 5 | Status: SHIPPED | OUTPATIENT
Start: 2023-05-22

## 2023-05-22 ASSESSMENT — ENCOUNTER SYMPTOMS
CONSTIPATION: 0
CHEST TIGHTNESS: 0
COUGH: 0
EYE ITCHING: 0
BACK PAIN: 1
DIARRHEA: 0
ALLERGIC/IMMUNOLOGIC NEGATIVE: 1
WHEEZING: 0
NAUSEA: 0
VOMITING: 0
EYE REDNESS: 0
EYE PAIN: 0

## 2023-05-22 NOTE — PATIENT INSTRUCTIONS
Diabetes Blood Sugar Emergencies: Your Action Plan  How can you prevent a blood sugar emergency? An important part of living with diabetes is keeping your blood sugar in your target range. You'll need to know what to do if it's too high or too low. Managing your blood sugar levels helps you avoid emergencies. This care sheet will teach you about the signs of high and low blood sugar. It will help you make an action plan with your doctor for when these signs occur. Low blood sugar is more likely to happen if you take certain medicines for diabetes. It can also happen if you skip a meal, drink alcohol, or exercise more than usual.  You may get high blood sugar if you eat differently than you normally do. One example is eating more carbohydrate than usual. Having a cold, the flu, or other sudden illness can also cause high blood sugar levels. Levels can also rise if you miss a dose of medicine. Any change in how you take your medicine may affect your blood sugar level. So it's important to work with your doctor before you make any changes. Track your blood sugar  Work with your doctor to fill in the blank spaces below that apply to you. Track your levels, know your target range, and write down ways you can get your blood sugar back in your target range. A log book can help you track your levels. Take the book to all of your medical appointments. Check your blood sugar _____ times a day, at these times:________________________________________________. (For example: Before meals, at bedtime, before exercise, during exercise, other.)  Your blood sugar target range before a meal is ___________________. Your blood sugar target range after a meal is _______________________. Do this--___________________________________________________--to get your blood sugar back within your safe range if your blood sugar results are _________________________________________.  (For example: Less than 70 or above 250

## 2023-05-22 NOTE — PROGRESS NOTES
region  -     tamsulosin (FLOMAX) 0.4 MG capsule; Take 1 capsule by mouth daily, Disp-90 capsule, R-3Normal  5. Neurogenic bladder disorder  -     oxybutynin (DITROPAN XL) 10 MG extended release tablet; Take 1 tablet by mouth daily, Disp-90 tablet, R-3Normal  -     tamsulosin (FLOMAX) 0.4 MG capsule; Take 1 capsule by mouth daily, Disp-90 capsule, R-3Normal  6. Mixed stress and urge urinary incontinence  7. Seasonal allergic rhinitis due to pollen  -     lansoprazole (PREVACID) 30 MG delayed release capsule; Take 1 capsule by mouth daily, Disp-90 capsule, R-1Normal  -     loratadine (CLARITIN) 10 MG tablet; Take 1 tablet by mouth daily, Disp-90 tablet, R-1Normal  -     azelastine (ASTELIN) 0.1 % nasal spray; 2 sprays by Nasal route 2 times daily Use in each nostril as directed, Disp-60 mL, R-5Normal  -     fluticasone (FLONASE) 50 MCG/ACT nasal spray; 2 sprays by Each Nostril route daily, Disp-16 g, R-5Normal  -     Tani Mehta MD, Otolaryngology, [facility]  8. Family history of sudden cardiac death  -     External Referral To Cardiology  9. Type 2 diabetes mellitus with hyperosmolarity without coma, without long-term current use of insulin ([facility]  Assessment & Plan:   At goal, continue current medications, continue current treatment plan, and lifestyle modifications recommended    10. Dry eye syndrome of both eyes  -     carboxymethylcellulose PF (LUBRICATING PLUS EYE DROPS) 0.5 % SOLN ophthalmic solution; Place 1 drop into both eyes 3 times daily, Disp-70 each, R-1Normal      Return in about 6 months (around 11/22/2023) for Medication evaluation, Results review, Routine follow up.     SUBJECTIVE/OBJECTIVE:  HPI  Here for 6 month follow up  Overall unchanged  Continues to have issues with weakness/numbness of left leg  Using walker at all time  No recent falls  No hypoglycemic episodes  Needs eye and foot referral  Would like to see cardiology at [facility] due to family hx of SCD    Review of Systems

## 2023-05-22 NOTE — TELEPHONE ENCOUNTER
----- Message from ISHAAN Whitmore CNP sent at 5/22/2023  3:25 PM EDT -----  Labs are all stable. No change to current medications. Refills sent in for 6 months. Labs for in 6months, please mail to pt.

## 2023-05-22 NOTE — ASSESSMENT & PLAN NOTE
At goal, continue current medications, continue current treatment plan, and lifestyle modifications recommended

## 2023-10-03 ENCOUNTER — OFFICE VISIT (OUTPATIENT)
Dept: UROLOGY | Age: 48
End: 2023-10-03
Payer: MEDICARE

## 2023-10-03 VITALS
SYSTOLIC BLOOD PRESSURE: 118 MMHG | WEIGHT: 237.9 LBS | DIASTOLIC BLOOD PRESSURE: 68 MMHG | BODY MASS INDEX: 36.06 KG/M2 | HEIGHT: 68 IN

## 2023-10-03 DIAGNOSIS — M47.14 MYELOPATHY OF THORACIC REGION: Primary | ICD-10-CM

## 2023-10-03 DIAGNOSIS — N31.9 NEUROGENIC BLADDER DISORDER: ICD-10-CM

## 2023-10-03 LAB — POST VOID RESIDUAL (PVR): 41 ML

## 2023-10-03 PROCEDURE — G8427 DOCREV CUR MEDS BY ELIG CLIN: HCPCS | Performed by: NURSE PRACTITIONER

## 2023-10-03 PROCEDURE — G8417 CALC BMI ABV UP PARAM F/U: HCPCS | Performed by: NURSE PRACTITIONER

## 2023-10-03 PROCEDURE — 3074F SYST BP LT 130 MM HG: CPT | Performed by: NURSE PRACTITIONER

## 2023-10-03 PROCEDURE — G8484 FLU IMMUNIZE NO ADMIN: HCPCS | Performed by: NURSE PRACTITIONER

## 2023-10-03 PROCEDURE — 3078F DIAST BP <80 MM HG: CPT | Performed by: NURSE PRACTITIONER

## 2023-10-03 PROCEDURE — 51798 US URINE CAPACITY MEASURE: CPT | Performed by: NURSE PRACTITIONER

## 2023-10-03 PROCEDURE — 99214 OFFICE O/P EST MOD 30 MIN: CPT | Performed by: NURSE PRACTITIONER

## 2023-10-03 PROCEDURE — 1036F TOBACCO NON-USER: CPT | Performed by: NURSE PRACTITIONER

## 2023-10-03 RX ORDER — SOLIFENACIN SUCCINATE 10 MG/1
10 TABLET, FILM COATED ORAL DAILY
Qty: 90 TABLET | Refills: 1 | Status: SHIPPED | OUTPATIENT
Start: 2023-10-03 | End: 2024-10-02

## 2023-10-03 ASSESSMENT — ENCOUNTER SYMPTOMS
BACK PAIN: 0
ABDOMINAL PAIN: 0
VOMITING: 0
NAUSEA: 0

## 2023-10-03 NOTE — PROGRESS NOTES
(VITAMIN B-12) 2500 MCG SUBL Place 1 tablet under the tongue daily      carboxymethylcellulose PF (LUBRICATING PLUS EYE DROPS) 0.5 % SOLN ophthalmic solution Place 1 drop into both eyes 3 times daily (Patient not taking: Reported on 10/3/2023) 70 each 1    Incontinence Supply Disposable (POISE HOURGLASS SHAPE) PADS 1 Device by Does not apply route 4 times daily (Patient not taking: Reported on 10/3/2023) 120 each 0    acetaminophen (TYLENOL) 500 MG tablet Take 1 tablet by mouth every 6 hours as needed for Pain (Patient not taking: Reported on 10/3/2023)       No current facility-administered medications for this visit. Past Medical History  Eusebio Pittman  has a past medical history of Asthma, Ataxic gait, Developmental delay, Diet-controlled diabetes mellitus ([address]), History of syrinx of spinal cord ([address]), Incomplete bladder emptying, Iron deficiency anemia, Myelopathy of thoracic region, Osteopenia determined by x-ray, Spasm of muscle, Type 2 diabetes mellitus with hyperosmolarity without coma, without long-term current use of insulin ([address]), Urinary dribbling, Urinary leakage, Uses walker, Uses wheelchair, and Vitamin D deficiency. Past Surgical History  The patient  has a past surgical history that includes craniotomy (N/A, 5/14/2020); other surgical history (09/16/2020); and back surgery. Family History  This patient's family history includes Arthritis in his mother; Diabetes in his mother; Heart Disease in his father. Social History  Eusebio Pittman  reports that he has never smoked. He has never used smokeless tobacco. He reports that he does not drink alcohol and does not use drugs. Subjective:      Review of Systems   Constitutional:  Negative for activity change, appetite change, chills, diaphoresis, fatigue, fever and unexpected weight change. Gastrointestinal:  Negative for abdominal pain, nausea and vomiting. Genitourinary:  Positive for frequency and urgency.  Negative for decreased urine volume,

## 2023-10-05 ENCOUNTER — OFFICE VISIT (OUTPATIENT)
Dept: PHYSICAL MEDICINE AND REHAB | Age: 48
End: 2023-10-05
Payer: MEDICARE

## 2023-10-05 VITALS
BODY MASS INDEX: 35.92 KG/M2 | WEIGHT: 237 LBS | DIASTOLIC BLOOD PRESSURE: 78 MMHG | HEIGHT: 68 IN | SYSTOLIC BLOOD PRESSURE: 114 MMHG

## 2023-10-05 DIAGNOSIS — R25.2 SPASTICITY: Primary | ICD-10-CM

## 2023-10-05 DIAGNOSIS — G95.0 SYRINX OF SPINAL CORD (HCC): ICD-10-CM

## 2023-10-05 DIAGNOSIS — R29.898 LEG WEAKNESS, BILATERAL: ICD-10-CM

## 2023-10-05 DIAGNOSIS — M62.838 SPASM OF MUSCLE: ICD-10-CM

## 2023-10-05 DIAGNOSIS — M79.2 NEUROPATHIC PAIN: ICD-10-CM

## 2023-10-05 PROCEDURE — G8417 CALC BMI ABV UP PARAM F/U: HCPCS | Performed by: ANESTHESIOLOGY

## 2023-10-05 PROCEDURE — 3078F DIAST BP <80 MM HG: CPT | Performed by: ANESTHESIOLOGY

## 2023-10-05 PROCEDURE — 99213 OFFICE O/P EST LOW 20 MIN: CPT | Performed by: ANESTHESIOLOGY

## 2023-10-05 PROCEDURE — 3074F SYST BP LT 130 MM HG: CPT | Performed by: ANESTHESIOLOGY

## 2023-10-05 PROCEDURE — 1036F TOBACCO NON-USER: CPT | Performed by: ANESTHESIOLOGY

## 2023-10-05 PROCEDURE — G8427 DOCREV CUR MEDS BY ELIG CLIN: HCPCS | Performed by: ANESTHESIOLOGY

## 2023-10-05 PROCEDURE — G8484 FLU IMMUNIZE NO ADMIN: HCPCS | Performed by: ANESTHESIOLOGY

## 2023-10-05 PROCEDURE — 64643 CHEMODENERV 1 EXTREM 1-4 EA: CPT | Performed by: ANESTHESIOLOGY

## 2023-10-05 PROCEDURE — 64642 CHEMODENERV 1 EXTREMITY 1-4: CPT | Performed by: ANESTHESIOLOGY

## 2023-10-05 RX ORDER — LEVETIRACETAM 750 MG/1
750 TABLET ORAL 2 TIMES DAILY
Qty: 60 TABLET | Refills: 3 | Status: SHIPPED | OUTPATIENT
Start: 2023-10-05

## 2023-10-05 NOTE — PROGRESS NOTES
Pre-operative Diagnosis: Spasticity      Post-operative Diagnosis: Spasticity      Procedure: Botox for spasticity      Procedure Description:  Patient was seated in wheelchair. Botox was drawn up into two 2 mL syringes. Skin was prepped with alcohol wipes. The following muscles were injected after negative aspiration; bilateral biceps femoris and semimembranosus for a total of 200 units. The patient tolerated the procedure well.     Amount medication wasted: 0 units        Procedural Complications: None        Jake Person DO  Interventional Pain Management/PM&R   [address]

## 2023-10-05 NOTE — PROGRESS NOTES
Chronic Pain/PM&R Clinic Note     Encounter Date: 10/5/23    Subjective:   Chief Complaint:   Chief Complaint   Patient presents with    Botox Injection     200 units Spasticity    Medication Refill     Keppra        History of Present Illness:   Ofelia Mesa is a 50 y.o. male seen in the clinic initially on 01/21/21 upon request from [place] ([address]) for his history of lower limb spasticity. He underwent an arachnoid cyst resection with Dr. Dameon Hernandez on May 14, 2020 after worsening bilateral leg pain. He notes about 2 months after his surgery his legs have been increasingly worse. He describes his pain to be in bilateral legs from the waist down that he describes as a constant 10/10 pain. He states the pain is a constant aching, burning pain. It is worse with any ambulation. He states is pain is improved with rest. He uses a walker for ambulation due to pain and weakness in his legs. He has been taking Baclofen 10 mg twice a day which does not help too much. He denies any saddle anesthesia or bowel/bladder incontinence. He deals with a lot of tone/spasticity in his legs. He reports stretching about 4 times a day. He was previously working at [employer] prior to his surgery. Today, 10/5/2023, patient presents for planned follow-up for management of lower limb spasticity and chronic pain. He states that he lost insurance coverage after he received his disability and was switched over to Medicare. He states that he would like to repeat his Botox injections for his lower limb spasticity as a significant improves his overall function and improves his ability to complete ADLs. He will states he had to pay out of for a lot of his medications and feels like the [facility] [place] continues to remain very effective for him. He states he needs a refill on this medication. Denies any other changes at this point.     History of Interventions:   Surgery: arachnoid cyst resection - Dr. Kena Ta (5/14/20)  Injections:

## 2023-10-05 NOTE — PROGRESS NOTES
Functionality Assessment/Goals Worksheet     On a scale of 0 (Does not Interfere) to 10 (Completely Interferes)     1. Which number describes how during the past week pain has interfered with           the following:  A. General Activity:  8  B. Mood: 5  C. Walking Ability:  10  D. Normal Work (Includes both work outside the home and housework):  9  E. Relations with Other People:   10  F. Sleep:   5  G. Enjoyment of Life:   5    2. Patient Prefers to Take their Pain Medications:     [x]  On a regular basis   []  Only when necessary    []  Does not take pain medications    3. What are the Patient's Goals/Expectations for Visiting Pain Management?      [x]  Learn about my pain    [x]  Receive Medication   []  Physical Therapy     []  Treat Depression   []  Receive Injections    []  Treat Sleep   []  Deal with Anxiety and Stress   []  Treat Opoid Dependence/Addiction   []  Other:

## 2023-11-14 DIAGNOSIS — J30.1 SEASONAL ALLERGIC RHINITIS DUE TO POLLEN: ICD-10-CM

## 2023-11-14 DIAGNOSIS — I10 BENIGN ESSENTIAL HTN: ICD-10-CM

## 2023-11-14 RX ORDER — LISINOPRIL 5 MG/1
2.5 TABLET ORAL DAILY
Qty: 45 TABLET | Refills: 1 | Status: SHIPPED | OUTPATIENT
Start: 2023-11-14

## 2023-11-14 RX ORDER — LANSOPRAZOLE 30 MG/1
30 CAPSULE, DELAYED RELEASE ORAL DAILY
Qty: 90 CAPSULE | Refills: 1 | Status: SHIPPED | OUTPATIENT
Start: 2023-11-14

## 2023-11-14 NOTE — TELEPHONE ENCOUNTER
Joe Corbett needs refill of   Requested Prescriptions     Pending Prescriptions Disp Refills    lansoprazole (PREVACID) 30 MG delayed release capsule [Pharmacy Med Name: LANSOPRAZOLE DR 30 MG CAPSULE] 90 capsule 1     Sig: take 1 capsule by mouth once daily    lisinopril (PRINIVIL;ZESTRIL) 5 MG tablet [Pharmacy Med Name: LISINOPRIL 5 MG TABLET] 45 tablet 1     Sig: take 1/2 tablet by mouth daily       Last Filled on:  05- for 90 day supply with 1 refill and sent to [facility] in [place], [place] by Gonzalez Armas CNP.       Last Visit Date:  05/22/2023    Next Visit Date:  11/27/2023

## 2023-11-15 NOTE — TELEPHONE ENCOUNTER
Tried calling pt to let him know prescriptions were sent in for the Lisinopril and the Prevacid and that A1c and labs are needed to be done and are already in epic. No answer, no available VM!

## 2023-11-20 ENCOUNTER — NURSE ONLY (OUTPATIENT)
Dept: LAB | Age: 48
End: 2023-11-20

## 2023-11-20 DIAGNOSIS — E11.9 CONTROLLED TYPE 2 DIABETES MELLITUS WITHOUT COMPLICATION, WITHOUT LONG-TERM CURRENT USE OF INSULIN (HCC): ICD-10-CM

## 2023-11-20 DIAGNOSIS — I10 BENIGN ESSENTIAL HTN: ICD-10-CM

## 2023-11-20 DIAGNOSIS — F32.9 REACTIVE DEPRESSION: ICD-10-CM

## 2023-11-20 DIAGNOSIS — E55.9 HYPOVITAMINOSIS D: ICD-10-CM

## 2023-11-20 DIAGNOSIS — E11.00 TYPE 2 DIABETES MELLITUS WITH HYPEROSMOLARITY WITHOUT COMA, WITHOUT LONG-TERM CURRENT USE OF INSULIN (HCC): ICD-10-CM

## 2023-11-20 LAB
25(OH)D3 SERPL-MCNC: 30 NG/ML (ref 30–100)
ALBUMIN SERPL BCG-MCNC: 4.7 G/DL (ref 3.5–5.1)
ALP SERPL-CCNC: 104 U/L (ref 38–126)
ALT SERPL W/O P-5'-P-CCNC: 35 U/L (ref 11–66)
ANION GAP SERPL CALC-SCNC: 10 MEQ/L (ref 8–16)
AST SERPL-CCNC: 20 U/L (ref 5–40)
BILIRUB SERPL-MCNC: 0.3 MG/DL (ref 0.3–1.2)
BUN SERPL-MCNC: 12 MG/DL (ref 7–22)
CALCIUM SERPL-MCNC: 9.6 MG/DL (ref 8.5–10.5)
CHLORIDE SERPL-SCNC: 104 MEQ/L (ref 98–111)
CHOLEST SERPL-MCNC: 266 MG/DL (ref 100–199)
CO2 SERPL-SCNC: 28 MEQ/L (ref 23–33)
CREAT SERPL-MCNC: 1.1 MG/DL (ref 0.4–1.2)
DEPRECATED MEAN GLUCOSE BLD GHB EST-ACNC: 129 MG/DL (ref 70–126)
GFR SERPL CREATININE-BSD FRML MDRD: > 60 ML/MIN/1.73M2
GLUCOSE SERPL-MCNC: 112 MG/DL (ref 70–108)
HBA1C MFR BLD HPLC: 6.3 % (ref 4.4–6.4)
HDLC SERPL-MCNC: 31 MG/DL
LDLC SERPL CALC-MCNC: 176 MG/DL
POTASSIUM SERPL-SCNC: 4.3 MEQ/L (ref 3.5–5.2)
PROT SERPL-MCNC: 7.8 G/DL (ref 6.1–8)
SODIUM SERPL-SCNC: 142 MEQ/L (ref 135–145)
TRIGL SERPL-MCNC: 296 MG/DL (ref 0–199)

## 2023-11-27 ENCOUNTER — OFFICE VISIT (OUTPATIENT)
Dept: FAMILY MEDICINE CLINIC | Age: 48
End: 2023-11-27
Payer: MEDICARE

## 2023-11-27 VITALS
HEIGHT: 69 IN | OXYGEN SATURATION: 98 % | DIASTOLIC BLOOD PRESSURE: 88 MMHG | SYSTOLIC BLOOD PRESSURE: 122 MMHG | HEART RATE: 133 BPM | WEIGHT: 252.4 LBS | TEMPERATURE: 98.2 F | BODY MASS INDEX: 37.38 KG/M2

## 2023-11-27 DIAGNOSIS — E11.9 TYPE 2 DIABETES MELLITUS WITHOUT COMPLICATION, WITH LONG-TERM CURRENT USE OF INSULIN (HCC): ICD-10-CM

## 2023-11-27 DIAGNOSIS — H04.123 DRY EYE SYNDROME OF BOTH EYES: ICD-10-CM

## 2023-11-27 DIAGNOSIS — Z00.00 WELCOME TO MEDICARE PREVENTIVE VISIT: Primary | ICD-10-CM

## 2023-11-27 DIAGNOSIS — J30.1 SEASONAL ALLERGIC RHINITIS DUE TO POLLEN: ICD-10-CM

## 2023-11-27 DIAGNOSIS — J34.2 DEVIATED NASAL SEPTUM: ICD-10-CM

## 2023-11-27 DIAGNOSIS — F32.9 REACTIVE DEPRESSION: ICD-10-CM

## 2023-11-27 DIAGNOSIS — J32.9 RECURRENT SINUSITIS: ICD-10-CM

## 2023-11-27 DIAGNOSIS — E11.9 CONTROLLED TYPE 2 DIABETES MELLITUS WITHOUT COMPLICATION, WITHOUT LONG-TERM CURRENT USE OF INSULIN (HCC): ICD-10-CM

## 2023-11-27 DIAGNOSIS — Z79.4 TYPE 2 DIABETES MELLITUS WITHOUT COMPLICATION, WITH LONG-TERM CURRENT USE OF INSULIN (HCC): ICD-10-CM

## 2023-11-27 DIAGNOSIS — E66.01 SEVERE OBESITY (BMI 35.0-39.9) WITH COMORBIDITY (HCC): ICD-10-CM

## 2023-11-27 PROCEDURE — G0402 INITIAL PREVENTIVE EXAM: HCPCS | Performed by: NURSE PRACTITIONER

## 2023-11-27 PROCEDURE — 3079F DIAST BP 80-89 MM HG: CPT | Performed by: NURSE PRACTITIONER

## 2023-11-27 PROCEDURE — 3074F SYST BP LT 130 MM HG: CPT | Performed by: NURSE PRACTITIONER

## 2023-11-27 PROCEDURE — 96372 THER/PROPH/DIAG INJ SC/IM: CPT | Performed by: NURSE PRACTITIONER

## 2023-11-27 PROCEDURE — 3017F COLORECTAL CA SCREEN DOC REV: CPT | Performed by: NURSE PRACTITIONER

## 2023-11-27 PROCEDURE — 3044F HG A1C LEVEL LT 7.0%: CPT | Performed by: NURSE PRACTITIONER

## 2023-11-27 RX ORDER — TRIAMCINOLONE ACETONIDE 40 MG/ML
40 INJECTION, SUSPENSION INTRA-ARTICULAR; INTRAMUSCULAR ONCE
Status: COMPLETED | OUTPATIENT
Start: 2023-11-27 | End: 2023-11-27

## 2023-11-27 RX ORDER — ATORVASTATIN CALCIUM 80 MG/1
80 TABLET, FILM COATED ORAL DAILY
Qty: 90 TABLET | Refills: 1 | Status: SHIPPED | OUTPATIENT
Start: 2023-11-27

## 2023-11-27 RX ORDER — CITALOPRAM HYDROBROMIDE 10 MG/1
10 TABLET ORAL DAILY
Qty: 90 TABLET | Refills: 1 | Status: SHIPPED | OUTPATIENT
Start: 2023-11-27

## 2023-11-27 RX ORDER — FAMOTIDINE 40 MG/1
40 TABLET, FILM COATED ORAL NIGHTLY
Qty: 90 TABLET | Refills: 1 | Status: SHIPPED | OUTPATIENT
Start: 2023-11-27

## 2023-11-27 RX ORDER — METHYLPREDNISOLONE ACETATE 80 MG/ML
80 INJECTION, SUSPENSION INTRA-ARTICULAR; INTRALESIONAL; INTRAMUSCULAR; SOFT TISSUE ONCE
Status: COMPLETED | OUTPATIENT
Start: 2023-11-27 | End: 2023-11-27

## 2023-11-27 RX ORDER — CETIRIZINE HYDROCHLORIDE 10 MG/1
10 TABLET ORAL DAILY
Qty: 90 TABLET | Refills: 1 | Status: SHIPPED | OUTPATIENT
Start: 2023-11-27

## 2023-11-27 RX ADMIN — TRIAMCINOLONE ACETONIDE 40 MG: 40 INJECTION, SUSPENSION INTRA-ARTICULAR; INTRAMUSCULAR at 08:35

## 2023-11-27 RX ADMIN — METHYLPREDNISOLONE ACETATE 80 MG: 80 INJECTION, SUSPENSION INTRA-ARTICULAR; INTRALESIONAL; INTRAMUSCULAR; SOFT TISSUE at 08:35

## 2023-11-27 SDOH — HEALTH STABILITY: PHYSICAL HEALTH: ON AVERAGE, HOW MANY DAYS PER WEEK DO YOU ENGAGE IN MODERATE TO STRENUOUS EXERCISE (LIKE A BRISK WALK)?: 7 DAYS

## 2023-11-27 SDOH — ECONOMIC STABILITY: FOOD INSECURITY: WITHIN THE PAST 12 MONTHS, YOU WORRIED THAT YOUR FOOD WOULD RUN OUT BEFORE YOU GOT MONEY TO BUY MORE.: NEVER TRUE

## 2023-11-27 SDOH — ECONOMIC STABILITY: FOOD INSECURITY: WITHIN THE PAST 12 MONTHS, THE FOOD YOU BOUGHT JUST DIDN'T LAST AND YOU DIDN'T HAVE MONEY TO GET MORE.: NEVER TRUE

## 2023-11-27 SDOH — HEALTH STABILITY: PHYSICAL HEALTH: ON AVERAGE, HOW MANY MINUTES DO YOU ENGAGE IN EXERCISE AT THIS LEVEL?: 20 MIN

## 2023-11-27 SDOH — ECONOMIC STABILITY: HOUSING INSECURITY
IN THE LAST 12 MONTHS, WAS THERE A TIME WHEN YOU DID NOT HAVE A STEADY PLACE TO SLEEP OR SLEPT IN A SHELTER (INCLUDING NOW)?: NO

## 2023-11-27 SDOH — ECONOMIC STABILITY: TRANSPORTATION INSECURITY
IN THE PAST 12 MONTHS, HAS LACK OF TRANSPORTATION KEPT YOU FROM MEETINGS, WORK, OR FROM GETTING THINGS NEEDED FOR DAILY LIVING?: NO

## 2023-11-27 SDOH — ECONOMIC STABILITY: INCOME INSECURITY: HOW HARD IS IT FOR YOU TO PAY FOR THE VERY BASICS LIKE FOOD, HOUSING, MEDICAL CARE, AND HEATING?: NOT HARD AT ALL

## 2023-11-27 ASSESSMENT — PATIENT HEALTH QUESTIONNAIRE - PHQ9
SUM OF ALL RESPONSES TO PHQ QUESTIONS 1-9: 0
8. MOVING OR SPEAKING SO SLOWLY THAT OTHER PEOPLE COULD HAVE NOTICED. OR THE OPPOSITE, BEING SO FIGETY OR RESTLESS THAT YOU HAVE BEEN MOVING AROUND A LOT MORE THAN USUAL: 0
SUM OF ALL RESPONSES TO PHQ QUESTIONS 1-9: 0
4. FEELING TIRED OR HAVING LITTLE ENERGY: 0
1. LITTLE INTEREST OR PLEASURE IN DOING THINGS: 0
SUM OF ALL RESPONSES TO PHQ QUESTIONS 1-9: 0
2. FEELING DOWN, DEPRESSED OR HOPELESS: 0
9. THOUGHTS THAT YOU WOULD BE BETTER OFF DEAD, OR OF HURTING YOURSELF: 0
6. FEELING BAD ABOUT YOURSELF - OR THAT YOU ARE A FAILURE OR HAVE LET YOURSELF OR YOUR FAMILY DOWN: 0
SUM OF ALL RESPONSES TO PHQ QUESTIONS 1-9: 0
3. TROUBLE FALLING OR STAYING ASLEEP: 0
2. FEELING DOWN, DEPRESSED OR HOPELESS: 0
5. POOR APPETITE OR OVEREATING: 0
SUM OF ALL RESPONSES TO PHQ QUESTIONS 1-9: 0
SUM OF ALL RESPONSES TO PHQ9 QUESTIONS 1 & 2: 0
7. TROUBLE CONCENTRATING ON THINGS, SUCH AS READING THE NEWSPAPER OR WATCHING TELEVISION: 0
10. IF YOU CHECKED OFF ANY PROBLEMS, HOW DIFFICULT HAVE THESE PROBLEMS MADE IT FOR YOU TO DO YOUR WORK, TAKE CARE OF THINGS AT HOME, OR GET ALONG WITH OTHER PEOPLE: 0
1. LITTLE INTEREST OR PLEASURE IN DOING THINGS: 0
SUM OF ALL RESPONSES TO PHQ QUESTIONS 1-9: 0
SUM OF ALL RESPONSES TO PHQ9 QUESTIONS 1 & 2: 0

## 2023-11-27 ASSESSMENT — COLUMBIA-SUICIDE SEVERITY RATING SCALE - C-SSRS
3. HAVE YOU BEEN THINKING ABOUT HOW YOU MIGHT KILL YOURSELF?: NO
7. DID THIS OCCUR IN THE LAST THREE MONTHS: NO
4. HAVE YOU HAD THESE THOUGHTS AND HAD SOME INTENTION OF ACTING ON THEM?: NO
5. HAVE YOU STARTED TO WORK OUT OR WORKED OUT THE DETAILS OF HOW TO KILL YOURSELF? DO YOU INTEND TO CARRY OUT THIS PLAN?: NO

## 2023-11-27 ASSESSMENT — LIFESTYLE VARIABLES
HOW MANY STANDARD DRINKS CONTAINING ALCOHOL DO YOU HAVE ON A TYPICAL DAY: PATIENT DOES NOT DRINK
HOW MANY STANDARD DRINKS CONTAINING ALCOHOL DO YOU HAVE ON A TYPICAL DAY: 0
HOW OFTEN DO YOU HAVE A DRINK CONTAINING ALCOHOL: NEVER
HOW OFTEN DO YOU HAVE SIX OR MORE DRINKS ON ONE OCCASION: 1
HOW OFTEN DO YOU HAVE A DRINK CONTAINING ALCOHOL: 1

## 2023-11-27 NOTE — PROGRESS NOTES
[facility]  Dept: [phone]          Taylor Snell (:  1975) is a 46 y.o. male,Established patient, here for evaluation of the following chief complaint(s):  Medicare AWV Medicare Annual Wellness Visit    Taylor Snell is here for Medicare AWV    Assessment & Plan   I have reviewed the patient's medical history in detail and updated the computerized patient record. HPI/ROS per the patient and caregiver. Overall non toxic in appearance. Answers questions appropriately. Conditions discussed and addressed this visit include:   Here for follow up   Labs reviewed with pt   Overall stable  Renewed ENT referral due to ongoing issues. Pt has been instructed to call our office if he does not receive a call. His number has been verified in the computer. Pt has been instructed to remove spam blocker from phone until ENT referral has made appt. CT of sinus has been ordered  Continue daily antihistamine, continue nasal sprays    Increase lipitor to 80 mg   The patient is advised to follow a low fat, low cholesterol diet, attempt to lose weight, reduce salt in diet and cooking, reduce exposure to stress, improve dietary compliance, continue current medications, and continue current healthy lifestyle patterns. Welcome to Medicare preventive visit  Controlled type 2 diabetes mellitus without complication, without long-term current use of insulin (HCC)Being evaluated/managed by pcp. No acute findings today meriting change in management plan. -     atorvastatin (LIPITOR) 80 MG tablet; Take 1 tablet by mouth daily, Disp-90 tablet, R-1Normal  -     vitamin D-3 (CHOLECALCIFEROL) 125 MCG (5000 UT) TABS; Take 1 tablet by mouth daily, Disp-90 tablet, R-1Normal  Seasonal allergic rhinitis due to pollen  -     [facility]. [facility] and [address];  Future  -     triamcinolone acetonide (KENALOG-40) injection 40 mg; 40 mg, IntraMUSCular, ONCE,

## 2023-11-27 NOTE — PATIENT INSTRUCTIONS
Incorporated. Care instructions adapted under license by [facility] ([facility]). If you have questions about a medical condition or this instruction, always ask your healthcare professional. [address] any warranty or liability for your use of this information. Learning About Mild Cognitive Impairment (MCI)  What is mild cognitive impairment (MCI)? It's common to forget things sometimes as we get older. But some older people have memory loss that's more than normal aging. It's called mild cognitive impairment, or MCI. It is not the same as dementia. People with the condition often know that their memory or mental function has changed. Tests may show some loss. But their minds work well overall. They can carry out daily tasks that are normal for them. People with MCI have a higher chance of one day getting dementia. But not all people who have it will get dementia. Some people may stay the same over time. What are the symptoms? People with MCI have more memory loss than what occurs with normal aging. They may have increasing trouble with recalling words and keeping up with conversations. They may also have trouble remembering important events and making decisions. What puts you at risk? The risk of getting MCI increases with age. Having high blood pressure or having a family history of MCI may also increase your risk. How is it diagnosed? Your doctor will do a physical exam.  You may be asked questions to check your memory and other mental skills. Your doctor may also talk to close friends and family members. This can help the doctor figure out how your memory and other mental skills have changed. You may get blood tests and tests that look at your brain. These questions and tests can make sure you don't have other conditions that can cause symptoms like MCI. These include depression, sleep problems, and side effects from medicines. How is it treated?   There are no medicines

## 2023-11-27 NOTE — PROGRESS NOTES
Administrations This Visit       methylPREDNISolone acetate (DEPO-MEDROL) injection 80 mg       Admin Date  11/27/2023  08:35 Action  Given Dose  80 mg Route  IntraMUSCular Site  Dorsogluteal Right Administered By  [employer] ([facility])    Ordering Provider: ISHAAN Carlisle CNP    NDC: [address]-9    Lot#: [id number]    : [employer]    Patient Supplied?: No              triamcinolone acetonide (KENALOG-40) injection 40 mg       Admin Date  11/27/2023  08:35 Action  Given Dose  40 mg Route  IntraMUSCular Site  Dorsogluteal Right Administered By  [employer] ([facility])    Ordering Provider: ISHAAN Carlisle CNP    NDC: 8551-9084-93    Lot#: [id number]    : [employer] (PRIMARY CARE)    Patient Supplied?: No                    Injection given to the patient's Right Dorsogluteal by Yamila Donovan ([facility]). Patient instructed to remain in clinic for 20 minutes after injection and was advised to report any adverse reaction to me immediately.

## 2024-01-04 ENCOUNTER — OFFICE VISIT (OUTPATIENT)
Dept: PHYSICAL MEDICINE AND REHAB | Age: 49
End: 2024-01-04
Payer: MEDICARE

## 2024-01-04 VITALS
HEIGHT: 69 IN | DIASTOLIC BLOOD PRESSURE: 84 MMHG | WEIGHT: 251.99 LBS | SYSTOLIC BLOOD PRESSURE: 120 MMHG | BODY MASS INDEX: 37.32 KG/M2

## 2024-01-04 DIAGNOSIS — R29.898 LEG WEAKNESS, BILATERAL: Primary | ICD-10-CM

## 2024-01-04 DIAGNOSIS — M62.838 SPASM OF MUSCLE: ICD-10-CM

## 2024-01-04 DIAGNOSIS — M79.2 NEUROPATHIC PAIN: ICD-10-CM

## 2024-01-04 DIAGNOSIS — R25.2 SPASTICITY: ICD-10-CM

## 2024-01-04 PROCEDURE — 1036F TOBACCO NON-USER: CPT | Performed by: ANESTHESIOLOGY

## 2024-01-04 PROCEDURE — 3079F DIAST BP 80-89 MM HG: CPT | Performed by: ANESTHESIOLOGY

## 2024-01-04 PROCEDURE — G8427 DOCREV CUR MEDS BY ELIG CLIN: HCPCS | Performed by: ANESTHESIOLOGY

## 2024-01-04 PROCEDURE — 3074F SYST BP LT 130 MM HG: CPT | Performed by: ANESTHESIOLOGY

## 2024-01-04 PROCEDURE — 99213 OFFICE O/P EST LOW 20 MIN: CPT | Performed by: ANESTHESIOLOGY

## 2024-01-04 PROCEDURE — G8417 CALC BMI ABV UP PARAM F/U: HCPCS | Performed by: ANESTHESIOLOGY

## 2024-01-04 PROCEDURE — G8484 FLU IMMUNIZE NO ADMIN: HCPCS | Performed by: ANESTHESIOLOGY

## 2024-01-04 PROCEDURE — 3017F COLORECTAL CA SCREEN DOC REV: CPT | Performed by: ANESTHESIOLOGY

## 2024-01-04 RX ORDER — LEVETIRACETAM 750 MG/1
750 TABLET ORAL 2 TIMES DAILY
Qty: 60 TABLET | Refills: 3 | Status: SHIPPED | OUTPATIENT
Start: 2024-01-04

## 2024-01-04 NOTE — PROGRESS NOTES
Functionality Assessment/Goals Worksheet     On a scale of 0 (Does not Interfere) to 10 (Completely Interferes)     1.  Which number describes how during the past week pain has interfered with           the following:  A.  General Activity:  8  B.  Mood: 6  C.  Walking Ability:  8  D.  Normal Work (Includes both work outside the home and housework):  8  E.  Relations with Other People:   3  F.  Sleep:   8  G.  Enjoyment of Life:   5    2.  Patient Prefers to Take their Pain Medications:     [x]  On a regular basis   []  Only when necessary    []  Does not take pain medications    3.  What are the Patient's Goals/Expectations for Visiting Pain Management?     []  Learn about my pain    [x]  Receive Medication   []  Physical Therapy     []  Treat Depression   []  Receive Injections    []  Treat Sleep   []  Deal with Anxiety and Stress   []  Treat Opoid Dependence/Addiction   []  Other:                                
pain.  He states that he did not see a huge response from his last Botox injections and would like to hold off on his Botox injections for spasticity today.  He continues to take his medications as prescribed and states that he continues to take baclofen and Keppra.  He states his pain is tolerable and would like to see how he is doing in the next few weeks to determine if he wants to reinvestigate Botox injections at this point.    History of Interventions:   Surgery: arachnoid cyst resection - Dr. Redmond (5/14/20)  Injections: None    Current Treatment Medications:   Baclofen 20 mg 3 times daily  Keppra 750 mg twice daily    Historical Treatment Medications:   Lyrica-leg swelling      Past Medical History:   Diagnosis Date    Asthma     does not know name of inhaler and hasnt used in over a yr    Ataxic gait     Developmental delay     Diet-controlled diabetes mellitus ([facility])     History of syrinx of spinal cord ([facility]) 03/17/2020    Incomplete bladder emptying     Iron deficiency anemia 11/14/2022    Myelopathy of thoracic region     Osteopenia determined by x-ray     Spasm of muscle     Type 2 diabetes mellitus with hyperosmolarity without coma, without long-term current use of insulin ([facility]) 8/11/2021    Urinary dribbling     Urinary leakage     Uses walker     for standing and transfers    Uses wheelchair     can stand and transfer cannot walk due to leg weakness    Vitamin D deficiency 03/12/2020    Vitamin D 25OH 24       Past Surgical History:   Procedure Laterality Date    BACK SURGERY      CRANIOTOMY N/A 5/14/2020    Thoracic Spine and Laminectomy Extraction of Arachnoid Cyst/Band and 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 performed by Alvarez Redmond MD at [facility] OR    OTHER SURGICAL HISTORY  09/16/2020    IR angiogram carotid cerebral bilateral       Family History   Problem Relation Age of Onset    Arthritis Mother     Diabetes Mother     Heart Disease Father     Cancer Neg Hx     High Blood Pressure

## 2024-05-16 ENCOUNTER — NURSE ONLY (OUTPATIENT)
Dept: LAB | Age: 49
End: 2024-05-16

## 2024-05-16 DIAGNOSIS — N40.1 BENIGN PROSTATIC HYPERPLASIA WITH URINARY FREQUENCY: ICD-10-CM

## 2024-05-16 DIAGNOSIS — R35.0 BENIGN PROSTATIC HYPERPLASIA WITH URINARY FREQUENCY: ICD-10-CM

## 2024-05-16 LAB — PSA SERPL-MCNC: 0.73 NG/ML (ref 0–1)

## 2024-05-17 ENCOUNTER — NURSE ONLY (OUTPATIENT)
Dept: LAB | Age: 49
End: 2024-05-17

## 2024-05-17 ENCOUNTER — TELEPHONE (OUTPATIENT)
Dept: FAMILY MEDICINE CLINIC | Age: 49
End: 2024-05-17

## 2024-05-17 ENCOUNTER — OFFICE VISIT (OUTPATIENT)
Dept: FAMILY MEDICINE CLINIC | Age: 49
End: 2024-05-17
Payer: MEDICARE

## 2024-05-17 VITALS
HEART RATE: 92 BPM | DIASTOLIC BLOOD PRESSURE: 72 MMHG | WEIGHT: 255.2 LBS | OXYGEN SATURATION: 94 % | SYSTOLIC BLOOD PRESSURE: 110 MMHG | BODY MASS INDEX: 38.23 KG/M2

## 2024-05-17 DIAGNOSIS — I10 BENIGN ESSENTIAL HTN: ICD-10-CM

## 2024-05-17 DIAGNOSIS — D50.9 IRON DEFICIENCY ANEMIA, UNSPECIFIED IRON DEFICIENCY ANEMIA TYPE: ICD-10-CM

## 2024-05-17 DIAGNOSIS — E11.9 CONTROLLED TYPE 2 DIABETES MELLITUS WITHOUT COMPLICATION, WITHOUT LONG-TERM CURRENT USE OF INSULIN (HCC): Primary | ICD-10-CM

## 2024-05-17 DIAGNOSIS — E11.9 CONTROLLED TYPE 2 DIABETES MELLITUS WITHOUT COMPLICATION, WITHOUT LONG-TERM CURRENT USE OF INSULIN (HCC): ICD-10-CM

## 2024-05-17 DIAGNOSIS — J30.1 SEASONAL ALLERGIC RHINITIS DUE TO POLLEN: ICD-10-CM

## 2024-05-17 DIAGNOSIS — E55.9 HYPOVITAMINOSIS D: ICD-10-CM

## 2024-05-17 DIAGNOSIS — H04.123 DRY EYE SYNDROME OF BOTH EYES: ICD-10-CM

## 2024-05-17 DIAGNOSIS — F32.9 REACTIVE DEPRESSION: ICD-10-CM

## 2024-05-17 PROBLEM — G95.0 SYRINX OF SPINAL CORD (HCC): Status: RESOLVED | Noted: 2020-03-17 | Resolved: 2024-05-17

## 2024-05-17 PROBLEM — E78.2 MIXED HYPERLIPIDEMIA: Status: ACTIVE | Noted: 2024-05-17

## 2024-05-17 LAB
ALBUMIN SERPL BCG-MCNC: 4.6 G/DL (ref 3.5–5.1)
ALP SERPL-CCNC: 138 U/L (ref 38–126)
ALT SERPL W/O P-5'-P-CCNC: 55 U/L (ref 11–66)
ANION GAP SERPL CALC-SCNC: 16 MEQ/L (ref 8–16)
AST SERPL-CCNC: 32 U/L (ref 5–40)
BASOPHILS ABSOLUTE: 0 THOU/MM3 (ref 0–0.1)
BASOPHILS NFR BLD AUTO: 0.3 %
BILIRUB SERPL-MCNC: 0.5 MG/DL (ref 0.3–1.2)
BUN SERPL-MCNC: 10 MG/DL (ref 7–22)
CALCIUM SERPL-MCNC: 9.6 MG/DL (ref 8.5–10.5)
CHLORIDE SERPL-SCNC: 101 MEQ/L (ref 98–111)
CHOLEST SERPL-MCNC: 134 MG/DL (ref 100–199)
CO2 SERPL-SCNC: 25 MEQ/L (ref 23–33)
CREAT SERPL-MCNC: 0.9 MG/DL (ref 0.4–1.2)
DEPRECATED MEAN GLUCOSE BLD GHB EST-ACNC: 138 MG/DL (ref 70–126)
DEPRECATED RDW RBC AUTO: 40.6 FL (ref 35–45)
EOSINOPHIL NFR BLD AUTO: 4.3 %
EOSINOPHILS ABSOLUTE: 0.3 THOU/MM3 (ref 0–0.4)
ERYTHROCYTE [DISTWIDTH] IN BLOOD BY AUTOMATED COUNT: 12.6 % (ref 11.5–14.5)
GFR SERPL CREATININE-BSD FRML MDRD: > 90 ML/MIN/1.73M2
GLUCOSE SERPL-MCNC: 114 MG/DL (ref 70–108)
HBA1C MFR BLD HPLC: 6.6 % (ref 4.4–6.4)
HCT VFR BLD AUTO: 46.4 % (ref 42–52)
HDLC SERPL-MCNC: 37 MG/DL
HGB BLD-MCNC: 15.3 GM/DL (ref 14–18)
IMM GRANULOCYTES # BLD AUTO: 0.02 THOU/MM3 (ref 0–0.07)
IMM GRANULOCYTES NFR BLD AUTO: 0.3 %
LDLC SERPL CALC-MCNC: 74 MG/DL
LYMPHOCYTES ABSOLUTE: 1.6 THOU/MM3 (ref 1–4.8)
LYMPHOCYTES NFR BLD AUTO: 20.7 %
MCH RBC QN AUTO: 29.4 PG (ref 26–33)
MCHC RBC AUTO-ENTMCNC: 33 GM/DL (ref 32.2–35.5)
MCV RBC AUTO: 89.1 FL (ref 80–94)
MONOCYTES ABSOLUTE: 0.5 THOU/MM3 (ref 0.4–1.3)
MONOCYTES NFR BLD AUTO: 6.3 %
NEUTROPHILS ABSOLUTE: 5.2 THOU/MM3 (ref 1.8–7.7)
NEUTROPHILS NFR BLD AUTO: 68.1 %
NRBC BLD AUTO-RTO: 0 /100 WBC
PLATELET # BLD AUTO: 291 THOU/MM3 (ref 130–400)
PMV BLD AUTO: 9.8 FL (ref 9.4–12.4)
POTASSIUM SERPL-SCNC: 4 MEQ/L (ref 3.5–5.2)
PROT SERPL-MCNC: 7.8 G/DL (ref 6.1–8)
RBC # BLD AUTO: 5.21 MILL/MM3 (ref 4.7–6.1)
SODIUM SERPL-SCNC: 142 MEQ/L (ref 135–145)
TRIGL SERPL-MCNC: 115 MG/DL (ref 0–199)
TSH SERPL DL<=0.005 MIU/L-ACNC: 3.09 UIU/ML (ref 0.4–4.2)
WBC # BLD AUTO: 7.7 THOU/MM3 (ref 4.8–10.8)

## 2024-05-17 PROCEDURE — 3074F SYST BP LT 130 MM HG: CPT | Performed by: NURSE PRACTITIONER

## 2024-05-17 PROCEDURE — 3017F COLORECTAL CA SCREEN DOC REV: CPT | Performed by: NURSE PRACTITIONER

## 2024-05-17 PROCEDURE — G8417 CALC BMI ABV UP PARAM F/U: HCPCS | Performed by: NURSE PRACTITIONER

## 2024-05-17 PROCEDURE — G8427 DOCREV CUR MEDS BY ELIG CLIN: HCPCS | Performed by: NURSE PRACTITIONER

## 2024-05-17 PROCEDURE — 2022F DILAT RTA XM EVC RTNOPTHY: CPT | Performed by: NURSE PRACTITIONER

## 2024-05-17 PROCEDURE — 3078F DIAST BP <80 MM HG: CPT | Performed by: NURSE PRACTITIONER

## 2024-05-17 PROCEDURE — 1036F TOBACCO NON-USER: CPT | Performed by: NURSE PRACTITIONER

## 2024-05-17 PROCEDURE — 99214 OFFICE O/P EST MOD 30 MIN: CPT | Performed by: NURSE PRACTITIONER

## 2024-05-17 PROCEDURE — 3046F HEMOGLOBIN A1C LEVEL >9.0%: CPT | Performed by: NURSE PRACTITIONER

## 2024-05-17 RX ORDER — LANSOPRAZOLE 30 MG/1
30 CAPSULE, DELAYED RELEASE ORAL DAILY
Qty: 90 CAPSULE | Refills: 1 | Status: SHIPPED | OUTPATIENT
Start: 2024-05-17

## 2024-05-17 RX ORDER — PREDNISONE 20 MG/1
20 TABLET ORAL 2 TIMES DAILY
Qty: 14 TABLET | Refills: 0 | Status: SHIPPED | OUTPATIENT
Start: 2024-05-17 | End: 2024-05-24

## 2024-05-17 RX ORDER — LISINOPRIL 5 MG/1
2.5 TABLET ORAL DAILY
Qty: 45 TABLET | Refills: 1 | Status: SHIPPED | OUTPATIENT
Start: 2024-05-17

## 2024-05-17 RX ORDER — CETIRIZINE HYDROCHLORIDE 10 MG/1
10 TABLET ORAL DAILY
Qty: 90 TABLET | Refills: 1 | Status: SHIPPED | OUTPATIENT
Start: 2024-05-17

## 2024-05-17 RX ORDER — CITALOPRAM HYDROBROMIDE 10 MG/1
10 TABLET ORAL DAILY
Qty: 90 TABLET | Refills: 1 | Status: SHIPPED | OUTPATIENT
Start: 2024-05-17

## 2024-05-17 RX ORDER — CARBOXYMETHYLCELLULOSE SODIUM 5 MG/ML
1 SOLUTION/ DROPS OPHTHALMIC 3 TIMES DAILY
Qty: 70 EACH | Refills: 1 | Status: SHIPPED | OUTPATIENT
Start: 2024-05-17

## 2024-05-17 RX ORDER — ATORVASTATIN CALCIUM 80 MG/1
80 TABLET, FILM COATED ORAL DAILY
Qty: 90 TABLET | Refills: 1 | Status: SHIPPED | OUTPATIENT
Start: 2024-05-17

## 2024-05-17 RX ORDER — FAMOTIDINE 40 MG/1
40 TABLET, FILM COATED ORAL NIGHTLY
Qty: 90 TABLET | Refills: 1 | Status: SHIPPED | OUTPATIENT
Start: 2024-05-17

## 2024-05-17 ASSESSMENT — PATIENT HEALTH QUESTIONNAIRE - PHQ9
5. POOR APPETITE OR OVEREATING: NOT AT ALL
8. MOVING OR SPEAKING SO SLOWLY THAT OTHER PEOPLE COULD HAVE NOTICED. OR THE OPPOSITE, BEING SO FIGETY OR RESTLESS THAT YOU HAVE BEEN MOVING AROUND A LOT MORE THAN USUAL: NOT AT ALL
6. FEELING BAD ABOUT YOURSELF - OR THAT YOU ARE A FAILURE OR HAVE LET YOURSELF OR YOUR FAMILY DOWN: NOT AT ALL
2. FEELING DOWN, DEPRESSED OR HOPELESS: NOT AT ALL
10. IF YOU CHECKED OFF ANY PROBLEMS, HOW DIFFICULT HAVE THESE PROBLEMS MADE IT FOR YOU TO DO YOUR WORK, TAKE CARE OF THINGS AT HOME, OR GET ALONG WITH OTHER PEOPLE: NOT DIFFICULT AT ALL
3. TROUBLE FALLING OR STAYING ASLEEP: NOT AT ALL
SUM OF ALL RESPONSES TO PHQ QUESTIONS 1-9: 0
7. TROUBLE CONCENTRATING ON THINGS, SUCH AS READING THE NEWSPAPER OR WATCHING TELEVISION: NOT AT ALL
SUM OF ALL RESPONSES TO PHQ QUESTIONS 1-9: 0
SUM OF ALL RESPONSES TO PHQ9 QUESTIONS 1 & 2: 0
SUM OF ALL RESPONSES TO PHQ QUESTIONS 1-9: 0
4. FEELING TIRED OR HAVING LITTLE ENERGY: NOT AT ALL
SUM OF ALL RESPONSES TO PHQ QUESTIONS 1-9: 0

## 2024-05-17 ASSESSMENT — ENCOUNTER SYMPTOMS
ALLERGIC/IMMUNOLOGIC NEGATIVE: 1
NAUSEA: 0
CHEST TIGHTNESS: 0
CONSTIPATION: 0
DIARRHEA: 0
WHEEZING: 0
VOMITING: 0
EYE REDNESS: 0
EYE PAIN: 0
BACK PAIN: 1
COUGH: 0
EYE ITCHING: 0

## 2024-05-17 NOTE — TELEPHONE ENCOUNTER
Ryley from [facility] called stating they do not have the carboxymethylcellulose PF with this just this ingredient but does have Refresh Relieva which is a PF option. Is it okay to switch brands? If it okay verbal authorization is okay.

## 2024-05-17 NOTE — PROGRESS NOTES
[address] [place] [id number]  Dept: [phone]          Gregory Wilson (:  1975) is a 48 y.o. male,Established patient, here for evaluation of the following chief complaint(s):  Diabetes, Follow-up, and Medication Refill      ASSESSMENT/PLAN:  I have reviewed the patient's medical history in detail and updated the computerized patient record.  HPI/ROS per the patient and caregiver.   Overall non toxic in appearance. Answers questions appropriately.   Conditions discussed and addressed this visit include:   The 10-year ASCVD risk score (Ramo BARAJAS, et al., 2019) is: 14.2%    Values used to calculate the score:      Age: 48 years      Sex: Male      Is Non- : No      Diabetic: Yes      Tobacco smoker: No      Systolic Blood Pressure: 110 mmHg      Is BP treated: Yes      HDL Cholesterol: 31 mg/dL      Total Cholesterol: 266 mg/dL    Diabetes education provided today:    Diabetes pathoetiology, difference between type 1 and type 2 diabetes, and progressive nature of Type 2 DM.  Metabolic syndrome: association of diabetes with dyslipidemia, HTN and obesity.  Diabetic Neuropathy: signs and therapy.  Foot care: advised to wash feet daily, pat dry and apply lotion at night, avoiding between toes. Need to look at feet daily and report to a physician any signs of inflammation or skin damage. Discussed diabetes shoes and socks.  Diabetic retinopathy: the most frequent cause of blindness in [place] currently. Measures to prevent that.  Diabetic nephropathy: Kidney function, microalbumin as a sign of diabetic nephropathy. Stages of kidney disease.  Nutrition as a mainstream of diabetes therapy. [place] about label reading.  Carbs: good carbs and bad carbs, importance of carb counting, incorporation of protein with each meal to reduce Glycemic index, importance of portions, Carb/insulin ratio.  Fats: Good fats and bad fats, meal planning and supplements.  Physical activity:

## 2024-05-17 NOTE — TELEPHONE ENCOUNTER
I called [facility] and spoke to Charlene. I let her know Barbra's response. She voiced understanding.

## 2024-05-20 ENCOUNTER — TELEPHONE (OUTPATIENT)
Dept: FAMILY MEDICINE CLINIC | Age: 49
End: 2024-05-20

## 2024-05-20 NOTE — TELEPHONE ENCOUNTER
I called and spoke to the patient. I let him know Barbra's response regarding his lab results. He voiced understanding. He then asked if his medications were sent to the pharmacy. I told him they were sent in on 05-. He voiced understanding.

## 2024-05-20 NOTE — TELEPHONE ENCOUNTER
----- Message from ISHAAN Juarez CNP sent at 5/20/2024  8:43 AM EDT -----    Labs are stable. No change to current medications. Continue 6 month check ups.

## 2024-05-20 NOTE — TELEPHONE ENCOUNTER
I copy and pasted Barbra's response regarding the patient's lab results and sent them to the patient via My Chart.

## 2024-05-22 ENCOUNTER — OFFICE VISIT (OUTPATIENT)
Dept: PHYSICAL MEDICINE AND REHAB | Age: 49
End: 2024-05-22
Payer: MEDICARE

## 2024-05-22 VITALS
HEIGHT: 69 IN | BODY MASS INDEX: 37.77 KG/M2 | SYSTOLIC BLOOD PRESSURE: 110 MMHG | WEIGHT: 255 LBS | DIASTOLIC BLOOD PRESSURE: 76 MMHG

## 2024-05-22 DIAGNOSIS — R25.2 SPASTICITY: ICD-10-CM

## 2024-05-22 DIAGNOSIS — G95.0 SYRINX OF SPINAL CORD (HCC): ICD-10-CM

## 2024-05-22 DIAGNOSIS — M62.838 SPASM OF MUSCLE: ICD-10-CM

## 2024-05-22 DIAGNOSIS — R29.898 LEG WEAKNESS, BILATERAL: Primary | ICD-10-CM

## 2024-05-22 PROCEDURE — 99213 OFFICE O/P EST LOW 20 MIN: CPT | Performed by: ANESTHESIOLOGY

## 2024-05-22 PROCEDURE — 3078F DIAST BP <80 MM HG: CPT | Performed by: ANESTHESIOLOGY

## 2024-05-22 PROCEDURE — 3017F COLORECTAL CA SCREEN DOC REV: CPT | Performed by: ANESTHESIOLOGY

## 2024-05-22 PROCEDURE — G8427 DOCREV CUR MEDS BY ELIG CLIN: HCPCS | Performed by: ANESTHESIOLOGY

## 2024-05-22 PROCEDURE — 3074F SYST BP LT 130 MM HG: CPT | Performed by: ANESTHESIOLOGY

## 2024-05-22 PROCEDURE — G8417 CALC BMI ABV UP PARAM F/U: HCPCS | Performed by: ANESTHESIOLOGY

## 2024-05-22 PROCEDURE — 1036F TOBACCO NON-USER: CPT | Performed by: ANESTHESIOLOGY

## 2024-05-22 RX ORDER — LEVETIRACETAM 750 MG/1
750 TABLET ORAL 2 TIMES DAILY
Qty: 60 TABLET | Refills: 3 | Status: SHIPPED | OUTPATIENT
Start: 2024-05-22

## 2024-05-22 NOTE — PROGRESS NOTES
Chronic Pain/PM&R Clinic Note     Encounter Date: 5/22/24    Subjective:   Chief Complaint:   Chief Complaint   Patient presents with    Follow-up    Medication Refill     Keppra        History of Present Illness:   Gregory Wilson is a 48 y.o. male seen in the clinic initially on 01/21/21 upon request from AILIN Luna (NS) for his history of lower limb spasticity.  He underwent an arachnoid cyst resection with Dr. Llanos on May 14, 2020 after worsening bilateral leg pain. He notes about 2 months after his surgery his legs have been increasingly worse. He describes his pain to be in bilateral legs from the waist down that he describes as a constant 10/10 pain. He states the pain is a constant aching, burning pain. It is worse with any ambulation. He states is pain is improved with rest. He uses a walker for ambulation due to pain and weakness in his legs. He has been taking Baclofen 10 mg twice a day which does not help too much. He denies any saddle anesthesia or bowel/bladder incontinence.     He deals with a lot of tone/spasticity in his legs. He reports stretching about 4 times a day. He was previously working at [employer] prior to his surgery.     Today, 5/22/2024, patient presents for planned follow-up for management of spasticity and chronic pain.  He states that he is doing very well overall in regards to his spasticity with medication management.  He feels like the increase in Keppra continues to do very well for him in regards to his pain and he would like to continue this medication.  He continues to take this in conjunction with his baclofen.  He denies any changes overall symptoms states he does need a refill on his Keppra.    History of Interventions:   Surgery: arachnoid cyst resection - Dr. Redmond (5/14/20)  Injections: None    Current Treatment Medications:   Baclofen 20 mg 3 times daily  Keppra 750 mg twice daily    Historical Treatment Medications:   Lyrica-leg swelling      Past

## 2024-05-22 NOTE — PROGRESS NOTES
Functionality Assessment/Goals Worksheet     On a scale of 0 (Does not Interfere) to 10 (Completely Interferes)     1.  Which number describes how during the past week pain has interfered with           the following:  A.  General Activity:  5  B.  Mood: 5  C.  Walking Ability:  8  D.  Normal Work (Includes both work outside the home and housework):  9  E.  Relations with Other People:   4  F.  Sleep:   7  G.  Enjoyment of Life:   5    2.  Patient Prefers to Take their Pain Medications:     [x]  On a regular basis   []  Only when necessary    []  Does not take pain medications    3.  What are the Patient's Goals/Expectations for Visiting Pain Management?     []  Learn about my pain    [x]  Receive Medication   []  Physical Therapy     []  Treat Depression   []  Receive Injections    []  Treat Sleep   []  Deal with Anxiety and Stress   []  Treat Opoid Dependence/Addiction   []  Other:

## 2024-06-06 ENCOUNTER — OFFICE VISIT (OUTPATIENT)
Dept: UROLOGY | Age: 49
End: 2024-06-06
Payer: MEDICARE

## 2024-06-06 VITALS
HEIGHT: 69 IN | SYSTOLIC BLOOD PRESSURE: 124 MMHG | DIASTOLIC BLOOD PRESSURE: 82 MMHG | WEIGHT: 253.6 LBS | BODY MASS INDEX: 37.56 KG/M2

## 2024-06-06 DIAGNOSIS — N31.9 NEUROGENIC BLADDER DISORDER: ICD-10-CM

## 2024-06-06 DIAGNOSIS — N40.1 BENIGN PROSTATIC HYPERPLASIA WITH URINARY FREQUENCY: Primary | ICD-10-CM

## 2024-06-06 DIAGNOSIS — R35.0 BENIGN PROSTATIC HYPERPLASIA WITH URINARY FREQUENCY: Primary | ICD-10-CM

## 2024-06-06 DIAGNOSIS — M47.14 MYELOPATHY OF THORACIC REGION: ICD-10-CM

## 2024-06-06 LAB
BILIRUBIN, POC: NEGATIVE
BLOOD URINE, POC: NEGATIVE
CLARITY, POC: CLEAR
COLOR, POC: YELLOW
GLUCOSE URINE, POC: NEGATIVE
KETONES, POC: NEGATIVE
LEUKOCYTE EST, POC: NEGATIVE
NITRITE, POC: NEGATIVE
PH, POC: 5
POST VOID RESIDUAL (PVR): 38 ML
PROTEIN, POC: NEGATIVE
SPECIFIC GRAVITY, POC: 1.01
UROBILINOGEN, POC: 0.2

## 2024-06-06 PROCEDURE — G8427 DOCREV CUR MEDS BY ELIG CLIN: HCPCS | Performed by: NURSE PRACTITIONER

## 2024-06-06 PROCEDURE — 3074F SYST BP LT 130 MM HG: CPT | Performed by: NURSE PRACTITIONER

## 2024-06-06 PROCEDURE — 51798 US URINE CAPACITY MEASURE: CPT | Performed by: NURSE PRACTITIONER

## 2024-06-06 PROCEDURE — 3017F COLORECTAL CA SCREEN DOC REV: CPT | Performed by: NURSE PRACTITIONER

## 2024-06-06 PROCEDURE — 1036F TOBACCO NON-USER: CPT | Performed by: NURSE PRACTITIONER

## 2024-06-06 PROCEDURE — G8417 CALC BMI ABV UP PARAM F/U: HCPCS | Performed by: NURSE PRACTITIONER

## 2024-06-06 PROCEDURE — 3079F DIAST BP 80-89 MM HG: CPT | Performed by: NURSE PRACTITIONER

## 2024-06-06 PROCEDURE — 81003 URINALYSIS AUTO W/O SCOPE: CPT | Performed by: NURSE PRACTITIONER

## 2024-06-06 PROCEDURE — 99214 OFFICE O/P EST MOD 30 MIN: CPT | Performed by: NURSE PRACTITIONER

## 2024-06-06 RX ORDER — SOLIFENACIN SUCCINATE 10 MG/1
10 TABLET, FILM COATED ORAL DAILY
Qty: 90 TABLET | Refills: 3 | Status: SHIPPED | OUTPATIENT
Start: 2024-06-06 | End: 2025-06-06

## 2024-06-06 RX ORDER — TAMSULOSIN HYDROCHLORIDE 0.4 MG/1
0.4 CAPSULE ORAL DAILY
Qty: 90 CAPSULE | Refills: 3 | Status: SHIPPED | OUTPATIENT
Start: 2024-06-06

## 2024-06-06 ASSESSMENT — ENCOUNTER SYMPTOMS
BACK PAIN: 0
VOMITING: 0
NAUSEA: 0
ABDOMINAL PAIN: 0

## 2024-06-06 NOTE — PROGRESS NOTES
route daily 16 g 5    Cyanocobalamin (VITAMIN B-12) 2500 MCG SUBL Place 1 tablet under the tongue daily      Incontinence Supply Disposable (POISE HOURGLASS SHAPE) PADS 1 Device by Does not apply route 4 times daily 120 each 0    acetaminophen (TYLENOL) 500 MG tablet Take 1 tablet by mouth every 6 hours as needed for Pain       No current facility-administered medications for this visit.       Past Medical History  Gregory  has a past medical history of Asthma, Ataxic gait, Developmental delay, Diet-controlled diabetes mellitus ([facility]), History of syrinx of spinal cord ([facility]), Incomplete bladder emptying, Iron deficiency anemia, Myelopathy of thoracic region, Osteopenia determined by x-ray, Spasm of muscle, Type 2 diabetes mellitus with hyperosmolarity without coma, without long-term current use of insulin ([facility]), Urinary dribbling, Urinary leakage, Uses walker, Uses wheelchair, and Vitamin D deficiency.    Past Surgical History  The patient  has a past surgical history that includes craniotomy (N/A, 5/14/2020); other surgical history (09/16/2020); and back surgery.    Family History  This patient's family history includes Arthritis in his mother; Diabetes in his mother; Heart Disease in his father.    Social History  Gregory  reports that he has never smoked. He has never used smokeless tobacco. He reports that he does not drink alcohol and does not use drugs.      Subjective:      Review of Systems   Constitutional:  Negative for activity change, appetite change, chills, diaphoresis, fatigue, fever and unexpected weight change.   Gastrointestinal:  Negative for abdominal pain, nausea and vomiting.   Genitourinary:  Positive for frequency and urgency. Negative for decreased urine volume, difficulty urinating, dysuria, flank pain and hematuria.   Musculoskeletal:  Negative for back pain.       Objective:   /82   Ht 1.74 m (5' 8.5\")   Wt 115 kg (253 lb 9.6 oz)   BMI 38.00 kg/m²     Physical Exam  Vitals reviewed.
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
## 2024-09-21 NOTE — PROGRESS NOTES
table   Exercise 9: sitting LAQ X15, seated marching x 15 ea  Exercise 11: Ambulated through clinic arouind objects with RW and SBA  Exercise 13: step ups 4in. forward X10 B UE support  Exercise 16: BOSU: alternating lunges: 10x bilaterally   Exercise 17: Rocker board: anterior/posterior and lateral 15x each   Exercise 18: // bars: lateral steppin lengths; lateral and forward step over green hurdles 2x each          Activity Tolerance:  Activity Tolerance: Patient Tolerated treatment well  Activity Tolerance: struggled with increase in tone today    Assessment: Body structures, Functions, Activity limitations: Decreased functional mobility , Decreased ADL status, Decreased ROM, Decreased strength, Decreased balance, Increased pain, Decreased sensation, Decreased high-level IADLs, Decreased posture, Decreased endurance  Assessment: Struggled with increase in tone today. Tried to work through it, however held off on more dynamic activities due to imbalance. Prognosis: Excellent  Discharge Recommendations: Continue to assess pending progress    Patient Education:  PT Education: Plan of Care, PT Role, Goals  Patient Education: cont with HEP previously given, posture                      Plan:  Times per week: 2x per week  Plan weeks: 6 weels   Specific instructions for Next Treatment: NuStep warm up, scapular/thoracic strengthening, thoracic extension with ball, pec stretch in corner, standing balance activities, gait training, sit <> stand transfers, and core strengthening on plinth (leg raises, clamshells, reverse clam, bridges) Hip and leg flexibiltiy (hamstring, hip flexors, gastroc, piriformis).  Monitor lower lumbar pain, endplate fx L5 avoid aggressive trunk extension/flexion  Current Treatment Recommendations: Strengthening, ROM, Balance Training, Functional Mobility Training, Manual Therapy - Soft Tissue Mobilization, Manual Therapy - Joint Manipulation, Gait Training, Stair training, Home Exercise
None known

## 2024-10-23 NOTE — ASSESSMENT & PLAN NOTE
Chronic, at goal, well controlled.    Orders:    lisinopril (PRINIVIL;ZESTRIL) 5 MG tablet; Take 0.5 tablets by mouth daily

## 2024-10-23 NOTE — ASSESSMENT & PLAN NOTE
Chronic, at goal, POCT A1c 6.4 at this time, continue diet control, consider metformin if worsening.    Orders:    Microalbumin / Creatinine Urine Ratio; Future    POCT glycosylated hemoglobin (Hb A1C)     DIABETES FOOT EXAM    atorvastatin (LIPITOR) 80 MG tablet; Take 1 tablet by mouth daily

## 2024-10-23 NOTE — PROGRESS NOTES
Gregory Wilson (:  1975) is a 48 y.o. male,Established patient, here for evaluation of the following chief complaint(s):  New Patient         Assessment & Plan  Controlled type 2 diabetes mellitus without complication, without long-term current use of insulin (HCC)  Chronic, at goal, POCT A1c 6.4 at this time, continue diet control, consider metformin if worsening.    Orders:    Microalbumin / Creatinine Urine Ratio; Future    POCT glycosylated hemoglobin (Hb A1C)    HM DIABETES FOOT EXAM    atorvastatin (LIPITOR) 80 MG tablet; Take 1 tablet by mouth daily    Benign essential HTN   Chronic, at goal, well controlled.    Orders:    lisinopril (PRINIVIL;ZESTRIL) 5 MG tablet; Take 0.5 tablets by mouth daily    Myelopathy of thoracic region  Chronic, stable, following specialists at this time as noted below for sequelae .         Reactive depression  Chronic stable, continue citalropraom 10 QD.    Orders:    citalopram (CELEXA) 10 MG tablet; Take 1 tablet by mouth daily    Neurogenic bladder disorder   Chronic, stable Following urology, at this time, improved nocturia.         Mixed hyperlipidemia   Chronic stable. Continue statin therapy.    Orders:    atorvastatin (LIPITOR) 80 MG tablet; Take 1 tablet by mouth daily    Ataxia    Chronic, stable, Noted, following pain management and neurolgy PRN.         BMI 37.0-37.9, adult    Noted. Continue dietary modification.         Screening for colon cancer    Last colonoscopy Aug/2022 for positive cologuard, noted sessile serrated adenoma  x 1 and tubuler adeoma and hyperplastic polyp, likey due in 7-10 years..         Onychomycosis of toenail    Acute, new, Pt defer treatment at this time, previously seen by local podiatry.         Seasonal allergic rhinitis due to pollen   Chronic, at goal (stable), continue current treatment plan    Orders:    cetirizine (ZYRTEC) 10 MG tablet; Take 1 tablet by mouth daily    fluticasone (FLONASE) 50 MCG/ACT nasal spray; 2

## 2024-10-24 NOTE — ASSESSMENT & PLAN NOTE
Chronic stable. Continue statin therapy.    Orders:    atorvastatin (LIPITOR) 80 MG tablet; Take 1 tablet by mouth daily

## 2024-10-24 NOTE — ASSESSMENT & PLAN NOTE
Chronic stable, continue citalropraom 10 QD.    Orders:    citalopram (CELEXA) 10 MG tablet; Take 1 tablet by mouth daily

## 2024-10-25 ENCOUNTER — OFFICE VISIT (OUTPATIENT)
Dept: FAMILY MEDICINE CLINIC | Age: 49
End: 2024-10-25
Payer: MEDICARE

## 2024-10-25 VITALS
WEIGHT: 255.13 LBS | BODY MASS INDEX: 37.79 KG/M2 | HEIGHT: 69 IN | DIASTOLIC BLOOD PRESSURE: 110 MMHG | TEMPERATURE: 98.8 F | HEART RATE: 105 BPM | SYSTOLIC BLOOD PRESSURE: 140 MMHG | OXYGEN SATURATION: 96 %

## 2024-10-25 DIAGNOSIS — M47.14 MYELOPATHY OF THORACIC REGION: ICD-10-CM

## 2024-10-25 DIAGNOSIS — I10 BENIGN ESSENTIAL HTN: ICD-10-CM

## 2024-10-25 DIAGNOSIS — Z12.11 SCREENING FOR COLON CANCER: ICD-10-CM

## 2024-10-25 DIAGNOSIS — E78.2 MIXED HYPERLIPIDEMIA: ICD-10-CM

## 2024-10-25 DIAGNOSIS — J30.1 SEASONAL ALLERGIC RHINITIS DUE TO POLLEN: ICD-10-CM

## 2024-10-25 DIAGNOSIS — R27.0 ATAXIA: ICD-10-CM

## 2024-10-25 DIAGNOSIS — E11.9 CONTROLLED TYPE 2 DIABETES MELLITUS WITHOUT COMPLICATION, WITHOUT LONG-TERM CURRENT USE OF INSULIN (HCC): Primary | ICD-10-CM

## 2024-10-25 DIAGNOSIS — B35.1 ONYCHOMYCOSIS OF TOENAIL: ICD-10-CM

## 2024-10-25 DIAGNOSIS — N31.9 NEUROGENIC BLADDER DISORDER: ICD-10-CM

## 2024-10-25 DIAGNOSIS — F32.9 REACTIVE DEPRESSION: ICD-10-CM

## 2024-10-25 LAB
HBA1C MFR BLD: 6.4 %
HBA1C MFR BLD: 6.4 % (ref 4.3–5.7)

## 2024-10-25 PROCEDURE — 3077F SYST BP >= 140 MM HG: CPT

## 2024-10-25 PROCEDURE — 3044F HG A1C LEVEL LT 7.0%: CPT

## 2024-10-25 PROCEDURE — 99214 OFFICE O/P EST MOD 30 MIN: CPT

## 2024-10-25 PROCEDURE — 2022F DILAT RTA XM EVC RTNOPTHY: CPT

## 2024-10-25 PROCEDURE — 3080F DIAST BP >= 90 MM HG: CPT

## 2024-10-25 PROCEDURE — G8427 DOCREV CUR MEDS BY ELIG CLIN: HCPCS

## 2024-10-25 PROCEDURE — 83036 HEMOGLOBIN GLYCOSYLATED A1C: CPT

## 2024-10-25 PROCEDURE — 1036F TOBACCO NON-USER: CPT

## 2024-10-25 PROCEDURE — 3017F COLORECTAL CA SCREEN DOC REV: CPT

## 2024-10-25 PROCEDURE — G8417 CALC BMI ABV UP PARAM F/U: HCPCS

## 2024-10-25 PROCEDURE — G8484 FLU IMMUNIZE NO ADMIN: HCPCS

## 2024-10-25 RX ORDER — CETIRIZINE HYDROCHLORIDE 10 MG/1
10 TABLET ORAL DAILY
Qty: 90 TABLET | Refills: 1 | Status: SHIPPED | OUTPATIENT
Start: 2024-10-25

## 2024-10-25 RX ORDER — CITALOPRAM HYDROBROMIDE 10 MG/1
10 TABLET ORAL DAILY
Qty: 90 TABLET | Refills: 1 | Status: SHIPPED | OUTPATIENT
Start: 2024-10-25

## 2024-10-25 RX ORDER — FLUTICASONE PROPIONATE 50 MCG
2 SPRAY, SUSPENSION (ML) NASAL DAILY
Qty: 16 G | Refills: 5 | Status: SHIPPED | OUTPATIENT
Start: 2024-10-25

## 2024-10-25 RX ORDER — ATORVASTATIN CALCIUM 80 MG/1
80 TABLET, FILM COATED ORAL DAILY
Qty: 90 TABLET | Refills: 1 | Status: SHIPPED | OUTPATIENT
Start: 2024-10-25

## 2024-10-25 RX ORDER — LISINOPRIL 5 MG/1
2.5 TABLET ORAL DAILY
Qty: 45 TABLET | Refills: 1 | Status: SHIPPED | OUTPATIENT
Start: 2024-10-25

## 2024-10-25 ASSESSMENT — ENCOUNTER SYMPTOMS
SINUS PRESSURE: 1
WHEEZING: 0
SHORTNESS OF BREATH: 0
STRIDOR: 0
ABDOMINAL PAIN: 0
TROUBLE SWALLOWING: 0
SINUS PAIN: 0
COUGH: 0
CONSTIPATION: 0
DIARRHEA: 0
NAUSEA: 0
VOMITING: 0
BACK PAIN: 1
VOICE CHANGE: 0

## 2024-10-25 NOTE — ASSESSMENT & PLAN NOTE
Chronic, at goal (stable), continue current treatment plan    Orders:    cetirizine (ZYRTEC) 10 MG tablet; Take 1 tablet by mouth daily    fluticasone (FLONASE) 50 MCG/ACT nasal spray; 2 sprays by Each Nostril route daily

## 2025-01-24 ENCOUNTER — LAB (OUTPATIENT)
Dept: LAB | Age: 50
End: 2025-01-24

## 2025-01-24 ENCOUNTER — OFFICE VISIT (OUTPATIENT)
Dept: FAMILY MEDICINE CLINIC | Age: 50
End: 2025-01-24
Payer: MEDICARE

## 2025-01-24 VITALS
HEART RATE: 77 BPM | SYSTOLIC BLOOD PRESSURE: 124 MMHG | BODY MASS INDEX: 34.59 KG/M2 | WEIGHT: 236.25 LBS | TEMPERATURE: 97.7 F | OXYGEN SATURATION: 98 % | DIASTOLIC BLOOD PRESSURE: 82 MMHG

## 2025-01-24 DIAGNOSIS — G95.0 SYRINX OF SPINAL CORD (HCC): ICD-10-CM

## 2025-01-24 DIAGNOSIS — E11.9 CONTROLLED TYPE 2 DIABETES MELLITUS WITHOUT COMPLICATION, WITHOUT LONG-TERM CURRENT USE OF INSULIN (HCC): ICD-10-CM

## 2025-01-24 DIAGNOSIS — E78.2 MIXED HYPERLIPIDEMIA: ICD-10-CM

## 2025-01-24 DIAGNOSIS — F32.9 REACTIVE DEPRESSION: ICD-10-CM

## 2025-01-24 DIAGNOSIS — N31.9 NEUROGENIC BLADDER DISORDER: Primary | ICD-10-CM

## 2025-01-24 LAB
ALBUMIN SERPL BCG-MCNC: 4.5 G/DL (ref 3.5–5.1)
ALP SERPL-CCNC: 141 U/L (ref 38–126)
ALT SERPL W/O P-5'-P-CCNC: 44 U/L (ref 11–66)
ANION GAP SERPL CALC-SCNC: 10 MEQ/L (ref 8–16)
AST SERPL-CCNC: 25 U/L (ref 5–40)
BASOPHILS ABSOLUTE: 0.1 THOU/MM3 (ref 0–0.1)
BASOPHILS NFR BLD AUTO: 0.8 %
BILIRUB SERPL-MCNC: 0.3 MG/DL (ref 0.3–1.2)
BUN SERPL-MCNC: 16 MG/DL (ref 7–22)
CALCIUM SERPL-MCNC: 9.3 MG/DL (ref 8.5–10.5)
CHLORIDE SERPL-SCNC: 103 MEQ/L (ref 98–111)
CHOLEST SERPL-MCNC: 240 MG/DL (ref 100–199)
CO2 SERPL-SCNC: 26 MEQ/L (ref 23–33)
CREAT SERPL-MCNC: 0.9 MG/DL (ref 0.4–1.2)
CREAT UR-MCNC: 249.6 MG/DL
DEPRECATED MEAN GLUCOSE BLD GHB EST-ACNC: 123 MG/DL (ref 70–126)
DEPRECATED RDW RBC AUTO: 40.1 FL (ref 35–45)
EOSINOPHIL NFR BLD AUTO: 3.7 %
EOSINOPHILS ABSOLUTE: 0.3 THOU/MM3 (ref 0–0.4)
ERYTHROCYTE [DISTWIDTH] IN BLOOD BY AUTOMATED COUNT: 13 % (ref 11.5–14.5)
GFR SERPL CREATININE-BSD FRML MDRD: > 90 ML/MIN/1.73M2
GLUCOSE SERPL-MCNC: 106 MG/DL (ref 70–108)
HBA1C MFR BLD HPLC: 6.1 % (ref 4.4–6.4)
HCT VFR BLD AUTO: 47 % (ref 42–52)
HDLC SERPL-MCNC: 39 MG/DL
HGB BLD-MCNC: 15.6 GM/DL (ref 14–18)
IMM GRANULOCYTES # BLD AUTO: 0.02 THOU/MM3 (ref 0–0.07)
IMM GRANULOCYTES NFR BLD AUTO: 0.3 %
LDLC SERPL CALC-MCNC: 168 MG/DL
LYMPHOCYTES ABSOLUTE: 2.2 THOU/MM3 (ref 1–4.8)
LYMPHOCYTES NFR BLD AUTO: 27.6 %
MCH RBC QN AUTO: 28.6 PG (ref 26–33)
MCHC RBC AUTO-ENTMCNC: 33.2 GM/DL (ref 32.2–35.5)
MCV RBC AUTO: 86.1 FL (ref 80–94)
MICROALBUMIN UR-MCNC: < 1.2 MG/DL
MICROALBUMIN/CREAT RATIO PNL UR: 5 MG/G (ref 0–30)
MONOCYTES ABSOLUTE: 0.4 THOU/MM3 (ref 0.4–1.3)
MONOCYTES NFR BLD AUTO: 5.2 %
NEUTROPHILS ABSOLUTE: 4.9 THOU/MM3 (ref 1.8–7.7)
NEUTROPHILS NFR BLD AUTO: 62.4 %
NRBC BLD AUTO-RTO: 0 /100 WBC
PLATELET # BLD AUTO: 350 THOU/MM3 (ref 130–400)
PMV BLD AUTO: 9 FL (ref 9.4–12.4)
POTASSIUM SERPL-SCNC: 3.8 MEQ/L (ref 3.5–5.2)
PROT SERPL-MCNC: 7.4 G/DL (ref 6.1–8)
RBC # BLD AUTO: 5.46 MILL/MM3 (ref 4.7–6.1)
SODIUM SERPL-SCNC: 139 MEQ/L (ref 135–145)
TRIGL SERPL-MCNC: 166 MG/DL (ref 0–199)
TSH SERPL DL<=0.005 MIU/L-ACNC: 4.17 UIU/ML (ref 0.4–4.2)
WBC # BLD AUTO: 7.9 THOU/MM3 (ref 4.8–10.8)

## 2025-01-24 PROCEDURE — 99214 OFFICE O/P EST MOD 30 MIN: CPT

## 2025-01-24 PROCEDURE — 1036F TOBACCO NON-USER: CPT

## 2025-01-24 PROCEDURE — 3044F HG A1C LEVEL LT 7.0%: CPT

## 2025-01-24 PROCEDURE — G8427 DOCREV CUR MEDS BY ELIG CLIN: HCPCS

## 2025-01-24 PROCEDURE — 3074F SYST BP LT 130 MM HG: CPT

## 2025-01-24 PROCEDURE — 2022F DILAT RTA XM EVC RTNOPTHY: CPT

## 2025-01-24 PROCEDURE — 3079F DIAST BP 80-89 MM HG: CPT

## 2025-01-24 PROCEDURE — 3017F COLORECTAL CA SCREEN DOC REV: CPT

## 2025-01-24 PROCEDURE — G8417 CALC BMI ABV UP PARAM F/U: HCPCS

## 2025-01-24 SDOH — ECONOMIC STABILITY: FOOD INSECURITY: WITHIN THE PAST 12 MONTHS, THE FOOD YOU BOUGHT JUST DIDN'T LAST AND YOU DIDN'T HAVE MONEY TO GET MORE.: NEVER TRUE

## 2025-01-24 SDOH — ECONOMIC STABILITY: FOOD INSECURITY: WITHIN THE PAST 12 MONTHS, YOU WORRIED THAT YOUR FOOD WOULD RUN OUT BEFORE YOU GOT MONEY TO BUY MORE.: NEVER TRUE

## 2025-01-24 ASSESSMENT — PATIENT HEALTH QUESTIONNAIRE - PHQ9
2. FEELING DOWN, DEPRESSED OR HOPELESS: NOT AT ALL
3. TROUBLE FALLING OR STAYING ASLEEP: NOT AT ALL
SUM OF ALL RESPONSES TO PHQ QUESTIONS 1-9: 0
5. POOR APPETITE OR OVEREATING: NOT AT ALL
SUM OF ALL RESPONSES TO PHQ QUESTIONS 1-9: 0
SUM OF ALL RESPONSES TO PHQ QUESTIONS 1-9: 0
SUM OF ALL RESPONSES TO PHQ9 QUESTIONS 1 & 2: 0
9. THOUGHTS THAT YOU WOULD BE BETTER OFF DEAD, OR OF HURTING YOURSELF: NOT AT ALL
4. FEELING TIRED OR HAVING LITTLE ENERGY: NOT AT ALL
1. LITTLE INTEREST OR PLEASURE IN DOING THINGS: NOT AT ALL
SUM OF ALL RESPONSES TO PHQ QUESTIONS 1-9: 0
8. MOVING OR SPEAKING SO SLOWLY THAT OTHER PEOPLE COULD HAVE NOTICED. OR THE OPPOSITE, BEING SO FIGETY OR RESTLESS THAT YOU HAVE BEEN MOVING AROUND A LOT MORE THAN USUAL: NOT AT ALL
6. FEELING BAD ABOUT YOURSELF - OR THAT YOU ARE A FAILURE OR HAVE LET YOURSELF OR YOUR FAMILY DOWN: NOT AT ALL
10. IF YOU CHECKED OFF ANY PROBLEMS, HOW DIFFICULT HAVE THESE PROBLEMS MADE IT FOR YOU TO DO YOUR WORK, TAKE CARE OF THINGS AT HOME, OR GET ALONG WITH OTHER PEOPLE: NOT DIFFICULT AT ALL
7. TROUBLE CONCENTRATING ON THINGS, SUCH AS READING THE NEWSPAPER OR WATCHING TELEVISION: NOT AT ALL

## 2025-01-24 NOTE — PROGRESS NOTES
Nose: Nose normal. No congestion or rhinorrhea.      Mouth/Throat:      Mouth: Mucous membranes are moist.      Pharynx: Oropharynx is clear. No oropharyngeal exudate or posterior oropharyngeal erythema.   Cardiovascular:      Rate and Rhythm: Normal rate.      Pulses: Normal pulses.      Heart sounds: Normal heart sounds. No murmur heard.     No friction rub. No gallop.   Pulmonary:      Effort: Pulmonary effort is normal. No respiratory distress.      Breath sounds: Normal breath sounds. No wheezing, rhonchi or rales.   Abdominal:      General: Abdomen is flat. Bowel sounds are normal. There is no distension.      Palpations: Abdomen is soft.      Tenderness: There is no abdominal tenderness. There is no guarding or rebound.   Musculoskeletal:         General: No swelling, tenderness or signs of injury.      Right lower leg: No edema.      Left lower leg: No edema.   Neurological:      General: No focal deficit present.      Mental Status: He is alert.      Cranial Nerves: No cranial nerve deficit.      Sensory: Sensory deficit (LLE) present.      Motor: Weakness and abnormal muscle tone (spacitity noted lower extremities) present.      Gait: Gait abnormal (due to ataxia).     On this date 1/24/2025 I have spent 25 minutes reviewing previous notes, test results and face to face with the patient discussing the diagnosis and importance of compliance with the treatment plan as well as documenting on the day of the visit.      An electronic signature was used to authenticate this note.    --Art Lindquist MD

## 2025-01-24 NOTE — ASSESSMENT & PLAN NOTE
Chronic, at goal (stable), continue current treatment plan, medication adherence emphasized, and lifestyle modifications recommended  Stable due for labs including A1c, at this time appears diet  controlled, we will consider metformin if A1c as worsened., obtain previously oredered urine micro alb.    Orders:    CBC with Auto Differential; Future    Comprehensive Metabolic Panel; Future    TSH reflex to FT4; Future    Lipid Panel; Future    Hemoglobin A1C; Future

## 2025-02-04 ENCOUNTER — TELEPHONE (OUTPATIENT)
Dept: FAMILY MEDICINE CLINIC | Age: 50
End: 2025-02-04

## 2025-02-04 NOTE — TELEPHONE ENCOUNTER
Called and received Gregory's voicemail. Left a message informing him of Dr. Lindquist's review of his labs. Was provided the office phone number if any questions or concerns.

## 2025-02-04 NOTE — TELEPHONE ENCOUNTER
The patient has not viewed the following result(s) yet.     Labs reviewd.     Cholestrol is slightly elevated.  Please continue taking lipitor regularlly. ...    Written by Art Lindquist MD on 1/27/2025  8:10 AM EST View Full Comments

## 2025-02-04 NOTE — TELEPHONE ENCOUNTER
Gregory called back and was provided Dr. Lindquist's responses to his lab work. No Additional questions or concerns at this time from Gregory.

## 2025-07-23 ENCOUNTER — LAB (OUTPATIENT)
Dept: LAB | Age: 50
End: 2025-07-23

## 2025-07-24 ENCOUNTER — OFFICE VISIT (OUTPATIENT)
Dept: FAMILY MEDICINE CLINIC | Age: 50
End: 2025-07-24
Payer: MEDICARE

## 2025-07-24 VITALS
SYSTOLIC BLOOD PRESSURE: 120 MMHG | DIASTOLIC BLOOD PRESSURE: 76 MMHG | WEIGHT: 238.13 LBS | OXYGEN SATURATION: 98 % | TEMPERATURE: 98.5 F | BODY MASS INDEX: 34.86 KG/M2 | HEART RATE: 72 BPM

## 2025-07-24 DIAGNOSIS — M47.14 MYELOPATHY OF THORACIC REGION: ICD-10-CM

## 2025-07-24 DIAGNOSIS — E78.2 MIXED HYPERLIPIDEMIA: ICD-10-CM

## 2025-07-24 DIAGNOSIS — R27.0 ATAXIA: ICD-10-CM

## 2025-07-24 DIAGNOSIS — N31.9 NEUROGENIC BLADDER DISORDER: ICD-10-CM

## 2025-07-24 DIAGNOSIS — F32.9 REACTIVE DEPRESSION: ICD-10-CM

## 2025-07-24 DIAGNOSIS — I10 BENIGN ESSENTIAL HTN: ICD-10-CM

## 2025-07-24 DIAGNOSIS — E11.9 CONTROLLED TYPE 2 DIABETES MELLITUS WITHOUT COMPLICATION, WITHOUT LONG-TERM CURRENT USE OF INSULIN (HCC): Primary | ICD-10-CM

## 2025-07-24 PROCEDURE — 3017F COLORECTAL CA SCREEN DOC REV: CPT

## 2025-07-24 PROCEDURE — G8417 CALC BMI ABV UP PARAM F/U: HCPCS

## 2025-07-24 PROCEDURE — 2022F DILAT RTA XM EVC RTNOPTHY: CPT

## 2025-07-24 PROCEDURE — G8427 DOCREV CUR MEDS BY ELIG CLIN: HCPCS

## 2025-07-24 PROCEDURE — 3078F DIAST BP <80 MM HG: CPT

## 2025-07-24 PROCEDURE — 1036F TOBACCO NON-USER: CPT

## 2025-07-24 PROCEDURE — 99213 OFFICE O/P EST LOW 20 MIN: CPT

## 2025-07-24 PROCEDURE — 3074F SYST BP LT 130 MM HG: CPT

## 2025-07-24 PROCEDURE — 3044F HG A1C LEVEL LT 7.0%: CPT

## 2025-07-24 NOTE — PROGRESS NOTES
PROGRESS NOTES      Patient:  Gregory Wilson  YOB: 1975    MRN: [id number]       PCP: Art Lindquist MD    Last Seen: 1/24/2025     Date of Service: Pt seen/examined on 07/24/25     ASSESSMENT/PLAN:    Assessment & Plan  Controlled type 2 diabetes mellitus without complication, without long-term current use of insulin (HCC)   Chronic, at goal (stable), due for A1c, diet controlled at this time, will consider metformin if A1c starts increasing.    Orders:    Lipid Panel; Future    Hemoglobin A1C; Future    Mixed hyperlipidemia   Chronic, at goal (stable), slightly elevated, due for recheck, continue statin.    Orders:    Lipid Panel; Future    Neurogenic bladder disorder   Monitored by specialist- no acute findings meriting change in the plan stable.         Reactive depression   Chronic, at goal (stable), continue current treatment plan continue celexa 10 QD.         Benign essential HTN   Chronic, at goal (stable), continue current treatment plan continue lisnopril 2.5 QD.         Ataxia   Monitored by specialist- no acute findings meriting change in the plan stable/         Myelopathy of thoracic region   Monitored by specialist- no acute findings meriting change in the plan stable.               Plan        No follow-ups on file.        Chief Complaint:    Chief Complaint   Patient presents with    Follow-up     No concerns or issues at this time          History Of Present Illness:      Pt is a 48 y/o male w/ PMH of a T8-9 spinal arachnoid cyst s/p resection & fusion 2/2 myelopathy related b/l leg pain w/ sequelae of lower limb spasticity following pain management and mixed Urinary incontinence likely 2/2 neurogic bladder symptoms following urology,,cervical lumbar stenosis, DM2, HTN, VANE, reactive depression, allergic rhinitis GARETT,, BPH, dysphagia, presenting for routine visit, over all doing well, no acute complaints or change since last seen.  Regarding DM2, appears diet

## 2025-07-24 NOTE — ASSESSMENT & PLAN NOTE
Chronic, at goal (stable), due for A1c, diet controlled at this time, will consider metformin if A1c starts increasing.    Orders:    Lipid Panel; Future    Hemoglobin A1C; Future

## 2025-07-24 NOTE — ASSESSMENT & PLAN NOTE
Chronic, at goal (stable), slightly elevated, due for recheck, continue statin.    Orders:    Lipid Panel; Future

## 2025-07-29 ENCOUNTER — OFFICE VISIT (OUTPATIENT)
Dept: FAMILY MEDICINE CLINIC | Age: 50
End: 2025-07-29
Payer: MEDICARE

## 2025-07-29 ENCOUNTER — LAB (OUTPATIENT)
Dept: LAB | Age: 50
End: 2025-07-29

## 2025-07-29 VITALS
TEMPERATURE: 98 F | WEIGHT: 234 LBS | DIASTOLIC BLOOD PRESSURE: 88 MMHG | RESPIRATION RATE: 16 BRPM | BODY MASS INDEX: 34.66 KG/M2 | HEART RATE: 95 BPM | HEIGHT: 69 IN | SYSTOLIC BLOOD PRESSURE: 138 MMHG | OXYGEN SATURATION: 100 %

## 2025-07-29 DIAGNOSIS — F32.9 REACTIVE DEPRESSION: ICD-10-CM

## 2025-07-29 DIAGNOSIS — N31.9 NEUROGENIC BLADDER DISORDER: ICD-10-CM

## 2025-07-29 DIAGNOSIS — J30.1 SEASONAL ALLERGIC RHINITIS DUE TO POLLEN: ICD-10-CM

## 2025-07-29 DIAGNOSIS — E11.9 CONTROLLED TYPE 2 DIABETES MELLITUS WITHOUT COMPLICATION, WITHOUT LONG-TERM CURRENT USE OF INSULIN (HCC): ICD-10-CM

## 2025-07-29 DIAGNOSIS — K21.9 GASTROESOPHAGEAL REFLUX DISEASE, UNSPECIFIED WHETHER ESOPHAGITIS PRESENT: ICD-10-CM

## 2025-07-29 DIAGNOSIS — G95.0 SYRINX OF SPINAL CORD (HCC): ICD-10-CM

## 2025-07-29 DIAGNOSIS — E78.2 MIXED HYPERLIPIDEMIA: ICD-10-CM

## 2025-07-29 DIAGNOSIS — I10 BENIGN ESSENTIAL HTN: ICD-10-CM

## 2025-07-29 DIAGNOSIS — N39.46 MIXED STRESS AND URGE URINARY INCONTINENCE: ICD-10-CM

## 2025-07-29 DIAGNOSIS — R27.0 ATAXIA: ICD-10-CM

## 2025-07-29 DIAGNOSIS — Z00.00 INITIAL MEDICARE ANNUAL WELLNESS VISIT: Primary | ICD-10-CM

## 2025-07-29 LAB
CHOLEST SERPL-MCNC: 259 MG/DL (ref 100–199)
DEPRECATED MEAN GLUCOSE BLD GHB EST-ACNC: 123 MG/DL (ref 70–126)
HBA1C MFR BLD HPLC: 6.1 % (ref 4–6)
HDLC SERPL-MCNC: 43 MG/DL
LDLC SERPL CALC-MCNC: 190 MG/DL
TRIGL SERPL-MCNC: 129 MG/DL (ref 0–199)

## 2025-07-29 PROCEDURE — 3079F DIAST BP 80-89 MM HG: CPT

## 2025-07-29 PROCEDURE — 3044F HG A1C LEVEL LT 7.0%: CPT

## 2025-07-29 PROCEDURE — 3017F COLORECTAL CA SCREEN DOC REV: CPT

## 2025-07-29 PROCEDURE — 3075F SYST BP GE 130 - 139MM HG: CPT

## 2025-07-29 PROCEDURE — G0438 PPPS, INITIAL VISIT: HCPCS

## 2025-07-29 RX ORDER — ATORVASTATIN CALCIUM 80 MG/1
80 TABLET, FILM COATED ORAL DAILY
Qty: 90 TABLET | Refills: 1 | Status: SHIPPED | OUTPATIENT
Start: 2025-07-29

## 2025-07-29 RX ORDER — CITALOPRAM HYDROBROMIDE 20 MG/1
20 TABLET ORAL DAILY
Qty: 90 TABLET | Refills: 1 | Status: SHIPPED | OUTPATIENT
Start: 2025-07-29

## 2025-07-29 RX ORDER — LISINOPRIL 5 MG/1
2.5 TABLET ORAL DAILY
Qty: 45 TABLET | Refills: 1 | Status: SHIPPED | OUTPATIENT
Start: 2025-07-29

## 2025-07-29 RX ORDER — LANSOPRAZOLE 30 MG/1
30 CAPSULE, DELAYED RELEASE ORAL DAILY
Qty: 90 CAPSULE | Refills: 1 | Status: SHIPPED | OUTPATIENT
Start: 2025-07-29

## 2025-07-29 RX ORDER — FAMOTIDINE 40 MG/1
40 TABLET, FILM COATED ORAL NIGHTLY
Qty: 90 TABLET | Refills: 1 | Status: SHIPPED | OUTPATIENT
Start: 2025-07-29

## 2025-07-29 RX ORDER — CITALOPRAM HYDROBROMIDE 20 MG/1
20 TABLET ORAL DAILY
Qty: 90 TABLET | Refills: 1 | Status: SHIPPED | OUTPATIENT
Start: 2025-07-29 | End: 2025-07-29 | Stop reason: SDUPTHER

## 2025-07-29 RX ORDER — INCONTINENCE PAD,LINER,DISP
1 EACH MISCELLANEOUS 4 TIMES DAILY
Qty: 120 EACH | Refills: 3 | Status: SHIPPED | OUTPATIENT
Start: 2025-07-29

## 2025-07-29 RX ORDER — CETIRIZINE HYDROCHLORIDE 10 MG/1
10 TABLET ORAL DAILY
Qty: 90 TABLET | Refills: 1 | Status: SHIPPED | OUTPATIENT
Start: 2025-07-29

## 2025-07-29 ASSESSMENT — LIFESTYLE VARIABLES
HOW MANY STANDARD DRINKS CONTAINING ALCOHOL DO YOU HAVE ON A TYPICAL DAY: PATIENT DOES NOT DRINK
HOW OFTEN DO YOU HAVE A DRINK CONTAINING ALCOHOL: NEVER

## 2025-07-29 ASSESSMENT — PATIENT HEALTH QUESTIONNAIRE - PHQ9
2. FEELING DOWN, DEPRESSED OR HOPELESS: NOT AT ALL
10. IF YOU CHECKED OFF ANY PROBLEMS, HOW DIFFICULT HAVE THESE PROBLEMS MADE IT FOR YOU TO DO YOUR WORK, TAKE CARE OF THINGS AT HOME, OR GET ALONG WITH OTHER PEOPLE: NOT DIFFICULT AT ALL
8. MOVING OR SPEAKING SO SLOWLY THAT OTHER PEOPLE COULD HAVE NOTICED. OR THE OPPOSITE, BEING SO FIGETY OR RESTLESS THAT YOU HAVE BEEN MOVING AROUND A LOT MORE THAN USUAL: NOT AT ALL
SUM OF ALL RESPONSES TO PHQ QUESTIONS 1-9: 5
3. TROUBLE FALLING OR STAYING ASLEEP: MORE THAN HALF THE DAYS
9. THOUGHTS THAT YOU WOULD BE BETTER OFF DEAD, OR OF HURTING YOURSELF: NOT AT ALL
1. LITTLE INTEREST OR PLEASURE IN DOING THINGS: SEVERAL DAYS
4. FEELING TIRED OR HAVING LITTLE ENERGY: SEVERAL DAYS
7. TROUBLE CONCENTRATING ON THINGS, SUCH AS READING THE NEWSPAPER OR WATCHING TELEVISION: NOT AT ALL
5. POOR APPETITE OR OVEREATING: SEVERAL DAYS
SUM OF ALL RESPONSES TO PHQ QUESTIONS 1-9: 5
SUM OF ALL RESPONSES TO PHQ QUESTIONS 1-9: 5
6. FEELING BAD ABOUT YOURSELF - OR THAT YOU ARE A FAILURE OR HAVE LET YOURSELF OR YOUR FAMILY DOWN: NOT AT ALL
SUM OF ALL RESPONSES TO PHQ QUESTIONS 1-9: 5

## 2025-07-29 NOTE — PROGRESS NOTES
Medicare Annual Wellness Visit    Gregory Wilson is here for Medicare AWV    Assessment & Plan   Initial Medicare annual wellness visit  Reactive depression  -     citalopram (CELEXA) 20 MG tablet; Take 1 tablet by mouth daily, Disp-90 tablet, R-1Normal  Controlled type 2 diabetes mellitus without complication, without long-term current use of insulin (HCC)  -     atorvastatin (LIPITOR) 80 MG tablet; Take 1 tablet by mouth daily, Disp-90 tablet, R-1Normal  Mixed hyperlipidemia  -     atorvastatin (LIPITOR) 80 MG tablet; Take 1 tablet by mouth daily, Disp-90 tablet, R-1Normal  Benign essential HTN  -     lisinopril (PRINIVIL;ZESTRIL) 5 MG tablet; Take 0.5 tablets by mouth daily, Disp-45 tablet, R-1Normal  Syrinx of spinal cord (HCC)  Neurogenic bladder disorder  -     Incontinence Supply Disposable (POISE HOURGLASS SHAPE) PADS; 4 TIMES DAILY Starting Tue 7/29/2025, Disp-120 each, R-3, Normal  Ataxia  Seasonal allergic rhinitis due to pollen  -     cetirizine (ZYRTEC) 10 MG tablet; Take 1 tablet by mouth daily, Disp-90 tablet, R-1Normal  Mixed stress and urge urinary incontinence  Gastroesophageal reflux disease, unspecified whether esophagitis present  -     lansoprazole (PREVACID) 30 MG delayed release capsule; Take 1 capsule by mouth daily, Disp-90 capsule, R-1Normal  -     famotidine (PEPCID) 40 MG tablet; Take 1 tablet by mouth at bedtime, Disp-90 tablet, R-1Normal      Increase celexa to 20QD 2/2 mood.     Return in 6 months (on 1/29/2026) for 6 mo f/u.     Subjective   The following acute and/or chronic problems were also addressed today:  Encounter Diagnoses   Name Primary?    Initial Medicare annual wellness visit Yes    Reactive depression     Controlled type 2 diabetes mellitus without complication, without long-term current use of insulin (HCC)     Mixed hyperlipidemia     Benign essential HTN     Syrinx of spinal cord (HCC)     Neurogenic bladder disorder     Ataxia     Seasonal allergic rhinitis due to

## (undated) DEVICE — SOLUTION SURG PREP POV IOD 7.5% 4 OZ

## (undated) DEVICE — CARBIDE MATCH HEAD

## (undated) DEVICE — 3M™ IOBAN™ 2 ANTIMICROBIAL INCISE DRAPE 6650EZ: Brand: IOBAN™ 2

## (undated) DEVICE — TUBING PRSS 36 M F

## (undated) DEVICE — SYRINGE MED 30ML STD CLR PLAS LUERLOCK TIP N CTRL DISP

## (undated) DEVICE — SUTURE VCRL SZ 2-0 L18IN ABSRB VLT L26MM SH 1/2 CIR J775D

## (undated) DEVICE — CODMAN® SURGICAL PATTIES 1/2" X1 1/2" (1.27CM X 3.81CM): Brand: CODMAN®

## (undated) DEVICE — DRAPE THER FLUID WARMING 66X44 IN FLAT SLUSH DBL DISC ORS

## (undated) DEVICE — SET CATH 20GA L1.75IN RAD ART POLYUR RADPQ W/ INTEGR

## (undated) DEVICE — GOWN,SIRUS,NON REINFRCD,LARGE,SET IN SL: Brand: MEDLINE

## (undated) DEVICE — DISPOSABLE STANDARD BIPOLAR FORCEPS, NON-STICK,: Brand: SPETZLER-MALIS

## (undated) DEVICE — 1.1MM X 6.0MM STRAIGHT ROUTER

## (undated) DEVICE — TAPE ADH W1INXL10YD PLAS TRNSPAR H2O RESIST HYPOALRG CURAD

## (undated) DEVICE — Z DISCONTINUED BY MEDLINE USE 2711682 TRAY SKIN PREP DRY W/ PREM GLV

## (undated) DEVICE — 3.0MM TAPERED ROUTER

## (undated) DEVICE — SYRINGE IRRIG 60ML SFT PLIABLE BLB EZ TO GRP 1 HND USE W/

## (undated) DEVICE — CODMAN® SURGICAL PATTIES 1/2" X 1/2" (1.27CM X 1.27CM): Brand: CODMAN®

## (undated) DEVICE — DIFFUSER: Brand: CORE, MAESTRO

## (undated) DEVICE — CODMAN® DISPOSABLE PERFORATOR 14MM: Brand: CODMAN®

## (undated) DEVICE — DRAIN SURG FLAT W7MMXL20CM FULL PERF

## (undated) DEVICE — ELECTRODE PT RET AD L9FT HI MOIST COND ADH HYDRGEL CORDED

## (undated) DEVICE — SOLUTION IV 500ML 0.9% SOD CHL PH 5 INJ USP VIAFLX PLAS

## (undated) DEVICE — CORD ES L12FT BPLR FRCP

## (undated) DEVICE — ZEISS MD MICROSCOPE DRAPE 54 X 120 - GLASS LENS: Brand: OPTICS ONE

## (undated) DEVICE — PRESSURE MONITORING SET: Brand: TRUWAVE

## (undated) DEVICE — GLOVE SURG SZ 65 THK91MIL LTX FREE SYN POLYISOPRENE

## (undated) DEVICE — SEALANT TISS 10 CC FIBRIN VISTASEAL

## (undated) DEVICE — MICRO TIP WIPE: Brand: DEVON

## (undated) DEVICE — TOTAL TRAY, 16FR 10ML SIL FOLEY, URN: Brand: MEDLINE

## (undated) DEVICE — APPLIER CLP L9.375IN APER 2.1MM CLS L3.8MM 20 SM TI CLP

## (undated) DEVICE — TUBING, SUCTION, 1/4" X 20', STRAIGHT: Brand: MEDLINE INDUSTRIES, INC.

## (undated) DEVICE — GLOVE ORANGE PI 7   MSG9070

## (undated) DEVICE — NEEDLE SPNL L3.5IN PNK HUB S STL REG WALL FIT STYL W/ QNCKE

## (undated) DEVICE — TAPE SURG W4INXL11YD 2IN PERF LINERLESS NONWOVEN MEDFIX EZ

## (undated) DEVICE — GAUZE,SPONGE,4"X4",12PLY,STERILE,LF,2'S: Brand: MEDLINE

## (undated) DEVICE — 3M™ STERI-DRAPE™ INSTRUMENT POUCH 1018: Brand: STERI-DRAPE™

## (undated) DEVICE — AGENT HEMSTAT W2XL14IN OXIDIZED REGENERATED CELOS ABSRB FOR

## (undated) DEVICE — SET CATH 20GA L1.5IN RAD ART POLYUR RADPQ W/ INTEGR 0.018IN

## (undated) DEVICE — OIL CARTRIDGE: Brand: CORE, MAESTRO

## (undated) DEVICE — KIT ART LN 20GA L12CM FEP RADPQ 0.025X13.75IN SPR GWIRE

## (undated) DEVICE — TAPE,CLOTH/SILK,CURAD,3"X10YD,LF,40/CS: Brand: CURAD

## (undated) DEVICE — GOWN,AURORA,NON-REINFORCED,2XL: Brand: MEDLINE

## (undated) DEVICE — AEGIS 1" DISK 4MM HOLE, PEEL OPEN: Brand: MEDLINE

## (undated) DEVICE — SUTURE VCRL SZ 3-0 L18IN ABSRB VLT L26MM SH 1/2 CIR J774D

## (undated) DEVICE — KIT CATHETER 18GA L445CM NDL 20GA 0025IN RAD ART W WNG CLP

## (undated) DEVICE — Device

## (undated) DEVICE — GLOVE ORANGE PI 8   MSG9080

## (undated) DEVICE — SUTURE PERMA-HAND SZ 3-0 L30IN NONABSORBABLE BLK L60MM KS 622H

## (undated) DEVICE — SPONGE GZ W4XL4IN COT 12 PLY TYP VII WVN C FLD DSGN

## (undated) DEVICE — GLOVE SURG SZ 85 L12IN THK75MIL DK GRN LTX FREE

## (undated) DEVICE — DRAPE C ARM W36XL30IN RECTANG BND BG AND TAPE

## (undated) DEVICE — CONTAINER,SPECIMEN,PNEU TUBE,4OZ,OR STRL: Brand: MEDLINE

## (undated) DEVICE — AGENT HEMSTAT W2XL4IN OXIDIZED REGENERATED CELOS ABSRB SFT

## (undated) DEVICE — SPONGE DRN W4XL4IN RAYON/POLYESTER 6 PLY NONWOVEN PRECUT

## (undated) DEVICE — 3M™ STERI-STRIP™ COMPOUND BENZOIN TINCTURE 40 BAGS/CARTON 4 CARTONS/CASE C1544: Brand: 3M™ STERI-STRIP™

## (undated) DEVICE — BANDAGE COMPR W4INXL12FT E DISP ESMARCH EVEN

## (undated) DEVICE — GAUZE,SPONGE,8"X4",12PLY,XRAY,STRL,LF: Brand: MEDLINE

## (undated) DEVICE — DRAPE,INSTRUMENT,MAGNETIC,10X16: Brand: MEDLINE

## (undated) DEVICE — 4-PORT MANIFOLD: Brand: NEPTUNE 2

## (undated) DEVICE — TOWEL,OR,DSP,ST,BLUE,STD,4/PK,20PK/CS: Brand: MEDLINE

## (undated) DEVICE — GLOVE ORANGE PI 7 1/2   MSG9075

## (undated) DEVICE — SUTURE NRLN SZ 4-0 L18IN NONABSORBABLE BLK L17MM RB-1 1/2 C554D

## (undated) DEVICE — PREP SOL PVP IODINE 4%  4 OZ/BTL

## (undated) DEVICE — BIPOLAR SEALER 23-112-1 AQM 6.0: Brand: AQUAMANTYS ®

## (undated) DEVICE — SUTURE VCRL SZ 0 L45CM ABSRB VLT OS-6 L36.4MM 1/2 CIR REV J711T

## (undated) DEVICE — SPLINT ARMBRD W3XL10.5IN POLYFOAM DLX A LN